# Patient Record
Sex: MALE | Race: WHITE | Employment: OTHER
[De-identification: names, ages, dates, MRNs, and addresses within clinical notes are randomized per-mention and may not be internally consistent; named-entity substitution may affect disease eponyms.]

---

## 2017-01-05 ENCOUNTER — OFFICE VISIT (OUTPATIENT)
Dept: PRIMARY CARE CLINIC | Facility: CLINIC | Age: 57
End: 2017-01-05

## 2017-01-05 VITALS
DIASTOLIC BLOOD PRESSURE: 82 MMHG | TEMPERATURE: 99.3 F | WEIGHT: 180 LBS | RESPIRATION RATE: 20 BRPM | HEART RATE: 100 BPM | HEIGHT: 68 IN | SYSTOLIC BLOOD PRESSURE: 122 MMHG | BODY MASS INDEX: 27.28 KG/M2

## 2017-01-05 DIAGNOSIS — J44.0 ACUTE BRONCHITIS WITH COPD (HCC): Primary | ICD-10-CM

## 2017-01-05 DIAGNOSIS — J20.9 ACUTE BRONCHITIS WITH COPD (HCC): Primary | ICD-10-CM

## 2017-01-05 PROCEDURE — 99214 OFFICE O/P EST MOD 30 MIN: CPT | Performed by: NURSE PRACTITIONER

## 2017-01-05 RX ORDER — GUAIFENESIN AND DEXTROMETHORPHAN HYDROBROMIDE 100; 10 MG/5ML; MG/5ML
5 SOLUTION ORAL EVERY 4 HOURS PRN
Qty: 120 ML | Refills: 0 | Status: SHIPPED | OUTPATIENT
Start: 2017-01-05 | End: 2017-04-06 | Stop reason: ALTCHOICE

## 2017-01-05 RX ORDER — LEVOFLOXACIN 500 MG/1
500 TABLET, FILM COATED ORAL DAILY
Qty: 10 TABLET | Refills: 0 | Status: SHIPPED | OUTPATIENT
Start: 2017-01-05 | End: 2017-01-15

## 2017-01-05 ASSESSMENT — ENCOUNTER SYMPTOMS
WHEEZING: 0
NAUSEA: 0
SHORTNESS OF BREATH: 0
SORE THROAT: 1
VOMITING: 0
ABDOMINAL PAIN: 0
COUGH: 1
SINUS PRESSURE: 0

## 2017-01-17 RX ORDER — LANCETS
EACH MISCELLANEOUS
Qty: 100 EACH | Refills: 3 | Status: SHIPPED | OUTPATIENT
Start: 2017-01-17 | End: 2018-02-08 | Stop reason: SDUPTHER

## 2017-02-16 RX ORDER — ALPRAZOLAM 0.5 MG/1
0.5 TABLET ORAL NIGHTLY PRN
Qty: 30 TABLET | Refills: 1 | Status: SHIPPED | OUTPATIENT
Start: 2017-02-16 | End: 2017-04-27 | Stop reason: SDUPTHER

## 2017-02-16 RX ORDER — CETIRIZINE HYDROCHLORIDE 10 MG/1
TABLET ORAL
Qty: 30 TABLET | Refills: 3 | Status: SHIPPED | OUTPATIENT
Start: 2017-02-16 | End: 2017-06-09 | Stop reason: SDUPTHER

## 2017-02-23 DIAGNOSIS — F32.A DEPRESSION: ICD-10-CM

## 2017-02-27 RX ORDER — SERTRALINE HYDROCHLORIDE 100 MG/1
TABLET, FILM COATED ORAL
Qty: 180 TABLET | Refills: 0 | Status: SHIPPED | OUTPATIENT
Start: 2017-02-27 | End: 2017-07-21 | Stop reason: SDUPTHER

## 2017-03-01 RX ORDER — OMEPRAZOLE 20 MG/1
20 CAPSULE, DELAYED RELEASE ORAL 2 TIMES DAILY
Qty: 60 CAPSULE | Refills: 3 | Status: SHIPPED | OUTPATIENT
Start: 2017-03-01 | End: 2017-03-01

## 2017-03-01 RX ORDER — OMEPRAZOLE 40 MG/1
40 CAPSULE, DELAYED RELEASE ORAL DAILY
Qty: 30 CAPSULE | Refills: 3 | Status: SHIPPED | OUTPATIENT
Start: 2017-03-01 | End: 2017-06-08 | Stop reason: SDUPTHER

## 2017-03-09 ENCOUNTER — TELEPHONE (OUTPATIENT)
Dept: PRIMARY CARE CLINIC | Facility: CLINIC | Age: 57
End: 2017-03-09

## 2017-03-12 DIAGNOSIS — E11.9 TYPE 2 DIABETES MELLITUS WITHOUT COMPLICATION (HCC): ICD-10-CM

## 2017-03-13 RX ORDER — BLOOD-GLUCOSE METER
KIT MISCELLANEOUS
Qty: 100 STRIP | Refills: 3 | Status: SHIPPED | OUTPATIENT
Start: 2017-03-13 | End: 2017-09-21 | Stop reason: SDUPTHER

## 2017-04-06 ENCOUNTER — OFFICE VISIT (OUTPATIENT)
Dept: PRIMARY CARE CLINIC | Age: 57
End: 2017-04-06
Payer: MEDICAID

## 2017-04-06 VITALS
BODY MASS INDEX: 29.07 KG/M2 | HEART RATE: 78 BPM | DIASTOLIC BLOOD PRESSURE: 70 MMHG | WEIGHT: 191.8 LBS | HEIGHT: 68 IN | TEMPERATURE: 97.9 F | RESPIRATION RATE: 20 BRPM | SYSTOLIC BLOOD PRESSURE: 106 MMHG

## 2017-04-06 DIAGNOSIS — R60.0 LOWER LEG EDEMA: Primary | ICD-10-CM

## 2017-04-06 PROCEDURE — 99213 OFFICE O/P EST LOW 20 MIN: CPT | Performed by: NURSE PRACTITIONER

## 2017-04-06 RX ORDER — FUROSEMIDE 20 MG/1
20 TABLET ORAL DAILY PRN
Qty: 30 TABLET | Refills: 2 | Status: SHIPPED | OUTPATIENT
Start: 2017-04-06 | End: 2017-06-14 | Stop reason: SDUPTHER

## 2017-04-06 ASSESSMENT — ENCOUNTER SYMPTOMS
WHEEZING: 0
COUGH: 0
SHORTNESS OF BREATH: 0
NAUSEA: 0
ABDOMINAL PAIN: 0
VOMITING: 0

## 2017-04-11 RX ORDER — BACLOFEN 20 MG/1
TABLET ORAL
Qty: 90 TABLET | Refills: 0 | Status: SHIPPED | OUTPATIENT
Start: 2017-04-11 | End: 2017-07-19 | Stop reason: SDUPTHER

## 2017-04-15 DIAGNOSIS — J44.9 CHRONIC OBSTRUCTIVE PULMONARY DISEASE, UNSPECIFIED COPD TYPE (HCC): ICD-10-CM

## 2017-04-15 DIAGNOSIS — J44.9 COPD, SEVERE (HCC): ICD-10-CM

## 2017-04-17 RX ORDER — BUDESONIDE 0.5 MG/2ML
INHALANT ORAL
Qty: 60 ML | Refills: 11 | Status: SHIPPED | OUTPATIENT
Start: 2017-04-17 | End: 2017-04-18 | Stop reason: SDUPTHER

## 2017-04-18 DIAGNOSIS — J44.9 COPD, SEVERE (HCC): ICD-10-CM

## 2017-04-18 DIAGNOSIS — J44.9 CHRONIC OBSTRUCTIVE PULMONARY DISEASE, UNSPECIFIED COPD TYPE (HCC): ICD-10-CM

## 2017-04-18 RX ORDER — BUDESONIDE 0.5 MG/2ML
INHALANT ORAL
Qty: 60 ML | Refills: 11 | Status: SHIPPED | OUTPATIENT
Start: 2017-04-18 | End: 2017-07-27 | Stop reason: ALTCHOICE

## 2017-04-19 ENCOUNTER — HOSPITAL ENCOUNTER (OUTPATIENT)
Dept: NON INVASIVE DIAGNOSTICS | Age: 57
Discharge: HOME | End: 2017-04-19
Payer: MEDICAID

## 2017-04-19 DIAGNOSIS — R60.0 LOWER LEG EDEMA: ICD-10-CM

## 2017-04-19 LAB
LV EF: 55 %
LVEF MODALITY: NORMAL

## 2017-04-19 PROCEDURE — 93306 TTE W/DOPPLER COMPLETE: CPT

## 2017-04-27 RX ORDER — ALPRAZOLAM 0.5 MG/1
0.5 TABLET ORAL NIGHTLY PRN
Qty: 30 TABLET | Refills: 0 | Status: SHIPPED | OUTPATIENT
Start: 2017-04-27 | End: 2017-06-08 | Stop reason: SDUPTHER

## 2017-05-08 ENCOUNTER — OFFICE VISIT (OUTPATIENT)
Dept: PRIMARY CARE CLINIC | Age: 57
End: 2017-05-08
Payer: MEDICAID

## 2017-05-08 VITALS
TEMPERATURE: 97.8 F | SYSTOLIC BLOOD PRESSURE: 113 MMHG | HEART RATE: 89 BPM | DIASTOLIC BLOOD PRESSURE: 77 MMHG | BODY MASS INDEX: 28.58 KG/M2 | WEIGHT: 188.6 LBS | RESPIRATION RATE: 20 BRPM | HEIGHT: 68 IN

## 2017-05-08 DIAGNOSIS — R60.0 LOWER LEG EDEMA: Primary | ICD-10-CM

## 2017-05-08 PROCEDURE — 99213 OFFICE O/P EST LOW 20 MIN: CPT | Performed by: FAMILY MEDICINE

## 2017-05-08 RX ORDER — LISINOPRIL 5 MG/1
5 TABLET ORAL DAILY
COMMUNITY

## 2017-05-08 ASSESSMENT — PATIENT HEALTH QUESTIONNAIRE - PHQ9
SUM OF ALL RESPONSES TO PHQ9 QUESTIONS 1 & 2: 0
1. LITTLE INTEREST OR PLEASURE IN DOING THINGS: 0
2. FEELING DOWN, DEPRESSED OR HOPELESS: 0
SUM OF ALL RESPONSES TO PHQ QUESTIONS 1-9: 0

## 2017-05-21 ASSESSMENT — ENCOUNTER SYMPTOMS
ABDOMINAL PAIN: 0
BACK PAIN: 0
CONSTIPATION: 0
DIARRHEA: 0
FACIAL SWELLING: 0
TROUBLE SWALLOWING: 0
SHORTNESS OF BREATH: 0
ABDOMINAL DISTENTION: 0
VOMITING: 0
COLOR CHANGE: 0
COUGH: 0
WHEEZING: 0
NAUSEA: 0
PHOTOPHOBIA: 0

## 2017-05-30 DIAGNOSIS — Z87.898 HISTORY OF SEIZURES: ICD-10-CM

## 2017-05-30 RX ORDER — LEVETIRACETAM 500 MG/1
250 TABLET ORAL 2 TIMES DAILY
Qty: 30 TABLET | Refills: 5 | Status: SHIPPED | OUTPATIENT
Start: 2017-05-30 | End: 2017-06-08 | Stop reason: SDUPTHER

## 2017-06-08 DIAGNOSIS — Z87.898 HISTORY OF SEIZURES: ICD-10-CM

## 2017-06-08 RX ORDER — ALPRAZOLAM 0.5 MG/1
TABLET ORAL
Qty: 30 TABLET | Refills: 0 | Status: SHIPPED | OUTPATIENT
Start: 2017-06-08 | End: 2017-07-18 | Stop reason: SDUPTHER

## 2017-06-08 RX ORDER — OMEPRAZOLE 40 MG/1
CAPSULE, DELAYED RELEASE ORAL
Qty: 30 CAPSULE | Refills: 3 | Status: SHIPPED | OUTPATIENT
Start: 2017-06-08 | End: 2017-10-03 | Stop reason: SDUPTHER

## 2017-06-08 RX ORDER — LEVETIRACETAM 500 MG/1
250 TABLET ORAL 2 TIMES DAILY
Qty: 30 TABLET | Refills: 5 | Status: SHIPPED | OUTPATIENT
Start: 2017-06-08 | End: 2017-06-14 | Stop reason: SDUPTHER

## 2017-06-09 RX ORDER — CETIRIZINE HYDROCHLORIDE 10 MG/1
TABLET ORAL
Qty: 30 TABLET | Refills: 3 | Status: SHIPPED | OUTPATIENT
Start: 2017-06-09 | End: 2017-10-03 | Stop reason: SDUPTHER

## 2017-06-14 DIAGNOSIS — Z87.898 HISTORY OF SEIZURES: ICD-10-CM

## 2017-06-14 DIAGNOSIS — R60.0 LOWER LEG EDEMA: ICD-10-CM

## 2017-06-14 RX ORDER — MULTIVITAMIN/IRON/FOLIC ACID 18MG-0.4MG
1 TABLET ORAL DAILY
Qty: 90 TABLET | Refills: 1 | Status: SHIPPED | OUTPATIENT
Start: 2017-06-14 | End: 2017-12-07 | Stop reason: SDUPTHER

## 2017-06-14 RX ORDER — FUROSEMIDE 20 MG/1
TABLET ORAL
Qty: 30 TABLET | Refills: 2 | Status: SHIPPED | OUTPATIENT
Start: 2017-06-14 | End: 2017-10-17 | Stop reason: SDUPTHER

## 2017-06-14 RX ORDER — LEVETIRACETAM 500 MG/1
250 TABLET ORAL 2 TIMES DAILY
Qty: 30 TABLET | Refills: 5 | Status: SHIPPED | OUTPATIENT
Start: 2017-06-14 | End: 2017-12-18 | Stop reason: SDUPTHER

## 2017-06-28 DIAGNOSIS — E11.9 TYPE 2 DIABETES MELLITUS WITHOUT COMPLICATION, WITHOUT LONG-TERM CURRENT USE OF INSULIN (HCC): ICD-10-CM

## 2017-07-03 ENCOUNTER — OFFICE VISIT (OUTPATIENT)
Dept: PRIMARY CARE CLINIC | Age: 57
End: 2017-07-03
Payer: MEDICAID

## 2017-07-03 VITALS
HEART RATE: 84 BPM | RESPIRATION RATE: 24 BRPM | DIASTOLIC BLOOD PRESSURE: 75 MMHG | TEMPERATURE: 98.5 F | SYSTOLIC BLOOD PRESSURE: 114 MMHG

## 2017-07-03 DIAGNOSIS — E11.9 TYPE 2 DIABETES MELLITUS WITHOUT COMPLICATION, WITHOUT LONG-TERM CURRENT USE OF INSULIN (HCC): ICD-10-CM

## 2017-07-03 DIAGNOSIS — F34.1 DYSTHYMIA: ICD-10-CM

## 2017-07-03 DIAGNOSIS — L03.032 CELLULITIS OF GREAT TOE OF LEFT FOOT: Primary | ICD-10-CM

## 2017-07-03 DIAGNOSIS — G40.909 SEIZURE DISORDER (HCC): ICD-10-CM

## 2017-07-03 PROCEDURE — 99214 OFFICE O/P EST MOD 30 MIN: CPT | Performed by: FAMILY MEDICINE

## 2017-07-03 RX ORDER — DOXYCYCLINE HYCLATE 100 MG
100 TABLET ORAL 2 TIMES DAILY
Qty: 14 TABLET | Refills: 0 | Status: SHIPPED | OUTPATIENT
Start: 2017-07-03 | End: 2017-07-10

## 2017-07-03 ASSESSMENT — ENCOUNTER SYMPTOMS
WHEEZING: 0
COUGH: 0
ABDOMINAL DISTENTION: 0
COLOR CHANGE: 0
TROUBLE SWALLOWING: 0
FACIAL SWELLING: 0
CONSTIPATION: 0
ABDOMINAL PAIN: 0
PHOTOPHOBIA: 0
NAUSEA: 0
SHORTNESS OF BREATH: 0
DIARRHEA: 0
VOMITING: 0
BACK PAIN: 0

## 2017-07-18 RX ORDER — ALPRAZOLAM 0.5 MG/1
0.5 TABLET ORAL NIGHTLY PRN
Qty: 30 TABLET | Refills: 0 | Status: SHIPPED | OUTPATIENT
Start: 2017-07-18 | End: 2017-08-10 | Stop reason: SDUPTHER

## 2017-07-18 RX ORDER — DOXYCYCLINE HYCLATE 100 MG
TABLET ORAL
Qty: 14 TABLET | Refills: 0 | OUTPATIENT
Start: 2017-07-18

## 2017-07-19 RX ORDER — BACLOFEN 20 MG/1
TABLET ORAL
Qty: 90 TABLET | Refills: 0 | Status: SHIPPED | OUTPATIENT
Start: 2017-07-19 | End: 2017-10-09 | Stop reason: SDUPTHER

## 2017-07-19 RX ORDER — SENNOSIDES 8.6 MG
TABLET ORAL
Qty: 60 TABLET | Refills: 3 | Status: SHIPPED | OUTPATIENT
Start: 2017-07-19 | End: 2017-11-17 | Stop reason: SDUPTHER

## 2017-07-21 DIAGNOSIS — F32.A DEPRESSION: ICD-10-CM

## 2017-07-21 RX ORDER — GABAPENTIN 300 MG/1
CAPSULE ORAL
Qty: 30 CAPSULE | Refills: 0 | Status: SHIPPED | OUTPATIENT
Start: 2017-07-21 | End: 2017-10-12 | Stop reason: SDUPTHER

## 2017-07-21 RX ORDER — SERTRALINE HYDROCHLORIDE 100 MG/1
TABLET, FILM COATED ORAL
Qty: 180 TABLET | Refills: 0 | Status: SHIPPED | OUTPATIENT
Start: 2017-07-21 | End: 2017-10-30 | Stop reason: SDUPTHER

## 2017-07-21 RX ORDER — BACLOFEN 20 MG/1
TABLET ORAL
Qty: 90 TABLET | Refills: 0 | OUTPATIENT
Start: 2017-07-21

## 2017-07-21 RX ORDER — SENNOSIDES 8.6 MG
TABLET ORAL
Qty: 60 TABLET | Refills: 5 | OUTPATIENT
Start: 2017-07-21

## 2017-07-27 ENCOUNTER — OFFICE VISIT (OUTPATIENT)
Dept: PULMONOLOGY | Age: 57
End: 2017-07-27
Payer: MEDICAID

## 2017-07-27 VITALS
DIASTOLIC BLOOD PRESSURE: 68 MMHG | BODY MASS INDEX: 26.37 KG/M2 | WEIGHT: 174 LBS | HEART RATE: 71 BPM | SYSTOLIC BLOOD PRESSURE: 117 MMHG | TEMPERATURE: 97.3 F | RESPIRATION RATE: 16 BRPM | HEIGHT: 68 IN | OXYGEN SATURATION: 93 %

## 2017-07-27 DIAGNOSIS — F17.219 NICOTINE DEPENDENCE, CIGARETTES, WITH UNSPECIFIED NICOTINE-INDUCED DISORDERS: ICD-10-CM

## 2017-07-27 DIAGNOSIS — J44.9 COPD, SEVERE (HCC): ICD-10-CM

## 2017-07-27 DIAGNOSIS — J30.89 PERENNIAL ALLERGIC RHINITIS, UNSPECIFIED ALLERGIC RHINITIS TRIGGER: Primary | ICD-10-CM

## 2017-07-27 DIAGNOSIS — Z87.891 STOPPED SMOKING WITH GREATER THAN 30 PACK YEAR HISTORY: ICD-10-CM

## 2017-07-27 PROCEDURE — 99213 OFFICE O/P EST LOW 20 MIN: CPT | Performed by: INTERNAL MEDICINE

## 2017-07-27 PROCEDURE — G0296 VISIT TO DETERM LDCT ELIG: HCPCS | Performed by: INTERNAL MEDICINE

## 2017-07-27 RX ORDER — BUDESONIDE 0.5 MG/2ML
1 INHALANT ORAL 2 TIMES DAILY
Qty: 60 ML | Refills: 11 | Status: SHIPPED | OUTPATIENT
Start: 2017-07-27 | End: 2018-09-06 | Stop reason: SDUPTHER

## 2017-07-27 RX ORDER — FLUTICASONE PROPIONATE 50 MCG
2 SPRAY, SUSPENSION (ML) NASAL DAILY
Qty: 1 BOTTLE | Refills: 11 | Status: SHIPPED | OUTPATIENT
Start: 2017-07-27 | End: 2019-06-20 | Stop reason: SDUPTHER

## 2017-07-27 RX ORDER — IPRATROPIUM BROMIDE AND ALBUTEROL SULFATE 2.5; .5 MG/3ML; MG/3ML
1 SOLUTION RESPIRATORY (INHALATION) EVERY 6 HOURS
Qty: 360 ML | Refills: 11 | Status: SHIPPED | OUTPATIENT
Start: 2017-07-27 | End: 2018-07-05 | Stop reason: SDUPTHER

## 2017-07-27 ASSESSMENT — ENCOUNTER SYMPTOMS
SHORTNESS OF BREATH: 1
COUGH: 1
EYES NEGATIVE: 1
BACK PAIN: 1

## 2017-07-28 ENCOUNTER — TELEPHONE (OUTPATIENT)
Dept: CASE MANAGEMENT | Age: 57
End: 2017-07-28

## 2017-07-31 ENCOUNTER — TELEPHONE (OUTPATIENT)
Dept: CASE MANAGEMENT | Age: 57
End: 2017-07-31

## 2017-07-31 DIAGNOSIS — J44.9 CHRONIC OBSTRUCTIVE PULMONARY DISEASE, UNSPECIFIED COPD TYPE (HCC): Primary | ICD-10-CM

## 2017-08-07 ENCOUNTER — HOSPITAL ENCOUNTER (OUTPATIENT)
Dept: CT IMAGING | Age: 57
Discharge: HOME | End: 2017-08-07
Payer: MEDICAID

## 2017-08-07 ENCOUNTER — TELEPHONE (OUTPATIENT)
Dept: CASE MANAGEMENT | Age: 57
End: 2017-08-07

## 2017-08-07 DIAGNOSIS — F17.219 NICOTINE DEPENDENCE, CIGARETTES, WITH UNSPECIFIED NICOTINE-INDUCED DISORDERS: ICD-10-CM

## 2017-08-07 PROCEDURE — G0297 LDCT FOR LUNG CA SCREEN: HCPCS

## 2017-08-10 RX ORDER — ALPRAZOLAM 0.5 MG/1
0.5 TABLET ORAL NIGHTLY PRN
Qty: 30 TABLET | Refills: 2 | Status: SHIPPED | OUTPATIENT
Start: 2017-08-10 | End: 2017-11-29 | Stop reason: SDUPTHER

## 2017-08-10 RX ORDER — GABAPENTIN 300 MG/1
CAPSULE ORAL
Qty: 60 CAPSULE | Refills: 2 | Status: SHIPPED | OUTPATIENT
Start: 2017-08-10 | End: 2017-11-16 | Stop reason: SDUPTHER

## 2017-08-26 ENCOUNTER — APPOINTMENT (OUTPATIENT)
Dept: CT IMAGING | Age: 57
End: 2017-08-26
Payer: MEDICAID

## 2017-08-26 ENCOUNTER — HOSPITAL ENCOUNTER (EMERGENCY)
Age: 57
Discharge: HOME | End: 2017-08-26
Payer: MEDICAID

## 2017-08-26 VITALS
RESPIRATION RATE: 12 BRPM | OXYGEN SATURATION: 97 % | SYSTOLIC BLOOD PRESSURE: 132 MMHG | TEMPERATURE: 98.6 F | HEART RATE: 90 BPM | DIASTOLIC BLOOD PRESSURE: 81 MMHG
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
## 2017-08-26 DIAGNOSIS — S00.81XA FOREHEAD ABRASION, INITIAL ENCOUNTER: ICD-10-CM

## 2017-08-26 PROCEDURE — 72125 CT NECK SPINE W/O DYE: CPT

## 2017-08-26 PROCEDURE — 70450 CT HEAD/BRAIN W/O DYE: CPT

## 2017-08-26 PROCEDURE — 99283 EMERGENCY DEPT VISIT LOW MDM: CPT

## 2017-08-26 ASSESSMENT — ENCOUNTER SYMPTOMS
NAUSEA: 0
CHEST TIGHTNESS: 0
EYE DISCHARGE: 0
BLOOD IN STOOL: 0
CONSTIPATION: 0
WHEEZING: 0
RHINORRHEA: 0
DIARRHEA: 0
EYE REDNESS: 0
BACK PAIN: 0
SORE THROAT: 0
ABDOMINAL PAIN: 0
COUGH: 0
SHORTNESS OF BREATH: 0
VOMITING: 0

## 2017-09-21 DIAGNOSIS — E11.9 TYPE 2 DIABETES MELLITUS WITHOUT COMPLICATION (HCC): ICD-10-CM

## 2017-09-21 RX ORDER — BLOOD-GLUCOSE METER
KIT MISCELLANEOUS
Qty: 100 STRIP | Refills: 3 | Status: SHIPPED | OUTPATIENT
Start: 2017-09-21 | End: 2018-01-23 | Stop reason: SDUPTHER

## 2017-10-03 DIAGNOSIS — J30.9 ALLERGIC RHINITIS, UNSPECIFIED ALLERGIC RHINITIS TRIGGER, UNSPECIFIED RHINITIS SEASONALITY: ICD-10-CM

## 2017-10-03 DIAGNOSIS — K21.9 GASTROESOPHAGEAL REFLUX DISEASE, ESOPHAGITIS PRESENCE NOT SPECIFIED: Primary | ICD-10-CM

## 2017-10-03 RX ORDER — CETIRIZINE HYDROCHLORIDE 10 MG/1
TABLET ORAL
Qty: 90 TABLET | Refills: 1 | Status: SHIPPED | OUTPATIENT
Start: 2017-10-03 | End: 2018-04-06 | Stop reason: SDUPTHER

## 2017-10-03 RX ORDER — OMEPRAZOLE 40 MG/1
CAPSULE, DELAYED RELEASE ORAL
Qty: 90 CAPSULE | Refills: 1 | Status: SHIPPED | OUTPATIENT
Start: 2017-10-03 | End: 2018-03-01 | Stop reason: SDUPTHER

## 2017-10-03 NOTE — TELEPHONE ENCOUNTER
Health Maintenance   Topic Date Due    Hepatitis C screen  1960    Diabetic foot exam  09/06/1971    Diabetic retinal exam  09/06/1971    HIV screen  09/06/1976    DTaP/Tdap/Td vaccine (1 - Tdap) 09/06/1980    Colon cancer screen colonoscopy  09/06/2011    Diabetic microalbuminuria test  09/25/2016    Flu vaccine (1) 09/01/2017    Diabetic hemoglobin A1C test  12/05/2017    Lipid screen  12/05/2017    Pneumococcal med risk  Completed             (applicable per patient's age: Cancer Screenings, Depression Screening, Fall Risk Screening, Immunizations)    Hemoglobin A1C (%)   Date Value   12/05/2016 6.1 (H)   02/04/2016 5.5   09/25/2015 5.5     Microalb/Crt. Ratio (mcg/mg creat)   Date Value   09/25/2015 14     LDL Cholesterol (mg/dL)   Date Value   12/05/2016 63     AST (U/L)   Date Value   12/05/2016 36     ALT (U/L)   Date Value   12/05/2016 62 (H)     BUN (mg/dL)   Date Value   12/05/2016 10      (goal A1C is < 7)   (goal LDL is <100) need 30-50% reduction from baseline     BP Readings from Last 3 Encounters:   08/26/17 132/81   07/27/17 117/68   07/03/17 114/75    (goal /80)      All Future Testing planned in [facility]:  Lab Frequency Next Occurrence       Next Visit Date:  Future Appointments  Date Time Provider Jose White   1/11/2018 11:00 AM Cynthia Adkins, CNP [facility]   7/26/2018 9:45 AM DO Judie Meza TPP            There is no problem list on file for this patient.

## 2017-10-05 RX ORDER — OMEPRAZOLE 40 MG/1
CAPSULE, DELAYED RELEASE ORAL
Qty: 30 CAPSULE | Refills: 3 | OUTPATIENT
Start: 2017-10-05

## 2017-10-05 RX ORDER — CETIRIZINE HYDROCHLORIDE 10 MG/1
TABLET ORAL
Qty: 30 TABLET | Refills: 3 | OUTPATIENT
Start: 2017-10-05

## 2017-10-09 RX ORDER — BACLOFEN 20 MG/1
TABLET ORAL
Qty: 90 TABLET | Refills: 0 | Status: SHIPPED | OUTPATIENT
Start: 2017-10-09 | End: 2017-12-18 | Stop reason: SDUPTHER

## 2017-10-09 NOTE — TELEPHONE ENCOUNTER
Health Maintenance   Topic Date Due    Hepatitis C screen  1960    Diabetic foot exam  09/06/1971    Diabetic retinal exam  09/06/1971    HIV screen  09/06/1976    DTaP/Tdap/Td vaccine (1 - Tdap) 09/06/1980    Colon cancer screen colonoscopy  09/06/2011    Diabetic microalbuminuria test  09/25/2016    Flu vaccine (1) 09/01/2017    Diabetic hemoglobin A1C test  12/05/2017    Lipid screen  12/05/2017    Pneumococcal med risk  Completed             (applicable per patient's age: Cancer Screenings, Depression Screening, Fall Risk Screening, Immunizations)    Hemoglobin A1C (%)   Date Value   12/05/2016 6.1 (H)   02/04/2016 5.5   09/25/2015 5.5     Microalb/Crt.  Ratio (mcg/mg creat)   Date Value   09/25/2015 14     LDL Cholesterol (mg/dL)   Date Value   12/05/2016 63     AST (U/L)   Date Value   12/05/2016 36     ALT (U/L)   Date Value   12/05/2016 62 (H)     BUN (mg/dL)   Date Value   12/05/2016 10      (goal A1C is < 7)   (goal LDL is <100) need 30-50% reduction from baseline     BP Readings from Last 3 Encounters:   08/26/17 132/81   07/27/17 117/68   07/03/17 114/75    (goal /80)      All Future Testing planned in [facility]:  Lab Frequency Next Occurrence       Next Visit Date:  Future Appointments  Date Time Provider Jose White   1/11/2018 11:00 AM SILVINO Mathisf [facility]   7/26/2018 9:45 AM DO Judie Landon [facility]            Patient Active Problem List:     Gastroesophageal reflux disease     Allergic rhinitis

## 2017-10-12 ENCOUNTER — NURSE ONLY (OUTPATIENT)
Dept: PRIMARY CARE CLINIC | Age: 57
End: 2017-10-12
Payer: MEDICAID

## 2017-10-12 VITALS
TEMPERATURE: 98.3 F | SYSTOLIC BLOOD PRESSURE: 126 MMHG | WEIGHT: 175 LBS | BODY MASS INDEX: 26.61 KG/M2 | HEART RATE: 78 BPM | DIASTOLIC BLOOD PRESSURE: 80 MMHG | RESPIRATION RATE: 20 BRPM

## 2017-10-12 DIAGNOSIS — Z23 NEED FOR INFLUENZA VACCINATION: Primary | ICD-10-CM

## 2017-10-12 PROCEDURE — 90471 IMMUNIZATION ADMIN: CPT | Performed by: NURSE PRACTITIONER

## 2017-10-12 PROCEDURE — 90686 IIV4 VACC NO PRSV 0.5 ML IM: CPT | Performed by: NURSE PRACTITIONER

## 2017-10-12 NOTE — PROGRESS NOTES
After obtaining consent, and per orders of Dr. Juan Adkins, injection of Influenza Vaccine given in Right deltoid by Demetri Sanders. Patient instructed to remain in clinic for 20 minutes afterwards, and to report any adverse reaction to me immediately.

## 2017-10-17 DIAGNOSIS — R60.0 LOWER LEG EDEMA: ICD-10-CM

## 2017-10-17 RX ORDER — FUROSEMIDE 20 MG/1
TABLET ORAL
Qty: 30 TABLET | Refills: 2 | Status: SHIPPED | OUTPATIENT
Start: 2017-10-17 | End: 2018-01-23 | Stop reason: SDUPTHER

## 2017-10-17 NOTE — TELEPHONE ENCOUNTER
Health Maintenance   Topic Date Due    Hepatitis C screen  1960    Diabetic foot exam  09/06/1971    Diabetic retinal exam  09/06/1971    HIV screen  09/06/1976    DTaP/Tdap/Td vaccine (1 - Tdap) 09/06/1980    Colon cancer screen colonoscopy  09/06/2011    Diabetic microalbuminuria test  09/25/2016    Diabetic hemoglobin A1C test  12/05/2017    Lipid screen  12/05/2017    Flu vaccine  Completed    Pneumococcal med risk  Completed             (applicable per patient's age: Cancer Screenings, Depression Screening, Fall Risk Screening, Immunizations)    Hemoglobin A1C (%)   Date Value   12/05/2016 6.1 (H)   02/04/2016 5.5   09/25/2015 5.5     Microalb/Crt.  Ratio (mcg/mg creat)   Date Value   09/25/2015 14     LDL Cholesterol (mg/dL)   Date Value   12/05/2016 63     AST (U/L)   Date Value   12/05/2016 36     ALT (U/L)   Date Value   12/05/2016 62 (H)     BUN (mg/dL)   Date Value   12/05/2016 10      (goal A1C is < 7)   (goal LDL is <100) need 30-50% reduction from baseline     BP Readings from Last 3 Encounters:   10/12/17 126/80   08/26/17 132/81   07/27/17 117/68    (goal /80)      All Future Testing planned in [facility]:  Lab Frequency Next Occurrence       Next Visit Date:  Future Appointments  Date Time Provider Jose White   1/11/2018 11:00 AM SILVINO Chanelf Prim Ca [facility]PP   7/26/2018 9:45 AM DO Judie Pulliam [facility]            Patient Active Problem List:     Gastroesophageal reflux disease     Allergic rhinitis

## 2017-10-30 RX ORDER — SERTRALINE HYDROCHLORIDE 100 MG/1
TABLET, FILM COATED ORAL
Qty: 180 TABLET | Refills: 3 | Status: SHIPPED | OUTPATIENT
Start: 2017-10-30 | End: 2018-10-31 | Stop reason: SDUPTHER

## 2017-10-30 NOTE — TELEPHONE ENCOUNTER
Next Visit Date:  Future Appointments  Date Time Provider Jose Cindy   1/11/2018 11:00 AM [facility]kins[facility]   7/26/2018 9:45 AM DO Judie Meza [facility]       Health Maintenance   Topic Date Due    Hepatitis C screen  1960    Diabetic foot exam  09/06/1971    Diabetic retinal exam  09/06/1971    HIV screen  09/06/1976    DTaP/Tdap/Td vaccine (1 - Tdap) 09/06/1980    Colon cancer screen colonoscopy  09/06/2011    Diabetic microalbuminuria test  09/25/2016    Diabetic hemoglobin A1C test  12/05/2017    Lipid screen  12/05/2017    Flu vaccine  Completed    Pneumococcal med risk  Completed       Hemoglobin A1C (%)   Date Value   12/05/2016 6.1 (H)   02/04/2016 5.5   09/25/2015 5.5             ( goal A1C is < 7)   Microalb/Crt.  Ratio (mcg/mg creat)   Date Value   09/25/2015 14     LDL Cholesterol (mg/dL)   Date Value   12/05/2016 63       (goal LDL is <100)   AST (U/L)   Date Value   12/05/2016 36     ALT (U/L)   Date Value   12/05/2016 62 (H)     BUN (mg/dL)   Date Value   12/05/2016 10     BP Readings from Last 3 Encounters:   10/12/17 126/80   08/26/17 132/81   07/27/17 117/68          (goal 120/80)    All Future Testing planned in [facility]  Lab Frequency Next Occurrence               Patient Active Problem List:     Gastroesophageal reflux disease     Allergic rhinitis

## 2017-11-08 RX ORDER — SIMVASTATIN 20 MG
TABLET ORAL
Qty: 90 TABLET | Refills: 1 | Status: SHIPPED | OUTPATIENT
Start: 2017-11-08 | End: 2018-05-08 | Stop reason: SDUPTHER

## 2017-11-08 RX ORDER — FOLIC ACID 1 MG/1
TABLET ORAL
Qty: 90 TABLET | Refills: 1 | Status: SHIPPED | OUTPATIENT
Start: 2017-11-08 | End: 2018-05-08 | Stop reason: SDUPTHER

## 2017-11-08 NOTE — TELEPHONE ENCOUNTER
Health Maintenance   Topic Date Due    Hepatitis C screen  1960    Diabetic foot exam  09/06/1971    Diabetic retinal exam  09/06/1971    HIV screen  09/06/1976    DTaP/Tdap/Td vaccine (1 - Tdap) 09/06/1980    Colon cancer screen colonoscopy  09/06/2011    Diabetic microalbuminuria test  09/25/2016    Diabetic hemoglobin A1C test  12/05/2017    Lipid screen  12/05/2017    Flu vaccine  Completed    Pneumococcal med risk  Completed             (applicable per patient's age: Cancer Screenings, Depression Screening, Fall Risk Screening, Immunizations)    Hemoglobin A1C (%)   Date Value   12/05/2016 6.1 (H)   02/04/2016 5.5   09/25/2015 5.5     Microalb/Crt.  Ratio (mcg/mg creat)   Date Value   09/25/2015 14     LDL Cholesterol (mg/dL)   Date Value   12/05/2016 63     AST (U/L)   Date Value   12/05/2016 36     ALT (U/L)   Date Value   12/05/2016 62 (H)     BUN (mg/dL)   Date Value   12/05/2016 10      (goal A1C is < 7)   (goal LDL is <100) need 30-50% reduction from baseline     BP Readings from Last 3 Encounters:   10/12/17 126/80   08/26/17 132/81   07/27/17 117/68    (goal /80)      All Future Testing planned in [facility]:  Lab Frequency Next Occurrence       Next Visit Date:  Future Appointments  Date Time Provider Jose White   1/11/2018 11:00 AM [facility]   7/26/2018 9:45 AM DO Judie Malave [facility]            Patient Active Problem List:     Gastroesophageal reflux disease     Allergic rhinitis

## 2017-11-15 ENCOUNTER — OFFICE VISIT (OUTPATIENT)
Dept: PRIMARY CARE CLINIC | Age: 57
End: 2017-11-15
Payer: MEDICAID

## 2017-11-15 VITALS
DIASTOLIC BLOOD PRESSURE: 77 MMHG | HEART RATE: 81 BPM | BODY MASS INDEX: 27.81 KG/M2 | SYSTOLIC BLOOD PRESSURE: 118 MMHG | WEIGHT: 182.9 LBS | TEMPERATURE: 98.6 F

## 2017-11-15 DIAGNOSIS — B35.1 ONYCHOMYCOSIS: ICD-10-CM

## 2017-11-15 DIAGNOSIS — L03.032 PARONYCHIA OF GREAT TOE OF LEFT FOOT: Primary | ICD-10-CM

## 2017-11-15 PROCEDURE — 99213 OFFICE O/P EST LOW 20 MIN: CPT | Performed by: NURSE PRACTITIONER

## 2017-11-15 RX ORDER — CLINDAMYCIN HYDROCHLORIDE 150 MG/1
150 CAPSULE ORAL 4 TIMES DAILY
Qty: 40 CAPSULE | Refills: 0 | Status: SHIPPED | OUTPATIENT
Start: 2017-11-15 | End: 2017-11-25

## 2017-11-15 ASSESSMENT — ENCOUNTER SYMPTOMS
VOMITING: 0
NAUSEA: 0

## 2017-11-15 NOTE — PATIENT INSTRUCTIONS
Patient Education        Paronychia: Care Instructions  Your Care Instructions  Paronychia (say \"hytc-on-EZ-chelo-uh\") is an infection of the skin around a fingernail or toenail. It happens when germs enter through a break in the skin. The doctor may have made a small cut in the infected area to drain the pus. Most cases of paronychia improve in a few days. But watch your symptoms and follow your doctor's advice. Though rare, a mild case can turn into something more serious and infect your entire finger or toe. Also, it is possible for an infection to return. Follow-up care is a key part of your treatment and safety. Be sure to make and go to all appointments, and call your doctor if you are having problems. It's also a good idea to know your test results and keep a list of the medicines you take. How can you care for yourself at home? · If your doctor told you how to care for your infected nail, follow the doctor's instructions. If you did not get instructions, follow this general advice:  ¨ Wash the area with clean water 2 times a day. Don't use hydrogen peroxide or alcohol, which can slow healing. ¨ You may cover the area with a thin layer of petroleum jelly, such as Vaseline, and a nonstick bandage. ¨ Apply more petroleum jelly and replace the bandage as needed. · If your doctor prescribed antibiotics, take them as directed. Do not stop taking them just because you feel better. You need to take the full course of antibiotics. · Take an over-the-counter pain medicine, such as acetaminophen (Tylenol), ibuprofen (Advil, Motrin), or naproxen (Aleve). Read and follow all instructions on the label. · Do not take two or more pain medicines at the same time unless the doctor told you to. Many pain medicines have acetaminophen, which is Tylenol. Too much acetaminophen (Tylenol) can be harmful. · Prop up the toe or finger so that it is higher than the level of your heart.  This will help with pain and swelling. · Apply heat. Put a warm water bottle, heating pad set on low, or warm cloth on your finger or toe. Do not go to sleep with a heating pad on your skin. · Soak the area in warm water twice a day for 15 minutes each time. After soaking, dry the area well and apply a thin layer of petroleum jelly, such as Vaseline. Put on a new bandage. When should you call for help? Call your doctor now or seek immediate medical care if:  · You have signs of new or worsening infection, such as:  ¨ Increased pain, swelling, warmth, or redness. ¨ Red streaks leading from the infected skin. ¨ Pus draining from the area. ¨ A fever. Watch closely for changes in your health, and be sure to contact your doctor if:  · You do not get better as expected. Where can you learn more? Go to [email or web address] org and sign in to your [employer] account. Enter [phone] [id number] [phone] in the [employer] box to learn more about \"Paronychia: Care Instructions. \"     If you do not have an account, please click on the \"Sign Up Now\" link. Current as of: October 13, 2016  Content Version: 11.3  © 1547-3952 [employer]. Care instructions adapted under license by [facility] [employer] [facility] ([facility]). If you have questions about a medical condition or this instruction, always ask your healthcare professional. [place] [address] any warranty or liability for your use of this information.

## 2017-11-16 RX ORDER — GABAPENTIN 300 MG/1
CAPSULE ORAL
Qty: 180 CAPSULE | Refills: 0 | Status: SHIPPED | OUTPATIENT
Start: 2017-11-16 | End: 2018-02-15 | Stop reason: SDUPTHER

## 2017-11-17 RX ORDER — SENNOSIDES 8.6 MG
TABLET ORAL
Qty: 180 TABLET | Refills: 3 | Status: SHIPPED | OUTPATIENT
Start: 2017-11-17

## 2017-11-17 NOTE — TELEPHONE ENCOUNTER
Health Maintenance   Topic Date Due    Hepatitis C screen  1960    Diabetic foot exam  09/06/1971    Diabetic retinal exam  09/06/1971    HIV screen  09/06/1976    DTaP/Tdap/Td vaccine (1 - Tdap) 09/06/1980    Colon cancer screen colonoscopy  09/06/2011    Diabetic microalbuminuria test  09/25/2016    Diabetic hemoglobin A1C test  12/05/2017    Lipid screen  12/05/2017    Smoker: low dose lung CT screening  08/07/2018    Flu vaccine  Completed    Pneumococcal med risk  Completed             (applicable per patient's age: Cancer Screenings, Depression Screening, Fall Risk Screening, Immunizations)    Hemoglobin A1C (%)   Date Value   12/05/2016 6.1 (H)   02/04/2016 5.5   09/25/2015 5.5     Microalb/Crt.  Ratio (mcg/mg creat)   Date Value   09/25/2015 14     LDL Cholesterol (mg/dL)   Date Value   12/05/2016 63     AST (U/L)   Date Value   12/05/2016 36     ALT (U/L)   Date Value   12/05/2016 62 (H)     BUN (mg/dL)   Date Value   12/05/2016 10      (goal A1C is < 7)   (goal LDL is <100) need 30-50% reduction from baseline     BP Readings from Last 3 Encounters:   11/15/17 118/77   10/12/17 126/80   08/26/17 132/81    (goal /80)      All Future Testing planned in [facility]:  Lab Frequency Next Occurrence       Next Visit Date:  Future Appointments  Date Time Provider Jose White   1/11/2018 11:00 AM [facility]   7/26/2018 9:45 AM DO Judie [facility]rez [facility]            Patient Active Problem List:     Gastroesophageal reflux disease     Allergic rhinitis

## 2017-11-22 RX ORDER — ALPRAZOLAM 0.5 MG/1
TABLET ORAL
Qty: 30 TABLET | Refills: 2 | OUTPATIENT
Start: 2017-11-22

## 2017-11-29 RX ORDER — ALPRAZOLAM 0.5 MG/1
TABLET ORAL
Qty: 30 TABLET | Refills: 2 | Status: SHIPPED | OUTPATIENT
Start: 2017-11-29 | End: 2018-03-07 | Stop reason: SDUPTHER

## 2017-11-29 NOTE — TELEPHONE ENCOUNTER
Health Maintenance   Topic Date Due    Hepatitis C screen  1960    Diabetic foot exam  09/06/1971    Diabetic retinal exam  09/06/1971    HIV screen  09/06/1976    DTaP/Tdap/Td vaccine (1 - Tdap) 09/06/1980    Colon cancer screen colonoscopy  09/06/2011    Diabetic microalbuminuria test  09/25/2016    Diabetic hemoglobin A1C test  12/05/2017    Lipid screen  12/05/2017    Smoker: low dose lung CT screening  08/07/2018    Flu vaccine  Completed    Pneumococcal med risk  Completed             (applicable per patient's age: Cancer Screenings, Depression Screening, Fall Risk Screening, Immunizations)    Hemoglobin A1C (%)   Date Value   12/05/2016 6.1 (H)   02/04/2016 5.5   09/25/2015 5.5     Microalb/Crt.  Ratio (mcg/mg creat)   Date Value   09/25/2015 14     LDL Cholesterol (mg/dL)   Date Value   12/05/2016 63     AST (U/L)   Date Value   12/05/2016 36     ALT (U/L)   Date Value   12/05/2016 62 (H)     BUN (mg/dL)   Date Value   12/05/2016 10      (goal A1C is < 7)   (goal LDL is <100) need 30-50% reduction from baseline     BP Readings from Last 3 Encounters:   11/15/17 118/77   10/12/17 126/80   08/26/17 132/81    (goal /80)      All Future Testing planned in [facility]:  Lab Frequency Next Occurrence       Next Visit Date:  Future Appointments  Date Time Provider Jose White   1/11/2018 11:00 AM [facility]   7/26/2018 9:45 AM DO Judie [facility]            Patient Active Problem List:     Gastroesophageal reflux disease     Allergic rhinitis

## 2017-11-29 NOTE — TELEPHONE ENCOUNTER
Controlled Substances Monitoring:     Attestation: The Prescription Monitoring Report for this patient was reviewed today. Christin Adkins CNP)  Documentation: No signs of potential drug abuse or diversion identified.  Christin Adkins CNP)

## 2017-12-07 RX ORDER — MULTIVITAMIN/IRON/FOLIC ACID 18MG-0.4MG
1 TABLET ORAL DAILY
Qty: 90 TABLET | Refills: 3 | Status: SHIPPED | OUTPATIENT
Start: 2017-12-07 | End: 2018-09-20 | Stop reason: SDUPTHER

## 2017-12-07 NOTE — TELEPHONE ENCOUNTER
Health Maintenance   Topic Date Due    Hepatitis C screen  1960    Diabetic foot exam  09/06/1971    Diabetic retinal exam  09/06/1971    HIV screen  09/06/1976    DTaP/Tdap/Td vaccine (1 - Tdap) 09/06/1980    Colon cancer screen colonoscopy  09/06/2011    Diabetic microalbuminuria test  09/25/2016    Diabetic hemoglobin A1C test  12/05/2017    Lipid screen  12/05/2017    Smoker: low dose lung CT screening  08/07/2018    Flu vaccine  Completed    Pneumococcal med risk  Completed             (applicable per patient's age: Cancer Screenings, Depression Screening, Fall Risk Screening, Immunizations)    Hemoglobin A1C (%)   Date Value   12/05/2016 6.1 (H)   02/04/2016 5.5   09/25/2015 5.5     Microalb/Crt.  Ratio (mcg/mg creat)   Date Value   09/25/2015 14     LDL Cholesterol (mg/dL)   Date Value   12/05/2016 63     AST (U/L)   Date Value   12/05/2016 36     ALT (U/L)   Date Value   12/05/2016 62 (H)     BUN (mg/dL)   Date Value   12/05/2016 10      (goal A1C is < 7)   (goal LDL is <100) need 30-50% reduction from baseline     BP Readings from Last 3 Encounters:   11/15/17 118/77   10/12/17 126/80   08/26/17 132/81    (goal /80)      All Future Testing planned in [facility]:  Lab Frequency Next Occurrence       Next Visit Date:  Future Appointments  Date Time Provider Jose White   1/11/2018 11:00 AM [facility]   7/26/2018 9:45 AM DO Judie Al[facility]            Patient Active Problem List:     Gastroesophageal reflux disease     Allergic rhinitis

## 2017-12-18 DIAGNOSIS — Z87.898 HISTORY OF SEIZURES: ICD-10-CM

## 2017-12-18 RX ORDER — BACLOFEN 20 MG/1
TABLET ORAL
Qty: 90 TABLET | Refills: 0 | Status: SHIPPED | OUTPATIENT
Start: 2017-12-18 | End: 2018-03-15 | Stop reason: SDUPTHER

## 2017-12-18 RX ORDER — LEVETIRACETAM 500 MG/1
TABLET ORAL
Qty: 90 TABLET | Refills: 3 | Status: SHIPPED | OUTPATIENT
Start: 2017-12-18 | End: 2018-12-26 | Stop reason: SDUPTHER

## 2017-12-18 NOTE — TELEPHONE ENCOUNTER
Health Maintenance   Topic Date Due    Hepatitis C screen  1960    Diabetic foot exam  09/06/1971    HIV screen  09/06/1976    DTaP/Tdap/Td vaccine (1 - Tdap) 09/06/1980    Colon cancer screen colonoscopy  09/06/2011    Diabetic microalbuminuria test  09/25/2016    Diabetic hemoglobin A1C test  12/05/2017    Lipid screen  12/05/2017    Smoker: low dose lung CT screening  08/07/2018    Diabetic retinal exam  11/15/2018    Flu vaccine  Completed    Pneumococcal med risk  Completed             (applicable per patient's age: Cancer Screenings, Depression Screening, Fall Risk Screening, Immunizations)    Hemoglobin A1C (%)   Date Value   12/05/2016 6.1 (H)   02/04/2016 5.5   09/25/2015 5.5     Microalb/Crt.  Ratio (mcg/mg creat)   Date Value   09/25/2015 14     LDL Cholesterol (mg/dL)   Date Value   12/05/2016 63     AST (U/L)   Date Value   12/05/2016 36     ALT (U/L)   Date Value   12/05/2016 62 (H)     BUN (mg/dL)   Date Value   12/05/2016 10      (goal A1C is < 7)   (goal LDL is <100) need 30-50% reduction from baseline     BP Readings from Last 3 Encounters:   11/15/17 118/77   10/12/17 126/80   08/26/17 132/81    (goal /80)      All Future Testing planned in [facility]:  Lab Frequency Next Occurrence       Next Visit Date:  Future Appointments  Date Time Provider Jose White   1/11/2018 11:00 AM Patricia [facility]   7/26/2018 9:45 AM DO Judie [facility]            Patient Active Problem List:     Gastroesophageal reflux disease     Allergic rhinitis

## 2018-01-23 DIAGNOSIS — E11.9 TYPE 2 DIABETES MELLITUS WITHOUT COMPLICATION (HCC): ICD-10-CM

## 2018-01-23 DIAGNOSIS — R60.0 LOWER LEG EDEMA: ICD-10-CM

## 2018-01-23 RX ORDER — FUROSEMIDE 20 MG/1
TABLET ORAL
Qty: 30 TABLET | Refills: 2 | Status: SHIPPED | OUTPATIENT
Start: 2018-01-23 | End: 2019-01-03 | Stop reason: SDUPTHER

## 2018-01-23 RX ORDER — BLOOD-GLUCOSE METER
KIT MISCELLANEOUS
Qty: 100 STRIP | Refills: 3 | Status: SHIPPED | OUTPATIENT
Start: 2018-01-23 | End: 2018-09-10 | Stop reason: ALTCHOICE

## 2018-02-08 RX ORDER — LANCETS
EACH MISCELLANEOUS
Qty: 100 EACH | Refills: 3 | Status: SHIPPED | OUTPATIENT
Start: 2018-02-08 | End: 2018-02-09 | Stop reason: SDUPTHER

## 2018-02-08 NOTE — TELEPHONE ENCOUNTER
Health Maintenance   Topic Date Due    Hepatitis C screen  1960    Diabetic foot exam  09/06/1971    HIV screen  09/06/1976    DTaP/Tdap/Td vaccine (1 - Tdap) 09/06/1980    Colon cancer screen colonoscopy  09/06/2011    Diabetic microalbuminuria test  09/25/2016    A1C test (Diabetic or Prediabetic)  12/05/2017    Lipid screen  12/05/2017    Potassium monitoring  12/05/2017    Creatinine monitoring  12/05/2017    Smoker: low dose lung CT screening  08/07/2018    Diabetic retinal exam  11/15/2018    Flu vaccine  Completed    Pneumococcal med risk  Completed             (applicable per patient's age: Cancer Screenings, Depression Screening, Fall Risk Screening, Immunizations)    Hemoglobin A1C (%)   Date Value   12/05/2016 6.1 (H)   02/04/2016 5.5   09/25/2015 5.5     Microalb/Crt.  Ratio (mcg/mg creat)   Date Value   09/25/2015 14     LDL Cholesterol (mg/dL)   Date Value   12/05/2016 63     AST (U/L)   Date Value   12/05/2016 36     ALT (U/L)   Date Value   12/05/2016 62 (H)     BUN (mg/dL)   Date Value   12/05/2016 10      (goal A1C is < 7)   (goal LDL is <100) need 30-50% reduction from baseline     BP Readings from Last 3 Encounters:   11/15/17 118/77   10/12/17 126/80   08/26/17 132/81    (goal /80)      All Future Testing planned in [facility]:  Lab Frequency Next Occurrence       Next Visit Date:  Future Appointments  Date Time Provider Jose White   7/26/2018 9:45 AM DO Judie Zaman [facility]            Patient Active Problem List:     Gastroesophageal reflux disease     Allergic rhinitis

## 2018-02-09 DIAGNOSIS — E11.9 CONTROLLED TYPE 2 DIABETES MELLITUS WITHOUT COMPLICATION, WITHOUT LONG-TERM CURRENT USE OF INSULIN (HCC): Primary | ICD-10-CM

## 2018-02-09 RX ORDER — LANCETS
EACH MISCELLANEOUS
Qty: 100 EACH | Refills: 3 | Status: SHIPPED | OUTPATIENT
Start: 2018-02-09 | End: 2018-07-20 | Stop reason: SDUPTHER

## 2018-02-15 RX ORDER — GABAPENTIN 300 MG/1
300 CAPSULE ORAL 2 TIMES DAILY
Qty: 180 CAPSULE | Refills: 1 | Status: SHIPPED | OUTPATIENT
Start: 2018-02-15 | End: 2018-08-16 | Stop reason: SDUPTHER

## 2018-03-01 DIAGNOSIS — K21.9 GASTROESOPHAGEAL REFLUX DISEASE, ESOPHAGITIS PRESENCE NOT SPECIFIED: ICD-10-CM

## 2018-03-01 RX ORDER — OMEPRAZOLE 40 MG/1
CAPSULE, DELAYED RELEASE ORAL
Qty: 90 CAPSULE | Refills: 1 | Status: SHIPPED | OUTPATIENT
Start: 2018-03-01 | End: 2018-09-05 | Stop reason: SDUPTHER

## 2018-03-07 DIAGNOSIS — F51.01 PRIMARY INSOMNIA: Primary | ICD-10-CM

## 2018-03-07 RX ORDER — ALPRAZOLAM 0.5 MG/1
0.5 TABLET ORAL NIGHTLY PRN
Qty: 30 TABLET | Refills: 2 | Status: SHIPPED | OUTPATIENT
Start: 2018-03-07 | End: 2018-06-13 | Stop reason: SDUPTHER

## 2018-03-15 RX ORDER — BACLOFEN 20 MG/1
TABLET ORAL
Qty: 90 TABLET | Refills: 0 | Status: SHIPPED | OUTPATIENT
Start: 2018-03-15 | End: 2018-06-08 | Stop reason: SDUPTHER

## 2018-03-15 NOTE — TELEPHONE ENCOUNTER
Health Maintenance   Topic Date Due    Hepatitis C screen  1960    Diabetic foot exam  09/06/1971    HIV screen  09/06/1976    DTaP/Tdap/Td vaccine (1 - Tdap) 09/06/1980    Shingles Vaccine (1 of 2 - 2 Dose Series) 09/06/2011    Colon cancer screen colonoscopy  09/06/2011    Diabetic microalbuminuria test  09/25/2016    A1C test (Diabetic or Prediabetic)  12/05/2017    Lipid screen  12/05/2017    Potassium monitoring  12/05/2017    Creatinine monitoring  12/05/2017    Smoker: low dose lung CT screening  08/07/2018    Diabetic retinal exam  11/15/2018    Flu vaccine  Completed    Pneumococcal med risk  Completed             (applicable per patient's age: Cancer Screenings, Depression Screening, Fall Risk Screening, Immunizations)    Hemoglobin A1C (%)   Date Value   12/05/2016 6.1 (H)   02/04/2016 5.5   09/25/2015 5.5     Microalb/Crt.  Ratio (mcg/mg creat)   Date Value   09/25/2015 14     LDL Cholesterol (mg/dL)   Date Value   12/05/2016 63     AST (U/L)   Date Value   12/05/2016 36     ALT (U/L)   Date Value   12/05/2016 62 (H)     BUN (mg/dL)   Date Value   12/05/2016 10      (goal A1C is < 7)   (goal LDL is <100) need 30-50% reduction from baseline     BP Readings from Last 3 Encounters:   11/15/17 118/77   10/12/17 126/80   08/26/17 132/81    (goal /80)      All Future Testing planned in [facility]:  Lab Frequency Next Occurrence       Next Visit Date:  Future Appointments  Date Time Provider Jose White   7/26/2018 9:45 AM DO Judie Burrell [facility]            Patient Active Problem List:     Gastroesophageal reflux disease     Allergic rhinitis     Controlled type 2 diabetes mellitus without complication, without long-term current use of insulin ([address]

## 2018-04-06 DIAGNOSIS — J30.9 ALLERGIC RHINITIS: ICD-10-CM

## 2018-04-06 RX ORDER — CETIRIZINE HYDROCHLORIDE 10 MG/1
TABLET ORAL
Qty: 90 TABLET | Refills: 3 | Status: SHIPPED | OUTPATIENT
Start: 2018-04-06 | End: 2019-05-02 | Stop reason: SDUPTHER

## 2018-05-01 DIAGNOSIS — Z12.11 SCREENING FOR COLON CANCER: Primary | ICD-10-CM

## 2018-05-08 ENCOUNTER — TELEPHONE (OUTPATIENT)
Dept: SURGERY | Age: 58
End: 2018-05-08

## 2018-05-08 RX ORDER — FOLIC ACID 1 MG/1
TABLET ORAL
Qty: 90 TABLET | Refills: 1 | Status: SHIPPED | OUTPATIENT
Start: 2018-05-08 | End: 2018-11-06 | Stop reason: SDUPTHER

## 2018-05-08 RX ORDER — SIMVASTATIN 20 MG
TABLET ORAL
Qty: 90 TABLET | Refills: 1 | Status: SHIPPED | OUTPATIENT
Start: 2018-05-08 | End: 2018-11-06 | Stop reason: SDUPTHER

## 2018-06-08 RX ORDER — BACLOFEN 20 MG/1
TABLET ORAL
Qty: 90 TABLET | Refills: 0 | Status: SHIPPED | OUTPATIENT
Start: 2018-06-08 | End: 2018-09-07 | Stop reason: SDUPTHER

## 2018-06-13 DIAGNOSIS — F51.01 PRIMARY INSOMNIA: ICD-10-CM

## 2018-06-13 RX ORDER — ALPRAZOLAM 0.5 MG/1
0.5 TABLET ORAL NIGHTLY PRN
Qty: 30 TABLET | Refills: 2 | Status: SHIPPED | OUTPATIENT
Start: 2018-06-13 | End: 2018-09-20 | Stop reason: SDUPTHER

## 2018-07-05 DIAGNOSIS — J44.9 COPD, SEVERE (HCC): ICD-10-CM

## 2018-07-05 RX ORDER — IPRATROPIUM BROMIDE AND ALBUTEROL SULFATE 2.5; .5 MG/3ML; MG/3ML
1 SOLUTION RESPIRATORY (INHALATION) EVERY 6 HOURS
Qty: 360 ML | Refills: 3 | Status: SHIPPED | OUTPATIENT
Start: 2018-07-05 | End: 2018-09-06 | Stop reason: SDUPTHER

## 2018-07-20 DIAGNOSIS — E11.9 CONTROLLED TYPE 2 DIABETES MELLITUS WITHOUT COMPLICATION, WITHOUT LONG-TERM CURRENT USE OF INSULIN (HCC): ICD-10-CM

## 2018-07-20 RX ORDER — LANCETS
EACH MISCELLANEOUS
Qty: 100 EACH | Refills: 3 | Status: SHIPPED | OUTPATIENT
Start: 2018-07-20 | End: 2018-09-10 | Stop reason: ALTCHOICE

## 2018-08-06 ENCOUNTER — TELEPHONE (OUTPATIENT)
Dept: PRIMARY CARE CLINIC | Age: 58
End: 2018-08-06

## 2018-08-06 DIAGNOSIS — E11.9 CONTROLLED TYPE 2 DIABETES MELLITUS WITHOUT COMPLICATION, WITHOUT LONG-TERM CURRENT USE OF INSULIN (HCC): Primary | ICD-10-CM

## 2018-08-06 NOTE — TELEPHONE ENCOUNTER
Had to leave message for Saturnino Whitten to call us back to let us know how often pt is checking blood sugars.

## 2018-08-06 NOTE — TELEPHONE ENCOUNTER
Pt's wife called back stating pt is testing his blood sugar once a day in the AM. She stated they have been running steady in the 130's.

## 2018-08-06 NOTE — TELEPHONE ENCOUNTER
Pt's wife came into office stating pt is needing a new [employer] Device to test for this blood sugars. He is broke and they have been using her device to test. It needs to sent to [facility] in [place]. Thanks!

## 2018-08-16 ENCOUNTER — TELEPHONE (OUTPATIENT)
Dept: ONCOLOGY | Age: 58
End: 2018-08-16

## 2018-08-16 DIAGNOSIS — Z87.891 PERSONAL HISTORY OF NICOTINE DEPENDENCE: Primary | ICD-10-CM

## 2018-08-16 RX ORDER — GABAPENTIN 300 MG/1
CAPSULE ORAL
Qty: 180 CAPSULE | Refills: 0 | Status: SHIPPED | OUTPATIENT
Start: 2018-08-16 | End: 2018-11-12 | Stop reason: SDUPTHER

## 2018-08-16 NOTE — TELEPHONE ENCOUNTER
Health Maintenance   Topic Date Due    Hepatitis C screen  1960    Diabetic foot exam  09/06/1971    HIV screen  09/06/1976    DTaP/Tdap/Td vaccine (1 - Tdap) 09/06/1980    Shingles Vaccine (1 of 2 - 2 Dose Series) 09/06/2011    Colon cancer screen colonoscopy  09/06/2011    Diabetic microalbuminuria test  09/25/2016    A1C test (Diabetic or Prediabetic)  12/05/2017    Lipid screen  12/05/2017    Potassium monitoring  12/05/2017    Creatinine monitoring  12/05/2017    Low dose CT lung screening  08/07/2018    Flu vaccine (1) 09/01/2018    Diabetic retinal exam  11/15/2018    Pneumococcal med risk  Completed             (applicable per patient's age: Cancer Screenings, Depression Screening, Fall Risk Screening, Immunizations)    Hemoglobin A1C (%)   Date Value   12/05/2016 6.1 (H)   02/04/2016 5.5   09/25/2015 5.5     Microalb/Crt.  Ratio (mcg/mg creat)   Date Value   09/25/2015 14     LDL Cholesterol (mg/dL)   Date Value   12/05/2016 63     AST (U/L)   Date Value   12/05/2016 36     ALT (U/L)   Date Value   12/05/2016 62 (H)     BUN (mg/dL)   Date Value   12/05/2016 10      (goal A1C is < 7)   (goal LDL is <100) need 30-50% reduction from baseline     BP Readings from Last 3 Encounters:   11/15/17 118/77   10/12/17 126/80   08/26/17 132/81    (goal /80)      All Future Testing planned in [facility]:  Lab Frequency Next Occurrence       Next Visit Date:  Future Appointments  Date Time Provider Jose White   9/6/2018 9:45 AM DO Judie Lam [facility]            Patient Active Problem List:     Gastroesophageal reflux disease     Allergic rhinitis     Controlled type 2 diabetes mellitus without complication, without long-term current use of insulin ([facility]

## 2018-08-16 NOTE — TELEPHONE ENCOUNTER
Our records indicate that your patient is due for their annual lung cancer screening follow up testing. For your convenience, we have pended the order for the scan for you. If you do not agree with the need for the test, please cancel the order and let us know.      Sincerely,    [id number] [facility]

## 2018-09-05 DIAGNOSIS — K21.9 GASTROESOPHAGEAL REFLUX DISEASE, ESOPHAGITIS PRESENCE NOT SPECIFIED: ICD-10-CM

## 2018-09-05 RX ORDER — OMEPRAZOLE 40 MG/1
CAPSULE, DELAYED RELEASE ORAL
Qty: 90 CAPSULE | Refills: 3 | Status: SHIPPED | OUTPATIENT
Start: 2018-09-05 | End: 2019-09-25 | Stop reason: SDUPTHER

## 2018-09-06 ENCOUNTER — OFFICE VISIT (OUTPATIENT)
Dept: PULMONOLOGY | Age: 58
End: 2018-09-06
Payer: MEDICAID

## 2018-09-06 VITALS
BODY MASS INDEX: 27.58 KG/M2 | OXYGEN SATURATION: 92 % | HEIGHT: 68 IN | HEART RATE: 88 BPM | WEIGHT: 182 LBS | TEMPERATURE: 97.3 F | RESPIRATION RATE: 18 BRPM | DIASTOLIC BLOOD PRESSURE: 73 MMHG | SYSTOLIC BLOOD PRESSURE: 110 MMHG

## 2018-09-06 DIAGNOSIS — J44.9 COPD, SEVERE (HCC): ICD-10-CM

## 2018-09-06 DIAGNOSIS — Z87.891 STOPPED SMOKING WITH GREATER THAN 30 PACK YEAR HISTORY: Primary | ICD-10-CM

## 2018-09-06 DIAGNOSIS — F17.219 NICOTINE DEPENDENCE, CIGARETTES, WITH UNSPECIFIED NICOTINE-INDUCED DISORDERS: ICD-10-CM

## 2018-09-06 PROCEDURE — 99213 OFFICE O/P EST LOW 20 MIN: CPT | Performed by: INTERNAL MEDICINE

## 2018-09-06 RX ORDER — IPRATROPIUM BROMIDE AND ALBUTEROL SULFATE 2.5; .5 MG/3ML; MG/3ML
1 SOLUTION RESPIRATORY (INHALATION) EVERY 6 HOURS
Qty: 360 ML | Refills: 11 | Status: SHIPPED | OUTPATIENT
Start: 2018-09-06 | End: 2019-09-12 | Stop reason: SDUPTHER

## 2018-09-06 RX ORDER — BUDESONIDE 0.5 MG/2ML
1 INHALANT ORAL 2 TIMES DAILY
Qty: 60 AMPULE | Refills: 11 | Status: SHIPPED | OUTPATIENT
Start: 2018-09-06 | End: 2019-09-12 | Stop reason: SDUPTHER

## 2018-09-06 ASSESSMENT — ENCOUNTER SYMPTOMS
SHORTNESS OF BREATH: 1
EYES NEGATIVE: 1

## 2018-09-06 NOTE — PATIENT INSTRUCTIONS
SURVEY:    You may be receiving a survey from [employer] regarding your visit today. Please complete the survey to enable us to provide the highest quality of care to you and your family. If you cannot score us a very good on any question, please call the office to discuss how we could have made your experience a very good one. Thank you.

## 2018-09-06 NOTE — PROGRESS NOTES
Last 3 Encounters:   09/06/18 182 lb (82.6 kg)   11/15/17 182 lb 14.4 oz (83 kg)   10/12/17 175 lb (79.4 kg)       Results for orders placed or performed during the hospital encounter of 12/05/16   Comprehensive Metabolic Panel   Result Value Ref Range    Glucose 134 (H) 70 - 99 mg/dL    BUN 10 6 - 20 mg/dL    CREATININE 0.80 0.70 - 1.20 mg/dL    Bun/Cre Ratio 13 9 - 20    Calcium 9.3 8.6 - 10.4 mg/dL    Sodium 142 135 - 144 mmol/L    Potassium 4.2 3.7 - 5.3 mmol/L    Chloride 101 98 - 107 mmol/L    CO2 27 20 - 31 mmol/L    Anion Gap 14 9 - 17 mmol/L    Alkaline Phosphatase 95 40 - 129 U/L    ALT 62 (H) 5 - 41 U/L    AST 36 <40 U/L    Total Bilirubin 0.18 (L) 0.3 - 1.2 mg/dL    Total Protein 7.9 6.4 - 8.3 g/dL    Alb 3.8 3.5 - 5.2 g/dL    Albumin/Globulin Ratio 0.9 (L) 1.0 - 2.5    GFR Non-African American >60 >60 mL/min    GFR African American >60 >60 mL/min    GFR Comment          GFR Staging         Lipid Panel   Result Value Ref Range    Cholesterol 141 <200 mg/dL    HDL 31 (L) >40 mg/dL    LDL Cholesterol 63 0 - 130 mg/dL    Chol/HDL Ratio 4.5 <5    Triglycerides 237 (H) <150 mg/dL    VLDL NOT REPORTED 1 - 30 mg/dL   Hemoglobin A1C   Result Value Ref Range    Hemoglobin A1C 6.1 (H) 4.8 - 5.9 %    Estimated Avg Glucose 128 mg/dL       Assessment:      1. Stopped smoking with greater than 30 pack year history    2. COPD, severe ([address]    3. Nicotine dependence, cigarettes, with unspecified nicotine-induced disorders           Plan:      1. Repeat yearly low dose screening CT scan of the chest.  Discussed risks benefits and rationale with patient and family and he accepts. 2. Refilled duoneb and Pulmicort. 3. Flu shot in fall. 4. Return in one year.       Electronically signed by Naima Griffin DO on 9/6/2018 at 10:42 AM

## 2018-09-07 RX ORDER — BACLOFEN 20 MG/1
TABLET ORAL
Qty: 90 TABLET | Refills: 3 | Status: SHIPPED | OUTPATIENT
Start: 2018-09-07 | End: 2019-10-17 | Stop reason: SDUPTHER

## 2018-09-10 ENCOUNTER — OFFICE VISIT (OUTPATIENT)
Dept: PRIMARY CARE CLINIC | Age: 58
End: 2018-09-10
Payer: MEDICAID

## 2018-09-10 VITALS
SYSTOLIC BLOOD PRESSURE: 125 MMHG | BODY MASS INDEX: 27.45 KG/M2 | WEIGHT: 181.1 LBS | HEART RATE: 82 BPM | RESPIRATION RATE: 16 BRPM | TEMPERATURE: 98.3 F | HEIGHT: 68 IN | DIASTOLIC BLOOD PRESSURE: 84 MMHG

## 2018-09-10 DIAGNOSIS — Z23 NEED FOR INFLUENZA VACCINATION: ICD-10-CM

## 2018-09-10 DIAGNOSIS — Z23 NEED FOR SHINGLES VACCINE: ICD-10-CM

## 2018-09-10 DIAGNOSIS — E11.9 CONTROLLED TYPE 2 DIABETES MELLITUS WITHOUT COMPLICATION, WITHOUT LONG-TERM CURRENT USE OF INSULIN (HCC): Primary | ICD-10-CM

## 2018-09-10 DIAGNOSIS — Z12.12 SCREENING FOR COLORECTAL CANCER: Primary | ICD-10-CM

## 2018-09-10 DIAGNOSIS — Z12.11 SCREENING FOR COLORECTAL CANCER: Primary | ICD-10-CM

## 2018-09-10 DIAGNOSIS — K21.9 GASTROESOPHAGEAL REFLUX DISEASE, ESOPHAGITIS PRESENCE NOT SPECIFIED: ICD-10-CM

## 2018-09-10 LAB — HBA1C MFR BLD: 7.7 %

## 2018-09-10 PROCEDURE — 83036 HEMOGLOBIN GLYCOSYLATED A1C: CPT | Performed by: NURSE PRACTITIONER

## 2018-09-10 PROCEDURE — 90471 IMMUNIZATION ADMIN: CPT | Performed by: NURSE PRACTITIONER

## 2018-09-10 PROCEDURE — 99214 OFFICE O/P EST MOD 30 MIN: CPT | Performed by: NURSE PRACTITIONER

## 2018-09-10 PROCEDURE — 90686 IIV4 VACC NO PRSV 0.5 ML IM: CPT | Performed by: NURSE PRACTITIONER

## 2018-09-10 RX ORDER — LANCETS 30 GAUGE
1 EACH MISCELLANEOUS DAILY
Qty: 100 EACH | Refills: 3 | Status: SHIPPED | OUTPATIENT
Start: 2018-09-10 | End: 2019-10-17 | Stop reason: SDUPTHER

## 2018-09-10 ASSESSMENT — ENCOUNTER SYMPTOMS
SORE THROAT: 0
WATER BRASH: 0
HEARTBURN: 0
HOARSE VOICE: 0
BELCHING: 0
RHINORRHEA: 0
VOMITING: 0
COUGH: 0
GLOBUS SENSATION: 0
ABDOMINAL PAIN: 0
DIARRHEA: 0

## 2018-09-10 ASSESSMENT — PATIENT HEALTH QUESTIONNAIRE - PHQ9
1. LITTLE INTEREST OR PLEASURE IN DOING THINGS: 0
SUM OF ALL RESPONSES TO PHQ QUESTIONS 1-9: 0
SUM OF ALL RESPONSES TO PHQ QUESTIONS 1-9: 0
SUM OF ALL RESPONSES TO PHQ9 QUESTIONS 1 & 2: 0
2. FEELING DOWN, DEPRESSED OR HOPELESS: 0

## 2018-09-10 NOTE — PROGRESS NOTES
CARO Goldberg CNP   blood glucose test strips (TRUE METRIX BLOOD GLUCOSE TEST) strip 1 each by In Vitro route daily As needed. 9/10/18  Yes CARO Goldberg CNP   Lancets MISC 1 each by Does not apply route daily DISPENSE BRAND COMPATIBLE WITH METER AND STRIPS TRUE METRIX 9/10/18  Yes CARO Goldberg CNP   metFORMIN (GLUCOPHAGE) 1000 MG tablet TAKE 1 TABLET BY MOUTH TWICE DAILY 9/10/18  Yes CARO Goldberg CNP   baclofen (LIORESAL) 20 MG tablet TAKE 1/2 A TABLET BY MOUTH TWICE DAILY 9/7/18  Yes CARO Goldberg CNP   ipratropium-albuterol (DUONEB) 0.5-2.5 (3) MG/3ML SOLN nebulizer solution Inhale 3 mLs into the lungs every 6 hours 9/6/18  Yes Golden Rodriguez DO   budesonide (PULMICORT) 0.5 MG/2ML nebulizer suspension Take 2 mLs by nebulization 2 times daily 9/6/18  Yes Golden Rodriguez DO   omeprazole (PRILOSEC) 40 MG delayed release capsule TAKE 1 CAPSULE BY MOUTH DAILY 9/5/18  Yes CARO Goldberg CNP   gabapentin (NEURONTIN) 300 MG capsule TAKE ONE CAPSULE BY MOUTH TWICE A DAY 8/16/18 11/14/18 Yes CARO Mcnamara CNP   blood glucose monitor kit and supplies 1 kit by Other route daily Test 1 times a day & as needed for symptoms of irregular blood glucose. 8/6/18  Yes CARO Stewart CNP   ALPRAZolam (XANAX) 0.5 MG tablet Take 1 tablet by mouth nightly as needed for Sleep for up to 90 days. . 6/13/18 9/11/18 Yes CARO Goldberg CNP   folic acid (FOLVITE) 1 MG tablet TAKE 1 TABLET BY MOUTH DAILY 5/8/18  Yes CARO Goldberg CNP   simvastatin (ZOCOR) 20 MG tablet TAKE 1 TABLET BY MOUTH NIGHTLY 5/8/18  Yes CARO Goldberg CNP   cetirizine (ZYRTEC) 10 MG tablet TAKE 1 TABLET BY MOUTH EVERY DAY 4/6/18  Yes CARO Goldberg CNP   furosemide (LASIX) 20 MG tablet TAKE 1 TABLET BY MOUTH DAILY AS NEEDED (LEG SWELLING) 1/23/18  Yes Dre King Might, APRN - SILVINO   levETIRAcetam (KEPPRA) 500 MG tablet TAKE 1/2 TABLET BY MOUTH TWICE DAILY 12/18/17  Yes Dre King Might, APRN - CNP   Multiple Vitamins-Minerals ([facility] SPECTRAVITE ADVANCED) TABS TAKE 1 TABLET BY MOUTH DAILY 12/7/17  Yes Galina Meyers Chuck Might, APRN - CNP   senna (SENOKOT) 8.6 MG TABS tablet TAKE 1 TABLET BY MOUTH TWICE DAILY 11/17/17  Yes Galina Jolleysom Might, APRN - CNP   sertraline (ZOLOFT) 100 MG tablet TAKE 2 TABLETS BY MOUTH DAILY 10/30/17  Yes Galina Jolleysom Might, APRN - CNP   fluticasone (FLONASE) 50 MCG/ACT nasal spray 2 sprays by Nasal route daily 7/27/17  Yes Golden Rodriguez,    [facility] ASPIRIN CHILD 81 MG chewable tablet TAKE 1 TABLET BY MOUTH DAILY 6/28/17  Yes Galina Ferreiram Might, APRN - CNP   lisinopril (PRINIVIL;ZESTRIL) 5 MG tablet Take 5 mg by mouth daily   Yes Historical Provider, MD   Lift Chair [id number] [facility] by Does not apply route 10/14/16  Yes Galina Jolleysom Might, APRN - CNP   diclofenac (VOLTAREN) 75 MG EC tablet Take 75 mg by mouth daily   Yes Historical Provider, MD        Allergies:       Pcn [penicillins]; Morphine; and Sulfa antibiotics    Social History:     Tobacco:    reports that he quit smoking about 10 years ago. His smoking use included Cigarettes. He has a 45.00 pack-year smoking history. He has never used smokeless tobacco.  Alcohol:      reports that he does not drink alcohol. Drug Use:  reports that he does not use drugs. Family History:     Family History   Problem Relation Age of Onset   Elysia Hicks Cancer Mother     Diabetes Mother     Heart Failure Father     Diabetes Father     Diabetes Sister        Review of Systems:     Positive and Negative as described in HPI    Review of Systems   Constitutional: Negative for chills, fatigue, fever and weight loss. HENT: Negative for congestion, hoarse voice, rhinorrhea and sore throat. Eyes: Negative for visual disturbance. Respiratory: Negative for cough. Cardiovascular: Negative for chest pain. Gastrointestinal: Negative for abdominal pain, diarrhea, heartburn, melena and vomiting. Endocrine: Negative for polydipsia, polyphagia and polyuria.    Genitourinary: Negative for difficulty urinating and dysuria. Musculoskeletal: Positive for gait problem (chronic). Negative for neck pain and neck stiffness. Skin: Negative for rash. Neurological: Positive for speech difficulty (chronic). Negative for dizziness and headaches. Physical Exam:   Vitals:  /84 (Site: Right Upper Arm, Position: Sitting, Cuff Size: Medium Adult)   Pulse 82   Temp 98.3 °F (36.8 °C) (Temporal)   Resp 16   Ht 5' 8\" (1.727 m)   Wt 181 lb 1.6 oz (82.1 kg)   BMI 27.54 kg/m²     Physical Exam   Constitutional: He appears well-developed and well-nourished. No distress. HENT:   Right Ear: Tympanic membrane and ear canal normal.   Left Ear: Tympanic membrane and ear canal normal.   Mouth/Throat: No posterior oropharyngeal erythema. Eyes: Conjunctivae are normal.   Neck: Normal range of motion. Neck supple. Cardiovascular: Normal rate, regular rhythm and normal heart sounds. No murmur heard. Pulmonary/Chest: Effort normal and breath sounds normal. He has no wheezes. He has no rales. Abdominal: Soft. Bowel sounds are normal. He exhibits no distension. There is no tenderness. Musculoskeletal: He exhibits no edema. Lymphadenopathy:     He has no cervical adenopathy. Neurological: He is alert. Coordination (left hemiplegia) abnormal.   Skin: Skin is warm and dry. No rash noted. Psychiatric: His behavior is normal. Judgment and thought content normal. His mood appears not anxious. He does not exhibit a depressed mood. Nursing note and vitals reviewed.       Data:     Lab Results   Component Value Date     12/05/2016    K 4.2 12/05/2016     12/05/2016    CO2 27 12/05/2016    BUN 10 12/05/2016    CREATININE 0.80 12/05/2016    GLUCOSE 134 12/05/2016    PROT 7.9 12/05/2016    LABALBU 3.8 12/05/2016    BILITOT 0.18 12/05/2016    ALKPHOS 95 12/05/2016    AST 36 12/05/2016    ALT 62 12/05/2016     Lab Results   Component Value Date    WBC 11.2 09/25/2015    RBC 5.57 09/25/2015

## 2018-09-17 DIAGNOSIS — Z12.12 SCREENING FOR COLORECTAL CANCER: ICD-10-CM

## 2018-09-17 DIAGNOSIS — Z12.11 SCREENING FOR COLORECTAL CANCER: ICD-10-CM

## 2018-09-17 LAB
CONTROL: PRESENT
HEMOCCULT STL QL: NEGATIVE

## 2018-09-17 PROCEDURE — 82274 ASSAY TEST FOR BLOOD FECAL: CPT | Performed by: NURSE PRACTITIONER

## 2018-09-18 ENCOUNTER — TELEPHONE (OUTPATIENT)
Dept: PRIMARY CARE CLINIC | Age: 58
End: 2018-09-18

## 2018-09-18 NOTE — TELEPHONE ENCOUNTER
Attempted to call patient and message left regarding normal FIT test. Instructed to call with any questions.

## 2018-09-20 DIAGNOSIS — F51.01 PRIMARY INSOMNIA: ICD-10-CM

## 2018-09-21 RX ORDER — ALPRAZOLAM 0.5 MG/1
0.5 TABLET ORAL NIGHTLY PRN
Qty: 90 TABLET | Refills: 0 | Status: SHIPPED | OUTPATIENT
Start: 2018-09-21 | End: 2018-12-20

## 2018-09-21 RX ORDER — MULTIVITAMIN/IRON/FOLIC ACID 18MG-0.4MG
1 TABLET ORAL DAILY
Qty: 90 TABLET | Refills: 3 | Status: SHIPPED | OUTPATIENT
Start: 2018-09-21 | End: 2019-09-25 | Stop reason: SDUPTHER

## 2018-10-31 RX ORDER — SERTRALINE HYDROCHLORIDE 100 MG/1
TABLET, FILM COATED ORAL
Qty: 180 TABLET | Refills: 3 | Status: SHIPPED | OUTPATIENT
Start: 2018-10-31 | End: 2019-09-25 | Stop reason: SDUPTHER

## 2018-10-31 NOTE — TELEPHONE ENCOUNTER
Health Maintenance   Topic Date Due    Diabetic foot exam  09/06/1971    Shingles Vaccine (1 of 2 - 2 Dose Series) 09/06/2011    Diabetic microalbuminuria test  09/25/2016    Lipid screen  12/05/2017    Potassium monitoring  12/05/2017    Creatinine monitoring  12/05/2017    Low dose CT lung screening  08/07/2018    DTaP/Tdap/Td vaccine (1 - Tdap) 09/10/2019 (Originally 9/6/1980)    Hepatitis C screen  09/10/2019 (Originally 1960)    HIV screen  09/10/2019 (Originally 9/6/1976)    Diabetic retinal exam  11/15/2018    A1C test (Diabetic or Prediabetic)  09/10/2019    Colon Cancer Screen FIT/FOBT  09/17/2019    Flu vaccine  Completed    Pneumococcal med risk  Completed             (applicable per patient's age: Cancer Screenings, Depression Screening, Fall Risk Screening, Immunizations)    Hemoglobin A1C (%)   Date Value   09/10/2018 7.7   12/05/2016 6.1 (H)   02/04/2016 5.5     Microalb/Crt.  Ratio (mcg/mg creat)   Date Value   09/25/2015 14     LDL Cholesterol (mg/dL)   Date Value   12/05/2016 63     AST (U/L)   Date Value   12/05/2016 36     ALT (U/L)   Date Value   12/05/2016 62 (H)     BUN (mg/dL)   Date Value   12/05/2016 10      (goal A1C is < 7)   (goal LDL is <100) need 30-50% reduction from baseline     BP Readings from Last 3 Encounters:   09/10/18 125/84   09/06/18 110/73   11/15/17 118/77    (goal /80)      All Future Testing planned in [facility]:  Lab Frequency Next Occurrence   CT Lung Screening (Annual) Once 08/16/2018   CBC Auto Differential Once 03/09/2019   Comprehensive Metabolic Panel Once 32/01/6932   Hemoglobin A1C Once 03/09/2019   Lipid Panel Once 03/09/2019   Microalbumin, Ur Once 03/09/2019       Next Visit Date:  Future Appointments  Date Time Provider Jose White   3/19/2019 11:00 AM CARO Tinsley - CNP [facility]   9/12/2019 11:00 AM DO Judie Chappell [facility]            Patient Active Problem List:     Gastroesophageal reflux disease Allergic rhinitis     Controlled type 2 diabetes mellitus without complication, without long-term current use of insulin (HCC)     COPD, severe ([address]

## 2018-11-06 RX ORDER — FOLIC ACID 1 MG/1
TABLET ORAL
Qty: 90 TABLET | Refills: 1 | Status: SHIPPED | OUTPATIENT
Start: 2018-11-06 | End: 2019-05-02 | Stop reason: SDUPTHER

## 2018-11-06 RX ORDER — SIMVASTATIN 20 MG
TABLET ORAL
Qty: 90 TABLET | Refills: 1 | Status: SHIPPED | OUTPATIENT
Start: 2018-11-06 | End: 2019-05-02 | Stop reason: SDUPTHER

## 2018-11-06 NOTE — TELEPHONE ENCOUNTER
Health Maintenance   Topic Date Due    Diabetic foot exam  09/06/1971    Shingles Vaccine (1 of 2 - 2 Dose Series) 09/06/2011    Diabetic microalbuminuria test  09/25/2016    Lipid screen  12/05/2017    Potassium monitoring  12/05/2017    Creatinine monitoring  12/05/2017    Low dose CT lung screening  08/07/2018    Diabetic retinal exam  11/15/2018    DTaP/Tdap/Td vaccine (1 - Tdap) 09/10/2019 (Originally 9/6/1980)    Hepatitis C screen  09/10/2019 (Originally 1960)    HIV screen  09/10/2019 (Originally 9/6/1976)    A1C test (Diabetic or Prediabetic)  09/10/2019    Colon Cancer Screen FIT/FOBT  09/17/2019    Flu vaccine  Completed    Pneumococcal med risk  Completed             (applicable per patient's age: Cancer Screenings, Depression Screening, Fall Risk Screening, Immunizations)    Hemoglobin A1C (%)   Date Value   09/10/2018 7.7   12/05/2016 6.1 (H)   02/04/2016 5.5     Microalb/Crt.  Ratio (mcg/mg creat)   Date Value   09/25/2015 14     LDL Cholesterol (mg/dL)   Date Value   12/05/2016 63     AST (U/L)   Date Value   12/05/2016 36     ALT (U/L)   Date Value   12/05/2016 62 (H)     BUN (mg/dL)   Date Value   12/05/2016 10      (goal A1C is < 7)   (goal LDL is <100) need 30-50% reduction from baseline     BP Readings from Last 3 Encounters:   09/10/18 125/84   09/06/18 110/73   11/15/17 118/77    (goal /80)      All Future Testing planned in [facility]:  Lab Frequency Next Occurrence   CT Lung Screening (Annual) Once 08/16/2018   CBC Auto Differential Once 03/09/2019   Comprehensive Metabolic Panel Once 40/35/9718   Hemoglobin A1C Once 03/09/2019   Lipid Panel Once 03/09/2019   Microalbumin, Ur Once 03/09/2019       Next Visit Date:  Future Appointments  Date Time Provider Jose White   3/19/2019 11:00 AM Alcides Adkins APRN - CNP [facility]   9/12/2019 11:00 AM DO Judie Hernandez [facility]            Patient Active Problem List:     Gastroesophageal reflux disease

## 2018-11-12 RX ORDER — GABAPENTIN 300 MG/1
CAPSULE ORAL
Qty: 180 CAPSULE | Refills: 0 | Status: SHIPPED | OUTPATIENT
Start: 2018-11-12 | End: 2019-02-15 | Stop reason: SDUPTHER

## 2018-12-26 DIAGNOSIS — Z87.898 HISTORY OF SEIZURES: ICD-10-CM

## 2018-12-26 RX ORDER — LEVETIRACETAM 500 MG/1
TABLET ORAL
Qty: 90 TABLET | Refills: 3 | Status: SHIPPED | OUTPATIENT
Start: 2018-12-26 | End: 2019-12-18

## 2018-12-27 NOTE — TELEPHONE ENCOUNTER
Health Maintenance   Topic Date Due    Hepatitis C screen  1960    Diabetic foot exam  09/06/1971    HIV screen  09/06/1976    DTaP/Tdap/Td vaccine (1 - Tdap) 09/06/1980    Shingles Vaccine (1 of 2 - 2 Dose Series) 09/06/2011    Colon cancer screen colonoscopy  09/06/2011    Diabetic microalbuminuria test  09/25/2016    A1C test (Diabetic or Prediabetic)  12/05/2017    Lipid screen  12/05/2017    Potassium monitoring  12/05/2017    Creatinine monitoring  12/05/2017    Low dose CT lung screening  08/07/2018    Flu vaccine (1) 09/01/2018    Diabetic retinal exam  11/15/2018    Pneumococcal med risk  Completed             (applicable per patient's age: Cancer Screenings, Depression Screening, Fall Risk Screening, Immunizations)    Hemoglobin A1C (%)   Date Value   12/05/2016 6.1 (H)   02/04/2016 5.5   09/25/2015 5.5     Microalb/Crt.  Ratio (mcg/mg creat)   Date Value   09/25/2015 14     LDL Cholesterol (mg/dL)   Date Value   12/05/2016 63     AST (U/L)   Date Value   12/05/2016 36     ALT (U/L)   Date Value   12/05/2016 62 (H)     BUN (mg/dL)   Date Value   12/05/2016 10      (goal A1C is < 7)   (goal LDL is <100) need 30-50% reduction from baseline     BP Readings from Last 3 Encounters:   09/06/18 110/73   11/15/17 118/77   10/12/17 126/80    (goal /80)      All Future Testing planned in [facility]:  Lab Frequency Next Occurrence   CT Lung Screening (Annual) Once 08/16/2018       Next Visit Date:  Future Appointments  Date Time Provider Jose White   9/10/2018 11:00 AM Nancy Adkins, APRN - CNP [facility]            Patient Active Problem List:     Gastroesophageal reflux disease     Allergic rhinitis     Controlled type 2 diabetes mellitus without complication, without long-term current use of insulin (HCC)     COPD, severe ([address]
no

## 2019-01-03 DIAGNOSIS — R60.0 LOWER LEG EDEMA: ICD-10-CM

## 2019-01-03 RX ORDER — FUROSEMIDE 20 MG/1
TABLET ORAL
Qty: 30 TABLET | Refills: 2 | Status: SHIPPED | OUTPATIENT
Start: 2019-01-03 | End: 2019-04-04 | Stop reason: SDUPTHER

## 2019-02-15 RX ORDER — GABAPENTIN 300 MG/1
CAPSULE ORAL
Qty: 180 CAPSULE | Refills: 0 | Status: SHIPPED | OUTPATIENT
Start: 2019-02-15 | End: 2019-05-15 | Stop reason: SDUPTHER

## 2019-03-03 DIAGNOSIS — E11.9 CONTROLLED TYPE 2 DIABETES MELLITUS WITHOUT COMPLICATION, WITHOUT LONG-TERM CURRENT USE OF INSULIN (HCC): ICD-10-CM

## 2019-03-14 ENCOUNTER — HOSPITAL ENCOUNTER (OUTPATIENT)
Age: 59
Discharge: HOME | End: 2019-03-14
Payer: MEDICAID

## 2019-03-14 DIAGNOSIS — E11.9 CONTROLLED TYPE 2 DIABETES MELLITUS WITHOUT COMPLICATION, WITHOUT LONG-TERM CURRENT USE OF INSULIN (HCC): ICD-10-CM

## 2019-03-14 LAB
ABSOLUTE EOS #: 0.23 K/UL (ref 0–0.44)
ABSOLUTE IMMATURE GRANULOCYTE: 0.06 K/UL (ref 0–0.3)
ABSOLUTE LYMPH #: 2.37 K/UL (ref 1.1–3.7)
ABSOLUTE MONO #: 0.44 K/UL (ref 0.1–1.2)
ALBUMIN SERPL-MCNC: 3.9 G/DL (ref 3.5–5.2)
ALBUMIN/GLOBULIN RATIO: 1.1 (ref 1–2.5)
ALP BLD-CCNC: 80 U/L (ref 40–129)
ALT SERPL-CCNC: 32 U/L (ref 5–41)
ANION GAP SERPL CALCULATED.3IONS-SCNC: 12 MMOL/L (ref 9–17)
AST SERPL-CCNC: 22 U/L
BASOPHILS # BLD: 1 % (ref 0–2)
BASOPHILS ABSOLUTE: 0.04 K/UL (ref 0–0.2)
BILIRUB SERPL-MCNC: 0.2 MG/DL (ref 0.3–1.2)
BUN BLDV-MCNC: 7 MG/DL (ref 6–20)
BUN/CREAT BLD: 9 (ref 9–20)
CALCIUM SERPL-MCNC: 9.1 MG/DL (ref 8.6–10.4)
CHLORIDE BLD-SCNC: 104 MMOL/L (ref 98–107)
CHOLESTEROL/HDL RATIO: 3.3
CHOLESTEROL: 123 MG/DL
CO2: 28 MMOL/L (ref 20–31)
CREAT SERPL-MCNC: 0.78 MG/DL (ref 0.7–1.2)
CREATININE URINE: 84.2 MG/DL (ref 39–259)
DIFFERENTIAL TYPE: ABNORMAL
EOSINOPHILS RELATIVE PERCENT: 3 % (ref 1–4)
ESTIMATED AVERAGE GLUCOSE: 128 MG/DL
GFR AFRICAN AMERICAN: >60 ML/MIN
GFR NON-AFRICAN AMERICAN: >60 ML/MIN
GFR SERPL CREATININE-BSD FRML MDRD: ABNORMAL ML/MIN/{1.73_M2}
GFR SERPL CREATININE-BSD FRML MDRD: ABNORMAL ML/MIN/{1.73_M2}
GLUCOSE BLD-MCNC: 136 MG/DL (ref 70–99)
HBA1C MFR BLD: 6.1 % (ref 4.8–5.9)
HCT VFR BLD CALC: 44.5 % (ref 40.7–50.3)
HDLC SERPL-MCNC: 37 MG/DL
HEMOGLOBIN: 13.8 G/DL (ref 13–17)
IMMATURE GRANULOCYTES: 1 %
LDL CHOLESTEROL: 56 MG/DL (ref 0–130)
LYMPHOCYTES # BLD: 27 % (ref 24–43)
MCH RBC QN AUTO: 27.5 PG (ref 25.2–33.5)
MCHC RBC AUTO-ENTMCNC: 31 G/DL (ref 28.4–34.8)
MCV RBC AUTO: 88.8 FL (ref 82.6–102.9)
MICROALBUMIN/CREAT 24H UR: <12 MG/L
MICROALBUMIN/CREAT UR-RTO: NORMAL MCG/MG CREAT
MONOCYTES # BLD: 5 % (ref 3–12)
NRBC AUTOMATED: 0 PER 100 WBC
PDW BLD-RTO: 15 % (ref 11.8–14.4)
PLATELET # BLD: 204 K/UL (ref 138–453)
PLATELET ESTIMATE: ABNORMAL
PMV BLD AUTO: 10.2 FL (ref 8.1–13.5)
POTASSIUM SERPL-SCNC: 4.4 MMOL/L (ref 3.7–5.3)
RBC # BLD: 5.01 M/UL (ref 4.21–5.77)
RBC # BLD: ABNORMAL 10*6/UL
SEG NEUTROPHILS: 63 % (ref 36–65)
SEGMENTED NEUTROPHILS ABSOLUTE COUNT: 5.62 K/UL (ref 1.5–8.1)
SODIUM BLD-SCNC: 144 MMOL/L (ref 135–144)
TOTAL PROTEIN: 7.5 G/DL (ref 6.4–8.3)
TRIGL SERPL-MCNC: 152 MG/DL
VLDLC SERPL CALC-MCNC: ABNORMAL MG/DL (ref 1–30)
WBC # BLD: 8.8 K/UL (ref 3.5–11.3)
WBC # BLD: ABNORMAL 10*3/UL

## 2019-03-14 PROCEDURE — 80053 COMPREHEN METABOLIC PANEL: CPT

## 2019-03-14 PROCEDURE — 82043 UR ALBUMIN QUANTITATIVE: CPT

## 2019-03-14 PROCEDURE — 82570 ASSAY OF URINE CREATININE: CPT

## 2019-03-14 PROCEDURE — 80061 LIPID PANEL: CPT

## 2019-03-14 PROCEDURE — 36415 COLL VENOUS BLD VENIPUNCTURE: CPT

## 2019-03-14 PROCEDURE — 85025 COMPLETE CBC W/AUTO DIFF WBC: CPT

## 2019-03-14 PROCEDURE — 83036 HEMOGLOBIN GLYCOSYLATED A1C: CPT

## 2019-03-19 ENCOUNTER — OFFICE VISIT (OUTPATIENT)
Dept: PRIMARY CARE CLINIC | Age: 59
End: 2019-03-19
Payer: MEDICAID

## 2019-03-19 VITALS
TEMPERATURE: 98 F | WEIGHT: 184 LBS | RESPIRATION RATE: 20 BRPM | DIASTOLIC BLOOD PRESSURE: 74 MMHG | SYSTOLIC BLOOD PRESSURE: 122 MMHG | HEART RATE: 92 BPM | BODY MASS INDEX: 27.89 KG/M2 | HEIGHT: 68 IN

## 2019-03-19 DIAGNOSIS — J44.9 COPD, SEVERE (HCC): ICD-10-CM

## 2019-03-19 DIAGNOSIS — K21.9 GASTROESOPHAGEAL REFLUX DISEASE, ESOPHAGITIS PRESENCE NOT SPECIFIED: ICD-10-CM

## 2019-03-19 DIAGNOSIS — E11.9 CONTROLLED TYPE 2 DIABETES MELLITUS WITHOUT COMPLICATION, WITHOUT LONG-TERM CURRENT USE OF INSULIN (HCC): Primary | ICD-10-CM

## 2019-03-19 PROCEDURE — 99214 OFFICE O/P EST MOD 30 MIN: CPT | Performed by: NURSE PRACTITIONER

## 2019-03-19 ASSESSMENT — ENCOUNTER SYMPTOMS
BELCHING: 0
RHINORRHEA: 0
SORE THROAT: 0
ABDOMINAL PAIN: 0
FREQUENT THROAT CLEARING: 0
CHEST TIGHTNESS: 0
SHORTNESS OF BREATH: 0
HEMOPTYSIS: 0
HEARTBURN: 0
WATER BRASH: 0
SPUTUM PRODUCTION: 0
DIFFICULTY BREATHING: 0
DIARRHEA: 0
HOARSE VOICE: 0
VOMITING: 0
COUGH: 0
WHEEZING: 0
GLOBUS SENSATION: 0

## 2019-03-19 ASSESSMENT — PATIENT HEALTH QUESTIONNAIRE - PHQ9
SUM OF ALL RESPONSES TO PHQ9 QUESTIONS 1 & 2: 0
SUM OF ALL RESPONSES TO PHQ QUESTIONS 1-9: 0
SUM OF ALL RESPONSES TO PHQ QUESTIONS 1-9: 0
1. LITTLE INTEREST OR PLEASURE IN DOING THINGS: 0
2. FEELING DOWN, DEPRESSED OR HOPELESS: 0

## 2019-03-19 ASSESSMENT — COPD QUESTIONNAIRES: COPD: 1

## 2019-03-22 ENCOUNTER — OFFICE VISIT (OUTPATIENT)
Dept: PRIMARY CARE CLINIC | Age: 59
End: 2019-03-22
Payer: MEDICAID

## 2019-03-22 VITALS
TEMPERATURE: 99.1 F | SYSTOLIC BLOOD PRESSURE: 109 MMHG | HEIGHT: 68 IN | HEART RATE: 94 BPM | DIASTOLIC BLOOD PRESSURE: 87 MMHG | WEIGHT: 184 LBS | BODY MASS INDEX: 27.89 KG/M2 | RESPIRATION RATE: 18 BRPM

## 2019-03-22 DIAGNOSIS — R30.0 DYSURIA: ICD-10-CM

## 2019-03-22 DIAGNOSIS — J44.0 ACUTE BRONCHITIS WITH CHRONIC OBSTRUCTIVE PULMONARY DISEASE (COPD) (HCC): Primary | ICD-10-CM

## 2019-03-22 DIAGNOSIS — J20.9 ACUTE BRONCHITIS WITH CHRONIC OBSTRUCTIVE PULMONARY DISEASE (COPD) (HCC): Primary | ICD-10-CM

## 2019-03-22 DIAGNOSIS — R05.9 COUGH: ICD-10-CM

## 2019-03-22 LAB
INFLUENZA A ANTIBODY: NEGATIVE
INFLUENZA B ANTIBODY: NEGATIVE

## 2019-03-22 PROCEDURE — 87804 INFLUENZA ASSAY W/OPTIC: CPT | Performed by: NURSE PRACTITIONER

## 2019-03-22 PROCEDURE — 99213 OFFICE O/P EST LOW 20 MIN: CPT | Performed by: NURSE PRACTITIONER

## 2019-03-22 RX ORDER — PREDNISONE 20 MG/1
40 TABLET ORAL DAILY
Qty: 10 TABLET | Refills: 0 | Status: SHIPPED | OUTPATIENT
Start: 2019-03-22 | End: 2019-03-27

## 2019-03-22 RX ORDER — AZITHROMYCIN 250 MG/1
250 TABLET, FILM COATED ORAL SEE ADMIN INSTRUCTIONS
Qty: 6 TABLET | Refills: 0 | Status: SHIPPED | OUTPATIENT
Start: 2019-03-22 | End: 2019-03-27

## 2019-03-22 ASSESSMENT — ENCOUNTER SYMPTOMS: SORE THROAT: 0

## 2019-03-26 ASSESSMENT — ENCOUNTER SYMPTOMS
DIARRHEA: 0
SINUS PAIN: 0
NAUSEA: 0
VOMITING: 0
RHINORRHEA: 1
CHEST TIGHTNESS: 0
COUGH: 1
SHORTNESS OF BREATH: 0
SINUS PRESSURE: 0
WHEEZING: 0

## 2019-03-28 ENCOUNTER — TELEPHONE (OUTPATIENT)
Dept: PRIMARY CARE CLINIC | Age: 59
End: 2019-03-28

## 2019-03-28 DIAGNOSIS — I63.9 CEREBRAL INFARCTION, UNSPECIFIED MECHANISM (HCC): Primary | ICD-10-CM

## 2019-03-28 DIAGNOSIS — R53.1 LEFT-SIDED WEAKNESS: ICD-10-CM

## 2019-04-23 ENCOUNTER — TELEPHONE (OUTPATIENT)
Dept: PRIMARY CARE CLINIC | Age: 59
End: 2019-04-23

## 2019-05-02 DIAGNOSIS — J30.9 ALLERGIC RHINITIS: ICD-10-CM

## 2019-05-02 RX ORDER — CETIRIZINE HYDROCHLORIDE 10 MG/1
TABLET ORAL
Qty: 90 TABLET | Refills: 3 | Status: SHIPPED | OUTPATIENT
Start: 2019-05-02 | End: 2020-05-07

## 2019-05-02 RX ORDER — SIMVASTATIN 20 MG
TABLET ORAL
Qty: 90 TABLET | Refills: 3 | Status: SHIPPED | OUTPATIENT
Start: 2019-05-02 | End: 2020-05-06

## 2019-05-02 NOTE — TELEPHONE ENCOUNTER
Patient Active Problem List:     Gastroesophageal reflux disease     Allergic rhinitis     Controlled type 2 diabetes mellitus without complication, without long-term current use of insulin (HCC)     COPD, severe ([address]

## 2019-05-02 NOTE — TELEPHONE ENCOUNTER
Health Maintenance   Topic Date Due    Low dose CT lung screening  08/07/2018    DTaP/Tdap/Td vaccine (1 - Tdap) 09/10/2019 (Originally 9/6/1980)    Hepatitis C screen  09/10/2019 (Originally 1960)    HIV screen  09/10/2019 (Originally 9/6/1976)    Diabetic retinal exam  03/19/2020 (Originally 11/15/2018)    Hepatitis B Vaccine (1 of 3 - Risk 3-dose series) 03/19/2020 (Originally 9/6/1980)    Shingles Vaccine (1 of 2) 03/19/2020 (Originally 9/6/2011)    Colon Cancer Screen FIT/FOBT  09/17/2019    A1C test (Diabetic or Prediabetic)  03/14/2020    Diabetic microalbuminuria test  03/14/2020    Lipid screen  03/14/2020    Potassium monitoring  03/14/2020    Creatinine monitoring  03/14/2020    Diabetic foot exam  03/19/2020    Flu vaccine  Completed    Pneumococcal 0-64 years Vaccine  Completed             (applicable per patient's age: Cancer Screenings, Depression Screening, Fall Risk Screening, Immunizations)    Hemoglobin A1C (%)   Date Value   03/14/2019 6.1 (H)   09/10/2018 7.7   12/05/2016 6.1 (H)     Microalb/Crt.  Ratio (mcg/mg creat)   Date Value   03/14/2019 CANNOT BE CALCULATED     LDL Cholesterol (mg/dL)   Date Value   03/14/2019 56     AST (U/L)   Date Value   03/14/2019 22     ALT (U/L)   Date Value   03/14/2019 32     BUN (mg/dL)   Date Value   03/14/2019 7      (goal A1C is < 7)   (goal LDL is <100) need 30-50% reduction from baseline     BP Readings from Last 3 Encounters:   03/22/19 109/87   03/19/19 122/74   09/10/18 125/84    (goal /80)      All Future Testing planned in [facility]:  Lab Frequency Next Occurrence   CT Lung Screening (Annual) Once 70/90/0357   Basic Metabolic Panel Once 70/03/3938   Hemoglobin A1C Once 09/15/2019   Urinalysis With Microscopic Once 05/07/2019       Next Visit Date:  Future Appointments   Date Time Provider Jose White   9/12/2019 11:00 AM [facility]   9/19/2019  1:20 PM CARO Samayoa - CNP [facility] Patient Active Problem List:     Gastroesophageal reflux disease     Allergic rhinitis     Controlled type 2 diabetes mellitus without complication, without long-term current use of insulin (HCC)     COPD, severe ([address]

## 2019-05-15 RX ORDER — GABAPENTIN 300 MG/1
CAPSULE ORAL
Qty: 180 CAPSULE | Refills: 0 | Status: SHIPPED | OUTPATIENT
Start: 2019-05-15 | End: 2019-10-17 | Stop reason: SDUPTHER

## 2019-05-15 NOTE — TELEPHONE ENCOUNTER
Health Maintenance   Topic Date Due    Low dose CT lung screening  08/07/2018    DTaP/Tdap/Td vaccine (1 - Tdap) 09/10/2019 (Originally 9/6/1980)    Hepatitis C screen  09/10/2019 (Originally 1960)    HIV screen  09/10/2019 (Originally 9/6/1976)    Diabetic retinal exam  03/19/2020 (Originally 11/15/2018)    Hepatitis B Vaccine (1 of 3 - Risk 3-dose series) 03/19/2020 (Originally 9/6/1980)    Shingles Vaccine (1 of 2) 03/19/2020 (Originally 9/6/2011)    Colon Cancer Screen FIT/FOBT  09/17/2019    A1C test (Diabetic or Prediabetic)  03/14/2020    Diabetic microalbuminuria test  03/14/2020    Lipid screen  03/14/2020    Potassium monitoring  03/14/2020    Creatinine monitoring  03/14/2020    Diabetic foot exam  03/19/2020    Flu vaccine  Completed    Pneumococcal 0-64 years Vaccine  Completed             (applicable per patient's age: Cancer Screenings, Depression Screening, Fall Risk Screening, Immunizations)    Hemoglobin A1C (%)   Date Value   03/14/2019 6.1 (H)   09/10/2018 7.7   12/05/2016 6.1 (H)     Microalb/Crt.  Ratio (mcg/mg creat)   Date Value   03/14/2019 CANNOT BE CALCULATED     LDL Cholesterol (mg/dL)   Date Value   03/14/2019 56     AST (U/L)   Date Value   03/14/2019 22     ALT (U/L)   Date Value   03/14/2019 32     BUN (mg/dL)   Date Value   03/14/2019 7      (goal A1C is < 7)   (goal LDL is <100) need 30-50% reduction from baseline     BP Readings from Last 3 Encounters:   03/22/19 109/87   03/19/19 122/74   09/10/18 125/84    (goal /80)      All Future Testing planned in [facility]:  Lab Frequency Next Occurrence   CT Lung Screening (Annual) Once 12/87/4120   Basic Metabolic Panel Once 24/25/2854   Hemoglobin A1C Once 09/15/2019   Urinalysis With Microscopic Once 05/07/2019       Next Visit Date:  Future Appointments   Date Time Provider Jose White   9/12/2019 11:00 AM Unice Signs, DO [facility]   9/19/2019  1:20 PM Kwabena Adkins, CARO - CNP [facility]

## 2019-06-20 DIAGNOSIS — J30.89 PERENNIAL ALLERGIC RHINITIS: ICD-10-CM

## 2019-06-24 RX ORDER — FLUTICASONE PROPIONATE 50 MCG
SPRAY, SUSPENSION (ML) NASAL
Qty: 1 BOTTLE | Refills: 11 | Status: SHIPPED | OUTPATIENT
Start: 2019-06-24 | End: 2019-07-11 | Stop reason: SDUPTHER

## 2019-07-11 DIAGNOSIS — J30.89 PERENNIAL ALLERGIC RHINITIS: ICD-10-CM

## 2019-07-12 RX ORDER — FLUTICASONE PROPIONATE 50 MCG
SPRAY, SUSPENSION (ML) NASAL
Qty: 1 BOTTLE | Refills: 11 | Status: SHIPPED | OUTPATIENT
Start: 2019-07-12 | End: 2019-08-06 | Stop reason: SDUPTHER

## 2019-08-06 DIAGNOSIS — J30.89 PERENNIAL ALLERGIC RHINITIS: ICD-10-CM

## 2019-08-06 RX ORDER — FLUTICASONE PROPIONATE 50 MCG
SPRAY, SUSPENSION (ML) NASAL
Qty: 3 BOTTLE | Refills: 3 | Status: SHIPPED | OUTPATIENT
Start: 2019-08-06 | End: 2020-08-12

## 2019-08-28 ENCOUNTER — HOSPITAL ENCOUNTER (OUTPATIENT)
Age: 59
Discharge: HOME | End: 2019-08-28
Payer: MEDICAID

## 2019-08-28 ENCOUNTER — TELEPHONE (OUTPATIENT)
Dept: PRIMARY CARE CLINIC | Age: 59
End: 2019-08-28

## 2019-08-28 DIAGNOSIS — E11.9 CONTROLLED TYPE 2 DIABETES MELLITUS WITHOUT COMPLICATION, WITHOUT LONG-TERM CURRENT USE OF INSULIN (HCC): ICD-10-CM

## 2019-08-28 LAB
ANION GAP SERPL CALCULATED.3IONS-SCNC: 12 MMOL/L (ref 9–17)
BUN BLDV-MCNC: 12 MG/DL (ref 6–20)
BUN/CREAT BLD: 16 (ref 9–20)
CALCIUM SERPL-MCNC: 9.6 MG/DL (ref 8.6–10.4)
CHLORIDE BLD-SCNC: 105 MMOL/L (ref 98–107)
CO2: 29 MMOL/L (ref 20–31)
CREAT SERPL-MCNC: 0.73 MG/DL (ref 0.7–1.2)
ESTIMATED AVERAGE GLUCOSE: 111 MG/DL
GFR AFRICAN AMERICAN: >60 ML/MIN
GFR NON-AFRICAN AMERICAN: >60 ML/MIN
GFR SERPL CREATININE-BSD FRML MDRD: ABNORMAL ML/MIN/{1.73_M2}
GFR SERPL CREATININE-BSD FRML MDRD: ABNORMAL ML/MIN/{1.73_M2}
GLUCOSE BLD-MCNC: 120 MG/DL (ref 70–99)
HBA1C MFR BLD: 5.5 % (ref 4.8–5.9)
POTASSIUM SERPL-SCNC: 4.8 MMOL/L (ref 3.7–5.3)
SODIUM BLD-SCNC: 146 MMOL/L (ref 135–144)

## 2019-08-28 PROCEDURE — 36415 COLL VENOUS BLD VENIPUNCTURE: CPT

## 2019-08-28 PROCEDURE — 80048 BASIC METABOLIC PNL TOTAL CA: CPT

## 2019-08-28 PROCEDURE — 83036 HEMOGLOBIN GLYCOSYLATED A1C: CPT

## 2019-09-12 ENCOUNTER — OFFICE VISIT (OUTPATIENT)
Dept: PULMONOLOGY | Age: 59
End: 2019-09-12
Payer: MEDICAID

## 2019-09-12 VITALS
HEIGHT: 68 IN | TEMPERATURE: 99 F | HEART RATE: 95 BPM | BODY MASS INDEX: 27.89 KG/M2 | OXYGEN SATURATION: 95 % | DIASTOLIC BLOOD PRESSURE: 68 MMHG | SYSTOLIC BLOOD PRESSURE: 106 MMHG | RESPIRATION RATE: 20 BRPM | WEIGHT: 184 LBS

## 2019-09-12 DIAGNOSIS — Z87.891 STOPPED SMOKING WITH GREATER THAN 30 PACK YEAR HISTORY: Primary | ICD-10-CM

## 2019-09-12 DIAGNOSIS — J44.9 COPD, SEVERE (HCC): ICD-10-CM

## 2019-09-12 DIAGNOSIS — Z87.891 PERSONAL HISTORY OF TOBACCO USE: ICD-10-CM

## 2019-09-12 PROCEDURE — G0296 VISIT TO DETERM LDCT ELIG: HCPCS | Performed by: NURSE PRACTITIONER

## 2019-09-12 PROCEDURE — 99213 OFFICE O/P EST LOW 20 MIN: CPT | Performed by: NURSE PRACTITIONER

## 2019-09-12 RX ORDER — BUDESONIDE 0.5 MG/2ML
1 INHALANT ORAL 2 TIMES DAILY
Qty: 60 AMPULE | Refills: 11 | Status: SHIPPED | OUTPATIENT
Start: 2019-09-12 | End: 2020-06-09 | Stop reason: SDUPTHER

## 2019-09-12 RX ORDER — GABAPENTIN 300 MG/1
300 CAPSULE ORAL 2 TIMES DAILY
COMMUNITY
End: 2019-10-17

## 2019-09-12 RX ORDER — IPRATROPIUM BROMIDE AND ALBUTEROL SULFATE 2.5; .5 MG/3ML; MG/3ML
1 SOLUTION RESPIRATORY (INHALATION) EVERY 6 HOURS
Qty: 360 ML | Refills: 11 | Status: SHIPPED | OUTPATIENT
Start: 2019-09-12 | End: 2019-12-04 | Stop reason: SDUPTHER

## 2019-09-25 DIAGNOSIS — K21.9 GASTROESOPHAGEAL REFLUX DISEASE, ESOPHAGITIS PRESENCE NOT SPECIFIED: ICD-10-CM

## 2019-09-25 RX ORDER — OMEPRAZOLE 40 MG/1
CAPSULE, DELAYED RELEASE ORAL
Qty: 90 CAPSULE | Refills: 3 | Status: SHIPPED | OUTPATIENT
Start: 2019-09-25 | End: 2020-09-24 | Stop reason: SDUPTHER

## 2019-09-25 RX ORDER — MULTIVITAMIN/IRON/FOLIC ACID 18MG-0.4MG
TABLET ORAL
Qty: 90 TABLET | Refills: 3 | Status: SHIPPED | OUTPATIENT
Start: 2019-09-25 | End: 2019-10-17 | Stop reason: SDUPTHER

## 2019-09-25 RX ORDER — SERTRALINE HYDROCHLORIDE 100 MG/1
TABLET, FILM COATED ORAL
Qty: 180 TABLET | Refills: 3 | Status: SHIPPED | OUTPATIENT
Start: 2019-09-25 | End: 2020-07-02 | Stop reason: SDUPTHER

## 2019-10-01 DIAGNOSIS — R60.0 LOWER LEG EDEMA: ICD-10-CM

## 2019-10-01 RX ORDER — FUROSEMIDE 20 MG/1
TABLET ORAL
Qty: 90 TABLET | Refills: 1 | Status: SHIPPED | OUTPATIENT
Start: 2019-10-01 | End: 2020-04-06

## 2019-10-10 RX ORDER — SERTRALINE HYDROCHLORIDE 100 MG/1
TABLET, FILM COATED ORAL
Qty: 180 TABLET | Refills: 3 | Status: CANCELLED | OUTPATIENT
Start: 2019-10-10

## 2019-10-17 ENCOUNTER — OFFICE VISIT (OUTPATIENT)
Dept: PRIMARY CARE CLINIC | Age: 59
End: 2019-10-17
Payer: MEDICAID

## 2019-10-17 VITALS
RESPIRATION RATE: 18 BRPM | TEMPERATURE: 97.6 F | HEART RATE: 86 BPM | BODY MASS INDEX: 24.38 KG/M2 | DIASTOLIC BLOOD PRESSURE: 73 MMHG | HEIGHT: 68 IN | WEIGHT: 160.9 LBS | SYSTOLIC BLOOD PRESSURE: 125 MMHG

## 2019-10-17 DIAGNOSIS — R53.1 LEFT-SIDED WEAKNESS: ICD-10-CM

## 2019-10-17 DIAGNOSIS — Z12.11 SCREENING FOR COLORECTAL CANCER: ICD-10-CM

## 2019-10-17 DIAGNOSIS — Z23 NEED FOR INFLUENZA VACCINATION: ICD-10-CM

## 2019-10-17 DIAGNOSIS — Z12.12 SCREENING FOR COLORECTAL CANCER: ICD-10-CM

## 2019-10-17 DIAGNOSIS — I63.9 CEREBRAL INFARCTION, UNSPECIFIED MECHANISM (HCC): ICD-10-CM

## 2019-10-17 DIAGNOSIS — E11.9 CONTROLLED TYPE 2 DIABETES MELLITUS WITHOUT COMPLICATION, WITHOUT LONG-TERM CURRENT USE OF INSULIN (HCC): Primary | ICD-10-CM

## 2019-10-17 DIAGNOSIS — F34.1 DYSTHYMIA: ICD-10-CM

## 2019-10-17 DIAGNOSIS — Z12.11 SCREENING FOR COLON CANCER: Primary | ICD-10-CM

## 2019-10-17 PROCEDURE — 90471 IMMUNIZATION ADMIN: CPT | Performed by: NURSE PRACTITIONER

## 2019-10-17 PROCEDURE — 99214 OFFICE O/P EST MOD 30 MIN: CPT | Performed by: NURSE PRACTITIONER

## 2019-10-17 PROCEDURE — 90686 IIV4 VACC NO PRSV 0.5 ML IM: CPT | Performed by: NURSE PRACTITIONER

## 2019-10-17 RX ORDER — BACLOFEN 20 MG/1
TABLET ORAL
Qty: 90 TABLET | Refills: 3 | Status: SHIPPED | OUTPATIENT
Start: 2019-10-17 | End: 2020-10-14 | Stop reason: SDUPTHER

## 2019-10-17 RX ORDER — MULTIVITAMIN/IRON/FOLIC ACID 18MG-0.4MG
TABLET ORAL
Qty: 90 TABLET | Refills: 3 | Status: SHIPPED | OUTPATIENT
Start: 2019-10-17 | End: 2021-01-08 | Stop reason: SDUPTHER

## 2019-10-17 RX ORDER — GABAPENTIN 300 MG/1
CAPSULE ORAL
Qty: 180 CAPSULE | Refills: 0 | Status: SHIPPED | OUTPATIENT
Start: 2019-10-17 | End: 2020-01-13

## 2019-10-17 RX ORDER — LANCETS 30 GAUGE
1 EACH MISCELLANEOUS DAILY
Qty: 100 EACH | Refills: 3 | Status: SHIPPED | OUTPATIENT
Start: 2019-10-17 | End: 2020-09-16 | Stop reason: SDUPTHER

## 2019-10-17 ASSESSMENT — ENCOUNTER SYMPTOMS
VISUAL CHANGE: 0
DIARRHEA: 0
RHINORRHEA: 0
COUGH: 0
ABDOMINAL PAIN: 0
WHEEZING: 0
SORE THROAT: 0
VOMITING: 0
SHORTNESS OF BREATH: 0

## 2019-10-18 ENCOUNTER — TELEPHONE (OUTPATIENT)
Dept: PRIMARY CARE CLINIC | Age: 59
End: 2019-10-18

## 2019-10-18 DIAGNOSIS — Z12.11 SCREENING FOR COLON CANCER: ICD-10-CM

## 2019-10-18 LAB
CONTROL: PRESENT
HEMOCCULT STL QL: NEGATIVE

## 2019-10-18 PROCEDURE — 82274 ASSAY TEST FOR BLOOD FECAL: CPT | Performed by: NURSE PRACTITIONER

## 2019-12-04 DIAGNOSIS — J44.9 COPD, SEVERE (HCC): ICD-10-CM

## 2019-12-04 RX ORDER — IPRATROPIUM BROMIDE AND ALBUTEROL SULFATE 2.5; .5 MG/3ML; MG/3ML
SOLUTION RESPIRATORY (INHALATION)
Qty: 1080 ML | Refills: 3 | Status: SHIPPED | OUTPATIENT
Start: 2019-12-04 | End: 2020-09-24 | Stop reason: SDUPTHER

## 2019-12-18 DIAGNOSIS — Z87.898 HISTORY OF SEIZURES: ICD-10-CM

## 2019-12-18 RX ORDER — LEVETIRACETAM 500 MG/1
TABLET ORAL
Qty: 90 TABLET | Refills: 3 | Status: SHIPPED | OUTPATIENT
Start: 2019-12-18 | End: 2020-12-16 | Stop reason: SDUPTHER

## 2019-12-26 ENCOUNTER — TELEPHONE (OUTPATIENT)
Dept: PRIMARY CARE CLINIC | Age: 59
End: 2019-12-26

## 2019-12-26 DIAGNOSIS — I63.9 CEREBRAL INFARCTION, UNSPECIFIED MECHANISM (HCC): Primary | ICD-10-CM

## 2019-12-29 RX ORDER — BLOOD PRESSURE TEST KIT
1 KIT MISCELLANEOUS DAILY
Qty: 1 KIT | Refills: 0 | Status: SHIPPED | OUTPATIENT
Start: 2019-12-29 | End: 2021-10-27 | Stop reason: ALTCHOICE

## 2020-01-13 RX ORDER — GABAPENTIN 300 MG/1
CAPSULE ORAL
Qty: 180 CAPSULE | Refills: 0 | Status: SHIPPED | OUTPATIENT
Start: 2020-01-13 | End: 2020-03-10

## 2020-01-13 NOTE — TELEPHONE ENCOUNTER
Health Maintenance   Topic Date Due    Low dose CT lung screening  08/07/2018    Hepatitis B vaccine (1 of 3 - Risk 3-dose series) 03/19/2020 (Originally 9/6/1980)    Shingles Vaccine (1 of 2) 03/19/2020 (Originally 9/6/2011)    DTaP/Tdap/Td vaccine (1 - Tdap) 10/17/2020 (Originally 9/6/1972)    Hepatitis C screen  10/17/2020 (Originally 1960)    HIV screen  10/17/2020 (Originally 9/6/1976)    Diabetic microalbuminuria test  03/14/2020    Lipid screen  03/14/2020    Diabetic foot exam  03/19/2020    Diabetic retinal exam  07/25/2020    A1C test (Diabetic or Prediabetic)  08/28/2020    Potassium monitoring  08/28/2020    Creatinine monitoring  08/28/2020    Colon Cancer Screen FIT/FOBT  10/18/2020    Flu vaccine  Completed    Pneumococcal 0-64 years Vaccine  Completed             (applicable per patient's age: Cancer Screenings, Depression Screening, Fall Risk Screening, Immunizations)    Hemoglobin A1C (%)   Date Value   08/28/2019 5.5   03/14/2019 6.1 (H)   09/10/2018 7.7     Microalb/Crt.  Ratio (mcg/mg creat)   Date Value   03/14/2019 CANNOT BE CALCULATED     LDL Cholesterol (mg/dL)   Date Value   03/14/2019 56     AST (U/L)   Date Value   03/14/2019 22     ALT (U/L)   Date Value   03/14/2019 32     BUN (mg/dL)   Date Value   08/28/2019 12      (goal A1C is < 7)   (goal LDL is <100) need 30-50% reduction from baseline     BP Readings from Last 3 Encounters:   10/17/19 125/73   09/12/19 106/68   03/22/19 109/87    (goal /80)      All Future Testing planned in [facility]:  Lab Frequency Next Occurrence   Urinalysis With Microscopic Once 05/07/2019   CT Lung Screen (Annual) Once 09/12/2019   CBC Auto Differential Once 04/14/2020   ALT Once 04/17/2020   AST Once 10/53/6281   Basic Metabolic Panel Once 53/56/5770   Lipid Panel Once 04/14/2020   Hemoglobin A1C Once 04/14/2020   Microalbumin, Ur Once 04/14/2020       Next Visit Date:  Future Appointments   Date Time Provider Jose White 4/20/2020  9:00 AM CARO Love - CNP [facility]   9/24/2020 10:00 AM DO LIDYA Peña [facility]            Patient Active Problem List:     Gastroesophageal reflux disease     Allergic rhinitis     Controlled type 2 diabetes mellitus without complication, without long-term current use of insulin (HCC)     COPD, severe (HCC)     Dysthymia     Cerebral infarction (HCC)     Left-sided weakness

## 2020-02-05 NOTE — TELEPHONE ENCOUNTER
Once 04/14/2020       Next Visit Date:  Future Appointments   Date Time Provider Jose White   4/20/2020  9:00 AM CARO Cifuentes - CNP [facility]   9/24/2020 10:00 AM Ofe Trevino DO [facility]            Patient Active Problem List:     Gastroesophageal reflux disease     Allergic rhinitis     Controlled type 2 diabetes mellitus without complication, without long-term current use of insulin (HCC)     COPD, severe (HCC)     Dysthymia     Cerebral infarction (HCC)     Left-sided weakness

## 2020-03-10 RX ORDER — GABAPENTIN 300 MG/1
CAPSULE ORAL
Qty: 180 CAPSULE | Refills: 0 | Status: SHIPPED | OUTPATIENT
Start: 2020-03-10 | End: 2020-07-02 | Stop reason: SDUPTHER

## 2020-03-10 NOTE — TELEPHONE ENCOUNTER
Health Maintenance   Topic Date Due    Low dose CT lung screening  08/07/2018    Diabetic microalbuminuria test  03/14/2020    Lipid screen  03/14/2020    Diabetic foot exam  03/19/2020    Hepatitis B vaccine (1 of 3 - Risk 3-dose series) 03/19/2020 (Originally 9/6/1980)    Shingles Vaccine (1 of 2) 03/19/2020 (Originally 9/6/2011)    DTaP/Tdap/Td vaccine (1 - Tdap) 10/17/2020 (Originally 9/6/1980)    Hepatitis C screen  10/17/2020 (Originally 1960)    HIV screen  10/17/2020 (Originally 9/6/1976)    Diabetic retinal exam  07/25/2020    A1C test (Diabetic or Prediabetic)  08/28/2020    Potassium monitoring  08/28/2020    Creatinine monitoring  08/28/2020    Colon Cancer Screen FIT/FOBT  10/18/2020    Flu vaccine  Completed    Pneumococcal 0-64 years Vaccine  Completed    Hepatitis A vaccine  Aged Out    Hib vaccine  Aged Out    Meningococcal (ACWY) vaccine  Aged Out             (applicable per patient's age: Cancer Screenings, Depression Screening, Fall Risk Screening, Immunizations)    Hemoglobin A1C (%)   Date Value   08/28/2019 5.5   03/14/2019 6.1 (H)   09/10/2018 7.7     Microalb/Crt.  Ratio (mcg/mg creat)   Date Value   03/14/2019 CANNOT BE CALCULATED     LDL Cholesterol (mg/dL)   Date Value   03/14/2019 56     AST (U/L)   Date Value   03/14/2019 22     ALT (U/L)   Date Value   03/14/2019 32     BUN (mg/dL)   Date Value   08/28/2019 12      (goal A1C is < 7)   (goal LDL is <100) need 30-50% reduction from baseline     BP Readings from Last 3 Encounters:   10/17/19 125/73   09/12/19 106/68   03/22/19 109/87    (goal /80)      All Future Testing planned in [facility]:  Lab Frequency Next Occurrence   Urinalysis With Microscopic Once 05/07/2019   CT Lung Screen (Annual) Once 09/12/2019   CBC Auto Differential Once 04/14/2020   ALT Once 04/17/2020   AST Once 98/60/9342   Basic Metabolic Panel Once 03/18/8600   Lipid Panel Once 04/14/2020   Hemoglobin A1C Once 04/14/2020   Microalbumin, Ur Once 04/14/2020       Next Visit Date:  Future Appointments   Date Time Provider Jose White   4/20/2020  9:00 AM Juan Carlos Adkins, CARO - CNP [facility]   9/24/2020 10:00 AM Juan Smith [facility]            Patient Active Problem List:     Gastroesophageal reflux disease     Allergic rhinitis     Controlled type 2 diabetes mellitus without complication, without long-term current use of insulin (HCC)     COPD, severe (HCC)     Dysthymia     Cerebral infarction (HCC)     Left-sided weakness

## 2020-04-22 ENCOUNTER — TELEPHONE (OUTPATIENT)
Dept: PRIMARY CARE CLINIC | Age: 60
End: 2020-04-22

## 2020-04-27 ENCOUNTER — TELEPHONE (OUTPATIENT)
Dept: PRIMARY CARE CLINIC | Age: 60
End: 2020-04-27

## 2020-05-06 RX ORDER — SIMVASTATIN 20 MG
TABLET ORAL
Qty: 90 TABLET | Refills: 3 | Status: SHIPPED | OUTPATIENT
Start: 2020-05-06 | End: 2021-05-17

## 2020-05-06 RX ORDER — FOLIC ACID 1 MG/1
TABLET ORAL
Qty: 90 TABLET | Refills: 3 | Status: SHIPPED | OUTPATIENT
Start: 2020-05-06 | End: 2021-05-17

## 2020-05-06 NOTE — TELEPHONE ENCOUNTER
Health Maintenance   Topic Date Due    Hepatitis B vaccine (1 of 3 - Risk 3-dose series) 09/06/1980    Shingles Vaccine (1 of 2) 09/06/2011    Low dose CT lung screening  08/07/2018    Diabetic microalbuminuria test  03/14/2020    Lipid screen  03/14/2020    Diabetic foot exam  03/19/2020    DTaP/Tdap/Td vaccine (1 - Tdap) 10/17/2020 (Originally 9/6/1980)    Hepatitis C screen  10/17/2020 (Originally 1960)    HIV screen  10/17/2020 (Originally 9/6/1976)    Diabetic retinal exam  07/25/2020    A1C test (Diabetic or Prediabetic)  08/28/2020    Potassium monitoring  08/28/2020    Creatinine monitoring  08/28/2020    Colon Cancer Screen FIT/FOBT  10/18/2020    Flu vaccine  Completed    Pneumococcal 0-64 years Vaccine  Completed    Hepatitis A vaccine  Aged Out    Hib vaccine  Aged Out    Meningococcal (ACWY) vaccine  Aged Out             (applicable per patient's age: Cancer Screenings, Depression Screening, Fall Risk Screening, Immunizations)    Hemoglobin A1C (%)   Date Value   08/28/2019 5.5   03/14/2019 6.1 (H)   09/10/2018 7.7     Microalb/Crt.  Ratio (mcg/mg creat)   Date Value   03/14/2019 CANNOT BE CALCULATED     LDL Cholesterol (mg/dL)   Date Value   03/14/2019 56     AST (U/L)   Date Value   03/14/2019 22     ALT (U/L)   Date Value   03/14/2019 32     BUN (mg/dL)   Date Value   08/28/2019 12      (goal A1C is < 7)   (goal LDL is <100) need 30-50% reduction from baseline     BP Readings from Last 3 Encounters:   10/17/19 125/73   09/12/19 106/68   03/22/19 109/87    (goal /80)      All Future Testing planned in [facility]:  Lab Frequency Next Occurrence   CT Lung Screen (Annual) Once 09/12/2019   CBC Auto Differential Once 05/06/2020   ALT Once 05/11/2020   AST Once 14/30/4431   Basic Metabolic Panel Once 77/58/6697   Lipid Panel Once 05/06/2020   Hemoglobin A1C Once 05/06/2020   Microalbumin, Ur Once 05/06/2020       Next Visit Date:  Future Appointments   Date Time Provider Jose White   9/24/2020 10:00 AM DO LIDYA [facility]            Patient Active Problem List:     Gastroesophageal reflux disease     Allergic rhinitis     Controlled type 2 diabetes mellitus without complication, without long-term current use of insulin (HCC)     COPD, severe (HCC)     Dysthymia     Cerebral infarction (HCC)     Left-sided weakness

## 2020-05-07 RX ORDER — CETIRIZINE HYDROCHLORIDE 10 MG/1
TABLET ORAL
Qty: 90 TABLET | Refills: 3 | Status: SHIPPED | OUTPATIENT
Start: 2020-05-07 | End: 2021-05-12

## 2020-05-14 ENCOUNTER — TELEPHONE (OUTPATIENT)
Dept: PRIMARY CARE CLINIC | Age: 60
End: 2020-05-14

## 2020-05-14 NOTE — TELEPHONE ENCOUNTER
Attempted to call patient and message left regarding need to get fasting labs done and to R/S cancelled appt.

## 2020-06-09 ENCOUNTER — TELEPHONE (OUTPATIENT)
Dept: PULMONOLOGY | Age: 60
End: 2020-06-09

## 2020-06-09 RX ORDER — BUDESONIDE 0.5 MG/2ML
1 INHALANT ORAL 2 TIMES DAILY
Qty: 60 AMPULE | Refills: 11 | Status: SHIPPED | OUTPATIENT
Start: 2020-06-09 | End: 2020-09-24 | Stop reason: SDUPTHER

## 2020-06-09 RX ORDER — BUDESONIDE 0.5 MG/2ML
1 INHALANT ORAL 2 TIMES DAILY
Qty: 60 AMPULE | Refills: 11 | Status: CANCELLED | OUTPATIENT
Start: 2020-06-09

## 2020-06-11 NOTE — TELEPHONE ENCOUNTER
I called and left a voicemail that the Budesonide has been refilled. To call with any further questions or concerns.

## 2020-07-02 RX ORDER — GABAPENTIN 300 MG/1
300 CAPSULE ORAL 2 TIMES DAILY
Qty: 180 CAPSULE | Refills: 0 | Status: SHIPPED | OUTPATIENT
Start: 2020-07-02 | End: 2020-12-09

## 2020-07-02 RX ORDER — SERTRALINE HYDROCHLORIDE 100 MG/1
TABLET, FILM COATED ORAL
Qty: 180 TABLET | Refills: 3 | Status: SHIPPED | OUTPATIENT
Start: 2020-07-02 | End: 2021-08-19

## 2020-07-02 NOTE — TELEPHONE ENCOUNTER
Health Maintenance   Topic Date Due    Hepatitis B vaccine (1 of 3 - Risk 3-dose series) 09/06/1980    Shingles Vaccine (1 of 2) 09/06/2011    Low dose CT lung screening  08/07/2018    Diabetic microalbuminuria test  03/14/2020    Lipid screen  03/14/2020    Diabetic foot exam  03/19/2020    DTaP/Tdap/Td vaccine (1 - Tdap) 10/17/2020 (Originally 9/6/1980)    Hepatitis C screen  10/17/2020 (Originally 1960)    HIV screen  10/17/2020 (Originally 9/6/1976)    Diabetic retinal exam  07/25/2020    A1C test (Diabetic or Prediabetic)  08/28/2020    Potassium monitoring  08/28/2020    Creatinine monitoring  08/28/2020    Flu vaccine (1) 09/01/2020    Colon Cancer Screen FIT/FOBT  10/18/2020    Pneumococcal 0-64 years Vaccine  Completed    Hepatitis A vaccine  Aged Out    Hib vaccine  Aged Out    Meningococcal (ACWY) vaccine  Aged Out             (applicable per patient's age: Cancer Screenings, Depression Screening, Fall Risk Screening, Immunizations)    Hemoglobin A1C (%)   Date Value   08/28/2019 5.5   03/14/2019 6.1 (H)   09/10/2018 7.7     Microalb/Crt.  Ratio (mcg/mg creat)   Date Value   03/14/2019 CANNOT BE CALCULATED     LDL Cholesterol (mg/dL)   Date Value   03/14/2019 56     AST (U/L)   Date Value   03/14/2019 22     ALT (U/L)   Date Value   03/14/2019 32     BUN (mg/dL)   Date Value   08/28/2019 12      (goal A1C is < 7)   (goal LDL is <100) need 30-50% reduction from baseline     BP Readings from Last 3 Encounters:   10/17/19 125/73   09/12/19 106/68   03/22/19 109/87    (goal /80)      All Future Testing planned in [facility]:  Lab Frequency Next Occurrence   CT Lung Screen (Annual) Once 09/12/2019   CBC Auto Differential Once 07/23/2020   ALT Once 07/23/2020   AST Once 12/23/0291   Basic Metabolic Panel Once 16/94/2815   Lipid Panel Once 07/23/2020   Hemoglobin A1C Once 07/23/2020   Microalbumin, Ur Once 07/23/2020       Next Visit Date:  Future Appointments   Date Time Provider Jose White   9/24/2020 10:00 AM DO [facility]            Patient Active Problem List:     Gastroesophageal reflux disease     Allergic rhinitis     Controlled type 2 diabetes mellitus without complication, without long-term current use of insulin (HCC)     COPD, severe (HCC)     Dysthymia     Cerebral infarction (HCC)     Left-sided weakness

## 2020-07-28 ENCOUNTER — HOSPITAL ENCOUNTER (OUTPATIENT)
Age: 60
Discharge: HOME | End: 2020-07-28
Payer: MEDICAID

## 2020-07-28 LAB
ABSOLUTE EOS #: 0.26 K/UL (ref 0–0.44)
ABSOLUTE IMMATURE GRANULOCYTE: 0.03 K/UL (ref 0–0.3)
ABSOLUTE LYMPH #: 2.06 K/UL (ref 1.1–3.7)
ABSOLUTE MONO #: 0.42 K/UL (ref 0.1–1.2)
ALT SERPL-CCNC: 102 U/L (ref 5–41)
ANION GAP SERPL CALCULATED.3IONS-SCNC: 10 MMOL/L (ref 9–17)
AST SERPL-CCNC: 68 U/L
BASOPHILS # BLD: 0 % (ref 0–2)
BASOPHILS ABSOLUTE: 0.03 K/UL (ref 0–0.2)
BUN BLDV-MCNC: 10 MG/DL (ref 6–20)
BUN/CREAT BLD: 11 (ref 9–20)
CALCIUM SERPL-MCNC: 9.5 MG/DL (ref 8.6–10.4)
CHLORIDE BLD-SCNC: 104 MMOL/L (ref 98–107)
CHOLESTEROL/HDL RATIO: 5.3
CHOLESTEROL: 180 MG/DL
CO2: 28 MMOL/L (ref 20–31)
CREAT SERPL-MCNC: 0.93 MG/DL (ref 0.7–1.2)
DIFFERENTIAL TYPE: ABNORMAL
EOSINOPHILS RELATIVE PERCENT: 3 % (ref 1–4)
ESTIMATED AVERAGE GLUCOSE: 120 MG/DL
GFR AFRICAN AMERICAN: >60 ML/MIN
GFR NON-AFRICAN AMERICAN: >60 ML/MIN
GFR SERPL CREATININE-BSD FRML MDRD: ABNORMAL ML/MIN/{1.73_M2}
GFR SERPL CREATININE-BSD FRML MDRD: ABNORMAL ML/MIN/{1.73_M2}
GLUCOSE BLD-MCNC: 167 MG/DL (ref 70–99)
HBA1C MFR BLD: 5.8 % (ref 4.8–5.9)
HCT VFR BLD CALC: 45.1 % (ref 40.7–50.3)
HDLC SERPL-MCNC: 34 MG/DL
HEMOGLOBIN: 14 G/DL (ref 13–17)
IMMATURE GRANULOCYTES: 0 %
LDL CHOLESTEROL: 85 MG/DL (ref 0–130)
LYMPHOCYTES # BLD: 25 % (ref 24–43)
MCH RBC QN AUTO: 27.1 PG (ref 25.2–33.5)
MCHC RBC AUTO-ENTMCNC: 31 G/DL (ref 28.4–34.8)
MCV RBC AUTO: 87.4 FL (ref 82.6–102.9)
MONOCYTES # BLD: 5 % (ref 3–12)
NRBC AUTOMATED: 0 PER 100 WBC
PDW BLD-RTO: 15.6 % (ref 11.8–14.4)
PLATELET # BLD: 199 K/UL (ref 138–453)
PLATELET ESTIMATE: ABNORMAL
PMV BLD AUTO: 10.1 FL (ref 8.1–13.5)
POTASSIUM SERPL-SCNC: 4.2 MMOL/L (ref 3.7–5.3)
RBC # BLD: 5.16 M/UL (ref 4.21–5.77)
RBC # BLD: ABNORMAL 10*6/UL
SEG NEUTROPHILS: 67 % (ref 36–65)
SEGMENTED NEUTROPHILS ABSOLUTE COUNT: 5.39 K/UL (ref 1.5–8.1)
SODIUM BLD-SCNC: 142 MMOL/L (ref 135–144)
TRIGL SERPL-MCNC: 304 MG/DL
VLDLC SERPL CALC-MCNC: ABNORMAL MG/DL (ref 1–30)
WBC # BLD: 8.2 K/UL (ref 3.5–11.3)
WBC # BLD: ABNORMAL 10*3/UL

## 2020-07-28 PROCEDURE — 85025 COMPLETE CBC W/AUTO DIFF WBC: CPT

## 2020-07-28 PROCEDURE — 80048 BASIC METABOLIC PNL TOTAL CA: CPT

## 2020-07-28 PROCEDURE — 84460 ALANINE AMINO (ALT) (SGPT): CPT

## 2020-07-28 PROCEDURE — 84450 TRANSFERASE (AST) (SGOT): CPT

## 2020-07-28 PROCEDURE — 83036 HEMOGLOBIN GLYCOSYLATED A1C: CPT

## 2020-07-28 PROCEDURE — 36415 COLL VENOUS BLD VENIPUNCTURE: CPT

## 2020-07-28 PROCEDURE — 80061 LIPID PANEL: CPT

## 2020-07-29 ENCOUNTER — OFFICE VISIT (OUTPATIENT)
Dept: PRIMARY CARE CLINIC | Age: 60
End: 2020-07-29
Payer: MEDICAID

## 2020-07-29 ENCOUNTER — HOSPITAL ENCOUNTER (OUTPATIENT)
Age: 60
Setting detail: SPECIMEN
Discharge: HOME | End: 2020-07-29
Payer: MEDICAID

## 2020-07-29 VITALS
RESPIRATION RATE: 18 BRPM | WEIGHT: 171.5 LBS | DIASTOLIC BLOOD PRESSURE: 53 MMHG | HEART RATE: 86 BPM | SYSTOLIC BLOOD PRESSURE: 109 MMHG | TEMPERATURE: 98.3 F | HEIGHT: 68 IN | BODY MASS INDEX: 25.99 KG/M2

## 2020-07-29 LAB
CREATININE URINE: 31.2 MG/DL (ref 39–259)
MICROALBUMIN/CREAT 24H UR: <12 MG/L
MICROALBUMIN/CREAT UR-RTO: ABNORMAL MCG/MG CREAT

## 2020-07-29 PROCEDURE — 82570 ASSAY OF URINE CREATININE: CPT

## 2020-07-29 PROCEDURE — 82043 UR ALBUMIN QUANTITATIVE: CPT

## 2020-07-29 PROCEDURE — 99214 OFFICE O/P EST MOD 30 MIN: CPT | Performed by: NURSE PRACTITIONER

## 2020-07-29 ASSESSMENT — ENCOUNTER SYMPTOMS
SORE THROAT: 0
DIARRHEA: 0
DIFFICULTY BREATHING: 0
SHORTNESS OF BREATH: 0
COUGH: 0
WHEEZING: 0
CHEST TIGHTNESS: 0
VISUAL CHANGE: 0
HEMOPTYSIS: 0
ABDOMINAL PAIN: 0
FREQUENT THROAT CLEARING: 0
HOARSE VOICE: 0
VOMITING: 0
RHINORRHEA: 0

## 2020-07-29 ASSESSMENT — PATIENT HEALTH QUESTIONNAIRE - PHQ9
SUM OF ALL RESPONSES TO PHQ QUESTIONS 1-9: 0
SUM OF ALL RESPONSES TO PHQ QUESTIONS 1-9: 0
1. LITTLE INTEREST OR PLEASURE IN DOING THINGS: 0
SUM OF ALL RESPONSES TO PHQ9 QUESTIONS 1 & 2: 0
2. FEELING DOWN, DEPRESSED OR HOPELESS: 0

## 2020-07-29 ASSESSMENT — COPD QUESTIONNAIRES: COPD: 1

## 2020-07-29 NOTE — PROGRESS NOTES
Name: Tariq Rivas : 1960         Chief Complaint:     Chief Complaint   Patient presents with    Diabetes     Blood sugar today was 175.  Mental Health Problem       History of Present Illness:      Tariq Rivas is a 63 y.o.  male who presents with Diabetes (Blood sugar today was 175.) and Taylor Leyva Delvin is here today with his caregiver for a routine office visit. Diabetes   He presents for his follow-up diabetic visit. He has type 2 diabetes mellitus. His disease course has been stable. Pertinent negatives for hypoglycemia include no confusion, dizziness, headaches, hunger, nervousness/anxiousness, seizures or sleepiness. Speech difficulty: chronic. There are no diabetic associated symptoms. Pertinent negatives for diabetes include no chest pain, no fatigue, no polydipsia, no polyphagia, no polyuria and no visual change. There are no hypoglycemic complications. Pertinent negatives for hypoglycemia complications include no hospitalization and no required assistance. Symptoms are stable. Diabetic complications include a CVA. Pertinent negatives for diabetic complications include no heart disease, nephropathy or peripheral neuropathy. Risk factors for coronary artery disease include diabetes mellitus, dyslipidemia, male sex, hypertension, stress and sedentary lifestyle. Current diabetic treatment includes oral agent (monotherapy). He is compliant with treatment all of the time. His weight is stable. He is following a generally healthy diet. Meal planning includes avoidance of concentrated sweets. He has had a previous visit with a dietitian. He rarely participates in exercise. There is no change in his home blood glucose trend. His breakfast blood glucose range is generally 110-130 mg/dl. An ACE inhibitor/angiotensin II receptor blocker is being taken. He does not see a podiatrist.Eye exam is current.    Mental Health Problem   The primary symptoms include dysphoric mood History:   Diagnosis Date    Acid reflux     Asthma     Cerebrovascular disease     Chronic cough     Controlled type 2 diabetes mellitus without complication, without long-term current use of insulin ([facility] [address] 2/9/2018    COPD (chronic obstructive pulmonary disease) (HCC)     Dysphagia     Dyspnea     Type II or unspecified type diabetes mellitus without mention of complication, not stated as uncontrolled     Unspecified cerebral artery occlusion with cerebral infarction 11/05/2008      Reviewed all health maintenance requirements and ordered appropriate tests  Health Maintenance Due   Topic Date Due    Low dose CT lung screening  08/07/2018    Diabetic microalbuminuria test  03/14/2020    Diabetic foot exam  03/19/2020       Past Surgical History:     Past Surgical History:   Procedure Laterality Date    BRAIN SURGERY      CRANIOTOMY      KNEE SURGERY          Medications:       Prior to Admission medications    Medication Sig Start Date End Date Taking? Authorizing Provider   gabapentin (NEURONTIN) 300 MG capsule Take 1 capsule by mouth 2 times daily for 90 days.  7/2/20 9/30/20 Yes CARO No CNP   sertraline (ZOLOFT) 100 MG tablet TAKE 2 TABLETS BY MOUTH EVERY DAY 7/2/20  Yes CARO No CNP   budesonide (PULMICORT) 0.5 MG/2ML nebulizer suspension Take 2 mLs by nebulization 2 times daily 6/9/20  Yes Golden Rodriguez,    cetirizine (ZYRTEC) 10 MG tablet TAKE 1 TABLET BY MOUTH EVERY DAY 5/7/20  Yes CARO No CNP   folic acid (FOLVITE) 1 MG tablet TAKE 1 TABLET BY MOUTH EVERY DAY 5/6/20  Yes CARO No CNP   simvastatin (ZOCOR) 20 MG tablet TAKE 1 TABLET BY MOUTH EVERY DAY AT NIGHT 5/6/20  Yes CARO No CNP   furosemide (LASIX) 20 MG tablet TAKE 1 TABLET BY MOUTH DAILY AS NEEDED (LEG SWELLING) 4/6/20  Yes CARO No CNP   blood glucose test strips (TRUE METRIX BLOOD GLUCOSE TEST) strip TEST BLOOD SUGAR ONCE DAILY AND AS NEEDED, DX: E11.9 have insomnia and is not hyperactive. Physical Exam:   Vitals:  BP (!) 109/53 (Site: Right Upper Arm, Position: Sitting, Cuff Size: Large Adult)   Pulse 86   Temp 98.3 °F (36.8 °C) (Temporal)   Resp 18   Ht 5' 8\" (1.727 m)   Wt 171 lb 8 oz (77.8 kg)   BMI 26.08 kg/m²     Physical Exam  Vitals signs and nursing note reviewed. Constitutional:       General: He is not in acute distress. Appearance: Normal appearance. He is well-developed. He is not ill-appearing. HENT:      Right Ear: Tympanic membrane and ear canal normal.      Left Ear: Tympanic membrane and ear canal normal.      Mouth/Throat:      Dentition: Abnormal dentition (poor). Pharynx: No posterior oropharyngeal erythema. Eyes:      Conjunctiva/sclera: Conjunctivae normal.   Neck:      Musculoskeletal: Normal range of motion and neck supple. Cardiovascular:      Rate and Rhythm: Normal rate and regular rhythm. Pulses:           Dorsalis pedis pulses are 2+ on the right side and 2+ on the left side. Heart sounds: Normal heart sounds. No murmur. Pulmonary:      Effort: Pulmonary effort is normal.      Breath sounds: Normal breath sounds. No wheezing or rales. Abdominal:      General: Bowel sounds are normal. There is no distension. Palpations: Abdomen is soft. Tenderness: There is no abdominal tenderness. Musculoskeletal:      Right lower leg: No edema. Left lower leg: No edema. Right foot: Normal range of motion. No deformity. Left foot: Decreased range of motion. No deformity. Feet:      Right foot:      Protective Sensation: 5 sites tested. 5 sites sensed. Skin integrity: Skin integrity normal.      Toenail Condition: Right toenails are abnormally thick. Left foot:      Protective Sensation: 5 sites tested. 0 sites sensed. Skin integrity: Skin integrity normal.      Toenail Condition: Left toenails are abnormally thick.    Lymphadenopathy:      Cervical: No cervical adenopathy. Skin:     General: Skin is warm and dry. Findings: No rash. Neurological:      Mental Status: He is alert. Coordination: Coordination abnormal (left hemiplegia). Psychiatric:         Attention and Perception: He is attentive. Mood and Affect: Mood is not anxious or depressed. Speech: Speech normal.         Behavior: Behavior normal.         Thought Content: Thought content normal. Thought content does not include homicidal or suicidal ideation. Thought content does not include homicidal or suicidal plan. Judgment: Judgment normal.         Data:     Lab Results   Component Value Date     07/28/2020    K 4.2 07/28/2020     07/28/2020    CO2 28 07/28/2020    BUN 10 07/28/2020    CREATININE 0.93 07/28/2020    GLUCOSE 167 07/28/2020    PROT 7.5 03/14/2019    LABALBU 3.9 03/14/2019    BILITOT 0.20 03/14/2019    ALKPHOS 80 03/14/2019    AST 68 07/28/2020     07/28/2020     Lab Results   Component Value Date    WBC 8.2 07/28/2020    RBC 5.16 07/28/2020    HGB 14.0 07/28/2020    HCT 45.1 07/28/2020    MCV 87.4 07/28/2020    MCH 27.1 07/28/2020    MCHC 31.0 07/28/2020    RDW 15.6 07/28/2020     07/28/2020    MPV 10.1 07/28/2020     Lab Results   Component Value Date    TSH 2.65 05/23/2014     Lab Results   Component Value Date    CHOL 180 07/28/2020    HDL 34 07/28/2020    PSA 0.84 05/23/2014    LABA1C 5.8 07/28/2020       Assessment/Plan:      Diagnosis Orders   1. Controlled type 2 diabetes mellitus without complication, without long-term current use of insulin ([facility])   DIABETES FOOT EXAM    Basic Metabolic Panel    Urinalysis    Hemoglobin A1C   2. Dysthymia     3. COPD, severe ([address]         1. Adilson Lira received counseling on the following healthy behaviors: nutrition, exercise and medication adherence  2. Patient given educational materials - see patient instructions  3. Was a self-tracking handout given in paper form or via [employer]?  No  If yes, see orders or list here. 4.  Discussed use, benefit, and side effects of prescribed medications. Barriers to medication compliance addressed. All patient questions answered. Pt voiced understanding. 5.  Reviewed prior labs and health maintenance  6. Continue current medications, diet and exercise. Completed Refills   Requested Prescriptions      No prescriptions requested or ordered in this encounter         Return in about 6 months (around 1/29/2021) for Check up.

## 2020-07-29 NOTE — PATIENT INSTRUCTIONS
SURVEY:    You may be receiving a survey from [employer] regarding your visit today. Please complete the survey to enable us to provide the highest quality of care to you and your family. If you cannot score us a very good on any question, please call the office to discuss how we could have made your experience a very good one. Thank you. Patient Education        Counting Carbohydrates: Care Instructions  Your Care Instructions     You don't have to eat special foods when you have diabetes. You just have to be careful to eat healthy foods. Carbohydrates (carbs) raise blood sugar higher and quicker than any other nutrient. Carbs are found in desserts, breads and cereals, and fruit. They're also in starchy vegetables. These include potatoes, corn, and grains such as rice and pasta. Carbs are also in milk and yogurt. The more carbs you eat at one time, the higher your blood sugar will rise. Spreading carbs all through the day helps keep your blood sugar levels within your target range. Counting carbs is one of the best ways to keep your blood sugar under control. If you use insulin, counting carbs helps you match the right amount of insulin to the number of grams of carbs in a meal. Then you can change your diet and insulin dose as needed. Testing your blood sugar several times a day can help you learn how carbs affect your blood sugar. A registered dietitian or certified diabetes educator can help you plan meals and snacks. Follow-up care is a key part of your treatment and safety. Be sure to make and go to all appointments, and call your doctor if you are having problems. It's also a good idea to know your test results and keep a list of the medicines you take. How can you care for yourself at home?   Know your daily amount of carbohydrates  Your daily amount depends on several things, such as your weight, how active you are, which diabetes medicines you take, and what your goals are for your blood sugar levels. A registered dietitian or certified diabetes educator can help you plan how many carbs to include in each meal and snack. For most adults, a guideline for the daily amount of carbs is:  · 45 to 60 grams at each meal. That's about the same as 3 to 4 carbohydrate servings. · 15 to 20 grams at each snack. That's about the same as 1 carbohydrate serving. Count carbs  Counting carbs lets you know how much rapid-acting insulin to take before you eat. If you use an insulin pump, you get a constant rate of insulin during the day. So the pump must be programmed at meals. This gives you extra insulin to cover the rise in blood sugar after meals. If you take insulin:  · Learn your own insulin-to-carb ratio. You and your diabetes health professional will figure out the ratio. You can do this by testing your blood sugar after meals. For example, you may need a certain amount of insulin for every 15 grams of carbs. · Add up the carb grams in a meal. Then you can figure out how many units of insulin to take based on your insulin-to-carb ratio. · Exercise lowers blood sugar. You can use less insulin than you would if you were not doing exercise. Keep in mind that timing matters. If you exercise within 1 hour after a meal, your body may need less insulin for that meal than it would if you exercised 3 hours after the meal. Test your blood sugar to find out how exercise affects your need for insulin. If you do or don't take insulin:  · Look at labels on packaged foods. This can tell you how many carbs are in a serving. You can also use guides from the American Diabetes Association. · Be aware of portions, or serving sizes. If a package has two servings and you eat the whole package, you need to double the number of grams of carbohydrate listed for one serving. · Protein, fat, and fiber do not raise blood sugar as much as carbs do.  If you eat a lot of these nutrients in a meal, your blood sugar will rise more slowly than it would otherwise. Eat from all food groups  · Eat at least three meals a day. · Plan meals to include food from all the food groups. The food groups include grains, fruits, dairy, proteins, and vegetables. · Talk to your dietitian or diabetes educator about ways to add limited amounts of sweets into your meal plan. · If you drink alcohol, talk to your doctor. It may not be recommended when you are taking certain diabetes medicines. Where can you learn more? Go to https://Ensenda.[employer]iv[employer]. org and sign in to your [employer] account. Enter G919 in the [facility] [place] box to learn more about \"Counting Carbohydrates: Care Instructions. \"     If you do not have an account, please click on the \"Sign Up Now\" link. Current as of: December 20, 2019               Content Version: 12.5  © 2505-9952 [employer]. Care instructions adapted under license by [facility] ([facility]). If you have questions about a medical condition or this instruction, always ask your healthcare professional. [place] [address] any warranty or liability for your use of this information.

## 2020-08-12 RX ORDER — FLUTICASONE PROPIONATE 50 MCG
SPRAY, SUSPENSION (ML) NASAL
Qty: 1 BOTTLE | Refills: 3 | Status: SHIPPED | OUTPATIENT
Start: 2020-08-12 | End: 2020-11-12

## 2020-08-18 ENCOUNTER — TELEPHONE (OUTPATIENT)
Dept: PRIMARY CARE CLINIC | Age: 60
End: 2020-08-18

## 2020-09-15 ENCOUNTER — HOSPITAL ENCOUNTER (OUTPATIENT)
Age: 60
Discharge: HOME | End: 2020-09-17
Payer: MEDICAID

## 2020-09-15 ENCOUNTER — HOSPITAL ENCOUNTER (OUTPATIENT)
Age: 60
Discharge: HOME | End: 2020-09-15
Payer: MEDICAID

## 2020-09-15 ENCOUNTER — TELEPHONE (OUTPATIENT)
Dept: PRIMARY CARE CLINIC | Age: 60
End: 2020-09-15

## 2020-09-15 ENCOUNTER — OFFICE VISIT (OUTPATIENT)
Dept: PRIMARY CARE CLINIC | Age: 60
End: 2020-09-15
Payer: MEDICAID

## 2020-09-15 ENCOUNTER — HOSPITAL ENCOUNTER (OUTPATIENT)
Dept: VASCULAR LAB | Age: 60
Discharge: HOME | End: 2020-09-17
Payer: MEDICAID

## 2020-09-15 ENCOUNTER — HOSPITAL ENCOUNTER (OUTPATIENT)
Dept: GENERAL RADIOLOGY | Age: 60
Discharge: HOME | End: 2020-09-17
Payer: MEDICAID

## 2020-09-15 VITALS
SYSTOLIC BLOOD PRESSURE: 116 MMHG | DIASTOLIC BLOOD PRESSURE: 54 MMHG | HEIGHT: 68 IN | BODY MASS INDEX: 26.08 KG/M2 | RESPIRATION RATE: 20 BRPM | OXYGEN SATURATION: 94 % | TEMPERATURE: 99.4 F | HEART RATE: 98 BPM

## 2020-09-15 LAB
ANION GAP SERPL CALCULATED.3IONS-SCNC: 12 MMOL/L (ref 9–17)
BNP INTERPRETATION: NORMAL
BUN BLDV-MCNC: 11 MG/DL (ref 6–20)
BUN/CREAT BLD: 15 (ref 9–20)
CALCIUM SERPL-MCNC: 8.8 MG/DL (ref 8.6–10.4)
CHLORIDE BLD-SCNC: 104 MMOL/L (ref 98–107)
CO2: 27 MMOL/L (ref 20–31)
CREAT SERPL-MCNC: 0.75 MG/DL (ref 0.7–1.2)
GFR AFRICAN AMERICAN: >60 ML/MIN
GFR NON-AFRICAN AMERICAN: >60 ML/MIN
GFR SERPL CREATININE-BSD FRML MDRD: ABNORMAL ML/MIN/{1.73_M2}
GFR SERPL CREATININE-BSD FRML MDRD: ABNORMAL ML/MIN/{1.73_M2}
GLUCOSE BLD-MCNC: 169 MG/DL (ref 70–99)
POTASSIUM SERPL-SCNC: 4 MMOL/L (ref 3.7–5.3)
PRO-BNP: 71 PG/ML
SODIUM BLD-SCNC: 143 MMOL/L (ref 135–144)

## 2020-09-15 PROCEDURE — 99214 OFFICE O/P EST MOD 30 MIN: CPT | Performed by: NURSE PRACTITIONER

## 2020-09-15 PROCEDURE — 83880 ASSAY OF NATRIURETIC PEPTIDE: CPT

## 2020-09-15 PROCEDURE — 71046 X-RAY EXAM CHEST 2 VIEWS: CPT

## 2020-09-15 PROCEDURE — 36415 COLL VENOUS BLD VENIPUNCTURE: CPT

## 2020-09-15 PROCEDURE — 93971 EXTREMITY STUDY: CPT

## 2020-09-15 PROCEDURE — 80048 BASIC METABOLIC PNL TOTAL CA: CPT

## 2020-09-15 RX ORDER — FUROSEMIDE 20 MG/1
20 TABLET ORAL 2 TIMES DAILY
Qty: 180 TABLET | Refills: 3 | Status: SHIPPED | OUTPATIENT
Start: 2020-09-15 | End: 2021-10-27 | Stop reason: SDUPTHER

## 2020-09-15 ASSESSMENT — ENCOUNTER SYMPTOMS
SORE THROAT: 0
VISUAL CHANGE: 0
SHORTNESS OF BREATH: 0
VOMITING: 0
SWOLLEN GLANDS: 0
RHINORRHEA: 0
DIARRHEA: 0
COUGH: 0
CHANGE IN BOWEL HABIT: 0
WHEEZING: 0
NAUSEA: 0
ABDOMINAL PAIN: 0
CONSTIPATION: 0

## 2020-09-15 NOTE — PROGRESS NOTES
Name: Jaye Orozco : 1960         Chief Complaint:     Chief Complaint   Patient presents with    Edema     Bilat lower legs x 1 week. C/o left knee pain. History of Present Illness:      Jaye Orozco is a 62 y.o.  male who presents with Edema (Bilat lower legs x 1 week. C/o left knee pain. )      Jennifer Araujo is here today for routine office visit. Lower leg edema-worsening especially on the left. Patient does have chronic swelling in the left lower leg but is usually able to control this with Lasix and elevation. Unable to get the swelling to go down for the past couple of days. The left foot is now somewhat darker in color than the right. He does have some mild pitting edema in the right leg as well. He denies any shortness of breath. Other   This is a new (LOWER LEG EDEMA) problem. The current episode started in the past 7 days. The problem occurs constantly. The problem has been unchanged. Associated symptoms include weakness (CHRONIC LEFT SIDED). Pertinent negatives include no abdominal pain, anorexia, arthralgias, change in bowel habit, chest pain, chills, congestion, coughing, diaphoresis, fatigue, fever, headaches, joint swelling, myalgias, nausea, neck pain, numbness, rash, sore throat, swollen glands, urinary symptoms, vertigo, visual change or vomiting. Nothing aggravates the symptoms. He has tried rest (ELEVATION, LASIX) for the symptoms. The treatment provided no relief.          Past Medical History:     Past Medical History:   Diagnosis Date    Acid reflux     Asthma     Cerebrovascular disease     Chronic cough     Controlled type 2 diabetes mellitus without complication, without long-term current use of insulin ([facility]) 2018    COPD (chronic obstructive pulmonary disease) ([facility])     Dysphagia     Dyspnea     Type II or unspecified type diabetes mellitus without mention of complication, not stated as uncontrolled     Unspecified cerebral artery occlusion with cerebral infarction 11/05/2008      Reviewed all health maintenance requirements and ordered appropriate tests  Health Maintenance Due   Topic Date Due    Low dose CT lung screening  08/07/2018    Flu vaccine (1) 09/01/2020       Past Surgical History:     Past Surgical History:   Procedure Laterality Date    BRAIN SURGERY      CRANIOTOMY      KNEE SURGERY          Medications:       Prior to Admission medications    Medication Sig Start Date End Date Taking? Authorizing Provider   furosemide (LASIX) 20 MG tablet Take 1 tablet by mouth 2 times daily 9/15/20  Yes CARO Lombardi CNP   fluticasone (FLONASE) 50 MCG/ACT nasal spray SPRAY 2 SPRAYS INTO EACH NOSTRIL EVERY DAY 8/12/20  Yes Golden Rodriguez DO   gabapentin (NEURONTIN) 300 MG capsule Take 1 capsule by mouth 2 times daily for 90 days.  7/2/20 9/30/20 Yes CARO Lombardi CNP   sertraline (ZOLOFT) 100 MG tablet TAKE 2 TABLETS BY MOUTH EVERY DAY 7/2/20  Yes CARO Lombardi CNP   cetirizine (ZYRTEC) 10 MG tablet TAKE 1 TABLET BY MOUTH EVERY DAY 5/7/20  Yes CARO Lombardi CNP   folic acid (FOLVITE) 1 MG tablet TAKE 1 TABLET BY MOUTH EVERY DAY 5/6/20  Yes CARO Lombardi CNP   simvastatin (ZOCOR) 20 MG tablet TAKE 1 TABLET BY MOUTH EVERY DAY AT NIGHT 5/6/20  Yes CARO Lombadri CNP   blood glucose test strips (TRUE METRIX BLOOD GLUCOSE TEST) strip TEST BLOOD SUGAR ONCE DAILY AND AS NEEDED, DX: E11.9 DIABETES TYPE 2 2/5/20  Yes CARO Lombardi CNP   Blood Pressure KIT 1 each by Does not apply route daily 12/29/19  Yes CARO Lombardi CNP   levETIRAcetam (KEPPRA) 500 MG tablet TAKE 1/2 TABLET BY MOUTH TWICE DAILY 12/18/19  Yes CARO Lombardi CNP   ipratropium-albuterol (DUONEB) 0.5-2.5 (3) MG/3ML SOLN nebulizer solution INHALE 3 MLS PER NEBULIZER INTO THE LUNGS EVERY 6 HOURS 12/4/19  Yes Golden A Jennifer, DO   Lancets MISC 1 each by Does not apply route daily DISPENSE BRAND COMPATIBLE WITH METER AND STRIPS TRUE METRIX 10/17/19  Yes CARO No CNP   metFORMIN (GLUCOPHAGE) 1000 MG tablet TAKE 1 TABLET BY MOUTH TWICE DAILY 10/17/19  Yes CARO No CNP   baclofen (LIORESAL) 20 MG tablet TAKE 1/2 A TABLET BY MOUTH TWICE DAILY 10/17/19  Yes CARO No CNP   Misc. Devices (ADJUST BATH/SHOWER SEAT/BACK) MISC 1 each by Does not apply route daily 10/17/19  Yes CARO No CNP   Misc. Devices (COMMODE BEDSIDE) MISC 1 each by Does not apply route daily 10/17/19  Yes CARO No CNP   Multiple Vitamins-Minerals ([facility] SPECTRAVITE ADVANCED) TABS TAKE 1 TABLET BY MOUTH EVERY DAY 10/17/19  Yes CARO No CNP   omeprazole (PRILOSEC) 40 MG delayed release capsule TAKE 1 CAPSULE BY MOUTH EVERY DAY 9/25/19  Yes CARO No CNP   Misc. Devices (NOVA CUSHION GEL SEAT PAD) MISC 1 each by Does not apply route daily 3/28/19  Yes CARO No CNP   blood glucose monitor kit and supplies 1 kit by Other route daily Test 1 times a day & as needed for symptoms of irregular blood glucose. 8/6/18  Yes CARO Chawla CNP   senna (SENOKOT) 8.6 MG TABS tablet TAKE 1 TABLET BY MOUTH TWICE DAILY 11/17/17  Yes CARO No CNP   [facility] ASPIRIN CHILD 81 MG chewable tablet TAKE 1 TABLET BY MOUTH DAILY 6/28/17  Yes CARO No CNP   lisinopril (PRINIVIL;ZESTRIL) 5 MG tablet Take 5 mg by mouth daily   Yes Historical Provider, MD   Lift Chair [id number] [facility] by Does not apply route 10/14/16  Yes CARO No CNP   diclofenac (VOLTAREN) 75 MG EC tablet Take 75 mg by mouth daily   Yes Historical Provider, MD   budesonide (PULMICORT) 0.5 MG/2ML nebulizer suspension Take 2 mLs by nebulization 2 times daily 6/9/20   Geannie Lose, DO        Allergies:       Pcn [penicillins]; Morphine; and Sulfa antibiotics    Social History:     Tobacco:    reports that he quit smoking about 12 years ago. His smoking use included cigarettes. He has a 45.00 pack-year smoking history. Rate and Rhythm: Normal rate and regular rhythm. Heart sounds: Normal heart sounds. No murmur. Pulmonary:      Effort: Pulmonary effort is normal.      Breath sounds: Decreased air movement present. Decreased breath sounds (minimally) present. No wheezing or rales. Abdominal:      General: Bowel sounds are normal. There is no distension. Palpations: Abdomen is soft. Tenderness: There is no abdominal tenderness. Musculoskeletal:      Right lower leg: Edema (trace pitting) present. Left lower leg: Edema (2+ pitting) present. Lymphadenopathy:      Cervical: No cervical adenopathy. Skin:     General: Skin is warm and dry. Findings: No rash. Neurological:      Mental Status: He is alert. Coordination: Coordination abnormal (left hemiplegia). Psychiatric:         Mood and Affect: Mood normal.         Behavior: Behavior normal.         Data:     Lab Results   Component Value Date     07/28/2020    K 4.2 07/28/2020     07/28/2020    CO2 28 07/28/2020    BUN 10 07/28/2020    CREATININE 0.93 07/28/2020    GLUCOSE 167 07/28/2020    PROT 7.5 03/14/2019    LABALBU 3.9 03/14/2019    BILITOT 0.20 03/14/2019    ALKPHOS 80 03/14/2019    AST 68 07/28/2020     07/28/2020     Lab Results   Component Value Date    WBC 8.2 07/28/2020    RBC 5.16 07/28/2020    HGB 14.0 07/28/2020    HCT 45.1 07/28/2020    MCV 87.4 07/28/2020    MCH 27.1 07/28/2020    MCHC 31.0 07/28/2020    RDW 15.6 07/28/2020     07/28/2020    MPV 10.1 07/28/2020     Lab Results   Component Value Date    TSH 2.65 05/23/2014     Lab Results   Component Value Date    CHOL 180 07/28/2020    HDL 34 07/28/2020    PSA 0.84 05/23/2014    LABA1C 5.8 07/28/2020       Assessment/Plan:      Diagnosis Orders   1.  Lower leg edema  ECHO 2D WO Color Doppler Complete    Basic Metabolic Panel    Brain Natriuretic Peptide    XR CHEST STANDARD (2 VW)    VL DUP LOWER EXTREMITY VENOUS LEFT    furosemide (LASIX) 20 MG tablet     We will order stat Doppler to rule out DVT. We will also need BMP, BNP, chest x-ray. Echocardiogram ordered. We will follow-up in the office after testing. He is to increase furosemide to 20 mg twice daily. We will recheck BMP next week. 1.  Demar Ly received counseling on the following healthy behaviors: medication adherence  2. Patient given educational materials - see patient instructions  3. Was a self-tracking handout given in paper form or via [employer]? No  If yes, see orders or list here. 4.  Discussed use, benefit, and side effects of prescribed medications. Barriers to medication compliance addressed. All patient questions answered. Pt voiced understanding. 5.  Reviewed prior labs and health maintenance  6. Continue current medications, diet and exercise. Completed Refills   Requested Prescriptions     Signed Prescriptions Disp Refills    furosemide (LASIX) 20 MG tablet 180 tablet 3     Sig: Take 1 tablet by mouth 2 times daily         Return if symptoms worsen or fail to improve.

## 2020-09-15 NOTE — TELEPHONE ENCOUNTER
Message left with wife, Miracle Topete regarding stable labs and xrays. Instructed to continue to take Lasix twice a day as instructed for the next week. Informed to repeat labs in one week and to call this office with progress next week. Wife verbalized understanding.

## 2020-09-15 NOTE — TELEPHONE ENCOUNTER
----- Message from [facility], CARO - CNP sent at 9/15/2020 12:34 PM EDT -----  X-rays and labs are stable. Continue to use Lasix twice a day as instructed for the next week and repeat BMP(ordered). Call next week with progress.

## 2020-09-16 RX ORDER — LANCETS 30 GAUGE
1 EACH MISCELLANEOUS DAILY
Qty: 100 EACH | Refills: 3 | Status: SHIPPED | OUTPATIENT
Start: 2020-09-16 | End: 2021-10-27 | Stop reason: SDUPTHER

## 2020-09-21 ENCOUNTER — TELEPHONE (OUTPATIENT)
Dept: PULMONOLOGY | Age: 60
End: 2020-09-21

## 2020-09-21 NOTE — TELEPHONE ENCOUNTER
Talon's wife called and said that the Pulmicort needs refilled. I called [facility] and they said it needs a prior authorization not a refill. Prior authorization form completed and faxed.

## 2020-09-22 ENCOUNTER — HOSPITAL ENCOUNTER (OUTPATIENT)
Age: 60
Discharge: HOME | End: 2020-09-22
Payer: MEDICAID

## 2020-09-22 ENCOUNTER — TELEPHONE (OUTPATIENT)
Dept: PRIMARY CARE CLINIC | Age: 60
End: 2020-09-22

## 2020-09-22 LAB
ANION GAP SERPL CALCULATED.3IONS-SCNC: 10 MMOL/L (ref 9–17)
BUN BLDV-MCNC: 10 MG/DL (ref 6–20)
BUN/CREAT BLD: 12 (ref 9–20)
CALCIUM SERPL-MCNC: 8.6 MG/DL (ref 8.6–10.4)
CHLORIDE BLD-SCNC: 103 MMOL/L (ref 98–107)
CO2: 29 MMOL/L (ref 20–31)
CREAT SERPL-MCNC: 0.83 MG/DL (ref 0.7–1.2)
GFR AFRICAN AMERICAN: >60 ML/MIN
GFR NON-AFRICAN AMERICAN: >60 ML/MIN
GFR SERPL CREATININE-BSD FRML MDRD: ABNORMAL ML/MIN/{1.73_M2}
GFR SERPL CREATININE-BSD FRML MDRD: ABNORMAL ML/MIN/{1.73_M2}
GLUCOSE BLD-MCNC: 174 MG/DL (ref 70–99)
POTASSIUM SERPL-SCNC: 3.9 MMOL/L (ref 3.7–5.3)
SODIUM BLD-SCNC: 142 MMOL/L (ref 135–144)

## 2020-09-22 PROCEDURE — 36415 COLL VENOUS BLD VENIPUNCTURE: CPT

## 2020-09-22 PROCEDURE — 80048 BASIC METABOLIC PNL TOTAL CA: CPT

## 2020-09-22 NOTE — TELEPHONE ENCOUNTER
----- Message from [address] [place] [address], CARO - CNP sent at 9/22/2020 11:27 AM EDT -----  Labs are stable. How are his legs?

## 2020-09-22 NOTE — TELEPHONE ENCOUNTER
I do not see where his echo cardiogram is scheduled. Please check on this. I have also referred him to vascular for opinion.

## 2020-09-24 ENCOUNTER — OFFICE VISIT (OUTPATIENT)
Dept: PULMONOLOGY | Age: 60
End: 2020-09-24
Payer: MEDICAID

## 2020-09-24 VITALS
BODY MASS INDEX: 26.37 KG/M2 | WEIGHT: 174 LBS | OXYGEN SATURATION: 94 % | DIASTOLIC BLOOD PRESSURE: 68 MMHG | RESPIRATION RATE: 18 BRPM | SYSTOLIC BLOOD PRESSURE: 111 MMHG | HEIGHT: 68 IN | HEART RATE: 95 BPM | TEMPERATURE: 99 F

## 2020-09-24 PROCEDURE — G0296 VISIT TO DETERM LDCT ELIG: HCPCS | Performed by: NURSE PRACTITIONER

## 2020-09-24 PROCEDURE — 99213 OFFICE O/P EST LOW 20 MIN: CPT | Performed by: NURSE PRACTITIONER

## 2020-09-24 PROCEDURE — 99214 OFFICE O/P EST MOD 30 MIN: CPT

## 2020-09-24 RX ORDER — BUDESONIDE 0.5 MG/2ML
1 INHALANT ORAL 2 TIMES DAILY
Qty: 60 AMPULE | Refills: 11 | Status: SHIPPED | OUTPATIENT
Start: 2020-09-24 | End: 2021-10-25

## 2020-09-24 RX ORDER — IPRATROPIUM BROMIDE AND ALBUTEROL SULFATE 2.5; .5 MG/3ML; MG/3ML
SOLUTION RESPIRATORY (INHALATION)
Qty: 1080 ML | Refills: 3 | Status: SHIPPED | OUTPATIENT
Start: 2020-09-24 | End: 2021-10-28 | Stop reason: SDUPTHER

## 2020-09-24 RX ORDER — OMEPRAZOLE 40 MG/1
CAPSULE, DELAYED RELEASE ORAL
Qty: 90 CAPSULE | Refills: 3 | Status: SHIPPED | OUTPATIENT
Start: 2020-09-24 | End: 2021-10-27 | Stop reason: SDUPTHER

## 2020-09-24 ASSESSMENT — ENCOUNTER SYMPTOMS
EYES NEGATIVE: 1
GASTROINTESTINAL NEGATIVE: 1
ALLERGIC/IMMUNOLOGIC NEGATIVE: 1

## 2020-09-24 NOTE — PROGRESS NOTES
Subjective:      Patient ID: Lizzie Tirado. is a 62 y.o. male. HPI:    Here for follow-up for severe COPD, former smoker. Patient was last seen in the office in September 2019 per Lew PARRY. Patient with a history of left hemiparesis from stroke uses a wheelchair for mobility. Kortney Acuña He is accompanied with a family member who reports increasing issues with lower extremity swelling. No significant changes to his breathing, denies any cough, sputum production or hemoptysis. Does not use oxygen. Prior work-up:  PFT 2/26/2013: Spirometry demonstrates a severe obstructive ventilatory defect with a significant bronchospastic component identified. Final impression consistent with clinical diagnosis of COPD. Chest x-ray 9/15/2020: Generalized interstitial thickening suggestive of chronic lung disease. No focal airspace consolidation, sizable pleural effusion or pneumothorax. CT Chest Imaging 8/7/2017: Negative for pulmonary nodules. Panlobular emphysema with right apical bulla. Low lung volumes with bilateral linear scarring/atelectasis. Venous lower extremity Dopplers 9/15/2020: Negative for DVT but noted soft tissue edema from ankle to mid thigh with prominent lymph nodes visualized in the groin. Laboratory evaluation:  Pro BNP 9/15/2020: 71. Medications:   DuoNeb: 3-4 times daily  Budesonide twice daily:        Immunizations:   Immunization History   Administered Date(s) Administered    Influenza A ([id number]) Vaccine PF IM 01/12/2010    Influenza Virus Vaccine 10/01/2014, 10/22/2015    Influenza Whole 10/01/2011    Influenza, Quadv, IM, PF (6 mo and older Fluzone, Flulaval, Fluarix, and 3 yrs and older Afluria) 10/05/2016, 10/12/2017, 09/10/2018, 10/17/2019    Pneumococcal Polysaccharide ([id number]) 10/22/2015        No flowsheet data found.     /68 (Site: Right Upper Arm, Position: Sitting, Cuff Size: Medium Adult)   Pulse 95   Temp 99 °F (37.2 °C) (Tympanic) Resp 18   Ht 5' 8\" (1.727 m)   Wt 174 lb (78.9 kg)   SpO2 94%   BMI 26.46 kg/m²     Past Medical History:   Diagnosis Date    Acid reflux     Asthma     Cerebrovascular disease     Chronic cough     Controlled type 2 diabetes mellitus without complication, without long-term current use of insulin ([facility] 2018    COPD (chronic obstructive pulmonary disease) (HCC)     Dysphagia     Dyspnea     Type II or unspecified type diabetes mellitus without mention of complication, not stated as uncontrolled     Unspecified cerebral artery occlusion with cerebral infarction 2008     Past Surgical History:   Procedure Laterality Date    BRAIN SURGERY      CRANIOTOMY      KNEE SURGERY       Family History   Problem Relation Age of Onset    Cancer Mother     Diabetes Mother     Heart Failure Father     Diabetes Father     Diabetes Sister        Social History     Socioeconomic History    Marital status:      Spouse name: Not on file    Number of children: Not on file    Years of education: Not on file    Highest education level: Not on file   Occupational History    Not on file   Social Needs    Financial resource strain: Not on file    Food insecurity     Worry: Not on file     Inability: Not on file    Transportation needs     Medical: Not on file     Non-medical: Not on file   Tobacco Use    Smoking status: Former Smoker     Packs/day: 1.50     Years: 30.00     Pack years: 45.00     Types: Cigarettes     Last attempt to quit: 2008     Years since quittin.7    Smokeless tobacco: Never Used   Substance and Sexual Activity    Alcohol use: No    Drug use: No    Sexual activity: Not on file   Lifestyle    Physical activity     Days per week: Not on file     Minutes per session: Not on file    Stress: Not on file   Relationships    Social connections     Talks on phone: Not on file     Gets together: Not on file     Attends Congregation service: Not on file     Active member of club or organization: Not on file     Attends meetings of clubs or organizations: Not on file     Relationship status: Not on file    Intimate partner violence     Fear of current or ex partner: Not on file     Emotionally abused: Not on file     Physically abused: Not on file     Forced sexual activity: Not on file   Other Topics Concern    Not on file   Social History Narrative    Not on file       Review of Systems   Constitutional:        Wheelchair-bound for mobility   HENT: Negative. Eyes: Negative. Respiratory:        Shortness of breath with activity, denies cough, sputum production or hemoptysis. Cardiovascular:        Extensive bilateral lower extremity swelling. Venous Dopplers negative proBNP negative following up with vascular next week. Gastrointestinal: Negative. Endocrine: Negative. Genitourinary: Negative. Musculoskeletal:        Wheelchair-bound for mobility   Skin: Negative. Allergic/Immunologic: Negative. Neurological:        Left-sided hemiparesis status post CVA   Hematological: Negative. Psychiatric/Behavioral: Negative.         Objective:       Physical Exam  General appearance - oriented to person, place, and time, overweight and chronically ill appearing  Mental status - normal mood, behavior, speech, dress, motor activity, and thought processes  Eyes - pupils equal and reactive, extraocular eye movements intact  Ears - not examined  Nose - normal and patent, no erythema, discharge or polyps  Mouth - mucous membranes moist, pharynx normal without lesions  Neck - supple, no significant adenopathy  Chest - clear to auscultation, no wheezes, rales or rhonchi, symmetric air entry  Heart -normal rate, regular rhythm, normal S1, S2, no murmurs, rubs, clicks or gallops  Abdomen - soft, nontender, nondistended, no masses or organomegaly  Neuro- alert, oriented, normal speech, no focal findings or movement disorder noted} left-sided hemiparesis  Extremities - pedal edema 2-3 +, intact peripheral pulses  Skin - normal coloration and turgor, no rashes, no suspicious skin lesions noted     Wt Readings from Last 3 Encounters:   09/24/20 174 lb (78.9 kg)   07/29/20 171 lb 8 oz (77.8 kg)   10/17/19 160 lb 14.4 oz (73 kg)       Results for orders placed or performed during the hospital encounter of 06/79/64   Basic Metabolic Panel   Result Value Ref Range    Glucose 174 (H) 70 - 99 mg/dL    BUN 10 6 - 20 mg/dL    CREATININE 0.83 0.70 - 1.20 mg/dL    Bun/Cre Ratio 12 9 - 20    Calcium 8.6 8.6 - 10.4 mg/dL    Sodium 142 135 - 144 mmol/L    Potassium 3.9 3.7 - 5.3 mmol/L    Chloride 103 98 - 107 mmol/L    CO2 29 20 - 31 mmol/L    Anion Gap 10 9 - 17 mmol/L    GFR Non-African American >60 >60 mL/min    GFR African American >60 >60 mL/min    GFR Comment          GFR Staging             Xr Chest (2 Vw)    Result Date: 9/15/2020  EXAMINATION: TWO XRAY VIEWS OF THE CHEST 9/15/2020 10:42 am COMPARISON: 08/08/2016 HISTORY: ORDERING SYSTEM PROVIDED HISTORY: Lower leg edema TECHNOLOGIST PROVIDED HISTORY: lower leg swelling FINDINGS: Cardiac silhouette is normal in size. Mild generalized interstitial prominence without superimposed focal airspace consolidation, sizeable pleural effusion or pneumothorax. Central vascular congestion. Trachea is midline. Osseous structures and soft tissues are grossly intact. Generalized interstitial thickening suggestive of chronic lung disease. No definite focal airspace consolidation, sizeable pleural effusion or pneumothorax.      Vl Dup Lower Extremity Venous Left    Result Date: 9/15/2020    [facility]  Vascular Lower Extremities DVT Study Procedure   Patient Name   Izaiah Sabin Date of Study           09/15/2020                 Maria L Patel.   Date of Birth  1960    Gender                  Male   Age            62 year(s)    Race                       Room Number   Corporate ID # [id number]   Patient Acct # [de-identified]   MR # [id number] [facility], [facility] [place]   Accession #    [id number]   Interpreting Physician  Abilio Pinedo MD   Referring      Sidney Carrera,  Referring Physician  Nurse          CNP *  Practitioner  Additional Comments Results reported to Diony Adkins CARO-CNP, 09/15/2020 @ 1133. Procedure Type of Study:   Veins: Lower Extremities DVT Study, Venous Scan Lower Left. Patient Status:Out Patient. Comments:INDICATIONS: Lower leg edema R60.0 Swelling, discoloration  Conclusions   Summary   Study limitations, as above. No evidence for DVT. Soft tissue edema. Signature   ----------------------------------------------------------------  Electronically signed by Hai Trimble NAGI Michaels(Sonographer) on  09/15/2020 11:47 AM  ----------------------------------------------------------------   ----------------------------------------------------------------  Electronically signed by Abilio Pinedo MD(Interpreting  physician) on 09/15/2020 01:52 PM  ----------------------------------------------------------------    Left Impression:   The common femoral, femoral, deep femoral, popliteal, tibials, peroneal,   and saphenous veins were evaluated. The anterior and posterior tibial and peroneal veins were with poor   resolution. A few gastroc/soleal veins were compressible but, otherwise were with   poor resolution. Prominent lymph nodes were visualized in the groin. Edema was noted from the ankle to the mid thigh. All other veins were compressible with normal doppler responses. Velocities are measured in cm/s ; Diameters are measured in cm Right Lower Extremities DVT Study Measurements Right 2D Measurements +------------------------------------+----------+---------------+----------+ ! Location                            ! Visualized! Compressibility! Thrombosis! +------------------------------------+----------+---------------+----------+ ! Common Femoral                      !Yes       ! Yes !None      ! +------------------------------------+----------+---------------+----------+ Right Doppler Measurements +----------------------------+------+------+-------------------------------+ ! Location                    ! Signal!Reflux! Reflux (msec)                  ! +----------------------------+------+------+-------------------------------+ ! Common Femoral              !Phasic!      !                               ! +----------------------------+------+------+-------------------------------+ Left Lower Extremities DVT Study Measurements Left 2D Measurements +------------------------------------+----------+---------------+----------+ ! Location                            ! Visualized! Compressibility! Thrombosis! +------------------------------------+----------+---------------+----------+ ! Common Femoral                      !Yes       ! Yes            ! None      ! +------------------------------------+----------+---------------+----------+ ! Prox Femoral                        !Yes       ! Yes            ! None      ! +------------------------------------+----------+---------------+----------+ ! Mid Femoral                         !Yes       ! Yes            ! None      ! +------------------------------------+----------+---------------+----------+ ! Dist Femoral                        !Yes       ! Yes            ! None      ! +------------------------------------+----------+---------------+----------+ ! Deep Femoral                        !Yes       ! Yes            ! None      ! +------------------------------------+----------+---------------+----------+ ! Popliteal                           !Yes       ! Yes            ! None      ! +------------------------------------+----------+---------------+----------+ ! Sapheno Femoral Junction            ! Yes       ! Yes            ! None      ! +------------------------------------+----------+---------------+----------+ ! PTV                                 ! No        !               ! ! +------------------------------------+----------+---------------+----------+ ! Peroneal                            !No        !               !          ! +------------------------------------+----------+---------------+----------+ ! Gastroc                             ! No        !               !          ! +------------------------------------+----------+---------------+----------+ ! GSV Thigh                           ! Yes       ! Yes            ! None      ! +------------------------------------+----------+---------------+----------+ ! GSV Knee                            ! Yes       ! Yes            ! None      ! +------------------------------------+----------+---------------+----------+ ! GSV Ankle                           ! Yes       ! Yes            ! None      ! +------------------------------------+----------+---------------+----------+ ! SSV                                 ! Yes       ! Yes            ! None      ! +------------------------------------+----------+---------------+----------+ Left Doppler Measurements +---------------------------+------+------+--------------------------------+ ! Location                   ! Signal!Reflux! Reflux (msec)                   ! +---------------------------+------+------+--------------------------------+ ! Common Femoral             !Phasic!      !                                ! +---------------------------+------+------+--------------------------------+ ! Prox Femoral               !Phasic!      !                                ! +---------------------------+------+------+--------------------------------+ ! Popliteal                  !Phasic!      !                                ! +---------------------------+------+------+--------------------------------+      Assessment:      1. COPD, severe ([address]    2. Perennial allergic rhinitis    3. Personal history of tobacco use    4. Bilateral lower extremity edema          Plan:      1.  Medications reviewed, continue as ordered. 2. Educated and clarified the medication use. 3. Recommend flu vaccination in the fall annually. Due now  4. Patient is up-to-date with vaccinations from pulmonary perspective. 5. Maintain an active lifestyle. 6. Patient's questions were answered to his satisfaction. 7. Pulmonary function tests were reviewed   8. Chest x-ray was reviewed  9. CT scan of the chest was reviewed. Has not completed CT imaging the last 2 years. Still remains eligible for CT lung screening until 2023. 10. We'll see the patient back in 12 months        Electronically signed by CARO Obrien CNP on 9/24/2020 at 12:14 PM  Low Dose CT (LDCT) Lung Screening criteria met   Age 50-69   Pack year smoking >30   Still smoking or less than 15 year since quit   No sign or symptoms of lung cancer   > 11 months since last LDCT     Risks and benefits of lung cancer screening with LDCT scans discussed:    Significance of positive screen - False-positive LDCT results often occur. 95% of all positive results do not lead to a diagnosis of cancer. Usually further imaging can resolve most false-positive results; however, some patients may require invasive procedures. Over diagnosis risk - 10% to 12% of screen-detected lung cancer cases are over diagnosed--that is, the cancer would not have been detected in the patient's lifetime without the screening. Need for follow up screens annually to continue lung cancer screening effectiveness     Risks associated with radiation from annual LDCT- Radiation exposure is about the same as for a mammogram, which is about 1/3 of the annual background radiation exposure from everyday life. Starting screening at age 54 is not likely to increase cancer risk from radiation exposure. Patients with comorbidities resulting in life expectancy of < 10 years, or that would preclude treatment of an abnormality identified on CT, should not be screened due to lack of benefit.     To obtain maximal

## 2020-09-24 NOTE — TELEPHONE ENCOUNTER
Date:  Future Appointments   Date Time Provider Jose Cindy   9/30/2020  9:30 AM Domingo Lopez MD TIFF VASC [facility]   10/5/2020  8:30 AM [facility] ECHO ROOM [facility] ECHO [place]   2/1/2021  2:20 PM Simeon Allen Might, APRN - CNP Tiff Prim Ca [facility]   9/23/2021 11:45 AM Polina Griffin DO TIFF PULM [facility]            Patient Active Problem List:     Gastroesophageal reflux disease     Allergic rhinitis     Controlled type 2 diabetes mellitus without complication, without long-term current use of insulin (HCC)     COPD, severe (HCC)     Dysthymia     Cerebral infarction ([facility]     Left-sided weakness

## 2020-09-24 NOTE — PATIENT INSTRUCTIONS
your mouth, nose, and eyes. What can you do to avoid spreading the virus to others? To help avoid spreading the virus to others:  · Cover your mouth with a tissue when you cough or sneeze. Then throw the tissue in the trash. · Use a disinfectant to clean things that you touch often. · Wear a cloth face cover if you have to go to public areas. · Stay home if you are sick or have been exposed to the virus. Don't go to school, work, or public areas. And don't use public transportation, ride-shares, or taxis unless you have no choice. · If you are sick:  ? Leave your home only if you need to get medical care. But call the doctor's office first so they know you're coming. And wear a face cover. ? Wear the face cover whenever you're around other people. It can help stop the spread of the virus when you cough or sneeze. ? Clean and disinfect your home every day. Use household  and disinfectant wipes or sprays. Take special care to clean things that you grab with your hands. These include doorknobs, remote controls, phones, and handles on your refrigerator and microwave. And don't forget countertops, tabletops, bathrooms, and computer keyboards. When to call for help  [id number] anytime you think you may need emergency care. For example, call if:  · You have severe trouble breathing. (You can't talk at all.)  · You have constant chest pain or pressure. · You are severely dizzy or lightheaded. · You are confused or can't think clearly. · Your face and lips have a blue color. · You pass out (lose consciousness) or are very hard to wake up. Call your doctor now if you develop symptoms such as:  · Shortness of breath. · Fever. · Cough. If you need to get care, call ahead to the doctor's office for instructions before you go. Make sure you wear a face cover to prevent exposing other people to the virus. Where can you get the latest information?   The following health organizations are tracking and studying this virus. Their websites contain the most up-to-date information. Remy Medrano also learn what to do if you think you may have been exposed to the virus. · U.S. Centers for Disease Control and Prevention (CDC): The CDC provides updated news about the disease and travel advice. The website also tells you how to prevent the spread of infection. www.cdc.gov  · World Health Organization [facility]): WHO offers information about the virus outbreaks. WHO also has travel advice. www.who.int  Current as of: May 8, 2020               Content Version: 12.5  © 2006-2020 Healthwise, [employer]. Care instructions adapted under license by [facility] ([facility]). If you have questions about a medical condition or this instruction, always ask your healthcare professional. [address] any warranty or liability for your use of this information. What is lung cancer screening? Lung cancer screening is a way in which doctors check the lungs for early signs of cancer in people who have no symptoms of lung cancer. A low-dose CT scan uses much less radiation than a normal CT scan and shows a more detailed image of the lungs than a standard X-ray. The goal of lung cancer screening is to find cancer early, before it has a chance to grow, spread, or cause problems. One large study found that smokers who were screened with low-dose CT scans were less likely to die of lung cancer than those who were screened with standard X-ray. Below is a summary of the things you need to know regarding screening for lung cancer with low-dose computed tomography (LDCT). This is a screening program that involves routine annual screening with LDCT studies of the lung. The LDCTs are done using low-dose radiation that is not thought to increase your cancer risk.   If you have other serious medical conditions (other cancers, congestive heart failure) that limit your life expectancy to less than 10 years, you should not undergo lung cancer screening with LDCT. The chance is 20%-60% that the LDCT result will show abnormalities. This would require additional testing which could include repeat imaging or even invasive procedures. Most (about 95%) of \"abnormal\" LDCT results are false in the sense that no lung cancer is ultimately found. Additionally, some (about 10%) of the cancers found would not affect your life expectancy, even if undetected and untreated. If you are still smoking, the single most important thing that you can do to reduce your risk of dying of lung cancer is to quit. For this screening to be covered by Medicare and most other insurers, strict criteria must be met. If you do not meet these criteria, but still wish to undergo LDCT testing, you will be required to sign a waiver indicating your willingness to pay for the scan.

## 2020-09-30 ENCOUNTER — OFFICE VISIT (OUTPATIENT)
Dept: VASCULAR SURGERY | Age: 60
End: 2020-09-30
Payer: MEDICAID

## 2020-09-30 ENCOUNTER — HOSPITAL ENCOUNTER (OUTPATIENT)
Age: 60
Discharge: HOME | End: 2020-09-30
Payer: MEDICAID

## 2020-09-30 VITALS
RESPIRATION RATE: 18 BRPM | DIASTOLIC BLOOD PRESSURE: 75 MMHG | TEMPERATURE: 97 F | BODY MASS INDEX: 29.16 KG/M2 | SYSTOLIC BLOOD PRESSURE: 126 MMHG | HEIGHT: 65 IN | HEART RATE: 91 BPM | WEIGHT: 175 LBS

## 2020-09-30 LAB
ALBUMIN SERPL-MCNC: 4.8 G/DL (ref 3.5–5.2)
ALBUMIN/GLOBULIN RATIO: 1.5 (ref 1–2.5)
ALP BLD-CCNC: 93 U/L (ref 40–129)
ALT SERPL-CCNC: 28 U/L (ref 5–41)
AST SERPL-CCNC: 28 U/L
BILIRUB SERPL-MCNC: 0.27 MG/DL (ref 0.3–1.2)
BILIRUBIN DIRECT: <0.08 MG/DL
BILIRUBIN, INDIRECT: ABNORMAL MG/DL (ref 0–1)
GLOBULIN: ABNORMAL G/DL (ref 1.5–3.8)
TOTAL PROTEIN: 7.9 G/DL (ref 6.4–8.3)

## 2020-09-30 PROCEDURE — 36415 COLL VENOUS BLD VENIPUNCTURE: CPT

## 2020-09-30 PROCEDURE — 99214 OFFICE O/P EST MOD 30 MIN: CPT

## 2020-09-30 PROCEDURE — 99204 OFFICE O/P NEW MOD 45 MIN: CPT | Performed by: INTERNAL MEDICINE

## 2020-09-30 PROCEDURE — 80076 HEPATIC FUNCTION PANEL: CPT

## 2020-09-30 NOTE — PROGRESS NOTES
Olga CagleHeladio was seen on 9/30/2020 for   Chief Complaint   Patient presents with    New Patient     edema in feet and legs   much better than last week   .                             REVIEW OF SYSTEMS    Constitutional Weight: absent, A, Fatigue: absent Fever: absent   HEENT Ears: normal,Visual disturbance: absent Sore throat: absent,    Respiratory Shortness of breath: present, Cough: absent;, Snoring: absent   Cardivascular Chest pain: absent,  Leg pain: present,Leg swelling:present, Non-healing wound:absent    GI Diarrhea: absent, Abdominal Pain: absent    Urinary frequency: absent, Urinary incontinence: absent   Musculoskeletal Neck pain: absent, Back pain: absent, Restless Leg:absent     Dermatological Hair loss: absent, Skin changes: absent   Neurological  Sudden Loss of Vision in one eye:absent, Slurred Speech:absent, Weakness on one side of body: absent,Headache: absent   Psychiatric Anxiety: absent, Depression: present   Hematologic Abnormal bleeding: absent,

## 2020-09-30 NOTE — PROGRESS NOTES
Sheeba Alex. was seen on 2020 for   Chief Complaint   Patient presents with    New Patient     edema in feet and legs   much better than last week   . 20 1st [facility] visit. Referred by Sheyla Adkins. Leg swelling. Leg swelling. Chronic left leg swelling, but last cpl weeks got worse. Lasix and elevation have helped. Echo is scheduled. LE duplex 9/15/20 venous duplex showed no clot. Swelling noted. 9 yrs ago had stroke right brain. Left hemiparesis. Also had aneurysm and craniotomy. Wheel chair. Can't walk. DM. COPD. Elevated lft's but not aware of liver dz. . bnp 72. Cr 0.83. Used to have recliner, now sits straight up. Prior left leg surgery.     Social History     Socioeconomic History    Marital status:      Spouse name: Not on file    Number of children: Not on file    Years of education: Not on file    Highest education level: Not on file   Occupational History    Not on file   Social Needs    Financial resource strain: Not on file    Food insecurity     Worry: Not on file     Inability: Not on file    Transportation needs     Medical: Not on file     Non-medical: Not on file   Tobacco Use    Smoking status: Former Smoker     Packs/day: 1.50     Years: 30.00     Pack years: 45.00     Types: Cigarettes     Last attempt to quit: 2008     Years since quittin.7    Smokeless tobacco: Never Used   Substance and Sexual Activity    Alcohol use: No    Drug use: No    Sexual activity: Not on file   Lifestyle    Physical activity     Days per week: Not on file     Minutes per session: Not on file    Stress: Not on file   Relationships    Social connections     Talks on phone: Not on file     Gets together: Not on file     Attends Taoism service: Not on file     Active member of club or organization: Not on file     Attends meetings of clubs or organizations: Not on file     Relationship status: Not on file    Intimate partner violence     Fear of current or ex TABLET BY MOUTH EVERY DAY 90 tablet 3    simvastatin (ZOCOR) 20 MG tablet TAKE 1 TABLET BY MOUTH EVERY DAY AT NIGHT 90 tablet 3    blood glucose test strips (TRUE METRIX BLOOD GLUCOSE TEST) strip TEST BLOOD SUGAR ONCE DAILY AND AS NEEDED, DX: E11.9 DIABETES TYPE 2 100 strip 3    Blood Pressure KIT 1 each by Does not apply route daily 1 kit 0    levETIRAcetam (KEPPRA) 500 MG tablet TAKE 1/2 TABLET BY MOUTH TWICE DAILY 90 tablet 3    metFORMIN (GLUCOPHAGE) 1000 MG tablet TAKE 1 TABLET BY MOUTH TWICE DAILY 180 tablet 3    baclofen (LIORESAL) 20 MG tablet TAKE 1/2 A TABLET BY MOUTH TWICE DAILY 90 tablet 3    Misc. Devices (ADJUST BATH/SHOWER SEAT/BACK) MISC 1 each by Does not apply route daily 1 each 0    Misc. Devices (COMMODE BEDSIDE) MISC 1 each by Does not apply route daily 1 each 0    Multiple Vitamins-Minerals (CVS SPECTRAVITE ADVANCED) TABS TAKE 1 TABLET BY MOUTH EVERY DAY 90 tablet 3    Misc. Devices (NOVA CUSHION GEL SEAT PAD) MISC 1 each by Does not apply route daily 1 each 0    blood glucose monitor kit and supplies 1 kit by Other route daily Test 1 times a day & as needed for symptoms of irregular blood glucose. 1 kit 0    senna (SENOKOT) 8.6 MG TABS tablet TAKE 1 TABLET BY MOUTH TWICE DAILY 180 tablet 3    CVS ASPIRIN CHILD 81 MG chewable tablet TAKE 1 TABLET BY MOUTH DAILY 90 tablet 0    lisinopril (PRINIVIL;ZESTRIL) 5 MG tablet Take 5 mg by mouth daily      Lift Chair MISC by Does not apply route 1 each 0    diclofenac (VOLTAREN) 75 MG EC tablet Take 75 mg by mouth daily       No current facility-administered medications for this visit. Physical findings:  General- no acute distress, oriented.  Hard to speak  HEENT - no xanthelasma, external ears normal. Right craniotomy with depression of skull  Neck- Supple, no thyromegaly  Carotids - no carotid bruits  Lungs - bibasilar rales  CV- Regular rate and rythym, no murmurs rubs or gallops  Abdomen - Non tender, non distended, no bruits  Skin- warm, dry, no skin breakdown or gangrene. Ankle erythema  Extremities - 1+ right, 2+ left edema    Pulses Right - Radial 2+  Popliteal: absent  Posterior tibial:    absent  Dorsalis pedis:  absent     Pulses Left -Radial 2+  Popliteal: absent  Posterior tibial:    absent  Dorsalis pedis:  absent        Assessment:  Encounter Diagnosis   Name Primary?  Localized swelling of both lower legs Yes   etiology is unclear  Echo is pending, although he doesn't appear to be in right heart failure  No dvt  Much of this may be due to dependent leg position and no ambulation      Plan of care:  Compression jobst 20-30  Await echo  Continue lasix  Liver battery  Follow up and evaluate.    RTC 1 month    Electronically signed by John Meza MD on 9/30/20 at 10:00 AM EDT

## 2020-09-30 NOTE — PATIENT INSTRUCTIONS
mouth, nose, and eyes. What can you do to avoid spreading the virus to others? To help avoid spreading the virus to others:  · Cover your mouth with a tissue when you cough or sneeze. Then throw the tissue in the trash. · Use a disinfectant to clean things that you touch often. · Wear a cloth face cover if you have to go to public areas. · Stay home if you are sick or have been exposed to the virus. Don't go to school, work, or public areas. And don't use public transportation, ride-shares, or taxis unless you have no choice. · If you are sick:  ? Leave your home only if you need to get medical care. But call the doctor's office first so they know you're coming. And wear a face cover. ? Wear the face cover whenever you're around other people. It can help stop the spread of the virus when you cough or sneeze. ? Clean and disinfect your home every day. Use household  and disinfectant wipes or sprays. Take special care to clean things that you grab with your hands. These include doorknobs, remote controls, phones, and handles on your refrigerator and microwave. And don't forget countertops, tabletops, bathrooms, and computer keyboards. When to call for help  [id number] anytime you think you may need emergency care. For example, call if:  · You have severe trouble breathing. (You can't talk at all.)  · You have constant chest pain or pressure. · You are severely dizzy or lightheaded. · You are confused or can't think clearly. · Your face and lips have a blue color. · You pass out (lose consciousness) or are very hard to wake up. Call your doctor now if you develop symptoms such as:  · Shortness of breath. · Fever. · Cough. If you need to get care, call ahead to the doctor's office for instructions before you go. Make sure you wear a face cover to prevent exposing other people to the virus. Where can you get the latest information?   The following health organizations are tracking and studying this

## 2020-10-05 ENCOUNTER — HOSPITAL ENCOUNTER (OUTPATIENT)
Dept: NON INVASIVE DIAGNOSTICS | Age: 60
Discharge: HOME | End: 2020-10-05
Payer: MEDICAID

## 2020-10-05 LAB
LV EF: 55 %
LVEF MODALITY: NORMAL

## 2020-10-05 PROCEDURE — 93306 TTE W/DOPPLER COMPLETE: CPT

## 2020-10-06 ENCOUNTER — TELEPHONE (OUTPATIENT)
Dept: PRIMARY CARE CLINIC | Age: 60
End: 2020-10-06

## 2020-10-06 NOTE — TELEPHONE ENCOUNTER
----- Message from [address] [facility], CARO - CNP sent at 10/5/2020  5:45 PM EDT -----  Stable no changes, continue follow-up with vascular.

## 2020-10-06 NOTE — TELEPHONE ENCOUNTER
Attempted to call patient and message left with Xenia regarding echo results were stable no changes, continue follow-up with vascular. Xenia verbalized understanding.

## 2020-10-14 RX ORDER — BACLOFEN 10 MG/1
10 TABLET ORAL 2 TIMES DAILY
Qty: 180 TABLET | Refills: 1 | Status: SHIPPED | OUTPATIENT
Start: 2020-10-14 | End: 2021-10-27 | Stop reason: SDUPTHER

## 2020-10-14 NOTE — TELEPHONE ENCOUNTER
Refill request of Baclofen    Health Maintenance   Topic Date Due    Low dose CT lung screening  08/07/2018    Flu vaccine (1) 09/01/2020    Colon Cancer Screen FIT/FOBT  10/18/2020    DTaP/Tdap/Td vaccine (1 - Tdap) 10/17/2020 (Originally 9/6/1980)    Hepatitis C screen  10/17/2020 (Originally 1960)    HIV screen  10/17/2020 (Originally 9/6/1976)    Diabetic retinal exam  07/29/2021 (Originally 7/25/2020)    Hepatitis B vaccine (1 of 3 - Risk 3-dose series) 07/29/2021 (Originally 9/6/1980)    Shingles Vaccine (1 of 2) 07/29/2021 (Originally 9/6/2011)    A1C test (Diabetic or Prediabetic)  07/28/2021    Lipid screen  07/28/2021    Diabetic foot exam  07/29/2021    Diabetic microalbuminuria test  07/29/2021    Potassium monitoring  09/22/2021    Creatinine monitoring  09/22/2021    Pneumococcal 0-64 years Vaccine  Completed    Hepatitis A vaccine  Aged Out    Hib vaccine  Aged Out    Meningococcal (ACWY) vaccine  Aged Out             (applicable per patient's age: Cancer Screenings, Depression Screening, Fall Risk Screening, Immunizations)    Hemoglobin A1C (%)   Date Value   07/28/2020 5.8   08/28/2019 5.5   03/14/2019 6.1 (H)     Microalb/Crt.  Ratio (mcg/mg creat)   Date Value   07/29/2020 CANNOT BE CALCULATED     LDL Cholesterol (mg/dL)   Date Value   07/28/2020 85     AST (U/L)   Date Value   09/30/2020 28     ALT (U/L)   Date Value   09/30/2020 28     BUN (mg/dL)   Date Value   09/22/2020 10      (goal A1C is < 7)   (goal LDL is <100) need 30-50% reduction from baseline     BP Readings from Last 3 Encounters:   09/30/20 126/75   09/24/20 111/68   09/15/20 (!) 116/54    (goal /80)      All Future Testing planned in [facility]:  Lab Frequency Next Occurrence   CT Lung Screen (Annual) Once 86/25/9120   Basic Metabolic Panel Once 22/17/9819   Urinalysis Once 01/25/2021   Hemoglobin A1C Once 01/25/2021   CT Lung Screen (Annual) Once 09/24/2020       Next Visit Date:  Future Appointments Date Time Provider Jose White   10/28/2020 12:00 PM Maikol Pepe MD [place] [facility]   2/1/2021  2:20 PM CARO Amezcua - CNP [place] [facility]   9/23/2021 11:45 AM Iggy Mcmahon DO [place] PUL[facility]            Patient Active Problem List:     Gastroesophageal reflux disease     Allergic rhinitis     Controlled type 2 diabetes mellitus without complication, without long-term current use of insulin ([facility])     COPD, severe ([address]     Dysthymia     Cerebral infarction ([address]     Left-sided weakness

## 2020-10-28 ENCOUNTER — OFFICE VISIT (OUTPATIENT)
Dept: VASCULAR SURGERY | Age: 60
End: 2020-10-28
Payer: MEDICAID

## 2020-10-28 VITALS
DIASTOLIC BLOOD PRESSURE: 78 MMHG | RESPIRATION RATE: 18 BRPM | SYSTOLIC BLOOD PRESSURE: 124 MMHG | BODY MASS INDEX: 28.61 KG/M2 | HEIGHT: 66 IN | HEART RATE: 97 BPM | WEIGHT: 178 LBS | TEMPERATURE: 97.7 F

## 2020-10-28 PROCEDURE — 99214 OFFICE O/P EST MOD 30 MIN: CPT

## 2020-10-28 PROCEDURE — 99213 OFFICE O/P EST LOW 20 MIN: CPT | Performed by: INTERNAL MEDICINE

## 2020-10-28 NOTE — PATIENT INSTRUCTIONS
testing, especially if you have a weakened immune system. When to call for help  Call 911 anytime you think you may need emergency care. For example, call if:  · You have severe trouble breathing. (You can't talk at all.)  · You have constant chest pain or pressure. · You are severely dizzy or lightheaded. · You are confused or can't think clearly. · Your face and lips have a blue color. · You passed out (lost consciousness) or are very hard to wake up. Call your doctor now if you develop symptoms such as:  · Shortness of breath. · Fever. · Cough. If you need to get care, call ahead to the doctor's office for instructions before you go. Make sure you wear a face cover to prevent exposing other people to the virus. Where can you get the latest information? The following health organizations are tracking and studying this virus. Their websites contain the most up-to-date information. Gerri Mujica also learn what to do if you think you may have been exposed to the virus. · U.S. Centers for Disease Control and Prevention (CDC): The CDC provides updated news about the disease and travel advice. The website also tells you how to prevent the spread of infection. www.cdc.gov  · World Health Organization [facility]): WHO offers information about the virus outbreaks. WHO also has travel advice. www.who.int  Current as of: July 10, 2020               Content Version: 12.6  © 4558-7576 [employer]. Care instructions adapted under license by [facility] [place] ([facility]). If you have questions about a medical condition or this instruction, always ask your healthcare professional. [address] any warranty or liability for your use of this information.

## 2020-10-28 NOTE — PROGRESS NOTES
Bonnie Rainey. was seen on 10/28/2020 for   Chief Complaint   Patient presents with    Follow-up     swelling went down  Stockings picked up today   . 20 1st [facility] visit. Referred by Paul Adkins. Leg swelling. Leg swelling. Chronic left leg swelling, but last cpl weeks got worse. Lasix and elevation have helped. Echo is scheduled. LE duplex 9/15/20 venous duplex showed no clot. Swelling noted. 9 yrs ago had stroke right brain. Left hemiparesis. Also had aneurysm and craniotomy. Wheel chair. Can't walk. DM. COPD. Elevated lft's but not aware of liver dz. . bnp 72. Cr 0.83. Used to have recliner, now sits straight up. Prior left leg surgery. UPDATE 10/28/20 Legs are much better. Elevating them a lot more. Still taking lasix. Echo showed nl ef and nl valves. Liver fn nl Creat nl.  BNP 71. Just got compression stockings     Social History     Socioeconomic History    Marital status:      Spouse name: Not on file    Number of children: Not on file    Years of education: Not on file    Highest education level: Not on file   Occupational History    Not on file   Social Needs    Financial resource strain: Not on file    Food insecurity     Worry: Not on file     Inability: Not on file    Transportation needs     Medical: Not on file     Non-medical: Not on file   Tobacco Use    Smoking status: Former Smoker     Packs/day: 1.50     Years: 30.00     Pack years: 45.00     Types: Cigarettes     Last attempt to quit: 2008     Years since quittin.8    Smokeless tobacco: Never Used   Substance and Sexual Activity    Alcohol use: No    Drug use: No    Sexual activity: Not on file   Lifestyle    Physical activity     Days per week: Not on file     Minutes per session: Not on file    Stress: Not on file   Relationships    Social connections     Talks on phone: Not on file     Gets together: Not on file     Attends Cheondoism service: Not on file     Active member of club or organization: Not on file     Attends meetings of clubs or organizations: Not on file     Relationship status: Not on file    Intimate partner violence     Fear of current or ex partner: Not on file     Emotionally abused: Not on file     Physically abused: Not on file     Forced sexual activity: Not on file   Other Topics Concern    Not on file   Social History Narrative    Not on file     Family History   Problem Relation Age of Onset    Cancer Mother     Diabetes Mother     Heart Failure Father     Diabetes Father     Diabetes Sister      Past Medical History:   Diagnosis Date    Acid reflux     Asthma     Cerebrovascular disease     Chronic cough     Controlled type 2 diabetes mellitus without complication, without long-term current use of insulin ([facility] 2/9/2018    COPD (chronic obstructive pulmonary disease) ([facility]     Dysphagia     Dyspnea     Type II or unspecified type diabetes mellitus without mention of complication, not stated as uncontrolled     Unspecified cerebral artery occlusion with cerebral infarction 11/05/2008     Current Outpatient Medications   Medication Sig Dispense Refill    baclofen (LIORESAL) 10 MG tablet Take 1 tablet by mouth 2 times daily 180 tablet 1    budesonide (PULMICORT) 0.5 MG/2ML nebulizer suspension Take 2 mLs by nebulization 2 times daily 60 ampule 11    ipratropium-albuterol (DUONEB) 0.5-2.5 (3) MG/3ML SOLN nebulizer solution INHALE 3 MLS PER NEBULIZER INTO THE LUNGS EVERY 6 HOURS 1080 mL 3    omeprazole (PRILOSEC) 40 MG delayed release capsule TAKE 1 CAPSULE BY MOUTH EVERY DAY 90 capsule 3    Lancets MISC 1 each by Does not apply route daily DISPENSE BRAND COMPATIBLE WITH METER AND STRIPS TRUE METRIX 100 each 3    furosemide (LASIX) 20 MG tablet Take 1 tablet by mouth 2 times daily 180 tablet 3    fluticasone (FLONASE) 50 MCG/ACT nasal spray SPRAY 2 SPRAYS INTO EACH NOSTRIL EVERY DAY 1 Bottle 3    gabapentin (NEURONTIN) 300 MG capsule Take 1 capsule by mouth 2 times daily for 90 days. 180 capsule 0    sertraline (ZOLOFT) 100 MG tablet TAKE 2 TABLETS BY MOUTH EVERY  tablet 3    cetirizine (ZYRTEC) 10 MG tablet TAKE 1 TABLET BY MOUTH EVERY DAY 90 tablet 3    folic acid (FOLVITE) 1 MG tablet TAKE 1 TABLET BY MOUTH EVERY DAY 90 tablet 3    simvastatin (ZOCOR) 20 MG tablet TAKE 1 TABLET BY MOUTH EVERY DAY AT NIGHT 90 tablet 3    blood glucose test strips (TRUE METRIX BLOOD GLUCOSE TEST) strip TEST BLOOD SUGAR ONCE DAILY AND AS NEEDED, DX: E11.9 DIABETES TYPE 2 100 strip 3    Blood Pressure KIT 1 each by Does not apply route daily 1 kit 0    levETIRAcetam (KEPPRA) 500 MG tablet TAKE 1/2 TABLET BY MOUTH TWICE DAILY 90 tablet 3    metFORMIN (GLUCOPHAGE) 1000 MG tablet TAKE 1 TABLET BY MOUTH TWICE DAILY 180 tablet 3    Misc. Devices (ADJUST BATH/SHOWER SEAT/BACK) MISC 1 each by Does not apply route daily 1 each 0    Misc. Devices (COMMODE BEDSIDE) MISC 1 each by Does not apply route daily 1 each 0    Multiple Vitamins-Minerals (CVS SPECTRAVITE ADVANCED) TABS TAKE 1 TABLET BY MOUTH EVERY DAY 90 tablet 3    Misc. Devices (NOVA CUSHION GEL SEAT PAD) MISC 1 each by Does not apply route daily 1 each 0    blood glucose monitor kit and supplies 1 kit by Other route daily Test 1 times a day & as needed for symptoms of irregular blood glucose. 1 kit 0    senna (SENOKOT) 8.6 MG TABS tablet TAKE 1 TABLET BY MOUTH TWICE DAILY 180 tablet 3    CVS ASPIRIN CHILD 81 MG chewable tablet TAKE 1 TABLET BY MOUTH DAILY 90 tablet 0    lisinopril (PRINIVIL;ZESTRIL) 5 MG tablet Take 5 mg by mouth daily      Lift Chair MISC by Does not apply route 1 each 0    diclofenac (VOLTAREN) 75 MG EC tablet Take 75 mg by mouth daily       No current facility-administered medications for this visit. Physical findings:  General- no acute distress, oriented.  In wheelchair  HEENT - no xanthelasma, external ears normal  Neck- Supple, no thyromegaly  Skin- warm, dry, no skin

## 2020-11-12 RX ORDER — FLUTICASONE PROPIONATE 50 MCG
SPRAY, SUSPENSION (ML) NASAL
Qty: 1 BOTTLE | Refills: 1 | Status: SHIPPED | OUTPATIENT
Start: 2020-11-12 | End: 2020-12-10

## 2020-11-19 ENCOUNTER — TELEPHONE (OUTPATIENT)
Dept: PRIMARY CARE CLINIC | Age: 60
End: 2020-11-19

## 2020-11-19 ENCOUNTER — HOSPITAL ENCOUNTER (OUTPATIENT)
Dept: LAB | Age: 60
Setting detail: SPECIMEN
Discharge: HOME | End: 2020-11-19
Payer: MEDICAID

## 2020-11-19 PROCEDURE — C9803 HOPD COVID-19 SPEC COLLECT: HCPCS

## 2020-11-19 PROCEDURE — U0003 INFECTIOUS AGENT DETECTION BY NUCLEIC ACID (DNA OR RNA); SEVERE ACUTE RESPIRATORY SYNDROME CORONAVIRUS 2 (SARS-COV-2) (CORONAVIRUS DISEASE [COVID-19]), AMPLIFIED PROBE TECHNIQUE, MAKING USE OF HIGH THROUGHPUT TECHNOLOGIES AS DESCRIBED BY CMS-2020-01-R: HCPCS

## 2020-11-19 NOTE — TELEPHONE ENCOUNTER
[facility]. She reports HH is suggesting pt. Get tested. Wife denies pt.  Having any s/s

## 2020-11-22 LAB — SARS-COV-2, NAA: NOT DETECTED

## 2020-11-23 ENCOUNTER — TELEPHONE (OUTPATIENT)
Dept: PRIMARY CARE CLINIC | Age: 60
End: 2020-11-23

## 2020-11-23 NOTE — TELEPHONE ENCOUNTER
----- Message from [address] [place] [address], CARO - CNP sent at 11/22/2020  8:35 PM EST -----  Results are normal, please call patient and make them aware.

## 2020-12-09 RX ORDER — GABAPENTIN 300 MG/1
CAPSULE ORAL
Qty: 180 CAPSULE | Refills: 0 | Status: SHIPPED | OUTPATIENT
Start: 2020-12-09 | End: 2021-02-17

## 2020-12-09 NOTE — TELEPHONE ENCOUNTER
Health Maintenance   Topic Date Due    Hepatitis C screen  1960    HIV screen  09/06/1976    DTaP/Tdap/Td vaccine (1 - Tdap) 09/06/1980    Low dose CT lung screening  08/07/2018    Flu vaccine (1) 09/01/2020    Colon Cancer Screen FIT/FOBT  10/18/2020    Diabetic retinal exam  07/29/2021 (Originally 7/25/2020)    Hepatitis B vaccine (1 of 3 - Risk 3-dose series) 07/29/2021 (Originally 9/6/1980)    Shingles Vaccine (1 of 2) 07/29/2021 (Originally 9/6/2011)    A1C test (Diabetic or Prediabetic)  07/28/2021    Lipid screen  07/28/2021    Diabetic foot exam  07/29/2021    Diabetic microalbuminuria test  07/29/2021    Potassium monitoring  09/22/2021    Creatinine monitoring  09/22/2021    Hepatitis A vaccine  Aged Out    Hib vaccine  Aged Out    Meningococcal (ACWY) vaccine  Aged Out    Pneumococcal 0-64 years Vaccine  Aged Out             (applicable per patient's age: Cancer Screenings, Depression Screening, Fall Risk Screening, Immunizations)    Hemoglobin A1C (%)   Date Value   07/28/2020 5.8   08/28/2019 5.5   03/14/2019 6.1 (H)     Microalb/Crt.  Ratio (mcg/mg creat)   Date Value   07/29/2020 CANNOT BE CALCULATED     LDL Cholesterol (mg/dL)   Date Value   07/28/2020 85     AST (U/L)   Date Value   09/30/2020 28     ALT (U/L)   Date Value   09/30/2020 28     BUN (mg/dL)   Date Value   09/22/2020 10      (goal A1C is < 7)   (goal LDL is <100) need 30-50% reduction from baseline     BP Readings from Last 3 Encounters:   10/28/20 124/78   09/30/20 126/75   09/24/20 111/68    (goal /80)      All Future Testing planned in [facility]:  Lab Frequency Next Occurrence   CT Lung Screen (Annual) Once 21/10/5336   Basic Metabolic Panel Once 31/91/0998   Urinalysis Once 01/25/2021   Hemoglobin A1C Once 01/25/2021   CT Lung Screen (Annual) Once 09/24/2020       Next Visit Date:  Future Appointments   Date Time Provider Jose White   2/1/2021  2:20 PM [place] Mal[place] Adkins, APRN - CNP [facility] 9/23/2021 11:45 AM [facility]   11/3/2021 10:00 AM Sarahi Parker MD [facility]            Patient Active Problem List:     Gastroesophageal reflux disease     Allergic rhinitis     Controlled type 2 diabetes mellitus without complication, without long-term current use of insulin (HCC)     COPD, severe (HCC)     Dysthymia     Cerebral infarction (HCC)     Left-sided weakness

## 2020-12-10 RX ORDER — FLUTICASONE PROPIONATE 50 MCG
SPRAY, SUSPENSION (ML) NASAL
Qty: 1 BOTTLE | Refills: 1 | Status: SHIPPED | OUTPATIENT
Start: 2020-12-10 | End: 2021-01-08

## 2020-12-16 RX ORDER — LEVETIRACETAM 500 MG/1
TABLET ORAL
Qty: 90 TABLET | Refills: 3 | Status: SHIPPED | OUTPATIENT
Start: 2020-12-16 | End: 2021-10-27 | Stop reason: SDUPTHER

## 2020-12-16 NOTE — TELEPHONE ENCOUNTER
Health Maintenance   Topic Date Due    Hepatitis C screen  1960    HIV screen  09/06/1976    DTaP/Tdap/Td vaccine (1 - Tdap) 09/06/1980    Low dose CT lung screening  08/07/2018    Flu vaccine (1) 09/01/2020    Colon Cancer Screen FIT/FOBT  10/18/2020    Diabetic retinal exam  07/29/2021 (Originally 7/25/2020)    Hepatitis B vaccine (1 of 3 - Risk 3-dose series) 07/29/2021 (Originally 9/6/1980)    Shingles Vaccine (1 of 2) 07/29/2021 (Originally 9/6/2011)    A1C test (Diabetic or Prediabetic)  07/28/2021    Lipid screen  07/28/2021    Diabetic foot exam  07/29/2021    Diabetic microalbuminuria test  07/29/2021    Potassium monitoring  09/22/2021    Creatinine monitoring  09/22/2021    Pneumococcal 0-64 years Vaccine  Completed    Hepatitis A vaccine  Aged Out    Hib vaccine  Aged Out    Meningococcal (ACWY) vaccine  Aged Out             (applicable per patient's age: Cancer Screenings, Depression Screening, Fall Risk Screening, Immunizations)    Hemoglobin A1C (%)   Date Value   07/28/2020 5.8   08/28/2019 5.5   03/14/2019 6.1 (H)     Microalb/Crt.  Ratio (mcg/mg creat)   Date Value   07/29/2020 CANNOT BE CALCULATED     LDL Cholesterol (mg/dL)   Date Value   07/28/2020 85     AST (U/L)   Date Value   09/30/2020 28     ALT (U/L)   Date Value   09/30/2020 28     BUN (mg/dL)   Date Value   09/22/2020 10      (goal A1C is < 7)   (goal LDL is <100) need 30-50% reduction from baseline     BP Readings from Last 3 Encounters:   10/28/20 124/78   09/30/20 126/75   09/24/20 111/68    (goal /80)      All Future Testing planned in [facility]:  Lab Frequency Next Occurrence   CT Lung Screen (Annual) Once 50/51/7091   Basic Metabolic Panel Once 94/32/1185   Urinalysis Once 01/25/2021   Hemoglobin A1C Once 01/25/2021   CT Lung Screen (Annual) Once 09/24/2020       Next Visit Date:  Future Appointments   Date Time Provider Jose White   2/1/2021  2:20 PM Cheryle Huh Might, APRN - CNP [facility] 9/23/2021 11:45 AM [facility]   11/3/2021 10:00 AM Kimberly Oviedo MD [facility]            Patient Active Problem List:     Gastroesophageal reflux disease     Allergic rhinitis     Controlled type 2 diabetes mellitus without complication, without long-term current use of insulin (HCC)     COPD, severe (HCC)     Dysthymia     Cerebral infarction (HCC)     Left-sided weakness

## 2021-01-08 DIAGNOSIS — J30.89 PERENNIAL ALLERGIC RHINITIS: ICD-10-CM

## 2021-01-08 RX ORDER — FLUTICASONE PROPIONATE 50 MCG
SPRAY, SUSPENSION (ML) NASAL
Qty: 1 BOTTLE | Refills: 1 | Status: SHIPPED | OUTPATIENT
Start: 2021-01-08 | End: 2021-02-08

## 2021-01-08 RX ORDER — MULTIVITAMIN/IRON/FOLIC ACID 18MG-0.4MG
TABLET ORAL
Qty: 90 TABLET | Refills: 3 | Status: SHIPPED | OUTPATIENT
Start: 2021-01-08 | End: 2021-10-27 | Stop reason: SDUPTHER

## 2021-01-08 NOTE — TELEPHONE ENCOUNTER
Health Maintenance   Topic Date Due    Hepatitis C screen  1960    HIV screen  09/06/1976    DTaP/Tdap/Td vaccine (1 - Tdap) 09/06/1980    Low dose CT lung screening  08/07/2018    Flu vaccine (1) 09/01/2020    Colon Cancer Screen FIT/FOBT  10/18/2020    Diabetic retinal exam  07/29/2021 (Originally 7/25/2020)    Hepatitis B vaccine (1 of 3 - Risk 3-dose series) 07/29/2021 (Originally 9/6/1980)    Shingles Vaccine (1 of 2) 07/29/2021 (Originally 9/6/2011)    A1C test (Diabetic or Prediabetic)  07/28/2021    Lipid screen  07/28/2021    Diabetic foot exam  07/29/2021    Diabetic microalbuminuria test  07/29/2021    Potassium monitoring  09/22/2021    Creatinine monitoring  09/22/2021    Pneumococcal 0-64 years Vaccine  Completed    Hepatitis A vaccine  Aged Out    Hib vaccine  Aged Out    Meningococcal (ACWY) vaccine  Aged Out             (applicable per patient's age: Cancer Screenings, Depression Screening, Fall Risk Screening, Immunizations)    Hemoglobin A1C (%)   Date Value   07/28/2020 5.8   08/28/2019 5.5   03/14/2019 6.1 (H)     Microalb/Crt.  Ratio (mcg/mg creat)   Date Value   07/29/2020 CANNOT BE CALCULATED     LDL Cholesterol (mg/dL)   Date Value   07/28/2020 85     AST (U/L)   Date Value   09/30/2020 28     ALT (U/L)   Date Value   09/30/2020 28     BUN (mg/dL)   Date Value   09/22/2020 10      (goal A1C is < 7)   (goal LDL is <100) need 30-50% reduction from baseline     BP Readings from Last 3 Encounters:   10/28/20 124/78   09/30/20 126/75   09/24/20 111/68    (goal /80)      All Future Testing planned in [facility]:  Lab Frequency Next Occurrence   CT Lung Screen (Annual) Once 33/83/9474   Basic Metabolic Panel Once 09/02/6882   Urinalysis Once 01/25/2021   Hemoglobin A1C Once 01/25/2021   CT Lung Screen (Annual) Once 09/24/2020       Next Visit Date:  Future Appointments   Date Time Provider Jose White   2/1/2021  2:20 PM Levell CARO [place] - CNP [facility] 9/23/2021 11:45 [facility]   11/3/2021 10:00 AM Abhijit Hayden MD [facility]            Patient Active Problem List:     Gastroesophageal reflux disease     Allergic rhinitis     Controlled type 2 diabetes mellitus without complication, without long-term current use of insulin (HCC)     COPD, severe (HCC)     Dysthymia     Cerebral infarction (HCC)     Left-sided weakness

## 2021-01-29 ENCOUNTER — HOSPITAL ENCOUNTER (OUTPATIENT)
Age: 61
Discharge: HOME | End: 2021-01-29
Payer: MEDICAID

## 2021-01-29 DIAGNOSIS — E11.9 CONTROLLED TYPE 2 DIABETES MELLITUS WITHOUT COMPLICATION, WITHOUT LONG-TERM CURRENT USE OF INSULIN (HCC): ICD-10-CM

## 2021-01-29 LAB
-: ABNORMAL
AMORPHOUS: ABNORMAL
ANION GAP SERPL CALCULATED.3IONS-SCNC: 9 MMOL/L (ref 9–17)
BACTERIA: ABNORMAL
BILIRUBIN URINE: NEGATIVE
BUN BLDV-MCNC: 10 MG/DL (ref 6–20)
BUN/CREAT BLD: 13 (ref 9–20)
CALCIUM SERPL-MCNC: 9.4 MG/DL (ref 8.6–10.4)
CASTS UA: ABNORMAL /LPF
CHLORIDE BLD-SCNC: 96 MMOL/L (ref 98–107)
CO2: 30 MMOL/L (ref 20–31)
COLOR: YELLOW
COMMENT UA: ABNORMAL
CREAT SERPL-MCNC: 0.8 MG/DL (ref 0.7–1.2)
CRYSTALS, UA: ABNORMAL /HPF
EPITHELIAL CELLS UA: ABNORMAL /HPF (ref 0–5)
ESTIMATED AVERAGE GLUCOSE: 151 MG/DL
GFR AFRICAN AMERICAN: >60 ML/MIN
GFR NON-AFRICAN AMERICAN: >60 ML/MIN
GFR SERPL CREATININE-BSD FRML MDRD: ABNORMAL ML/MIN/{1.73_M2}
GFR SERPL CREATININE-BSD FRML MDRD: ABNORMAL ML/MIN/{1.73_M2}
GLUCOSE BLD-MCNC: 140 MG/DL (ref 70–99)
GLUCOSE URINE: NEGATIVE
HBA1C MFR BLD: 6.9 % (ref 4–6)
KETONES, URINE: NEGATIVE
LEUKOCYTE ESTERASE, URINE: ABNORMAL
MUCUS: ABNORMAL
NITRITE, URINE: NEGATIVE
OTHER OBSERVATIONS UA: ABNORMAL
PH UA: 6.5 (ref 5–9)
POTASSIUM SERPL-SCNC: 4 MMOL/L (ref 3.7–5.3)
PROTEIN UA: NEGATIVE
RBC UA: ABNORMAL /HPF (ref 0–2)
RENAL EPITHELIAL, UA: ABNORMAL /HPF
SODIUM BLD-SCNC: 135 MMOL/L (ref 135–144)
SPECIFIC GRAVITY UA: 1.01 (ref 1.01–1.02)
TRICHOMONAS: ABNORMAL
TURBIDITY: CLEAR
URINE HGB: NEGATIVE
UROBILINOGEN, URINE: NORMAL
WBC UA: ABNORMAL /HPF (ref 0–5)
YEAST: ABNORMAL

## 2021-01-29 PROCEDURE — 36415 COLL VENOUS BLD VENIPUNCTURE: CPT

## 2021-01-29 PROCEDURE — 80048 BASIC METABOLIC PNL TOTAL CA: CPT

## 2021-01-29 PROCEDURE — 81001 URINALYSIS AUTO W/SCOPE: CPT

## 2021-01-29 PROCEDURE — 83036 HEMOGLOBIN GLYCOSYLATED A1C: CPT

## 2021-02-04 DIAGNOSIS — J30.89 PERENNIAL ALLERGIC RHINITIS: ICD-10-CM

## 2021-02-08 RX ORDER — FLUTICASONE PROPIONATE 50 MCG
SPRAY, SUSPENSION (ML) NASAL
Qty: 1 BOTTLE | Refills: 1 | Status: SHIPPED | OUTPATIENT
Start: 2021-02-08 | End: 2021-03-15

## 2021-02-17 DIAGNOSIS — F34.1 DYSTHYMIA: ICD-10-CM

## 2021-02-17 RX ORDER — GABAPENTIN 300 MG/1
CAPSULE ORAL
Qty: 180 CAPSULE | Refills: 0 | Status: SHIPPED | OUTPATIENT
Start: 2021-02-17 | End: 2021-06-07

## 2021-02-17 NOTE — TELEPHONE ENCOUNTER
Health Maintenance   Topic Date Due    Hepatitis C screen  1960    HIV screen  09/06/1976    DTaP/Tdap/Td vaccine (1 - Tdap) 09/06/1980    Low dose CT lung screening  08/07/2018    Flu vaccine (1) 09/01/2020    Colon Cancer Screen FIT/FOBT  10/18/2020    Diabetic retinal exam  07/29/2021 (Originally 7/25/2020)    Hepatitis B vaccine (1 of 3 - Risk 3-dose series) 07/29/2021 (Originally 9/6/1980)    Shingles Vaccine (1 of 2) 07/29/2021 (Originally 9/6/2011)    Lipid screen  07/28/2021    Diabetic foot exam  07/29/2021    Diabetic microalbuminuria test  07/29/2021    A1C test (Diabetic or Prediabetic)  01/29/2022    Potassium monitoring  01/29/2022    Creatinine monitoring  01/29/2022    Pneumococcal 0-64 years Vaccine  Completed    Hepatitis A vaccine  Aged Out    Hib vaccine  Aged Out    Meningococcal (ACWY) vaccine  Aged Out             (applicable per patient's age: Cancer Screenings, Depression Screening, Fall Risk Screening, Immunizations)    Hemoglobin A1C (%)   Date Value   01/29/2021 6.9 (H)   07/28/2020 5.8   08/28/2019 5.5     Microalb/Crt.  Ratio (mcg/mg creat)   Date Value   07/29/2020 CANNOT BE CALCULATED     LDL Cholesterol (mg/dL)   Date Value   07/28/2020 85     AST (U/L)   Date Value   09/30/2020 28     ALT (U/L)   Date Value   09/30/2020 28     BUN (mg/dL)   Date Value   01/29/2021 10      (goal A1C is < 7)   (goal LDL is <100) need 30-50% reduction from baseline     BP Readings from Last 3 Encounters:   10/28/20 124/78   09/30/20 126/75   09/24/20 111/68    (goal /80)      All Future Testing planned in [facility]:  Lab Frequency Next Occurrence   CT Lung Screen (Annual) Once 09/12/2019   CT Lung Screen (Annual) Once 09/24/2020       Next Visit Date:  Future Appointments   Date Time Provider Jose White   9/23/2021 11:45 AM [facility]   11/3/2021 10:00 AM Altagracia Martinez MD [facility]            Patient Active Problem List: Gastroesophageal reflux disease     Allergic rhinitis     Controlled type 2 diabetes mellitus without complication, without long-term current use of insulin (HCC)     COPD, severe (HCC)     Dysthymia     Cerebral infarction (HCC)     Left-sided weakness

## 2021-02-18 DIAGNOSIS — E11.9 CONTROLLED TYPE 2 DIABETES MELLITUS WITHOUT COMPLICATION, WITHOUT LONG-TERM CURRENT USE OF INSULIN (HCC): ICD-10-CM

## 2021-02-18 RX ORDER — CALCIUM CITRATE/VITAMIN D3 200MG-6.25
TABLET ORAL
Qty: 100 STRIP | Refills: 3 | Status: SHIPPED | OUTPATIENT
Start: 2021-02-18 | End: 2021-06-09

## 2021-02-18 NOTE — TELEPHONE ENCOUNTER
Health Maintenance   Topic Date Due    Hepatitis C screen  1960    HIV screen  09/06/1976    DTaP/Tdap/Td vaccine (1 - Tdap) 09/06/1980    Low dose CT lung screening  08/07/2018    Flu vaccine (1) 09/01/2020    Colon Cancer Screen FIT/FOBT  10/18/2020    Diabetic retinal exam  07/29/2021 (Originally 7/25/2020)    Hepatitis B vaccine (1 of 3 - Risk 3-dose series) 07/29/2021 (Originally 9/6/1980)    Shingles Vaccine (1 of 2) 07/29/2021 (Originally 9/6/2011)    Lipid screen  07/28/2021    Diabetic foot exam  07/29/2021    Diabetic microalbuminuria test  07/29/2021    A1C test (Diabetic or Prediabetic)  01/29/2022    Potassium monitoring  01/29/2022    Creatinine monitoring  01/29/2022    Pneumococcal 0-64 years Vaccine  Completed    Hepatitis A vaccine  Aged Out    Hib vaccine  Aged Out    Meningococcal (ACWY) vaccine  Aged Out             (applicable per patient's age: Cancer Screenings, Depression Screening, Fall Risk Screening, Immunizations)    Hemoglobin A1C (%)   Date Value   01/29/2021 6.9 (H)   07/28/2020 5.8   08/28/2019 5.5     Microalb/Crt.  Ratio (mcg/mg creat)   Date Value   07/29/2020 CANNOT BE CALCULATED     LDL Cholesterol (mg/dL)   Date Value   07/28/2020 85     AST (U/L)   Date Value   09/30/2020 28     ALT (U/L)   Date Value   09/30/2020 28     BUN (mg/dL)   Date Value   01/29/2021 10      (goal A1C is < 7)   (goal LDL is <100) need 30-50% reduction from baseline     BP Readings from Last 3 Encounters:   10/28/20 124/78   09/30/20 126/75   09/24/20 111/68    (goal /80)      All Future Testing planned in [facility]:  Lab Frequency Next Occurrence   CT Lung Screen (Annual) Once 09/12/2019   CT Lung Screen (Annual) Once 09/24/2020       Next Visit Date:  Future Appointments   Date Time Provider Jose White   9/23/2021 11:45 AM [facility]   11/3/2021 10:00 AM Jose Luna MD [facility] [place]            Patient Active Problem List: Gastroesophageal reflux disease     Allergic rhinitis     Controlled type 2 diabetes mellitus without complication, without long-term current use of insulin (HCC)     COPD, severe (HCC)     Dysthymia     Cerebral infarction (HCC)     Left-sided weakness

## 2021-03-11 ENCOUNTER — OFFICE VISIT (OUTPATIENT)
Dept: PRIMARY CARE CLINIC | Age: 61
End: 2021-03-11
Payer: MEDICAID

## 2021-03-11 VITALS
RESPIRATION RATE: 18 BRPM | BODY MASS INDEX: 27.47 KG/M2 | SYSTOLIC BLOOD PRESSURE: 122 MMHG | OXYGEN SATURATION: 95 % | HEART RATE: 95 BPM | TEMPERATURE: 99.7 F | HEIGHT: 66 IN | DIASTOLIC BLOOD PRESSURE: 66 MMHG | WEIGHT: 170.9 LBS

## 2021-03-11 DIAGNOSIS — J44.9 COPD, SEVERE (HCC): ICD-10-CM

## 2021-03-11 DIAGNOSIS — E11.9 CONTROLLED TYPE 2 DIABETES MELLITUS WITHOUT COMPLICATION, WITHOUT LONG-TERM CURRENT USE OF INSULIN (HCC): Primary | ICD-10-CM

## 2021-03-11 DIAGNOSIS — Z12.11 SCREENING FOR COLORECTAL CANCER: ICD-10-CM

## 2021-03-11 DIAGNOSIS — Z12.12 SCREENING FOR COLORECTAL CANCER: ICD-10-CM

## 2021-03-11 PROBLEM — T20.011S BURN OF RIGHT EAR, SEQUELA: Status: RESOLVED | Noted: 2021-03-11 | Resolved: 2021-03-11

## 2021-03-11 PROBLEM — T20.011S BURN OF RIGHT EAR, SEQUELA: Status: ACTIVE | Noted: 2021-03-11

## 2021-03-11 PROCEDURE — 99214 OFFICE O/P EST MOD 30 MIN: CPT | Performed by: NURSE PRACTITIONER

## 2021-03-11 ASSESSMENT — ENCOUNTER SYMPTOMS
DIARRHEA: 0
VOMITING: 0
DIFFICULTY BREATHING: 0
FREQUENT THROAT CLEARING: 0
SPUTUM PRODUCTION: 0
CONSTIPATION: 0
WHEEZING: 0
COUGH: 0
ABDOMINAL PAIN: 0
SORE THROAT: 0
NAUSEA: 0
CHEST TIGHTNESS: 0
SHORTNESS OF BREATH: 0
RHINORRHEA: 0
HEMOPTYSIS: 0

## 2021-03-11 ASSESSMENT — PATIENT HEALTH QUESTIONNAIRE - PHQ9
1. LITTLE INTEREST OR PLEASURE IN DOING THINGS: 0
2. FEELING DOWN, DEPRESSED OR HOPELESS: 0
SUM OF ALL RESPONSES TO PHQ QUESTIONS 1-9: 0
SUM OF ALL RESPONSES TO PHQ QUESTIONS 1-9: 0

## 2021-03-11 ASSESSMENT — COPD QUESTIONNAIRES: COPD: 1

## 2021-03-11 NOTE — PROGRESS NOTES
Name: Savana Willis : 1960         Chief Complaint:     Chief Complaint   Patient presents with    Diabetes     routine check    COPD       History of Present Illness:      Savana Willis is a 62 y.o.  male who presents with Diabetes (routine check) and COPD      Milton Chery is here today with his wife and caregiver for a routine office visit. Diabetes  He presents for his follow-up diabetic visit. He has type 2 diabetes mellitus. His disease course has been stable. Hypoglycemia symptoms include speech difficulty (chronic). Pertinent negatives for hypoglycemia include no dizziness or headaches. Pertinent negatives for diabetes include no chest pain, no fatigue, no foot ulcerations, no polydipsia, no polyphagia and no polyuria. There are no hypoglycemic complications. Symptoms are stable. Diabetic complications include a CVA and peripheral neuropathy. Pertinent negatives for diabetic complications include no heart disease. Risk factors for coronary artery disease include diabetes mellitus, male sex and sedentary lifestyle. Current diabetic treatment includes oral agent (monotherapy). He is compliant with treatment all of the time. His weight is stable. He is following a generally healthy diet. When asked about meal planning, he reported none. He has had a previous visit with a dietitian. He never participates in exercise. There is no change in his home blood glucose trend. His breakfast blood glucose range is generally 130-140 mg/dl. An ACE inhibitor/angiotensin II receptor blocker is being taken. He does not see a podiatrist.Eye exam is not current. COPD  There is no chest tightness, cough, difficulty breathing, frequent throat clearing, hemoptysis, shortness of breath, sputum production or wheezing. This is a chronic problem. The current episode started more than 1 year ago. The problem occurs intermittently. The problem has been unchanged.  Pertinent negatives include no chest pain, fever, headaches, rhinorrhea or sore throat. His symptoms are aggravated by URI, exposure to smoke and exposure to fumes. His symptoms are alleviated by beta-agonist, ipratropium, steroid inhaler, rest and change in position. He reports significant improvement on treatment. Risk factors for lung disease include animal exposure. His past medical history is significant for bronchitis, COPD and pneumonia. Past Medical History:     Past Medical History:   Diagnosis Date    Acid reflux     Asthma     Cerebrovascular disease     Chronic cough     Controlled type 2 diabetes mellitus without complication, without long-term current use of insulin ([facility] [address] 2/9/2018    COPD (chronic obstructive pulmonary disease) (HCC)     Dysphagia     Dyspnea     Type II or unspecified type diabetes mellitus without mention of complication, not stated as uncontrolled     Unspecified cerebral artery occlusion with cerebral infarction 11/05/2008      Reviewed all health maintenance requirements and ordered appropriate tests  Health Maintenance Due   Topic Date Due    Low dose CT lung screening  08/07/2018    Colon Cancer Screen FIT/FOBT  10/18/2020       Past Surgical History:     Past Surgical History:   Procedure Laterality Date    BRAIN SURGERY      CRANIOTOMY      KNEE SURGERY          Medications:       Prior to Admission medications    Medication Sig Start Date End Date Taking?  Authorizing Provider   blood glucose test strips (TRUE METRIX BLOOD GLUCOSE TEST) strip TEST BLOOD SUGAR ONCE DAILY AND AS NEEDED, DX: E11.9 DIABETES TYPE 2 2/18/21  Yes Hal Sanon Might, APRN - CNP   gabapentin (NEURONTIN) 300 MG capsule TAKE 1 CAPSULE BY MOUTH TWICE A DAY 2/17/21 5/18/21 Yes Adrian MANZO Might, APRN - CNP   fluticasone (FLONASE) 50 MCG/ACT nasal spray USE 2 SPRAYS IN EACH NOSTRIL EVERY DAY 2/8/21  Yes Real Nelson, DO   Multiple Vitamins-Minerals ([facility] SPECTRAVITE ADVANCED) TABS TAKE 1 TABLET BY MOUTH EVERY DAY 1/8/21  Yes Hal Sanon Might, APRN - CNP   levETIRAcetam (KEPPRA) 500 MG tablet Take 1/2 tablet, PO, BID 12/16/20  Yes Gokul Smoker MightCARO CNP   metFORMIN (GLUCOPHAGE) 1000 MG tablet TAKE 1 TABLET BY MOUTH TWICE DAILY 12/9/20  Yes Gokul Smoker MightCARO CNP   baclofen (LIORESAL) 10 MG tablet Take 1 tablet by mouth 2 times daily 10/14/20  Yes Gokul Smoker MightCARO CNP   budesonide (PULMICORT) 0.5 MG/2ML nebulizer suspension Take 2 mLs by nebulization 2 times daily 9/24/20  Yes CARO Scott CNP   ipratropium-albuterol (DUONEB) 0.5-2.5 (3) MG/3ML SOLN nebulizer solution INHALE 3 MLS PER NEBULIZER INTO THE LUNGS EVERY 6 HOURS 9/24/20  Yes CARO Scott CNP   omeprazole (PRILOSEC) 40 MG delayed release capsule TAKE 1 CAPSULE BY MOUTH EVERY DAY 9/24/20  Yes Gokul Smoker Might, CARO Muñiz CNP   Lancets MISC 1 each by Does not apply route daily DISPENSE BRAND COMPATIBLE WITH METER AND STRIPS TRUE METRIX 9/16/20  Yes Gokul Smoker Might, APRN - CNP   furosemide (LASIX) 20 MG tablet Take 1 tablet by mouth 2 times daily 9/15/20  Yes Gokul Smoker MightCARO CNP   sertraline (ZOLOFT) 100 MG tablet TAKE 2 TABLETS BY MOUTH EVERY DAY 7/2/20  Yes Gokul Smoker Might, APRN - CNP   folic acid (FOLVITE) 1 MG tablet TAKE 1 TABLET BY MOUTH EVERY DAY 5/6/20  Yes Gokul Smoker MightCARO CNP   simvastatin (ZOCOR) 20 MG tablet TAKE 1 TABLET BY MOUTH EVERY DAY AT NIGHT 5/6/20  Yes Gokul Smoker MightCARO CNP   Blood Pressure KIT 1 each by Does not apply route daily 12/29/19  Yes Gokul Smoker MightCARO CNP   Misc. Devices (ADJUST BATH/SHOWER SEAT/BACK) MISC 1 each by Does not apply route daily 10/17/19  Yes Gokul Smoker MightCARO CNP   Misc. Devices (COMMODE BEDSIDE) MISC 1 each by Does not apply route daily 10/17/19  Yes Gokul Smoker CARO Adkins CNP   Misc.  Devices (NOVA CUSHION GEL SEAT PAD) MISC 1 each by Does not apply route daily 3/28/19  Yes Gokul Smoker Lucille, CARO Muñiz CNP   blood glucose monitor kit and supplies 1 kit by Other route daily Test 1 times a day & as needed for symptoms of irregular blood glucose. 8/6/18  Yes Sushila Sam Saw, APRN - CNP   senna (SENOKOT) 8.6 MG TABS tablet TAKE 1 TABLET BY MOUTH TWICE DAILY 11/17/17  Yes Gokul Smoker Might, APRN - CNP   CVS ASPIRIN CHILD 81 MG chewable tablet TAKE 1 TABLET BY MOUTH DAILY 6/28/17  Yes Gokul Smoker Might, APRN - CNP   lisinopril (PRINIVIL;ZESTRIL) 5 MG tablet Take 5 mg by mouth daily   Yes Historical Provider, MD   Lift Chair [id number] [facility] by Does not apply route 10/14/16  Yes Gokul Smoker Might, APRN - CNP   cetirizine (ZYRTEC) 10 MG tablet TAKE 1 TABLET BY MOUTH EVERY DAY  Patient not taking: Reported on 3/11/2021 5/7/20   Gokul Smoker Might, APRN - SILVINO   diclofenac (VOLTAREN) 75 MG EC tablet Take 75 mg by mouth daily    Historical Provider, MD        Allergies:       Pcn [penicillins], Morphine, and Sulfa antibiotics    Social History:     Tobacco:    reports that he quit smoking about 13 years ago. His smoking use included cigarettes. He has a 45.00 pack-year smoking history. He has never used smokeless tobacco.  Alcohol:      reports no history of alcohol use. Drug Use:  reports no history of drug use. Family History:     Family History   Problem Relation Age of Onset   Olivares Cancer Mother     Diabetes Mother     Heart Failure Father     Diabetes Father     Diabetes Sister        Review of Systems:     Positive and Negative as described in HPI    Review of Systems   Constitutional: Negative for chills, fatigue and fever. HENT: Negative for congestion, rhinorrhea and sore throat. Eyes: Negative for visual disturbance. Respiratory: Negative for cough, hemoptysis, sputum production, shortness of breath and wheezing. Cardiovascular: Negative for chest pain and palpitations. Gastrointestinal: Negative for abdominal pain, constipation, diarrhea, nausea and vomiting. Endocrine: Negative for polydipsia, polyphagia and polyuria. Genitourinary: Negative for difficulty urinating and dysuria. Musculoskeletal: Positive for gait problem (chronic). Negative for neck pain and neck stiffness. Skin: Negative for rash. Neurological: Positive for speech difficulty (chronic). Negative for dizziness, syncope, light-headedness and headaches. Physical Exam:   Vitals:  /66 (Site: Right Upper Arm, Position: Sitting, Cuff Size: Large Adult)   Pulse 95   Temp 99.7 °F (37.6 °C) (Temporal)   Resp 18   Ht 5' 6\" (1.676 m)   Wt 170 lb 14.4 oz (77.5 kg)   SpO2 95%   BMI 27.58 kg/m²     Physical Exam  Vitals signs and nursing note reviewed. Constitutional:       General: He is not in acute distress. Appearance: Normal appearance. He is well-developed. He is not ill-appearing. HENT:      Mouth/Throat:      Mouth: Mucous membranes are moist.      Dentition: Abnormal dentition (poor). Pharynx: No posterior oropharyngeal erythema. Eyes:      General: No scleral icterus. Conjunctiva/sclera: Conjunctivae normal.   Neck:      Musculoskeletal: Normal range of motion and neck supple. Cardiovascular:      Rate and Rhythm: Normal rate and regular rhythm. Heart sounds: Normal heart sounds. No murmur. Pulmonary:      Effort: Pulmonary effort is normal.      Breath sounds: Normal breath sounds. No wheezing or rales. Abdominal:      General: Bowel sounds are normal. There is no distension. Palpations: Abdomen is soft. Tenderness: There is no abdominal tenderness. Lymphadenopathy:      Cervical: No cervical adenopathy. Skin:     General: Skin is warm and dry. Findings: No rash. Neurological:      Mental Status: He is alert and oriented to person, place, and time. Mental status is at baseline. Coordination: Coordination abnormal (left hemiplegia).    Psychiatric:         Mood and Affect: Mood normal.         Behavior: Behavior normal.         Data:     Lab Results   Component Value Date     01/29/2021    K 4.0 01/29/2021    CL 96 01/29/2021    CO2 30 01/29/2021    BUN 10 01/29/2021    CREATININE 0.80 01/29/2021

## 2021-03-11 NOTE — PATIENT INSTRUCTIONS
SURVEY:    You may be receiving a survey from [employer] regarding your visit today. Please complete the survey to enable us to provide the highest quality of care to you and your family. If you cannot score us a very good on any question, please call the office to discuss how we could have made your experience a very good one. Thank you. Patient Education        Counting Carbohydrates: Care Instructions  Your Care Instructions     You don't have to eat special foods when you have diabetes. You just have to be careful to eat healthy foods. Carbohydrates (carbs) raise blood sugar higher and quicker than any other nutrient. Carbs are found in desserts, breads and cereals, and fruit. They're also in starchy vegetables. These include potatoes, corn, and grains such as rice and pasta. Carbs are also in milk and yogurt. The more carbs you eat at one time, the higher your blood sugar will rise. Spreading carbs all through the day helps keep your blood sugar levels within your target range. Counting carbs is one of the best ways to keep your blood sugar under control. If you use insulin, counting carbs helps you match the right amount of insulin to the number of grams of carbs in a meal. Then you can change your diet and insulin dose as needed. Testing your blood sugar several times a day can help you learn how carbs affect your blood sugar. A registered dietitian or certified diabetes educator can help you plan meals and snacks. Follow-up care is a key part of your treatment and safety. Be sure to make and go to all appointments, and call your doctor if you are having problems. It's also a good idea to know your test results and keep a list of the medicines you take. How can you care for yourself at home?   Know your daily amount of carbohydrates  Your daily amount depends on several things, such as your weight, how active you are, which diabetes medicines you take, and what your goals are for your blood sugar levels. A registered dietitian or certified diabetes educator can help you plan how many carbs to include in each meal and snack. For most adults, a guideline for the daily amount of carbs is:  · 45 to 60 grams at each meal. That's about the same as 3 to 4 carbohydrate servings. · 15 to 20 grams at each snack. That's about the same as 1 carbohydrate serving. Count carbs  Counting carbs lets you know how much rapid-acting insulin to take before you eat. If you use an insulin pump, you get a constant rate of insulin during the day. So the pump must be programmed at meals. This gives you extra insulin to cover the rise in blood sugar after meals. If you take insulin:  · Learn your own insulin-to-carb ratio. You and your diabetes health professional will figure out the ratio. You can do this by testing your blood sugar after meals. For example, you may need a certain amount of insulin for every 15 grams of carbs. · Add up the carb grams in a meal. Then you can figure out how many units of insulin to take based on your insulin-to-carb ratio. · Exercise lowers blood sugar. You can use less insulin than you would if you were not doing exercise. Keep in mind that timing matters. If you exercise within 1 hour after a meal, your body may need less insulin for that meal than it would if you exercised 3 hours after the meal. Test your blood sugar to find out how exercise affects your need for insulin. If you do or don't take insulin:  · Look at labels on packaged foods. This can tell you how many carbs are in a serving. You can also use guides from the American Diabetes Association. · Be aware of portions, or serving sizes. If a package has two servings and you eat the whole package, you need to double the number of grams of carbohydrate listed for one serving. · Protein, fat, and fiber do not raise blood sugar as much as carbs do.  If you eat a lot of these nutrients in a meal, your blood sugar will rise more slowly than it would otherwise. Eat from all food groups  · Eat at least three meals a day. · Plan meals to include food from all the food groups. The food groups include grains, fruits, dairy, proteins, and vegetables. · Talk to your dietitian or diabetes educator about ways to add limited amounts of sweets into your meal plan. · If you drink alcohol, talk to your doctor. It may not be recommended when you are taking certain diabetes medicines. Where can you learn more? Go to https://[employer]. org and sign in to your [employer] account. Enter C801 in the [facility] box to learn more about \"Counting Carbohydrates: Care Instructions. \"     If you do not have an account, please click on the \"Sign Up Now\" link. Current as of: August 31, 2020               Content Version: 12.8  © 4580-6717 [employer]wise, [employer]. Care instructions adapted under license by [facility] ([facility]). If you have questions about a medical condition or this instruction, always ask your healthcare professional. [address] any warranty or liability for your use of this information.

## 2021-03-12 DIAGNOSIS — J30.89 PERENNIAL ALLERGIC RHINITIS: ICD-10-CM

## 2021-03-15 RX ORDER — FLUTICASONE PROPIONATE 50 MCG
SPRAY, SUSPENSION (ML) NASAL
Qty: 1 BOTTLE | Refills: 1 | Status: SHIPPED | OUTPATIENT
Start: 2021-03-15 | End: 2021-04-07

## 2021-04-05 ENCOUNTER — TELEPHONE (OUTPATIENT)
Dept: PRIMARY CARE CLINIC | Age: 61
End: 2021-04-05

## 2021-04-05 DIAGNOSIS — Z12.12 SCREENING FOR COLORECTAL CANCER: ICD-10-CM

## 2021-04-05 DIAGNOSIS — R19.5 POSITIVE COLORECTAL CANCER SCREENING USING DNA-BASED STOOL TEST: Primary | ICD-10-CM

## 2021-04-05 DIAGNOSIS — Z12.11 SCREENING FOR COLORECTAL CANCER: ICD-10-CM

## 2021-04-05 NOTE — TELEPHONE ENCOUNTER
Cologuard results- POSITIVE. Health Maintenance   Topic Date Due    Low dose CT lung screening  08/07/2018    Colon Cancer Screen FIT/FOBT  10/18/2020    COVID-19 Vaccine (1) 05/11/2021 (Originally 9/6/1976)    Diabetic retinal exam  07/29/2021 (Originally 7/25/2020)    Hepatitis B vaccine (1 of 3 - Risk 3-dose series) 07/29/2021 (Originally 9/6/1980)    Shingles Vaccine (1 of 2) 07/29/2021 (Originally 9/6/2011)    DTaP/Tdap/Td vaccine (1 - Tdap) 03/11/2022 (Originally 9/6/1980)    Hepatitis C screen  03/11/2022 (Originally 1960)    HIV screen  03/11/2022 (Originally 9/6/1976)    Lipid screen  07/28/2021    Diabetic foot exam  07/29/2021    Diabetic microalbuminuria test  07/29/2021    A1C test (Diabetic or Prediabetic)  01/29/2022    Potassium monitoring  01/29/2022    Creatinine monitoring  01/29/2022    Flu vaccine  Completed    Pneumococcal 0-64 years Vaccine  Completed    Hepatitis A vaccine  Aged Out    Hib vaccine  Aged Out    Meningococcal (ACWY) vaccine  Aged Out             (applicable per patient's age: Cancer Screenings, Depression Screening, Fall Risk Screening, Immunizations)    Hemoglobin A1C (%)   Date Value   01/29/2021 6.9 (H)   07/28/2020 5.8   08/28/2019 5.5     Microalb/Crt.  Ratio (mcg/mg creat)   Date Value   07/29/2020 CANNOT BE CALCULATED     LDL Cholesterol (mg/dL)   Date Value   07/28/2020 85     AST (U/L)   Date Value   09/30/2020 28     ALT (U/L)   Date Value   09/30/2020 28     BUN (mg/dL)   Date Value   01/29/2021 10      (goal A1C is < 7)   (goal LDL is <100) need 30-50% reduction from baseline     BP Readings from Last 3 Encounters:   03/11/21 122/66   10/28/20 124/78   09/30/20 126/75    (goal /80)      All Future Testing planned in [facility]:  Lab Frequency Next Occurrence   CT Lung Screen (Annual) Once 03/24/2022   CBC Auto Differential Once 09/07/2021   ALT Once 09/11/2021   AST Once 65/45/4779   Basic Metabolic Panel Once 89/87/6447   Hemoglobin A1C Once 09/07/2021   Lipid Panel Once 09/07/2021   Microalbumin, Ur Once 09/07/2021       Next Visit Date:  Future Appointments   Date Time Provider Jose Cindy   9/14/2021 11:00 AM Diana Adkins APRN - CNP [place] [facility]   9/23/2021 11:45 AM Bryant Watkins DO TIFF PULM [facility]   11/3/2021 10:00 AM [facility]            Patient Active Problem List:     Gastroesophageal reflux disease     Allergic rhinitis     Controlled type 2 diabetes mellitus without complication, without long-term current use of insulin (HCC)     COPD, severe (HCC)     Dysthymia     Cerebral infarction ([facility] [address]     Left-sided weakness

## 2021-04-07 DIAGNOSIS — J30.89 PERENNIAL ALLERGIC RHINITIS: ICD-10-CM

## 2021-04-07 RX ORDER — FLUTICASONE PROPIONATE 50 MCG
SPRAY, SUSPENSION (ML) NASAL
Qty: 1 BOTTLE | Refills: 1 | Status: SHIPPED | OUTPATIENT
Start: 2021-04-07 | End: 2021-05-12

## 2021-05-12 DIAGNOSIS — J30.89 PERENNIAL ALLERGIC RHINITIS: ICD-10-CM

## 2021-05-12 DIAGNOSIS — J30.9 ALLERGIC RHINITIS: ICD-10-CM

## 2021-05-12 RX ORDER — FLUTICASONE PROPIONATE 50 MCG
SPRAY, SUSPENSION (ML) NASAL
Qty: 1 BOTTLE | Refills: 1 | Status: SHIPPED | OUTPATIENT
Start: 2021-05-12 | End: 2021-06-08

## 2021-05-12 RX ORDER — CETIRIZINE HYDROCHLORIDE 10 MG/1
TABLET ORAL
Qty: 90 TABLET | Refills: 3 | Status: SHIPPED | OUTPATIENT
Start: 2021-05-12 | End: 2021-10-27 | Stop reason: SDUPTHER

## 2021-05-12 NOTE — TELEPHONE ENCOUNTER
Health Maintenance   Topic Date Due    COVID-19 Vaccine (1) Never done    Low dose CT lung screening  08/07/2018    Diabetic retinal exam  07/29/2021 (Originally 7/25/2020)    Hepatitis B vaccine (1 of 3 - Risk 3-dose series) 07/29/2021 (Originally 9/6/1980)    Shingles Vaccine (1 of 2) 07/29/2021 (Originally 9/6/2011)    DTaP/Tdap/Td vaccine (1 - Tdap) 03/11/2022 (Originally 9/6/1980)    Hepatitis C screen  03/11/2022 (Originally 1960)    HIV screen  03/11/2022 (Originally 9/6/1976)    Lipid screen  07/28/2021    Diabetic foot exam  07/29/2021    Diabetic microalbuminuria test  07/29/2021    A1C test (Diabetic or Prediabetic)  01/29/2022    Potassium monitoring  01/29/2022    Creatinine monitoring  01/29/2022    Colon cancer screen fecal DNA test (Cologuard)  04/04/2024    Flu vaccine  Completed    Pneumococcal 0-64 years Vaccine  Completed    Hepatitis A vaccine  Aged Out    Hib vaccine  Aged Out    Meningococcal (ACWY) vaccine  Aged Out             (applicable per patient's age: Cancer Screenings, Depression Screening, Fall Risk Screening, Immunizations)    Hemoglobin A1C (%)   Date Value   01/29/2021 6.9 (H)   07/28/2020 5.8   08/28/2019 5.5     Microalb/Crt.  Ratio (mcg/mg creat)   Date Value   07/29/2020 CANNOT BE CALCULATED     LDL Cholesterol (mg/dL)   Date Value   07/28/2020 85     AST (U/L)   Date Value   09/30/2020 28     ALT (U/L)   Date Value   09/30/2020 28     BUN (mg/dL)   Date Value   01/29/2021 10      (goal A1C is < 7)   (goal LDL is <100) need 30-50% reduction from baseline     BP Readings from Last 3 Encounters:   03/11/21 122/66   10/28/20 124/78   09/30/20 126/75    (goal /80)      All Future Testing planned in [facility]:  Lab Frequency Next Occurrence   CT Lung Screen (Annual) Once 03/24/2022   CBC Auto Differential Once 09/07/2021   ALT Once 09/11/2021   AST Once 25/30/3770   Basic Metabolic Panel Once 31/30/0403   Hemoglobin A1C Once 09/07/2021   Lipid Panel Once 09/07/2021   Microalbumin, Ur Once 09/07/2021       Next Visit Date:  Future Appointments   Date Time Provider Jose Cindy   9/14/2021 11:00 AM [facility]   9/23/2021 11:45 AM [facility]   11/3/2021 10:00 AM [facility]            Patient Active Problem List:     Gastroesophageal reflux disease     Allergic rhinitis     Controlled type 2 diabetes mellitus without complication, without long-term current use of insulin (HCC)     COPD, severe (HCC)     Dysthymia     Cerebral infarction (HCC)     Left-sided weakness

## 2021-05-17 RX ORDER — SIMVASTATIN 20 MG
TABLET ORAL
Qty: 90 TABLET | Refills: 3 | Status: SHIPPED | OUTPATIENT
Start: 2021-05-17 | End: 2021-10-27 | Stop reason: SDUPTHER

## 2021-05-17 RX ORDER — FOLIC ACID 1 MG/1
TABLET ORAL
Qty: 90 TABLET | Refills: 3 | Status: SHIPPED | OUTPATIENT
Start: 2021-05-17 | End: 2021-10-27 | Stop reason: SDUPTHER

## 2021-06-06 DIAGNOSIS — F34.1 DYSTHYMIA: ICD-10-CM

## 2021-06-07 RX ORDER — GABAPENTIN 300 MG/1
CAPSULE ORAL
Qty: 180 CAPSULE | Refills: 0 | Status: SHIPPED | OUTPATIENT
Start: 2021-06-07 | End: 2021-09-01

## 2021-06-07 NOTE — TELEPHONE ENCOUNTER
Health Maintenance   Topic Date Due    COVID-19 Vaccine (1) Never done    Low dose CT lung screening  08/07/2018    Diabetic retinal exam  07/29/2021 (Originally 7/25/2020)    Hepatitis B vaccine (1 of 3 - Risk 3-dose series) 07/29/2021 (Originally 9/6/1980)    Shingles Vaccine (1 of 2) 07/29/2021 (Originally 9/6/2011)    DTaP/Tdap/Td vaccine (1 - Tdap) 03/11/2022 (Originally 9/6/1980)    Hepatitis C screen  03/11/2022 (Originally 1960)    HIV screen  03/11/2022 (Originally 9/6/1976)    Lipid screen  07/28/2021    Diabetic foot exam  07/29/2021    Diabetic microalbuminuria test  07/29/2021    A1C test (Diabetic or Prediabetic)  01/29/2022    Potassium monitoring  01/29/2022    Creatinine monitoring  01/29/2022    Colon cancer screen fecal DNA test (Cologuard)  04/04/2024    Pneumococcal 0-64 years Vaccine (2 of 2) 09/06/2026    Flu vaccine  Completed    Hepatitis A vaccine  Aged Out    Hib vaccine  Aged Out    Meningococcal (ACWY) vaccine  Aged Out             (applicable per patient's age: Cancer Screenings, Depression Screening, Fall Risk Screening, Immunizations)    Hemoglobin A1C (%)   Date Value   01/29/2021 6.9 (H)   07/28/2020 5.8   08/28/2019 5.5     Microalb/Crt.  Ratio (mcg/mg creat)   Date Value   07/29/2020 CANNOT BE CALCULATED     LDL Cholesterol (mg/dL)   Date Value   07/28/2020 85     AST (U/L)   Date Value   09/30/2020 28     ALT (U/L)   Date Value   09/30/2020 28     BUN (mg/dL)   Date Value   01/29/2021 10      (goal A1C is < 7)   (goal LDL is <100) need 30-50% reduction from baseline     BP Readings from Last 3 Encounters:   03/11/21 122/66   10/28/20 124/78   09/30/20 126/75    (goal /80)      All Future Testing planned in [facility]:  Lab Frequency Next Occurrence   CT Lung Screen (Annual) Once 03/24/2022   CBC Auto Differential Once 09/07/2021   ALT Once 09/11/2021   AST Once 35/99/7827   Basic Metabolic Panel Once 84/04/4919   Hemoglobin A1C Once 09/07/2021   Lipid Panel Once 09/07/2021   Microalbumin, Ur Once 09/07/2021       Next Visit Date:  Future Appointments   Date Time Provider Jose Cindy   9/14/2021 11:00 AM Amilcar Adkins APRN - CNP [place] [facility]   9/23/2021 11:45 AM Isreal Valdes DO [place] PULM [facility]   11/3/2021 10:00 AM Charles Sevilla MD [facility]            Patient Active Problem List:     Gastroesophageal reflux disease     Allergic rhinitis     Controlled type 2 diabetes mellitus without complication, without long-term current use of insulin (HCC)     COPD, severe (HCC)     Dysthymia     Cerebral infarction ([facility] [address]     Left-sided weakness

## 2021-06-08 DIAGNOSIS — E11.9 CONTROLLED TYPE 2 DIABETES MELLITUS WITHOUT COMPLICATION, WITHOUT LONG-TERM CURRENT USE OF INSULIN (HCC): ICD-10-CM

## 2021-06-08 DIAGNOSIS — J30.89 PERENNIAL ALLERGIC RHINITIS: ICD-10-CM

## 2021-06-08 RX ORDER — FLUTICASONE PROPIONATE 50 MCG
SPRAY, SUSPENSION (ML) NASAL
Qty: 1 BOTTLE | Refills: 1 | Status: SHIPPED | OUTPATIENT
Start: 2021-06-08 | End: 2021-07-06

## 2021-06-09 RX ORDER — CALCIUM CITRATE/VITAMIN D3 200MG-6.25
TABLET ORAL
Qty: 100 STRIP | Refills: 3 | Status: SHIPPED | OUTPATIENT
Start: 2021-06-09 | End: 2021-10-27 | Stop reason: SDUPTHER

## 2021-06-09 NOTE — TELEPHONE ENCOUNTER
Health Maintenance   Topic Date Due    COVID-19 Vaccine (1) Never done    Low dose CT lung screening  08/07/2018    Diabetic retinal exam  07/29/2021 (Originally 7/25/2020)    Hepatitis B vaccine (1 of 3 - Risk 3-dose series) 07/29/2021 (Originally 9/6/1980)    Shingles Vaccine (1 of 2) 07/29/2021 (Originally 9/6/2011)    DTaP/Tdap/Td vaccine (1 - Tdap) 03/11/2022 (Originally 9/6/1980)    Hepatitis C screen  03/11/2022 (Originally 1960)    HIV screen  03/11/2022 (Originally 9/6/1976)    Lipid screen  07/28/2021    Diabetic foot exam  07/29/2021    Diabetic microalbuminuria test  07/29/2021    A1C test (Diabetic or Prediabetic)  01/29/2022    Potassium monitoring  01/29/2022    Creatinine monitoring  01/29/2022    Colon cancer screen fecal DNA test (Cologuard)  04/04/2024    Pneumococcal 0-64 years Vaccine (2 of 2) 09/06/2026    Flu vaccine  Completed    Hepatitis A vaccine  Aged Out    Hib vaccine  Aged Out    Meningococcal (ACWY) vaccine  Aged Out             (applicable per patient's age: Cancer Screenings, Depression Screening, Fall Risk Screening, Immunizations)    Hemoglobin A1C (%)   Date Value   01/29/2021 6.9 (H)   07/28/2020 5.8   08/28/2019 5.5     Microalb/Crt.  Ratio (mcg/mg creat)   Date Value   07/29/2020 CANNOT BE CALCULATED     LDL Cholesterol (mg/dL)   Date Value   07/28/2020 85     AST (U/L)   Date Value   09/30/2020 28     ALT (U/L)   Date Value   09/30/2020 28     BUN (mg/dL)   Date Value   01/29/2021 10      (goal A1C is < 7)   (goal LDL is <100) need 30-50% reduction from baseline     BP Readings from Last 3 Encounters:   03/11/21 122/66   10/28/20 124/78   09/30/20 126/75    (goal /80)      All Future Testing planned in [facility]:  Lab Frequency Next Occurrence   CT Lung Screen (Annual) Once 03/24/2022   CBC Auto Differential Once 09/07/2021   ALT Once 09/11/2021   AST Once 48/94/8354   Basic Metabolic Panel Once 55/54/2145   Hemoglobin A1C Once 09/07/2021   Lipid Panel Once 09/07/2021   Microalbumin, Ur Once 09/07/2021       Next Visit Date:  Future Appointments   Date Time Provider Jose Cindy   9/14/2021 11:00 AM [facility]   9/23/2021 11:45 AM Peter Yung DO [place] PULM [facility]   11/3/2021 10:00 AM [facility] [place]            Patient Active Problem List:     Gastroesophageal reflux disease     Allergic rhinitis     Controlled type 2 diabetes mellitus without complication, without long-term current use of insulin (HCC)     COPD, severe (HCC)     Dysthymia     Cerebral infarction ([facility] [address]     Left-sided weakness

## 2021-06-14 ENCOUNTER — TELEPHONE (OUTPATIENT)
Dept: PRIMARY CARE CLINIC | Age: 61
End: 2021-06-14

## 2021-06-14 ENCOUNTER — OFFICE VISIT (OUTPATIENT)
Dept: PRIMARY CARE CLINIC | Age: 61
End: 2021-06-14
Payer: MEDICAID

## 2021-06-14 VITALS
SYSTOLIC BLOOD PRESSURE: 138 MMHG | WEIGHT: 170 LBS | BODY MASS INDEX: 27.32 KG/M2 | HEART RATE: 102 BPM | TEMPERATURE: 98.8 F | DIASTOLIC BLOOD PRESSURE: 78 MMHG | OXYGEN SATURATION: 94 % | HEIGHT: 66 IN | RESPIRATION RATE: 18 BRPM

## 2021-06-14 DIAGNOSIS — L30.4 INTERTRIGO: Primary | ICD-10-CM

## 2021-06-14 PROCEDURE — 99213 OFFICE O/P EST LOW 20 MIN: CPT | Performed by: NURSE PRACTITIONER

## 2021-06-14 RX ORDER — CLOTRIMAZOLE
1 POWDER (GRAM) MISCELLANEOUS 2 TIMES DAILY
Qty: 100 G | Refills: 1 | Status: SHIPPED | OUTPATIENT
Start: 2021-06-14 | End: 2021-06-14

## 2021-06-14 RX ORDER — NYSTATIN 100000 [USP'U]/G
POWDER TOPICAL
Qty: 60 G | Refills: 0 | Status: SHIPPED | OUTPATIENT
Start: 2021-06-14 | End: 2021-10-27 | Stop reason: ALTCHOICE

## 2021-06-14 SDOH — ECONOMIC STABILITY: FOOD INSECURITY: WITHIN THE PAST 12 MONTHS, YOU WORRIED THAT YOUR FOOD WOULD RUN OUT BEFORE YOU GOT MONEY TO BUY MORE.: NEVER TRUE

## 2021-06-14 SDOH — ECONOMIC STABILITY: FOOD INSECURITY: WITHIN THE PAST 12 MONTHS, THE FOOD YOU BOUGHT JUST DIDN'T LAST AND YOU DIDN'T HAVE MONEY TO GET MORE.: NEVER TRUE

## 2021-06-14 ASSESSMENT — SOCIAL DETERMINANTS OF HEALTH (SDOH): HOW HARD IS IT FOR YOU TO PAY FOR THE VERY BASICS LIKE FOOD, HOUSING, MEDICAL CARE, AND HEATING?: NOT HARD AT ALL

## 2021-06-14 NOTE — TELEPHONE ENCOUNTER
Phone call from caregiver, Geni Rios, stating that Stephanie Cabral has a rash under his arm pit area. States he gets rash when the weather is hot. States they have been trying to use corn starch to help keep area dry but rash continues. Would like to know if something could be called to pharmacy or if he will need to be seen. Please advise. Health Maintenance   Topic Date Due    COVID-19 Vaccine (1) Never done    Low dose CT lung screening  08/07/2018    Diabetic retinal exam  07/29/2021 (Originally 7/25/2020)    Hepatitis B vaccine (1 of 3 - Risk 3-dose series) 07/29/2021 (Originally 9/6/1980)    Shingles Vaccine (1 of 2) 07/29/2021 (Originally 9/6/2011)    DTaP/Tdap/Td vaccine (1 - Tdap) 03/11/2022 (Originally 9/6/1980)    Hepatitis C screen  03/11/2022 (Originally 1960)    HIV screen  03/11/2022 (Originally 9/6/1976)    Lipid screen  07/28/2021    Diabetic foot exam  07/29/2021    Diabetic microalbuminuria test  07/29/2021    A1C test (Diabetic or Prediabetic)  01/29/2022    Potassium monitoring  01/29/2022    Creatinine monitoring  01/29/2022    Colon cancer screen fecal DNA test (Cologuard)  04/04/2024    Pneumococcal 0-64 years Vaccine (2 of 2) 09/06/2026    Flu vaccine  Completed    Hepatitis A vaccine  Aged Out    Hib vaccine  Aged Out    Meningococcal (ACWY) vaccine  Aged Out             (applicable per patient's age: Cancer Screenings, Depression Screening, Fall Risk Screening, Immunizations)    Hemoglobin A1C (%)   Date Value   01/29/2021 6.9 (H)   07/28/2020 5.8   08/28/2019 5.5     Microalb/Crt.  Ratio (mcg/mg creat)   Date Value   07/29/2020 CANNOT BE CALCULATED     LDL Cholesterol (mg/dL)   Date Value   07/28/2020 85     AST (U/L)   Date Value   09/30/2020 28     ALT (U/L)   Date Value   09/30/2020 28     BUN (mg/dL)   Date Value   01/29/2021 10      (goal A1C is < 7)   (goal LDL is <100) need 30-50% reduction from baseline     BP Readings from Last 3 Encounters:   03/11/21 122/66 10/28/20 124/78   09/30/20 126/75    (goal /80)      All Future Testing planned in [facility]:  Lab Frequency Next Occurrence   CT Lung Screen (Annual) Once 03/24/2022   CBC Auto Differential Once 09/07/2021   ALT Once 09/11/2021   AST Once 66/25/0461   Basic Metabolic Panel Once 88/41/1114   Hemoglobin A1C Once 09/07/2021   Lipid Panel Once 09/07/2021   Microalbumin, Ur Once 09/07/2021       Next Visit Date:  Future Appointments   Date Time Provider Jose White   9/14/2021 11:00 AM Sol Adkins, APRN - CNP [facility]   9/23/2021 11:45 AM Jhony Polanco DO [place] PULM [facility]   11/3/2021 10:00 AM Vanessa Sawyer MD TIFF VASC [facility]            Patient Active Problem List:     Gastroesophageal reflux disease     Allergic rhinitis     Controlled type 2 diabetes mellitus without complication, without long-term current use of insulin (HCC)     COPD, severe (HCC)     Dysthymia     Cerebral infarction ([facility] [address]     Left-sided weakness

## 2021-06-14 NOTE — TELEPHONE ENCOUNTER
Prescribed underarm powder would cost over $600 with insurance. Could a cheaper alternative be prescribed?

## 2021-06-14 NOTE — PATIENT INSTRUCTIONS
yeast infections. Clotrimazole topical may also be used for purposes not listed in this medication guide. What should I discuss with my healthcare provider before using clotrimazole topical?  You should not use clotrimazole topical if you are allergic to it. Clotrimazole topical is not expected to harm an unborn baby. Tell your doctor if you are pregnant or plan to become pregnant during treatment. It is not known whether clotrimazole topical passes into breast milk or if it could harm a nursing baby. Tell your doctor if you are breast-feeding a baby. How should I use clotrimazole topical?  Use exactly as directed on the label, or as prescribed by your doctor. Do not use in larger or smaller amounts or for longer than recommended. Do not take by mouth. Clotrimazole topical is for use only on the skin. Wash your hands before and after using this medicine, unless you are using it to treat a hand infection. Clean and dry the affected area. Apply a small amount of the cream (usually twice daily) for 2 to 4 weeks. Do not cover the treated skin area unless your doctor tells you to. Avoid using bandages or dressings that do not allow air circulation. A light cotton-gauze dressing may be used to protect clothing. Use this medicine for the full prescribed length of time. Your symptoms may improve before the infection is completely cleared. Skipping doses may also increase your risk of further infection that is resistant to antifungal medicine. Call your doctor if your symptoms get worse, or if your condition does not improve after 4 weeks of treatment. Store at room temperature away from moisture and heat. What happens if I miss a dose? Apply the missed dose as soon as you remember. Skip the missed dose if it is almost time for your next scheduled dose. Do not use extra medicine to make up the missed dose. What happens if I overdose? An overdose of clotrimazole topical is not expected to be dangerous.  Seek

## 2021-06-14 NOTE — TELEPHONE ENCOUNTER
Please notify patient that I prescribed Nystatin powder.   If this is still costly let me know and we will have to do a cream.  Thanks Charley Richey

## 2021-06-14 NOTE — PROGRESS NOTES
[facility]  [address] [facility] [address]  Dept: [phone]  Dept Fax: [phone]    Breanna Gibbons is a 62 y.o. male who presents to the [facility] in Care today for his medical conditions/complaints as noted below. Breanna Love is c/o of Rash (x 1 week. c/o rash located under left armpit, some drainage and pus sacks. )      HPI:    Breanna Love is a 62 y.o. male who presents with  Rash  This is a new problem. Episode onset: 1 week. The problem is unchanged. Location: left armpit. The rash is characterized by redness, pain and draining. Treatments tried: Corn Starch. The treatment provided no relief. Caregiver is present with patient and states that every year he develops this rash under left armpit that they treat with cornstarch and it will go away. This year it seems to be getting worse and has not resolved.       Past Medical History:   Diagnosis Date    Acid reflux     Asthma     Cerebrovascular disease     Chronic cough     Controlled type 2 diabetes mellitus without complication, without long-term current use of insulin ([facility]) 2/9/2018    COPD (chronic obstructive pulmonary disease) ([facility])     Dysphagia     Dyspnea     Type II or unspecified type diabetes mellitus without mention of complication, not stated as uncontrolled     Unspecified cerebral artery occlusion with cerebral infarction 11/05/2008        Current Outpatient Medications   Medication Sig Dispense Refill    Clotrimazole POWD Apply 1 spray topically 2 times daily for 10 days 100 g 1    TRUE METRIX BLOOD GLUCOSE TEST strip TEST BLOOD SUGAR ONCE DAILY AND AS NEEDED, DX: E11.9 DIABETES TYPE 2 100 strip 3    fluticasone (FLONASE) 50 MCG/ACT nasal spray INSTILL 2 SPRAYS INTO EACH NOSTRIL EVERY DAY 1 Bottle 1    gabapentin (NEURONTIN) 300 MG capsule TAKE 1 CAPSULE BY MOUTH TWICE A  capsule 0    simvastatin (ZOCOR) 20 MG tablet TAKE 1 TABLET BY 0    diclofenac (VOLTAREN) 75 MG EC tablet Take 75 mg by mouth daily      Blood Pressure KIT 1 each by Does not apply route daily (Patient not taking: Reported on 6/14/2021) 1 kit 0     No current facility-administered medications for this visit. Allergies   Allergen Reactions    Pcn [Penicillins] Anaphylaxis    Morphine Rash    Sulfa Antibiotics Rash       Subjective:      Review of Systems   Skin: Positive for rash. Objective:     Physical Exam  Vitals and nursing note reviewed. Constitutional:       Appearance: Normal appearance. HENT:      Head: Normocephalic. Right Ear: External ear normal.      Left Ear: External ear normal.      Nose: Nose normal.   Eyes:      Pupils: Pupils are equal, round, and reactive to light. Cardiovascular:      Rate and Rhythm: Normal rate and regular rhythm. Heart sounds: Normal heart sounds. Pulmonary:      Effort: Pulmonary effort is normal.      Breath sounds: Normal breath sounds. Skin:     General: Skin is warm. Capillary Refill: Capillary refill takes less than 2 seconds. Findings: Erythema and rash present. Comments: Beefy moist erythematous rash under left armpit with some scaling present. Neurological:      Mental Status: He is alert. Psychiatric:         Mood and Affect: Mood normal.       /78 (Site: Right Upper Arm, Position: Sitting, Cuff Size: Large Adult)   Pulse 102   Temp 98.8 °F (37.1 °C) (Temporal)   Resp 18   Ht 5' 6\" (1.676 m)   Wt 170 lb (77.1 kg)   SpO2 94%   BMI 27.44 kg/m²     Assessment:      Diagnosis Orders   1. Intertrigo       No results found for this visit on 06/14/21. Plan:   · Topical powder as prescribed. · Keep area clean and dry. · Patient information given on Interigo and Clotrimazole. · Follow up with PCP if no improvement in 7-10 days.   · To ER or call 911 if any difficulty breathing, shortness of breath, inability to swallow, hives, rash, facial/tongue swelling or temp greater than 103 degrees. No follow-ups on file.     Orders Placed This Encounter   Medications    Clotrimazole POWD     Sig: Apply 1 spray topically 2 times daily for 10 days     Dispense:  100 g     Refill:  1        Electronically signed by CARO Kauffman CNP on 6/14/2021 at 10:53 AM

## 2021-07-03 DIAGNOSIS — J30.89 PERENNIAL ALLERGIC RHINITIS: ICD-10-CM

## 2021-07-06 RX ORDER — FLUTICASONE PROPIONATE 50 MCG
SPRAY, SUSPENSION (ML) NASAL
Qty: 1 BOTTLE | Refills: 1 | Status: SHIPPED | OUTPATIENT
Start: 2021-07-06 | End: 2021-08-03

## 2021-07-09 ENCOUNTER — TELEPHONE (OUTPATIENT)
Dept: PRIMARY CARE CLINIC | Age: 61
End: 2021-07-09

## 2021-07-09 NOTE — TELEPHONE ENCOUNTER
Xenia called the office in regards to [employer]'s nebulizer supplies. Xenia informed me that he is currently out of his nebulizer supplies. She contacted Dr. Sonia Root office but they are not in today to send the script to [facility]. Xenia was wondering if we could send the nebulizer supplies script over. He needs tubing, mask, and cups. Please advise.

## 2021-07-09 NOTE — TELEPHONE ENCOUNTER
----- Message from Junie Tate sent at 7/9/2021 10:52 AM EDT -----  Subject: Message to Provider    QUESTIONS  Information for Provider? Xenia, his caregiver, called to say she was able   to get a mask and treatment cups for his nebulizer. She does not need a   script now.  ---------------------------------------------------------------------------  --------------  CALL BACK INFO  What is the best way for the office to contact you? OK to leave message on   voicemail  Preferred Call Back Phone Number? [phone]  ---------------------------------------------------------------------------  --------------  SCRIPT ANSWERS  Relationship to Patient? Third Party  Representative Name?  Xenia

## 2021-07-09 NOTE — TELEPHONE ENCOUNTER
[facility] called the office and they were able to get the nebulizer supplies. They do not need a script anymore. Thank you.

## 2021-08-03 DIAGNOSIS — J30.89 PERENNIAL ALLERGIC RHINITIS: ICD-10-CM

## 2021-08-03 RX ORDER — FLUTICASONE PROPIONATE 50 MCG
SPRAY, SUSPENSION (ML) NASAL
Qty: 1 BOTTLE | Refills: 1 | Status: SHIPPED | OUTPATIENT
Start: 2021-08-03 | End: 2021-08-30

## 2021-08-19 DIAGNOSIS — F34.1 DYSTHYMIA: Primary | ICD-10-CM

## 2021-08-19 RX ORDER — SERTRALINE HYDROCHLORIDE 100 MG/1
TABLET, FILM COATED ORAL
Qty: 180 TABLET | Refills: 3 | Status: SHIPPED | OUTPATIENT
Start: 2021-08-19 | End: 2021-10-27 | Stop reason: SDUPTHER

## 2021-08-19 NOTE — TELEPHONE ENCOUNTER
Health Maintenance   Topic Date Due    Hepatitis B vaccine (1 of 3 - Risk 3-dose series) Never done    Shingles Vaccine (1 of 2) Never done    Low dose CT lung screening  08/07/2018    Diabetic retinal exam  07/25/2020    Diabetic foot exam  07/29/2021    Diabetic microalbuminuria test  07/29/2021    Lipid screen  07/28/2021    DTaP/Tdap/Td vaccine (1 - Tdap) 03/11/2022 (Originally 9/6/1980)    Hepatitis C screen  03/11/2022 (Originally 1960)    HIV screen  03/11/2022 (Originally 9/6/1976)    COVID-19 Vaccine (1) 06/14/2022 (Originally 9/6/1973)    Flu vaccine (1) 09/01/2021    A1C test (Diabetic or Prediabetic)  01/29/2022    Potassium monitoring  01/29/2022    Creatinine monitoring  01/29/2022    Colon cancer screen fecal DNA test (Cologuard)  04/04/2024    Pneumococcal 0-64 years Vaccine (2 of 2 - PPSV23) 09/06/2026    Hepatitis A vaccine  Aged Out    Hib vaccine  Aged Out    Meningococcal (ACWY) vaccine  Aged Out             (applicable per patient's age: Cancer Screenings, Depression Screening, Fall Risk Screening, Immunizations)    Hemoglobin A1C (%)   Date Value   01/29/2021 6.9 (H)   07/28/2020 5.8   08/28/2019 5.5     Microalb/Crt.  Ratio (mcg/mg creat)   Date Value   07/29/2020 CANNOT BE CALCULATED     LDL Cholesterol (mg/dL)   Date Value   07/28/2020 85     AST (U/L)   Date Value   09/30/2020 28     ALT (U/L)   Date Value   09/30/2020 28     BUN (mg/dL)   Date Value   01/29/2021 10      (goal A1C is < 7)   (goal LDL is <100) need 30-50% reduction from baseline     BP Readings from Last 3 Encounters:   06/14/21 138/78   03/11/21 122/66   10/28/20 124/78    (goal /80)      All Future Testing planned in [facility]:  Lab Frequency Next Occurrence   CT Lung Screen (Annual) Once 03/24/2022   CBC Auto Differential Once 09/07/2021   ALT Once 09/11/2021   AST Once 59/79/4065   Basic Metabolic Panel Once 15/56/2333   Hemoglobin A1C Once 09/07/2021   Lipid Panel Once 09/07/2021 Microalbumin, Ur Once 09/07/2021       Next Visit Date:  Future Appointments   Date Time Provider Jose Cindy   9/14/2021 11:00 AM CARO Pena - CNP [place] [facility]   9/23/2021 11:45 AM Nguyen Razo DO [place] [facility]   11/3/2021 10:00 AM Ana Kelley MD [facility]            Patient Active Problem List:     Gastroesophageal reflux disease     Allergic rhinitis     Controlled type 2 diabetes mellitus without complication, without long-term current use of insulin (HCC)     COPD, severe (HCC)     Dysthymia     Cerebral infarction (HCC)     Left-sided weakness

## 2021-08-29 DIAGNOSIS — J30.89 PERENNIAL ALLERGIC RHINITIS: ICD-10-CM

## 2021-08-30 RX ORDER — FLUTICASONE PROPIONATE 50 MCG
SPRAY, SUSPENSION (ML) NASAL
Qty: 1 BOTTLE | Refills: 1 | Status: SHIPPED | OUTPATIENT
Start: 2021-08-30 | End: 2021-09-15 | Stop reason: SDUPTHER

## 2021-09-01 DIAGNOSIS — F34.1 DYSTHYMIA: ICD-10-CM

## 2021-09-01 RX ORDER — GABAPENTIN 300 MG/1
CAPSULE ORAL
Qty: 60 CAPSULE | Refills: 2 | Status: SHIPPED | OUTPATIENT
Start: 2021-09-01 | End: 2021-09-08 | Stop reason: SDUPTHER

## 2021-09-01 NOTE — TELEPHONE ENCOUNTER
Health Maintenance   Topic Date Due    Hepatitis B vaccine (1 of 3 - Risk 3-dose series) Never done    Shingles Vaccine (1 of 2) Never done    Low dose CT lung screening  08/07/2018    Diabetic retinal exam  07/25/2020    Lipid screen  07/28/2021    Diabetic foot exam  07/29/2021    Diabetic microalbuminuria test  07/29/2021    Flu vaccine (1) 09/01/2021    DTaP/Tdap/Td vaccine (1 - Tdap) 03/11/2022 (Originally 9/6/1980)    Hepatitis C screen  03/11/2022 (Originally 1960)    HIV screen  03/11/2022 (Originally 9/6/1976)    COVID-19 Vaccine (1) 06/14/2022 (Originally 9/6/1973)    A1C test (Diabetic or Prediabetic)  01/29/2022    Potassium monitoring  01/29/2022    Creatinine monitoring  01/29/2022    Colon cancer screen fecal DNA test (Cologuard)  04/04/2024    Pneumococcal 0-64 years Vaccine (2 of 2 - PPSV23) 09/06/2026    Hepatitis A vaccine  Aged Out    Hib vaccine  Aged Out    Meningococcal (ACWY) vaccine  Aged Out             (applicable per patient's age: Cancer Screenings, Depression Screening, Fall Risk Screening, Immunizations)    Hemoglobin A1C (%)   Date Value   01/29/2021 6.9 (H)   07/28/2020 5.8   08/28/2019 5.5     Microalb/Crt.  Ratio (mcg/mg creat)   Date Value   07/29/2020 CANNOT BE CALCULATED     LDL Cholesterol (mg/dL)   Date Value   07/28/2020 85     AST (U/L)   Date Value   09/30/2020 28     ALT (U/L)   Date Value   09/30/2020 28     BUN (mg/dL)   Date Value   01/29/2021 10      (goal A1C is < 7)   (goal LDL is <100) need 30-50% reduction from baseline     BP Readings from Last 3 Encounters:   06/14/21 138/78   03/11/21 122/66   10/28/20 124/78    (goal /80)      All Future Testing planned in [facility]:  Lab Frequency Next Occurrence   CT Lung Screen (Annual) Once 03/24/2022   CBC Auto Differential Once 09/07/2021   ALT Once 09/11/2021   AST Once 03/19/5169   Basic Metabolic Panel Once 22/79/2678   Hemoglobin A1C Once 09/07/2021   Lipid Panel Once 09/07/2021

## 2021-09-08 DIAGNOSIS — F34.1 DYSTHYMIA: ICD-10-CM

## 2021-09-08 RX ORDER — GABAPENTIN 300 MG/1
CAPSULE ORAL
Qty: 180 CAPSULE | Refills: 0 | Status: SHIPPED | OUTPATIENT
Start: 2021-09-08 | End: 2021-10-27 | Stop reason: SDUPTHER

## 2021-09-08 NOTE — TELEPHONE ENCOUNTER
Health Maintenance   Topic Date Due    Shingles Vaccine (1 of 2) Never done    Low dose CT lung screening  08/07/2018    Diabetic retinal exam  07/25/2020    Lipid screen  07/28/2021    Diabetic foot exam  07/29/2021    Diabetic microalbuminuria test  07/29/2021    Flu vaccine (1) 09/01/2021    DTaP/Tdap/Td vaccine (1 - Tdap) 03/11/2022 (Originally 9/6/1980)    Hepatitis C screen  03/11/2022 (Originally 1960)    HIV screen  03/11/2022 (Originally 9/6/1976)    COVID-19 Vaccine (1) 06/14/2022 (Originally 9/6/1973)    A1C test (Diabetic or Prediabetic)  01/29/2022    Potassium monitoring  01/29/2022    Creatinine monitoring  01/29/2022    Colon cancer screen fecal DNA test (Cologuard)  04/04/2024    Pneumococcal 0-64 years Vaccine (2 of 2 - PPSV23) 09/06/2026    Hepatitis A vaccine  Aged Out    Hib vaccine  Aged Out    Meningococcal (ACWY) vaccine  Aged Out             (applicable per patient's age: Cancer Screenings, Depression Screening, Fall Risk Screening, Immunizations)    Hemoglobin A1C (%)   Date Value   01/29/2021 6.9 (H)   07/28/2020 5.8   08/28/2019 5.5     Microalb/Crt.  Ratio (mcg/mg creat)   Date Value   07/29/2020 CANNOT BE CALCULATED     LDL Cholesterol (mg/dL)   Date Value   07/28/2020 85     AST (U/L)   Date Value   09/30/2020 28     ALT (U/L)   Date Value   09/30/2020 28     BUN (mg/dL)   Date Value   01/29/2021 10      (goal A1C is < 7)   (goal LDL is <100) need 30-50% reduction from baseline     BP Readings from Last 3 Encounters:   06/14/21 138/78   03/11/21 122/66   10/28/20 124/78    (goal /80)      All Future Testing planned in [facility]:  Lab Frequency Next Occurrence   CT Lung Screen (Annual) Once 03/24/2022   CBC Auto Differential Once 09/07/2021   ALT Once 09/11/2021   AST Once 33/74/4005   Basic Metabolic Panel Once 84/09/9343   Hemoglobin A1C Once 09/07/2021   Lipid Panel Once 09/07/2021   Microalbumin, Ur Once 09/07/2021       Next Visit Date:  Future Appointments   Date Time Provider Jose White   9/14/2021 11:00 AM Rafael Adkins APRN - CNP [place] [facility]   9/23/2021 11:45 AM Kaitlin Anderson DO [place] [facility]   11/3/2021 10:00 AM Wilmar Alvarez MD [facility]            Patient Active Problem List:     Gastroesophageal reflux disease     Allergic rhinitis     Controlled type 2 diabetes mellitus without complication, without long-term current use of insulin (HCC)     COPD, severe (HCC)     Dysthymia     Cerebral infarction (HCC)     Left-sided weakness

## 2021-09-09 ENCOUNTER — HOSPITAL ENCOUNTER (OUTPATIENT)
Age: 61
Discharge: HOME | End: 2021-09-09
Payer: MEDICAID

## 2021-09-09 ENCOUNTER — TELEPHONE (OUTPATIENT)
Dept: PRIMARY CARE CLINIC | Age: 61
End: 2021-09-09

## 2021-09-09 DIAGNOSIS — E11.9 CONTROLLED TYPE 2 DIABETES MELLITUS WITHOUT COMPLICATION, WITHOUT LONG-TERM CURRENT USE OF INSULIN (HCC): ICD-10-CM

## 2021-09-09 LAB
ABSOLUTE EOS #: 0.2 K/UL (ref 0–0.44)
ABSOLUTE IMMATURE GRANULOCYTE: 0.03 K/UL (ref 0–0.3)
ABSOLUTE LYMPH #: 1.96 K/UL (ref 1.1–3.7)
ABSOLUTE MONO #: 0.39 K/UL (ref 0.1–1.2)
ALT SERPL-CCNC: 20 U/L (ref 5–41)
ANION GAP SERPL CALCULATED.3IONS-SCNC: 16 MMOL/L (ref 9–17)
AST SERPL-CCNC: 18 U/L
BASOPHILS # BLD: 1 % (ref 0–2)
BASOPHILS ABSOLUTE: 0.04 K/UL (ref 0–0.2)
BUN BLDV-MCNC: 12 MG/DL (ref 8–23)
BUN/CREAT BLD: 12 (ref 9–20)
CALCIUM SERPL-MCNC: 9.3 MG/DL (ref 8.6–10.4)
CHLORIDE BLD-SCNC: 99 MMOL/L (ref 98–107)
CO2: 25 MMOL/L (ref 20–31)
CREAT SERPL-MCNC: 1.03 MG/DL (ref 0.7–1.2)
CREATININE URINE: 104 MG/DL (ref 39–259)
DIFFERENTIAL TYPE: ABNORMAL
EOSINOPHILS RELATIVE PERCENT: 2 % (ref 1–4)
GFR AFRICAN AMERICAN: >60 ML/MIN
GFR NON-AFRICAN AMERICAN: >60 ML/MIN
GFR SERPL CREATININE-BSD FRML MDRD: ABNORMAL ML/MIN/{1.73_M2}
GFR SERPL CREATININE-BSD FRML MDRD: ABNORMAL ML/MIN/{1.73_M2}
GLUCOSE BLD-MCNC: 103 MG/DL (ref 70–99)
HCT VFR BLD CALC: 40.5 % (ref 40.7–50.3)
HEMOGLOBIN: 12.7 G/DL (ref 13–17)
IMMATURE GRANULOCYTES: 0 %
LYMPHOCYTES # BLD: 23 % (ref 24–43)
MCH RBC QN AUTO: 27.4 PG (ref 25.2–33.5)
MCHC RBC AUTO-ENTMCNC: 31.4 G/DL (ref 28.4–34.8)
MCV RBC AUTO: 87.3 FL (ref 82.6–102.9)
MICROALBUMIN/CREAT 24H UR: <12 MG/L
MICROALBUMIN/CREAT UR-RTO: NORMAL MCG/MG CREAT
MONOCYTES # BLD: 5 % (ref 3–12)
NRBC AUTOMATED: 0 PER 100 WBC
PDW BLD-RTO: 15.7 % (ref 11.8–14.4)
PLATELET # BLD: 219 K/UL (ref 138–453)
PLATELET ESTIMATE: ABNORMAL
PMV BLD AUTO: 10.1 FL (ref 8.1–13.5)
POTASSIUM SERPL-SCNC: 4 MMOL/L (ref 3.7–5.3)
RBC # BLD: 4.64 M/UL (ref 4.21–5.77)
RBC # BLD: ABNORMAL 10*6/UL
SEG NEUTROPHILS: 69 % (ref 36–65)
SEGMENTED NEUTROPHILS ABSOLUTE COUNT: 5.83 K/UL (ref 1.5–8.1)
SODIUM BLD-SCNC: 140 MMOL/L (ref 135–144)
WBC # BLD: 8.5 K/UL (ref 3.5–11.3)
WBC # BLD: ABNORMAL 10*3/UL

## 2021-09-09 PROCEDURE — 85025 COMPLETE CBC W/AUTO DIFF WBC: CPT

## 2021-09-09 PROCEDURE — 82043 UR ALBUMIN QUANTITATIVE: CPT

## 2021-09-09 PROCEDURE — 80061 LIPID PANEL: CPT

## 2021-09-09 PROCEDURE — 82570 ASSAY OF URINE CREATININE: CPT

## 2021-09-09 PROCEDURE — 84450 TRANSFERASE (AST) (SGOT): CPT

## 2021-09-09 PROCEDURE — 84460 ALANINE AMINO (ALT) (SGPT): CPT

## 2021-09-09 PROCEDURE — 80048 BASIC METABOLIC PNL TOTAL CA: CPT

## 2021-09-09 PROCEDURE — 36415 COLL VENOUS BLD VENIPUNCTURE: CPT

## 2021-09-09 PROCEDURE — 83036 HEMOGLOBIN GLYCOSYLATED A1C: CPT

## 2021-09-09 NOTE — TELEPHONE ENCOUNTER
----- Message from CARO Dunlap CNP sent at 9/9/2021 12:22 PM EDT -----  Please notify patient of normal lab results.   Thanks Joe Caputo

## 2021-09-09 NOTE — TELEPHONE ENCOUNTER
----- Message from CARO Philippe CNP sent at 9/9/2021 12:22 PM EDT -----  Please notify patient of normal lab results.   Thanks Piotr Alves

## 2021-09-10 LAB
CHOLESTEROL/HDL RATIO: 4.6
CHOLESTEROL: 143 MG/DL
ESTIMATED AVERAGE GLUCOSE: 123 MG/DL
HBA1C MFR BLD: 5.9 % (ref 4–6)
HDLC SERPL-MCNC: 31 MG/DL
LDL CHOLESTEROL: 63 MG/DL (ref 0–130)
TRIGL SERPL-MCNC: 245 MG/DL
VLDLC SERPL CALC-MCNC: ABNORMAL MG/DL (ref 1–30)

## 2021-09-13 ENCOUNTER — TELEPHONE (OUTPATIENT)
Dept: PRIMARY CARE CLINIC | Age: 61
End: 2021-09-13

## 2021-09-13 NOTE — TELEPHONE ENCOUNTER
----- Message from CARO Marie CNP sent at 9/12/2021  9:34 AM EDT -----  Please notify patient of normal lab results.   Thanks [facility]

## 2021-09-15 DIAGNOSIS — J30.89 PERENNIAL ALLERGIC RHINITIS: ICD-10-CM

## 2021-09-15 RX ORDER — FLUTICASONE PROPIONATE 50 MCG
SPRAY, SUSPENSION (ML) NASAL
Qty: 3 EACH | Refills: 3 | Status: SHIPPED | OUTPATIENT
Start: 2021-09-15

## 2021-10-23 DIAGNOSIS — J44.9 COPD, SEVERE (HCC): ICD-10-CM

## 2021-10-25 RX ORDER — BUDESONIDE 0.5 MG/2ML
INHALANT ORAL
Qty: 120 ML | Refills: 11 | Status: SHIPPED | OUTPATIENT
Start: 2021-10-25 | End: 2021-10-28 | Stop reason: SDUPTHER

## 2021-10-27 ENCOUNTER — OFFICE VISIT (OUTPATIENT)
Dept: PRIMARY CARE CLINIC | Age: 61
End: 2021-10-27
Payer: MEDICAID

## 2021-10-27 VITALS
SYSTOLIC BLOOD PRESSURE: 132 MMHG | OXYGEN SATURATION: 95 % | DIASTOLIC BLOOD PRESSURE: 80 MMHG | RESPIRATION RATE: 24 BRPM | BODY MASS INDEX: 27.44 KG/M2 | WEIGHT: 170 LBS | HEART RATE: 92 BPM | TEMPERATURE: 97.8 F

## 2021-10-27 DIAGNOSIS — R60.0 LOWER LEG EDEMA: ICD-10-CM

## 2021-10-27 DIAGNOSIS — Z23 NEED FOR INFLUENZA VACCINATION: ICD-10-CM

## 2021-10-27 DIAGNOSIS — K21.9 GASTROESOPHAGEAL REFLUX DISEASE WITHOUT ESOPHAGITIS: ICD-10-CM

## 2021-10-27 DIAGNOSIS — E11.9 CONTROLLED TYPE 2 DIABETES MELLITUS WITHOUT COMPLICATION, WITHOUT LONG-TERM CURRENT USE OF INSULIN (HCC): Primary | ICD-10-CM

## 2021-10-27 PROCEDURE — 99214 OFFICE O/P EST MOD 30 MIN: CPT | Performed by: NURSE PRACTITIONER

## 2021-10-27 PROCEDURE — 90471 IMMUNIZATION ADMIN: CPT | Performed by: NURSE PRACTITIONER

## 2021-10-27 PROCEDURE — 90674 CCIIV4 VAC NO PRSV 0.5 ML IM: CPT | Performed by: NURSE PRACTITIONER

## 2021-10-27 RX ORDER — SIMVASTATIN 20 MG
20 TABLET ORAL NIGHTLY
Qty: 90 TABLET | Refills: 3 | Status: SHIPPED | OUTPATIENT
Start: 2021-10-27

## 2021-10-27 RX ORDER — LEVETIRACETAM 500 MG/1
TABLET ORAL
Qty: 90 TABLET | Refills: 3 | Status: SHIPPED | OUTPATIENT
Start: 2021-10-27

## 2021-10-27 RX ORDER — FOLIC ACID 1 MG/1
1 TABLET ORAL DAILY
Qty: 90 TABLET | Refills: 3 | Status: SHIPPED | OUTPATIENT
Start: 2021-10-27

## 2021-10-27 RX ORDER — FUROSEMIDE 20 MG/1
20 TABLET ORAL 2 TIMES DAILY
Qty: 180 TABLET | Refills: 3 | Status: SHIPPED | OUTPATIENT
Start: 2021-10-27

## 2021-10-27 RX ORDER — OMEPRAZOLE 40 MG/1
CAPSULE, DELAYED RELEASE ORAL
Qty: 90 CAPSULE | Refills: 3 | Status: SHIPPED | OUTPATIENT
Start: 2021-10-27

## 2021-10-27 RX ORDER — LANCETS 30 GAUGE
1 EACH MISCELLANEOUS DAILY
Qty: 100 EACH | Refills: 3 | Status: SHIPPED | OUTPATIENT
Start: 2021-10-27

## 2021-10-27 RX ORDER — GABAPENTIN 300 MG/1
CAPSULE ORAL
Qty: 180 CAPSULE | Refills: 0 | Status: SHIPPED | OUTPATIENT
Start: 2021-10-27 | End: 2022-06-08

## 2021-10-27 RX ORDER — BACLOFEN 10 MG/1
10 TABLET ORAL 2 TIMES DAILY
Qty: 180 TABLET | Refills: 3 | Status: SHIPPED | OUTPATIENT
Start: 2021-10-27

## 2021-10-27 RX ORDER — MULTIVITAMIN/IRON/FOLIC ACID 18MG-0.4MG
TABLET ORAL
Qty: 90 TABLET | Refills: 3 | Status: SHIPPED | OUTPATIENT
Start: 2021-10-27

## 2021-10-27 RX ORDER — SERTRALINE HYDROCHLORIDE 100 MG/1
200 TABLET, FILM COATED ORAL DAILY
Qty: 180 TABLET | Refills: 3 | Status: SHIPPED | OUTPATIENT
Start: 2021-10-27

## 2021-10-27 RX ORDER — CALCIUM CITRATE/VITAMIN D3 200MG-6.25
TABLET ORAL
Qty: 100 STRIP | Refills: 3 | Status: SHIPPED | OUTPATIENT
Start: 2021-10-27

## 2021-10-27 RX ORDER — CETIRIZINE HYDROCHLORIDE 10 MG/1
10 TABLET ORAL DAILY
Qty: 90 TABLET | Refills: 3 | Status: SHIPPED | OUTPATIENT
Start: 2021-10-27

## 2021-10-27 ASSESSMENT — ENCOUNTER SYMPTOMS
HEARTBURN: 1
FREQUENT THROAT CLEARING: 0
RHINORRHEA: 0
HEMOPTYSIS: 0
GLOBUS SENSATION: 0
CONSTIPATION: 0
SHORTNESS OF BREATH: 0
DIFFICULTY BREATHING: 0
ABDOMINAL PAIN: 0
CHEST TIGHTNESS: 0
VOMITING: 0
BELCHING: 0
CHOKING: 0
COUGH: 0
DIARRHEA: 0
SORE THROAT: 0
NAUSEA: 0
WHEEZING: 0
SPUTUM PRODUCTION: 0

## 2021-10-27 ASSESSMENT — COPD QUESTIONNAIRES: COPD: 1

## 2021-10-27 NOTE — PROGRESS NOTES
After obtaining consent, and per orders of Adrian Adkins CNP, injection of Flu given in Left deltoid by Milton Denney LPN. Patient instructed to remain in clinic for 20 minutes afterwards, and to report any adverse reaction to me immediately. Vaccine Information Sheet, \"Influenza - Inactivated\"  given to Ashlee Mata., or parent/legal guardian of  Ashlee Mata. and verbalized understanding. Patient responses:    Have you ever had a reaction to a flu vaccine? No  Are you able to eat eggs without adverse effects? Yes  Do you have any current illness? No  Have you ever had [place] Syndrome? No    Flu vaccine given per order. Please see immunization tab.

## 2021-10-27 NOTE — PROGRESS NOTES
Name: Chris Toribio : 1960         Chief Complaint:     Chief Complaint   Patient presents with    Diabetes     routine check       History of Present Illness:      Chris Toribio is a 62 y.o.  male who presents with Diabetes (routine check)      Paul Carreno is here today with his wife and caregiver for a routine office visit. Lower leg edema-stable, patient using Ines with good result. Labs are stable. Diabetes  He presents for his follow-up diabetic visit. He has type 2 diabetes mellitus. His disease course has been stable. Hypoglycemia symptoms include speech difficulty (chronic). Pertinent negatives for hypoglycemia include no dizziness or headaches. Pertinent negatives for diabetes include no chest pain, no fatigue, no foot ulcerations, no polydipsia, no polyphagia, no polyuria and no weight loss. There are no hypoglycemic complications. Symptoms are stable. Diabetic complications include a CVA and peripheral neuropathy. Pertinent negatives for diabetic complications include no heart disease. Risk factors for coronary artery disease include diabetes mellitus, male sex and sedentary lifestyle. Current diabetic treatment includes oral agent (monotherapy). He is compliant with treatment all of the time. His weight is stable. He is following a generally healthy diet. When asked about meal planning, he reported none. He has had a previous visit with a dietitian. He never participates in exercise. There is no change in his home blood glucose trend. His breakfast blood glucose range is generally 130-140 mg/dl. An ACE inhibitor/angiotensin II receptor blocker is being taken. He does not see a podiatrist.Eye exam is not current. Gastroesophageal Reflux  He complains of heartburn. He reports no abdominal pain, no belching, no chest pain, no choking, no coughing, no early satiety, no globus sensation, no nausea, no sore throat or no wheezing. This is a chronic problem.  The current episode started more than 1 year ago. The problem occurs rarely. The problem has been unchanged. The heartburn duration is less than a minute. The heartburn is located in the substernum. The heartburn is of mild intensity. The heartburn does not wake him from sleep. The heartburn does not limit his activity. The heartburn doesn't change with position. The symptoms are aggravated by certain foods and caffeine. Pertinent negatives include no fatigue, melena or weight loss. Risk factors include lack of exercise and caffeine use. He has tried a PPI for the symptoms. The treatment provided significant relief. Past procedures include an EGD. Past procedures do not include a UGI. Past invasive treatments do not include gastroplasty, gastroplication or reflux surgery. Past Medical History:     Past Medical History:   Diagnosis Date    Acid reflux     Asthma     Cerebrovascular disease     Chronic cough     Controlled type 2 diabetes mellitus without complication, without long-term current use of insulin (HCC) 2/9/2018    COPD [facility]chronic obstructive pulmonary disease) ([facility])     Dysphagia     Dyspnea     Type II or unspecified type diabetes mellitus without mention of complication, not stated as uncontrolled     Unspecified cerebral artery occlusion with cerebral infarction 11/05/2008      Reviewed all health maintenance requirements and ordered appropriate tests  Health Maintenance Due   Topic Date Due    Shingles Vaccine (1 of 2) Never done    Low dose CT lung screening  08/07/2018       Past Surgical History:     Past Surgical History:   Procedure Laterality Date    BRAIN SURGERY      CRANIOTOMY      KNEE SURGERY          Medications:       Prior to Admission medications    Medication Sig Start Date End Date Taking?  Authorizing Provider   gabapentin (NEURONTIN) 300 MG capsule TAKE 1 CAPSULE BY MOUTH TWICE A DAY 10/27/21 1/25/22 Yes Adrian Adkins, APRN - CNP   sertraline (ZOLOFT) 100 MG tablet Take 2 tablets by mouth daily 10/27/21  Yes CARO Mcdonough CNP   blood glucose test strips (TRUE METRIX BLOOD GLUCOSE TEST) strip As needed. 10/27/21  Yes CARO Mcdonough CNP   simvastatin (ZOCOR) 20 MG tablet Take 1 tablet by mouth nightly 10/27/21  Yes CARO Mcdonough CNP   folic acid (FOLVITE) 1 MG tablet Take 1 tablet by mouth daily 10/27/21  Yes CARO Mcdonough CNP   cetirizine (ZYRTEC) 10 MG tablet Take 1 tablet by mouth daily 10/27/21  Yes CARO Mcdonough CNP   Multiple Vitamins-Minerals ([facility] SPECTRAVITE ADVANCED) TABS TAKE 1 TABLET BY MOUTH EVERY DAY 10/27/21  Yes CARO Mcdonough CNP   levETIRAcetam (KEPPRA) 500 MG tablet Take 1/2 tablet, PO, BID 10/27/21  Yes CARO Mcdonough CNP   metFORMIN (GLUCOPHAGE) 1000 MG tablet TAKE 1 TABLET BY MOUTH TWICE DAILY 10/27/21  Yes CARO Mcdonough CNP   baclofen (LIORESAL) 10 MG tablet Take 1 tablet by mouth 2 times daily 10/27/21  Yes CARO Mcdonough CNP   omeprazole (PRILOSEC) 40 MG delayed release capsule TAKE 1 CAPSULE BY MOUTH EVERY DAY 10/27/21  Yes CARO Mcdonough CNP   Lancets MISC 1 each by Does not apply route daily DISPENSE BRAND COMPATIBLE WITH METER AND STRIPS TRUE METRIX 10/27/21  Yes CARO Mcdonough CNP   furosemide (LASIX) 20 MG tablet Take 1 tablet by mouth 2 times daily 10/27/21  Yes CARO Mcdonough CNP   budesonide (PULMICORT) 0.5 MG/2ML nebulizer suspension USE 1 VIAL VIA NEBULIZER MACHINE TWO TIMES DANDRE 10/25/21  Yes CARO Arroyo CNP   fluticasone (FLONASE) 50 MCG/ACT nasal spray INSTILL 2 SPRAYS INTO EACH NOSTRIL EVERY DAY 9/15/21  Yes Angele Rinne, MD   ipratropium-albuterol (DUONEB) 0.5-2.5 (3) MG/3ML SOLN nebulizer solution INHALE 3 MLS PER NEBULIZER INTO THE LUNGS EVERY 6 HOURS 9/24/20  Yes Beatriz Franklin, CARO - CNP   Misc. Devices (ADJUST BATH/SHOWER SEAT/BACK) MISC 1 each by Does not apply route daily 10/17/19  Yes Sharona Adkins, CARO - CNP   Misc.  Devices (COMMODE BEDSIDE) MISC 1 each by Does not apply route daily 10/17/19  Yes CARO Kolb CNP   Misc. Devices (NOVA CUSHION GEL SEAT PAD) MISC 1 each by Does not apply route daily 3/28/19  Yes CARO Kolb CNP   blood glucose monitor kit and supplies 1 kit by Other route daily Test 1 times a day & as needed for symptoms of irregular blood glucose. 8/6/18  Yes Toryradha SpringCARO CNP   senna (SENOKOT) 8.6 MG TABS tablet TAKE 1 TABLET BY MOUTH TWICE DAILY 11/17/17  Yes CARO Kolb CNP   [facility] ASPIRIN CHILD 81 MG chewable tablet TAKE 1 TABLET BY MOUTH DAILY 6/28/17  Yes CARO Kolb CNP   lisinopril (PRINIVIL;ZESTRIL) 5 MG tablet Take 5 mg by mouth daily   Yes Historical Provider, MD        Allergies:       Pcn [penicillins], Morphine, and Sulfa antibiotics    Social History:     Tobacco:    reports that he quit smoking about 13 years ago. His smoking use included cigarettes. He has a 45.00 pack-year smoking history. He has never used smokeless tobacco.  Alcohol:      reports no history of alcohol use. Drug Use:  reports no history of drug use. Family History:     Family History   Problem Relation Age of Onset   Olivares Cancer Mother     Diabetes Mother     Heart Failure Father     Diabetes Father     Diabetes Sister        Review of Systems:     Positive and Negative as described in HPI    Review of Systems   Constitutional: Negative for chills, fatigue, fever and weight loss. HENT: Negative for congestion, rhinorrhea and sore throat. Eyes: Negative for visual disturbance. Respiratory: Negative for cough, hemoptysis, sputum production, choking, shortness of breath and wheezing. Cardiovascular: Negative for chest pain and palpitations. Gastrointestinal: Positive for heartburn. Negative for abdominal pain, constipation, diarrhea, melena, nausea and vomiting. Endocrine: Negative for polydipsia, polyphagia and polyuria. Genitourinary: Negative for difficulty urinating and dysuria. Musculoskeletal: Positive for gait problem (chronic). Negative for neck pain and neck stiffness. Skin: Negative for rash. Neurological: Positive for speech difficulty (chronic). Negative for dizziness, syncope, light-headedness and headaches. Physical Exam:   Vitals:  /80 (Position: Sitting)   Pulse 92   Temp 97.8 °F (36.6 °C) (Temporal)   Resp 24   Wt 170 lb (77.1 kg) Comment: per wife  SpO2 95%   BMI 27.44 kg/m²     Physical Exam  Vitals and nursing note reviewed. Constitutional:       General: He is not in acute distress. Appearance: Normal appearance. He is well-developed. He is not ill-appearing. HENT:      Mouth/Throat:      Mouth: Mucous membranes are moist.      Dentition: Abnormal dentition (poor). Pharynx: No posterior oropharyngeal erythema. Eyes:      General: No scleral icterus. Conjunctiva/sclera: Conjunctivae normal.   Cardiovascular:      Rate and Rhythm: Normal rate and regular rhythm. Heart sounds: Normal heart sounds. No murmur heard. Pulmonary:      Effort: Pulmonary effort is normal.      Breath sounds: Normal breath sounds. No wheezing or rales. Abdominal:      General: Bowel sounds are normal. There is no distension. Palpations: Abdomen is soft. Tenderness: There is no abdominal tenderness. Musculoskeletal:      Cervical back: Normal range of motion and neck supple. Right lower leg: Edema (trace pitting) present. Left lower leg: Edema (trace pitting) present. Lymphadenopathy:      Cervical: No cervical adenopathy. Skin:     General: Skin is warm and dry. Findings: No rash. Neurological:      Mental Status: He is alert and oriented to person, place, and time. Mental status is at baseline. Coordination: Coordination abnormal (left hemiplegia).    Psychiatric:         Mood and Affect: Mood normal.         Behavior: Behavior normal.         Data:     Lab Results   Component Value Date     09/09/2021 K 4.0 09/09/2021    CL 99 09/09/2021    CO2 25 09/09/2021    BUN 12 09/09/2021    CREATININE 1.03 09/09/2021    GLUCOSE 103 09/09/2021    PROT 7.9 09/30/2020    LABALBU 4.8 09/30/2020    BILITOT 0.27 09/30/2020    ALKPHOS 93 09/30/2020    AST 18 09/09/2021    ALT 20 09/09/2021     Lab Results   Component Value Date    WBC 8.5 09/09/2021    RBC 4.64 09/09/2021    HGB 12.7 09/09/2021    HCT 40.5 09/09/2021    MCV 87.3 09/09/2021    MCH 27.4 09/09/2021    MCHC 31.4 09/09/2021    RDW 15.7 09/09/2021     09/09/2021    MPV 10.1 09/09/2021     Lab Results   Component Value Date    TSH 2.65 05/23/2014     Lab Results   Component Value Date    CHOL 143 09/09/2021    HDL 31 09/09/2021    PSA 0.84 05/23/2014    LABA1C 5.9 09/09/2021       Assessment/Plan:      Diagnosis Orders   1. Controlled type 2 diabetes mellitus without complication, without long-term current use of insulin (HCC)  blood glucose test strips (TRUE METRIX BLOOD GLUCOSE TEST) strip    metFORMIN (GLUCOPHAGE) 1000 MG tablet    Lancets MISC    Basic Metabolic Panel    Hemoglobin A1C   2. Gastroesophageal reflux disease  omeprazole (PRILOSEC) 40 MG delayed release capsule   3. Lower leg edema  furosemide (LASIX) 20 MG tablet   4. Need for influenza vaccination  INFLUENZA, MDCK QUADV, 2 YRS AND OLDER, IM, PF, PREFILL SYR OR SDV, 0.5ML (FLUCELVAX QUADV, PF)     Continue all current medications. Labs are stable. Strongly encourage Covid vaccination. We will see him back in 6 months with repeat labs, sooner if any problems. 1.  Daniela Chawla received counseling on the following healthy behaviors: nutrition, exercise and medication adherence  2. Patient given educational materials - see patient instructions  3. Was a self-tracking handout given in paper form or via [employer]? No  If yes, see orders or list here. 4.  Discussed use, benefit, and side effects of prescribed medications. Barriers to medication compliance addressed.   All patient questions answered. Pt voiced understanding. 5.  Reviewed prior labs and health maintenance  6. Continue current medications, diet and exercise. Completed Refills   Requested Prescriptions     Signed Prescriptions Disp Refills    gabapentin (NEURONTIN) 300 MG capsule 180 capsule 0     Sig: TAKE 1 CAPSULE BY MOUTH TWICE A DAY    sertraline (ZOLOFT) 100 MG tablet 180 tablet 3     Sig: Take 2 tablets by mouth daily    blood glucose test strips (TRUE METRIX BLOOD GLUCOSE TEST) strip 100 strip 3     Sig: As needed.  simvastatin (ZOCOR) 20 MG tablet 90 tablet 3     Sig: Take 1 tablet by mouth nightly    folic acid (FOLVITE) 1 MG tablet 90 tablet 3     Sig: Take 1 tablet by mouth daily    cetirizine (ZYRTEC) 10 MG tablet 90 tablet 3     Sig: Take 1 tablet by mouth daily    Multiple Vitamins-Minerals ([facility]S SPECTRAVITE ADVANCED) TABS 90 tablet 3     Sig: TAKE 1 TABLET BY MOUTH EVERY DAY    levETIRAcetam (KEPPRA) 500 MG tablet 90 tablet 3     Sig: Take 1/2 tablet, PO, BID    metFORMIN (GLUCOPHAGE) 1000 MG tablet 180 tablet 3     Sig: TAKE 1 TABLET BY MOUTH TWICE DAILY    baclofen (LIORESAL) 10 MG tablet 180 tablet 3     Sig: Take 1 tablet by mouth 2 times daily    omeprazole (PRILOSEC) 40 MG delayed release capsule 90 capsule 3     Sig: TAKE 1 CAPSULE BY MOUTH EVERY DAY    Lancets MISC 100 each 3     Si each by Does not apply route daily DISPENSE BRAND COMPATIBLE WITH METER AND STRIPS TRUE METRIX    furosemide (LASIX) 20 MG tablet 180 tablet 3     Sig: Take 1 tablet by mouth 2 times daily         Return in about 6 months (around 2022) for Check up.

## 2021-10-28 ENCOUNTER — OFFICE VISIT (OUTPATIENT)
Dept: PULMONOLOGY | Age: 61
End: 2021-10-28
Payer: MEDICAID

## 2021-10-28 VITALS
WEIGHT: 170 LBS | SYSTOLIC BLOOD PRESSURE: 131 MMHG | BODY MASS INDEX: 27.32 KG/M2 | HEART RATE: 100 BPM | DIASTOLIC BLOOD PRESSURE: 70 MMHG | HEIGHT: 66 IN | OXYGEN SATURATION: 94 % | RESPIRATION RATE: 20 BRPM | TEMPERATURE: 98.5 F

## 2021-10-28 DIAGNOSIS — I63.9 CEREBROVASCULAR ACCIDENT (CVA), UNSPECIFIED MECHANISM (HCC): ICD-10-CM

## 2021-10-28 DIAGNOSIS — Z28.21 COVID-19 VACCINATION REFUSED: ICD-10-CM

## 2021-10-28 DIAGNOSIS — J44.9 COPD, SEVERE (HCC): ICD-10-CM

## 2021-10-28 DIAGNOSIS — Z87.891 PERSONAL HISTORY OF TOBACCO USE: ICD-10-CM

## 2021-10-28 DIAGNOSIS — J44.1 COPD WITH ACUTE EXACERBATION (HCC): Primary | ICD-10-CM

## 2021-10-28 PROCEDURE — 99213 OFFICE O/P EST LOW 20 MIN: CPT | Performed by: INTERNAL MEDICINE

## 2021-10-28 PROCEDURE — 99214 OFFICE O/P EST MOD 30 MIN: CPT | Performed by: INTERNAL MEDICINE

## 2021-10-28 PROCEDURE — G0296 VISIT TO DETERM LDCT ELIG: HCPCS | Performed by: INTERNAL MEDICINE

## 2021-10-28 RX ORDER — IPRATROPIUM BROMIDE AND ALBUTEROL SULFATE 2.5; .5 MG/3ML; MG/3ML
SOLUTION RESPIRATORY (INHALATION)
Qty: 1080 ML | Refills: 3 | Status: SHIPPED | OUTPATIENT
Start: 2021-10-28

## 2021-10-28 RX ORDER — AZITHROMYCIN 250 MG/1
250 TABLET, FILM COATED ORAL SEE ADMIN INSTRUCTIONS
Qty: 6 TABLET | Refills: 0 | Status: SHIPPED | OUTPATIENT
Start: 2021-10-28 | End: 2021-11-02

## 2021-10-28 RX ORDER — BUDESONIDE 0.5 MG/2ML
1 INHALANT ORAL 2 TIMES DAILY
Qty: 120 ML | Refills: 11 | Status: SHIPPED | OUTPATIENT
Start: 2021-10-28 | End: 2022-10-28

## 2021-10-28 ASSESSMENT — ENCOUNTER SYMPTOMS
SHORTNESS OF BREATH: 1
COUGH: 1
EYES NEGATIVE: 1

## 2021-10-28 NOTE — PROGRESS NOTES
Subjective:      Patient ID: Harley Dowell. is a 62 y.o. male being seen in my clinic for   Chief Complaint   Patient presents with    COPD     yearly       HPI  Follow-up visit for COPD. Since his last visit a year ago he is pretty much been wheelchair-bound. Daughter states that because of his stroke 10 years ago he has a dense left hemiparesis. Remains on DuoNeb aerosols, and Pulmicort. Needs refills. Refuses Covid vaccine. Stressed importance, safety, and efficacy however patient not persuaded. According to our records, patient has not had a low-dose screening CT scan of the chest since 2017 (category 1). One was ordered a year ago. Daughter claims that the patient actually had this done at [facility] but I can find no record of same. Daughter insists that he had it done. Review of Systems   Constitutional: Negative. HENT: Negative. Eyes: Negative. Respiratory: Positive for cough and shortness of breath. Cardiovascular: Negative. Musculoskeletal: Positive for gait problem. Neurological: Positive for facial asymmetry and weakness. All other systems reviewed and are negative.       Objective:     Vitals:    10/28/21 0943   BP: 131/70   Site: Right Upper Arm   Position: Sitting   Cuff Size: Medium Adult   Pulse: 100   Resp: 20   Temp: 98.5 °F (36.9 °C)   TempSrc: Temporal   SpO2: 94%   Weight: 170 lb (77.1 kg)   Height: 5' 6\" (1.676 m)     Current Outpatient Medications   Medication Sig Dispense Refill    ipratropium-albuterol (DUONEB) 0.5-2.5 (3) MG/3ML SOLN nebulizer solution INHALE 3 MLS PER NEBULIZER INTO THE LUNGS EVERY 6 HOURS 1080 mL 3    budesonide (PULMICORT) 0.5 MG/2ML nebulizer suspension Take 2 mLs by nebulization 2 times daily 120 mL 11    azithromycin (ZITHROMAX) 250 MG tablet Take 1 tablet by mouth See Admin Instructions for 5 days 500mg on day 1 followed by 250mg on days 2 - 5 6 tablet 0    gabapentin (NEURONTIN) 300 MG capsule TAKE 1 CAPSULE BY MOUTH TWICE A  capsule 0    sertraline (ZOLOFT) 100 MG tablet Take 2 tablets by mouth daily 180 tablet 3    simvastatin (ZOCOR) 20 MG tablet Take 1 tablet by mouth nightly 90 tablet 3    folic acid (FOLVITE) 1 MG tablet Take 1 tablet by mouth daily 90 tablet 3    cetirizine (ZYRTEC) 10 MG tablet Take 1 tablet by mouth daily 90 tablet 3    Multiple Vitamins-Minerals (CVS SPECTRAVITE ADVANCED) TABS TAKE 1 TABLET BY MOUTH EVERY DAY 90 tablet 3    levETIRAcetam (KEPPRA) 500 MG tablet Take 1/2 tablet, PO, BID 90 tablet 3    metFORMIN (GLUCOPHAGE) 1000 MG tablet TAKE 1 TABLET BY MOUTH TWICE DAILY 180 tablet 3    baclofen (LIORESAL) 10 MG tablet Take 1 tablet by mouth 2 times daily 180 tablet 3    omeprazole (PRILOSEC) 40 MG delayed release capsule TAKE 1 CAPSULE BY MOUTH EVERY DAY 90 capsule 3    furosemide (LASIX) 20 MG tablet Take 1 tablet by mouth 2 times daily 180 tablet 3    fluticasone (FLONASE) 50 MCG/ACT nasal spray INSTILL 2 SPRAYS INTO EACH NOSTRIL EVERY DAY 3 each 3    senna (SENOKOT) 8.6 MG TABS tablet TAKE 1 TABLET BY MOUTH TWICE DAILY 180 tablet 3    CVS ASPIRIN CHILD 81 MG chewable tablet TAKE 1 TABLET BY MOUTH DAILY 90 tablet 0    lisinopril (PRINIVIL;ZESTRIL) 5 MG tablet Take 5 mg by mouth daily      blood glucose test strips (TRUE METRIX BLOOD GLUCOSE TEST) strip As needed. 100 strip 3    Lancets MISC 1 each by Does not apply route daily DISPENSE BRAND COMPATIBLE WITH METER AND STRIPS TRUE METRIX 100 each 3    Misc. Devices (ADJUST BATH/SHOWER SEAT/BACK) MISC 1 each by Does not apply route daily 1 each 0    Misc. Devices (COMMODE BEDSIDE) MISC 1 each by Does not apply route daily 1 each 0    Misc. Devices (NOVA CUSHION GEL SEAT PAD) MISC 1 each by Does not apply route daily 1 each 0    blood glucose monitor kit and supplies 1 kit by Other route daily Test 1 times a day & as needed for symptoms of irregular blood glucose.  1 kit 0     No current facility-administered medications without complication, without long-term current use of insulin (HCC)    COPD, severe ([address]    Dysthymia    Cerebral infarction ([address]    Left-sided weakness    Lower leg edema         Plan:   1. Ordered low-dose screening CT scan of the chest.  2. Refilled DuoNeb and Pulmicort. 3. Azithromycin for purulent exacerbation. 4. Increase activity as tolerated. 5. Discussed strategies to mitigate risk of COVID-19 infection and strongly encouraged vaccination. 6. Return in 1 year. Orders Placed This Encounter   Medications    ipratropium-albuterol (DUONEB) 0.5-2.5 (3) MG/3ML SOLN nebulizer solution     Sig: INHALE 3 MLS PER NEBULIZER INTO THE LUNGS EVERY 6 HOURS     Dispense:  1080 mL     Refill:  3    budesonide (PULMICORT) 0.5 MG/2ML nebulizer suspension     Sig: Take 2 mLs by nebulization 2 times daily     Dispense:  120 mL     Refill:  11     DX Code Needed  PATIENT TRIED TO FILL ELECTRONICALLY, BUT THE RX IS .  azithromycin (ZITHROMAX) 250 MG tablet     Sig: Take 1 tablet by mouth See Admin Instructions for 5 days 500mg on day 1 followed by 250mg on days 2 - 5     Dispense:  6 tablet     Refill:  0     No orders of the defined types were placed in this encounter. Return in about 1 year (around 10/28/2022). Electronically signed by Candelaria Moses DO on 10/28/2021at 10:00 AM  Low Dose CT (LDCT) Lung Screening criteria met:     Age 55-77(Medicare) or 50-80 (USPST)   Pack year smoking >30 (Medicare) or >20 (USPSTF)   Still smoking or less than 15 year since quit   No sign or symptoms of lung cancer   > 11 months since last LDCT     Risks and benefits of lung cancer screening with LDCT scans discussed:    Significance of positive screen - False-positive LDCT results often occur. 95% of all positive results do not lead to a diagnosis of cancer. Usually further imaging can resolve most false-positive results; however, some patients may require invasive procedures.     Over diagnosis risk - 10% to 12% of screen-detected lung cancer cases are over diagnosed--that is, the cancer would not have been detected in the patient's lifetime without the screening. Need for follow up screens annually to continue lung cancer screening effectiveness     Risks associated with radiation from annual LDCT- Radiation exposure is about the same as for a mammogram, which is about 1/3 of the annual background radiation exposure from everyday life. Starting screening at age 54 is not likely to increase cancer risk from radiation exposure. Patients with comorbidities resulting in life expectancy of < 10 years, or that would preclude treatment of an abnormality identified on CT, should not be screened due to lack of benefit.     To obtain maximal benefit from this screening, smoking cessation and long-term abstinence from smoking is critical

## 2021-10-28 NOTE — PATIENT INSTRUCTIONS
SURVEY:    You may be receiving a survey from [employer] regarding your visit today. Please complete the survey to enable us to provide the highest quality of care to you and your family. If you cannot score us a very good on any question, please call the office to discuss how we could have made your experience a very good one. Thank you. What is lung cancer screening? Lung cancer screening is a way in which doctors check the lungs for early signs of cancer in people who have no symptoms of lung cancer. A low-dose CT scan uses much less radiation than a normal CT scan and shows a more detailed image of the lungs than a standard X-ray. The goal of lung cancer screening is to find cancer early, before it has a chance to grow, spread, or cause problems. One large study found that smokers who were screened with low-dose CT scans were less likely to die of lung cancer than those who were screened with standard X-ray. Below is a summary of the things you need to know regarding screening for lung cancer with low-dose computed tomography (LDCT). This is a screening program that involves routine annual screening with LDCT studies of the lung. The LDCTs are done using low-dose radiation that is not thought to increase your cancer risk. If you have other serious medical conditions (other cancers, congestive heart failure) that limit your life expectancy to less than 10 years, you should not undergo lung cancer screening with LDCT. The chance is 20%-60% that the LDCT result will show abnormalities. This would require additional testing which could include repeat imaging or even invasive procedures. Most (about 95%) of \"abnormal\" LDCT results are false in the sense that no lung cancer is ultimately found. Additionally, some (about 10%) of the cancers found would not affect your life expectancy, even if undetected and untreated.   If you are still smoking, the single most important thing that you can do to reduce your risk of dying of lung cancer is to quit. For this screening to be covered by Medicare and most other insurers, strict criteria must be met. If you do not meet these criteria, but still wish to undergo LDCT testing, you will be required to sign a waiver indicating your willingness to pay for the scan.

## 2022-01-14 ENCOUNTER — TELEPHONE (OUTPATIENT)
Dept: PULMONOLOGY | Age: 62
End: 2022-01-14

## 2022-01-14 DIAGNOSIS — Z99.89 OSA ON CPAP: Primary | ICD-10-CM

## 2022-01-14 DIAGNOSIS — G47.33 OSA ON CPAP: Primary | ICD-10-CM

## 2022-01-20 ENCOUNTER — TELEPHONE (OUTPATIENT)
Dept: PULMONOLOGY | Age: 62
End: 2022-01-20

## 2022-01-20 DIAGNOSIS — J44.9 COPD, SEVERE (HCC): Primary | ICD-10-CM

## 2022-01-20 NOTE — TELEPHONE ENCOUNTER
Caretaker called and indicated his Nebulizer is broken. Needing new machine and supplies. Please advise.  Last seen 10/21  BETTY ALBA and K

## 2022-05-12 ENCOUNTER — HOSPITAL ENCOUNTER (OUTPATIENT)
Age: 62
Discharge: HOME | End: 2022-05-12
Payer: MEDICAID

## 2022-05-12 DIAGNOSIS — E11.9 CONTROLLED TYPE 2 DIABETES MELLITUS WITHOUT COMPLICATION, WITHOUT LONG-TERM CURRENT USE OF INSULIN (HCC): ICD-10-CM

## 2022-05-12 LAB
ANION GAP SERPL CALCULATED.3IONS-SCNC: 15 MMOL/L (ref 9–17)
BUN BLDV-MCNC: 10 MG/DL (ref 8–23)
BUN/CREAT BLD: 7 (ref 9–20)
CALCIUM SERPL-MCNC: 8.9 MG/DL (ref 8.6–10.4)
CHLORIDE BLD-SCNC: 99 MMOL/L (ref 98–107)
CO2: 25 MMOL/L (ref 20–31)
CREAT SERPL-MCNC: 1.35 MG/DL (ref 0.7–1.2)
GFR AFRICAN AMERICAN: >60 ML/MIN
GFR NON-AFRICAN AMERICAN: 54 ML/MIN
GFR SERPL CREATININE-BSD FRML MDRD: ABNORMAL ML/MIN/{1.73_M2}
GFR SERPL CREATININE-BSD FRML MDRD: ABNORMAL ML/MIN/{1.73_M2}
GLUCOSE BLD-MCNC: 182 MG/DL (ref 70–99)
POTASSIUM SERPL-SCNC: 3.9 MMOL/L (ref 3.7–5.3)
SODIUM BLD-SCNC: 139 MMOL/L (ref 135–144)

## 2022-05-12 PROCEDURE — 36415 COLL VENOUS BLD VENIPUNCTURE: CPT

## 2022-05-12 PROCEDURE — 83036 HEMOGLOBIN GLYCOSYLATED A1C: CPT

## 2022-05-12 PROCEDURE — 80048 BASIC METABOLIC PNL TOTAL CA: CPT

## 2022-05-13 LAB
ESTIMATED AVERAGE GLUCOSE: 177 MG/DL
HBA1C MFR BLD: 7.8 % (ref 4–6)

## 2022-06-08 ENCOUNTER — TELEPHONE (OUTPATIENT)
Dept: PRIMARY CARE CLINIC | Age: 62
End: 2022-06-08

## 2022-06-08 RX ORDER — GABAPENTIN 300 MG/1
CAPSULE ORAL
Qty: 180 CAPSULE | Refills: 0 | Status: SHIPPED | OUTPATIENT
Start: 2022-06-08 | End: 2022-09-21

## 2022-06-08 NOTE — TELEPHONE ENCOUNTER
Allergic rhinitis     Controlled type 2 diabetes mellitus without complication, without long-term current use of insulin (HCC)     COPD, severe (HCC)     Dysthymia     Cerebral infarction (HCC)     Left-sided weakness     Lower leg edema

## 2022-06-08 NOTE — LETTER
[facility]  [address]  [place] [address] [facility]  Phone: [phone]  Fax: [phone] [facility], APRN - CNP         June 8, 2022     Patient: Mary Palacio. YOB: 1960   Date of Visit: 6/8/2022       To Whom It May Concern: It is my medical opinion that Tootie Stock requires a disability parking placard for the following reasons:  He cannot walk without assistance from another person or the use of an assistance device (cane, crutch, prosthetic device, wheelchair, etc.). He has limited walking ability due to a neurologic condition. Duration of need: permanent    If you have any questions or concerns, please don't hesitate to call.     Sincerely,        Yasmin Adkins, APRN - CNP

## 2022-06-29 ENCOUNTER — OFFICE VISIT (OUTPATIENT)
Dept: PRIMARY CARE CLINIC | Age: 62
End: 2022-06-29
Payer: MEDICAID

## 2022-06-29 VITALS
DIASTOLIC BLOOD PRESSURE: 74 MMHG | TEMPERATURE: 97.7 F | WEIGHT: 183.7 LBS | RESPIRATION RATE: 22 BRPM | SYSTOLIC BLOOD PRESSURE: 128 MMHG | OXYGEN SATURATION: 97 % | HEART RATE: 94 BPM | BODY MASS INDEX: 29.65 KG/M2

## 2022-06-29 DIAGNOSIS — K21.9 GASTROESOPHAGEAL REFLUX DISEASE WITHOUT ESOPHAGITIS: ICD-10-CM

## 2022-06-29 DIAGNOSIS — I63.9 CEREBRAL INFARCTION, UNSPECIFIED MECHANISM (HCC): ICD-10-CM

## 2022-06-29 DIAGNOSIS — E11.9 CONTROLLED TYPE 2 DIABETES MELLITUS WITHOUT COMPLICATION, WITHOUT LONG-TERM CURRENT USE OF INSULIN (HCC): Primary | ICD-10-CM

## 2022-06-29 DIAGNOSIS — Z12.5 SCREENING PSA (PROSTATE SPECIFIC ANTIGEN): ICD-10-CM

## 2022-06-29 DIAGNOSIS — Z74.09 LIMITED MOBILITY: ICD-10-CM

## 2022-06-29 DIAGNOSIS — Z23 NEED FOR SHINGLES VACCINE: ICD-10-CM

## 2022-06-29 DIAGNOSIS — K42.9 UMBILICAL HERNIA WITHOUT OBSTRUCTION AND WITHOUT GANGRENE: ICD-10-CM

## 2022-06-29 PROCEDURE — 99214 OFFICE O/P EST MOD 30 MIN: CPT | Performed by: NURSE PRACTITIONER

## 2022-06-29 PROCEDURE — 3051F HG A1C>EQUAL 7.0%<8.0%: CPT | Performed by: NURSE PRACTITIONER

## 2022-06-29 RX ORDER — WHEELCHAIR
1 EACH MISCELLANEOUS DAILY
Qty: 1 EACH | Refills: 0 | Status: SHIPPED | OUTPATIENT
Start: 2022-06-29 | End: 2022-07-18 | Stop reason: SDUPTHER

## 2022-06-29 RX ORDER — ZOSTER VACCINE RECOMBINANT, ADJUVANTED 50 MCG/0.5
0.5 KIT INTRAMUSCULAR SEE ADMIN INSTRUCTIONS
Qty: 0.5 ML | Refills: 0 | Status: SHIPPED | OUTPATIENT
Start: 2022-06-29 | End: 2022-12-26

## 2022-06-29 RX ORDER — PIOGLITAZONEHYDROCHLORIDE 15 MG/1
15 TABLET ORAL DAILY
Qty: 90 TABLET | Refills: 1 | Status: SHIPPED | OUTPATIENT
Start: 2022-06-29

## 2022-06-29 SDOH — ECONOMIC STABILITY: FOOD INSECURITY: WITHIN THE PAST 12 MONTHS, THE FOOD YOU BOUGHT JUST DIDN'T LAST AND YOU DIDN'T HAVE MONEY TO GET MORE.: PATIENT DECLINED

## 2022-06-29 SDOH — ECONOMIC STABILITY: FOOD INSECURITY: WITHIN THE PAST 12 MONTHS, YOU WORRIED THAT YOUR FOOD WOULD RUN OUT BEFORE YOU GOT MONEY TO BUY MORE.: PATIENT DECLINED

## 2022-06-29 ASSESSMENT — PATIENT HEALTH QUESTIONNAIRE - PHQ9
6. FEELING BAD ABOUT YOURSELF - OR THAT YOU ARE A FAILURE OR HAVE LET YOURSELF OR YOUR FAMILY DOWN: 0
SUM OF ALL RESPONSES TO PHQ QUESTIONS 1-9: 0
SUM OF ALL RESPONSES TO PHQ QUESTIONS 1-9: 0
2. FEELING DOWN, DEPRESSED OR HOPELESS: 0
9. THOUGHTS THAT YOU WOULD BE BETTER OFF DEAD, OR OF HURTING YOURSELF: 0
1. LITTLE INTEREST OR PLEASURE IN DOING THINGS: 0
SUM OF ALL RESPONSES TO PHQ QUESTIONS 1-9: 0
10. IF YOU CHECKED OFF ANY PROBLEMS, HOW DIFFICULT HAVE THESE PROBLEMS MADE IT FOR YOU TO DO YOUR WORK, TAKE CARE OF THINGS AT HOME, OR GET ALONG WITH OTHER PEOPLE: 0
3. TROUBLE FALLING OR STAYING ASLEEP: 0
8. MOVING OR SPEAKING SO SLOWLY THAT OTHER PEOPLE COULD HAVE NOTICED. OR THE OPPOSITE, BEING SO FIGETY OR RESTLESS THAT YOU HAVE BEEN MOVING AROUND A LOT MORE THAN USUAL: 0
7. TROUBLE CONCENTRATING ON THINGS, SUCH AS READING THE NEWSPAPER OR WATCHING TELEVISION: 0
SUM OF ALL RESPONSES TO PHQ9 QUESTIONS 1 & 2: 0
SUM OF ALL RESPONSES TO PHQ QUESTIONS 1-9: 0
4. FEELING TIRED OR HAVING LITTLE ENERGY: 0
5. POOR APPETITE OR OVEREATING: 0

## 2022-06-29 ASSESSMENT — ENCOUNTER SYMPTOMS
SORE THROAT: 0
BELCHING: 0
ABDOMINAL PAIN: 0
GLOBUS SENSATION: 0
CHOKING: 0
WHEEZING: 0
NAUSEA: 0
HEARTBURN: 1
COUGH: 0

## 2022-06-29 ASSESSMENT — SOCIAL DETERMINANTS OF HEALTH (SDOH): HOW HARD IS IT FOR YOU TO PAY FOR THE VERY BASICS LIKE FOOD, HOUSING, MEDICAL CARE, AND HEATING?: PATIENT DECLINED

## 2022-06-29 NOTE — PROGRESS NOTES
Name: Nathaniel Clancy. : 1960         Chief Complaint:     Chief Complaint   Patient presents with    Diabetes     routine check. Would like new wheelchair with elevated leg support       History of Present Illness:      Nathaniel Clancy. is a 62 y.o.  male who presents with Diabetes (routine check. Would like new wheelchair with elevated leg support)      Jose Ortega is here today for a routine office visit. CVA/limited mobility-patient states he is having trouble with mobility and his current wheelchair. He states his wheelchair does not have legs that will elevate and he ends up with lower leg edema on most days. He would greatly benefit from a wealth chair that has legs that can be extended or elevated. This would improve the swelling in the lower extremities and improve his quality of life. Umbilical hernia-worsening, patient states he is having more discomfort than usual.  He states the hernia is still reducible but seems to be a little larger. Diabetes  He presents for his follow-up diabetic visit. He has type 2 diabetes mellitus. His disease course has been worsening. Hypoglycemia symptoms include speech difficulty (chronic). Pertinent negatives for hypoglycemia include no dizziness or headaches. Pertinent negatives for diabetes include no chest pain, no fatigue, no foot ulcerations, no polydipsia, no polyphagia, no polyuria and no weight loss. There are no hypoglycemic complications. Symptoms are stable. Diabetic complications include a CVA and peripheral neuropathy. Pertinent negatives for diabetic complications include no heart disease. Risk factors for coronary artery disease include diabetes mellitus, male sex and sedentary lifestyle. Current diabetic treatment includes oral agent (monotherapy). He is compliant with treatment all of the time. His weight is stable. He is following a generally healthy diet. When asked about meal planning, he reported none.  He has had a previous visit with a dietitian. He never participates in exercise. There is no change in his home blood glucose trend. His breakfast blood glucose range is generally 140-180 mg/dl. An ACE inhibitor/angiotensin II receptor blocker is being taken. He does not see a podiatrist.Eye exam is not current. Gastroesophageal Reflux  He complains of heartburn. He reports no abdominal pain, no belching, no chest pain, no choking, no coughing, no early satiety, no globus sensation, no nausea, no sore throat or no wheezing. This is a chronic problem. The current episode started more than 1 year ago. The problem occurs rarely. The problem has been unchanged. The heartburn duration is less than a minute. The heartburn is located in the substernum. The heartburn is of mild intensity. The heartburn does not wake him from sleep. The heartburn does not limit his activity. The heartburn doesn't change with position. The symptoms are aggravated by certain foods and caffeine. Pertinent negatives include no fatigue, melena or weight loss. Risk factors include lack of exercise and caffeine use. He has tried a PPI for the symptoms. The treatment provided significant relief. Past procedures include an EGD. Past procedures do not include a UGI. Past invasive treatments do not include gastroplasty, gastroplication or reflux surgery.          Past Medical History:     Past Medical History:   Diagnosis Date    Acid reflux     Asthma     Cerebrovascular disease     Chronic cough     Controlled type 2 diabetes mellitus without complication, without long-term current use of insulin ([facility] 2/9/2018    COPD (chronic obstructive pulmonary disease) (HCC)     Dysphagia     Dyspnea     Type II or unspecified type diabetes mellitus without mention of complication, not stated as uncontrolled     Unspecified cerebral artery occlusion with cerebral infarction 11/05/2008      Reviewed all health maintenance requirements and ordered appropriate tests  Health Maintenance Due   Topic Date Due    Prostate Specific Antigen (PSA) Screening or Monitoring  05/23/2015    Low dose CT lung screening  08/07/2018    Depression Monitoring  03/11/2022       Past Surgical History:     Past Surgical History:   Procedure Laterality Date    BRAIN SURGERY      CRANIOTOMY      KNEE SURGERY          Medications:       Prior to Admission medications    Medication Sig Start Date End Date Taking? Authorizing Provider   zoster recombinant adjuvanted vaccine [facility]) 50 MCG/0.5ML SUSR injection Inject 0.5 mLs into the muscle See Admin Instructions 1 dose now and repeat in 2-6 months 6/29/22 12/26/22 Yes Alicia Adkins APRN - CNP   Misc. Devices [facility]) MISC 1 each by Does not apply route daily WITH EXTENDING LEGS 6/29/22  Yes Alicia Adkins APRN - CNP   pioglitazone (ACTOS) 15 MG tablet Take 1 tablet by mouth daily 6/29/22  Yes Alicia Adkins APRN - CNP   gabapentin (NEURONTIN) 300 MG capsule TAKE ONE CAPSULE BY MOUTH TWICE A DAY 6/8/22 9/8/22 Yes Adrian Adkins APRN - SILVINO   ipratropium-albuterol (DUONEB) 0.5-2.5 (3) MG/3ML SOLN nebulizer solution INHALE 3 MLS PER NEBULIZER INTO THE LUNGS EVERY 6 HOURS 10/28/21  Yes Jose Rodriguez DO   budesonide (PULMICORT) 0.5 MG/2ML nebulizer suspension Take 2 mLs by nebulization 2 times daily 10/28/21 10/28/22 Yes Golden Rodriguez DO   sertraline (ZOLOFT) 100 MG tablet Take 2 tablets by mouth daily 10/27/21  Yes Alicia Adkins APRN - CNP   blood glucose test strips (TRUE METRIX BLOOD GLUCOSE TEST) strip As needed.  10/27/21  Yes Alicia Adkins APRN - CNP   simvastatin (ZOCOR) 20 MG tablet Take 1 tablet by mouth nightly 10/27/21  Yes Alicia Adkins APRN - CNP   folic acid (FOLVITE) 1 MG tablet Take 1 tablet by mouth daily 10/27/21  Yes Alicia Adkins APRN - CNP   cetirizine (ZYRTEC) 10 MG tablet Take 1 tablet by mouth daily 10/27/21  Yes Alicia Nares Might, APRN - CNP   Multiple Vitamins-Minerals (CVS SPECTRAVITE ADVANCED) TABS TAKE 1 TABLET BY MOUTH EVERY DAY 10/27/21  Yes CARO Suárez CNP   levETIRAcetam (KEPPRA) 500 MG tablet Take 1/2 tablet, PO, BID 10/27/21  Yes CARO Suárez CNP   metFORMIN (GLUCOPHAGE) 1000 MG tablet TAKE 1 TABLET BY MOUTH TWICE DAILY 10/27/21  Yes CARO Suárez CNP   baclofen (LIORESAL) 10 MG tablet Take 1 tablet by mouth 2 times daily 10/27/21  Yes CARO Suárez CNP   omeprazole (PRILOSEC) 40 MG delayed release capsule TAKE 1 CAPSULE BY MOUTH EVERY DAY 10/27/21  Yes CARO Suárez CNP   Lancets MISC 1 each by Does not apply route daily DISPENSE BRAND COMPATIBLE WITH METER AND STRIPS TRUE METRIX 10/27/21  Yes CARO Suárez CNP   furosemide (LASIX) 20 MG tablet Take 1 tablet by mouth 2 times daily 10/27/21  Yes CARO Suárez CNP   fluticasone (FLONASE) 50 MCG/ACT nasal spray INSTILL 2 SPRAYS INTO EACH NOSTRIL EVERY DAY 9/15/21  Yes Magi Abebe MD   Misc. Devices (ADJUST BATH/SHOWER SEAT/BACK) MISC 1 each by Does not apply route daily 10/17/19  Yes CARO Suárez CNP   Misc. Devices (COMMODE BEDSIDE) MISC 1 each by Does not apply route daily 10/17/19  Yes CARO Suárez CNP   Misc. Devices (NOVA CUSHION GEL SEAT PAD) MISC 1 each by Does not apply route daily 3/28/19  Yes CARO Suárez CNP   blood glucose monitor kit and supplies 1 kit by Other route daily Test 1 times a day & as needed for symptoms of irregular blood glucose. 8/6/18  Yes CARO Sarmiento CNP   senna (SENOKOT) 8.6 MG TABS tablet TAKE 1 TABLET BY MOUTH TWICE DAILY 11/17/17  Yes CARO Suárez CNP   CVS ASPIRIN CHILD 81 MG chewable tablet TAKE 1 TABLET BY MOUTH DAILY 6/28/17  Yes CARO uSárez CNP   lisinopril (PRINIVIL;ZESTRIL) 5 MG tablet Take 5 mg by mouth daily   Yes Historical Provider, MD        Allergies:       Pcn [penicillins], Morphine, and Sulfa antibiotics    Social History:     Tobacco:    reports that he quit smoking about 14 years ago.  His smoking use included cigarettes. He has a 45.00 pack-year smoking history. He has never used smokeless tobacco.  Alcohol:      reports no history of alcohol use. Drug Use:  reports no history of drug use. Family History:     Family History   Problem Relation Age of Onset   Lulu Curl Cancer Mother     Diabetes Mother     Heart Failure Father     Diabetes Father     Diabetes Sister        Review of Systems:     Positive and Negative as described in HPI    Review of Systems   Constitutional: Negative for fatigue and weight loss. HENT: Negative for sore throat. Respiratory: Negative for cough, choking and wheezing. Cardiovascular: Negative for chest pain. Gastrointestinal: Positive for heartburn. Negative for abdominal pain, melena and nausea. Endocrine: Negative for polydipsia, polyphagia and polyuria. Neurological: Positive for speech difficulty (chronic). Negative for dizziness and headaches. Physical Exam:   Vitals:  /74 (Position: Sitting)   Pulse 94   Temp 97.7 °F (36.5 °C) (Temporal)   Resp 22   Wt 183 lb 11.2 oz (83.3 kg)   SpO2 97%   BMI 29.65 kg/m²     Physical Exam  Vitals and nursing note reviewed. Constitutional:       General: He is not in acute distress. Appearance: Normal appearance. He is well-developed. He is not ill-appearing. HENT:      Mouth/Throat:      Mouth: Mucous membranes are moist.      Dentition: Abnormal dentition (poor). Pharynx: No posterior oropharyngeal erythema. Eyes:      General: No scleral icterus. Conjunctiva/sclera: Conjunctivae normal.   Cardiovascular:      Rate and Rhythm: Normal rate and regular rhythm. Heart sounds: Normal heart sounds. No murmur heard. Pulmonary:      Effort: Pulmonary effort is normal.      Breath sounds: Normal breath sounds. No wheezing or rales. Abdominal:      General: Bowel sounds are normal. There is no distension. Palpations: Abdomen is soft. Tenderness: There is no abdominal tenderness. Hernia: A hernia is present. Hernia is present in the umbilical area. Musculoskeletal:      Cervical back: Normal range of motion and neck supple. Right lower leg: Edema (trace pitting) present. Left lower leg: Edema (trace pitting) present. Lymphadenopathy:      Cervical: No cervical adenopathy. Skin:     General: Skin is warm and dry. Findings: No rash. Neurological:      Mental Status: He is alert and oriented to person, place, and time. Mental status is at baseline. Coordination: Coordination abnormal (left hemiplegia). Psychiatric:         Mood and Affect: Mood normal.         Behavior: Behavior normal.         Data:     Lab Results   Component Value Date     05/12/2022    K 3.9 05/12/2022    CL 99 05/12/2022    CO2 25 05/12/2022    BUN 10 05/12/2022    CREATININE 1.35 05/12/2022    GLUCOSE 182 05/12/2022    PROT 7.9 09/30/2020    LABALBU 4.8 09/30/2020    BILITOT 0.27 09/30/2020    ALKPHOS 93 09/30/2020    AST 18 09/09/2021    ALT 20 09/09/2021     Lab Results   Component Value Date    WBC 8.5 09/09/2021    RBC 4.64 09/09/2021    HGB 12.7 09/09/2021    HCT 40.5 09/09/2021    MCV 87.3 09/09/2021    MCH 27.4 09/09/2021    MCHC 31.4 09/09/2021    RDW 15.7 09/09/2021     09/09/2021    MPV 10.1 09/09/2021     Lab Results   Component Value Date    TSH 2.65 05/23/2014     Lab Results   Component Value Date    CHOL 143 09/09/2021    HDL 31 09/09/2021    PSA 0.84 05/23/2014    LABA1C 7.8 05/12/2022       Assessment/Plan:      Diagnosis Orders   1. Controlled type 2 diabetes mellitus without complication, without long-term current use of insulin (HCC)  pioglitazone (ACTOS) 15 MG tablet    CBC with Auto Differential    ALT    AST    Hemoglobin A1C    Lipid Panel    Microalbumin, Ur    Basic Metabolic Panel   2. Gastroesophageal reflux disease without esophagitis     3.  Umbilical hernia without obstruction and without gangrene  Parris Herrera DO, General Surgery, [place]   4. Cerebral infarction, unspecified mechanism ([facility] [address] [address]  Misc. Devices [facility]) MISC   5. Limited mobility  Misc. Devices [facility]) MISC   6. Need for shingles vaccine  zoster recombinant adjuvanted vaccine [facility]) 50 MCG/0.5ML SUSR injection   7. Screening PSA (prostate specific antigen)  PSA Screening     We will continue all current medications and add pioglitazone 15 mg daily. This is due to worsening of diabetes. Referral to general surgery for evaluation of umbilical hernia. We will see him back in 6 months with repeat labs, sooner if any issues. 1.  Boris Andrade received counseling on the following healthy behaviors: nutrition, exercise and medication adherence  2. Patient given educational materials - see patient instructions  3. Was a self-tracking handout given in paper form or via [employer]? No  If yes, see orders or list here. 4.  Discussed use, benefit, and side effects of prescribed medications. Barriers to medication compliance addressed. All patient questions answered. Pt voiced understanding. 5.  Reviewed prior labs and health maintenance  6. Continue current medications, diet and exercise. Completed Refills   Requested Prescriptions     Signed Prescriptions Disp Refills    zoster recombinant adjuvanted vaccine (SHINGRIX) 50 MCG/0.5ML SUSR injection 0.5 mL 0     Sig: Inject 0.5 mLs into the muscle See Admin Instructions 1 dose now and repeat in 2-6 months    [facility]. Devices [facility]) [facility] 1 each 0     Si each by Does not apply route daily WITH EXTENDING LEGS    pioglitazone (ACTOS) 15 MG tablet 90 tablet 1     Sig: Take 1 tablet by mouth daily         Return in about 6 months (around 2022) for Check up.

## 2022-06-29 NOTE — PATIENT INSTRUCTIONS
SURVEY:     You may be receiving a survey from [employer] regarding your visit today. Please complete the survey to enable us to provide the highest quality of care to you and your family. If you cannot score us a very good on any question, please call the office to discuss how we could have made your experience a very good one. Thank you. Ame Adkins, APRN-SILVINO Staples, CNP  Carlos Talbert, LPN  Johnathan Garza, CMA  Benny Awad, CMA  Sadie, CMA  Priscilla, PCA  Jessica, PM    Patient Education        Live Zoster (Shingles) Vaccine: What You Need to Know  Why get vaccinated? Live zoster (shingles) vaccine can prevent shingles. Shingles (also called herpes zoster, or just zoster) is a painful skin rash, usually with blisters. In addition to the rash, shingles can cause fever, headache, chills, or upset stomach. More rarely, shingles can lead to pneumonia, hearing problems, blindness, brain inflammation (encephalitis), ordeath. The most common complication of shingles is long-term nerve pain called postherpetic neuralgia (PHN). PHN occurs in the areas where the shingles rash was, even after the rash clears up. It can last for months or years after therash goes away. The pain from PHN can be severe and debilitating. About 10 to 18% of people who get shingles will experience PHN. The risk of PHN increases with age. An older adult with shingles is more likely to develop PHN and have longer lasting and more severe pain than a younger person withshingles. Shingles is caused by the varicella zoster virus, the same virus that causes chickenpox. After you have chickenpox, the virus stays in your body and can cause shingles later in life. Shingles cannot be passed from one person to another, but the virus that causes shingles can spread and cause chickenpox insomeone who had never had chickenpox or received chickenpox vaccine. Live shingles vaccine  Live shingles vaccine can provide protection against shingles and PHN.   Another type of shingles vaccine, recombinant shingles vaccine, is the preferred vaccine for the prevention of shingles. However, live shingles vaccine may be used in some circumstances (for example if a person is allergic to recombinant shingles vaccine or prefers live shingles vaccine, or if recombinant shingles vaccine isnot available). Adults 60 years and older who get live shingles vaccine should receive 1 dose, administered by injection. Shingles vaccine may be given at the same time as other vaccines. Talk with your health care provider  Tell your vaccine provider if the person getting the vaccine:   Has had an allergic reaction after a previous dose of live shingles vaccine or varicella vaccine, or has any severe, life-threatening allergies.  Has a weakened immune system.  Is pregnant or thinks she might be pregnant.  Is currently experiencing an episode of shingles. In some cases, your health care provider may decide to postpone shinglesvaccination to a future visit. People with minor illnesses, such as a cold, may be vaccinated. People who are moderately or severely ill should usually wait until they recover beforegetting live shingles vaccine. Your health care provider can give you more information. Risks of a vaccine reaction   Redness, soreness, swelling, or itching at the site of the injection and headache can happen after live shingles vaccine. Rarely, live shingles vaccine can cause rash or shingles. People sometimes faint after medical procedures, including vaccination. Tellyour provider if you feel dizzy or have vision changes or ringing in the ears. As with any medicine, there is a very remote chance of a vaccine causing asevere allergic reaction, other serious injury, or death. What if there is a serious problem? An allergic reaction could occur after the vaccinated person leaves the clinic.  If you see signs of a severe allergic reaction (hives, swelling of the face and throat, difficulty breathing, a fast heartbeat, dizziness, or weakness), call 9-1-1 and get the person to the nearest hospital.  For other signs that concern you, call your health care provider. Adverse reactions should be reported to the Vaccine Adverse Event Reporting System (VAERS). Your health care provider will usually file this report, or you can do it yourself. Visit the VAERS website at www.vaers. [facility].gov or call 2-151.921.4147. VAERS is only for reporting reactions, and VAERS staff do not give medical advice. How can I learn more?  Ask your health care provider.  Call your local or state health department.  Contact the Centers for Disease Control and Prevention (CDC):  ? Call 2-130.868.4221 (1-800-CDC-INFO) or  ? Visit CDCs website at www.cdc.gov/vaccines  Vaccine Information Statement  Live Zoster Vaccine  10/30/2019  Department of [facility] and [employer] for Disease Control and Prevention  Many Vaccine Information Statements are available in Latvian and other languages. See www.immunize.org/vis   Hojas de Información Sobre Vacunas están disponibles en Español y en muchos otros idiomas. Visite Andrew.si   Care instructions adapted under license by [facility] ([facility]). If you have questions about a medical condition or this instruction, always ask your healthcare professional. [address] any warranty or liability for your use of this information. Patient Education        Counting Carbohydrates for Diabetes: Care Instructions  Overview     Managing the amount of carbohydrate (carbs) you eat is an important part of planning healthy meals when you have diabetes. Carbs raise blood sugar more than any other nutrient. Carbs are found in grains, starchy vegetables, fruits,and milk and yogurt. Carbs are also found in sugar-sweetened foods and drinks. The more carbs you eat at one time, the higher your blood sugar will rise. Counting carbs can help you keep your blood sugar within your target range. If you use insulin, counting carbs helps you match the right amount of insulinto the number of grams of carbs in a meal.  A registered dietitian or diabetes educator can help you plan meals and snacks. Follow-up care is a key part of your treatment and safety. Be sure to make and go to all appointments, and call your doctor if you are having problems. It's also a good idea to know your test results and keep alist of the medicines you take. How can you care for yourself at home? Know your daily amount of carbohydrates  Your daily amount depends on several things, such as your weight, how active you are, which diabetes medicines you take, and what your goals are for your blood sugar levels. A registered dietitian or diabetes educator can help youplan how many carbs to include in each meal and snack. For most adults, a guideline for the daily amount of carbs is:   45 to 60 grams at each meal. That's about the same as 3 to 4 carbohydrate servings.  15 to 20 grams at each snack. That's about the same as 1 carbohydrate serving. Count carbs  Counting carbs lets you know how much rapid-acting insulin to take before you eat. If you use an insulin pump, you get a constant rate of insulin during the day. So the pump must be programmed at meals. This gives you extra insulin tocover the rise in blood sugar after meals. If you take insulin:   Learn your own insulin-to-carb ratio. You and your diabetes health professional will figure out the ratio. You can do this by testing your blood sugar after meals. For example, you may need a certain amount of insulin for every 15 grams of carbs.  Add up the carb grams in a meal. Then you can figure out how many units of insulin to take based on your insulin-to-carb ratio.  Exercise lowers blood sugar. You can use less insulin than you would if you were not doing exercise. Keep in mind that timing matters.  If you exercise within 1 hour after a meal, your body may need less insulin for that meal than it would if you exercised 3 hours after the meal. Test your blood sugar to find out how exercise affects your need for insulin. If you do or don't take insulin:   Look at labels on packaged foods. This can tell you how many carbs are in a serving.  Be aware of portions, or serving sizes. If a package has two servings and you eat the whole package, you need to double the number of grams of carbohydrate listed for one serving.  Protein, fat, and fiber do not raise blood sugar as much as carbs do. If you eat a lot of these nutrients in a meal, your blood sugar will rise more slowly than it would otherwise. Where can you learn more? Go to https://[employer] org and sign in to your [employer] account. Enter K622 in the [employer] box to learn more about \"Counting Carbohydrates for Diabetes: Care Instructions. \"     If you do not have an account, please click on the \"Sign Up Now\" link. Current as of: July 28, 2021               Content Version: 13.3  © 4244-2561 [employer]. Care instructions adapted under license by [address] If you have questions about a medical condition or this instruction, always ask your healthcare professional. [address]  any warranty or liability for your use of this information.

## 2022-07-18 ENCOUNTER — TELEPHONE (OUTPATIENT)
Dept: PRIMARY CARE CLINIC | Age: 62
End: 2022-07-18

## 2022-07-18 DIAGNOSIS — Z74.09 LIMITED MOBILITY: ICD-10-CM

## 2022-07-18 DIAGNOSIS — I63.9 CEREBRAL INFARCTION, UNSPECIFIED MECHANISM (HCC): ICD-10-CM

## 2022-07-18 RX ORDER — WHEELCHAIR
1 EACH MISCELLANEOUS DAILY
Qty: 1 EACH | Refills: 0 | Status: SHIPPED | OUTPATIENT
Start: 2022-07-18

## 2022-07-18 NOTE — TELEPHONE ENCOUNTER
The script for Rachel Gates' prescription needs changed to read:    Wheelchair with elevating leg rests. The current one is incorrect stating \"extending leg rests\"    Will you sign new script?

## 2022-08-24 RX ORDER — GABAPENTIN 300 MG/1
CAPSULE ORAL
Qty: 60 CAPSULE | OUTPATIENT
Start: 2022-08-24

## 2022-08-25 ENCOUNTER — TELEPHONE (OUTPATIENT)
Dept: ONCOLOGY | Age: 62
End: 2022-08-25

## 2022-09-01 ENCOUNTER — HOSPITAL ENCOUNTER (OUTPATIENT)
Dept: CT IMAGING | Age: 62
Discharge: HOME | End: 2022-09-03
Payer: MEDICAID

## 2022-09-01 DIAGNOSIS — Z87.891 PERSONAL HISTORY OF TOBACCO USE: ICD-10-CM

## 2022-09-01 PROCEDURE — 71271 CT THORAX LUNG CANCER SCR C-: CPT

## 2022-09-21 RX ORDER — GABAPENTIN 300 MG/1
CAPSULE ORAL
Qty: 180 CAPSULE | Refills: 0 | Status: SHIPPED | OUTPATIENT
Start: 2022-09-21 | End: 2022-12-21

## 2022-09-21 NOTE — TELEPHONE ENCOUNTER
Health Maintenance   Topic Date Due    DTaP/Tdap/Td vaccine (1 - Tdap) Never done    Flu vaccine (1) 09/01/2022    Diabetic microalbuminuria test  09/09/2022    Lipids  09/09/2022    Diabetic foot exam  10/27/2022 (Originally 7/29/2021)    Diabetic retinal exam  11/06/2022 (Originally 7/25/2020)    Shingles vaccine (1 of 2) 06/29/2023 (Originally 9/6/2011)    Pneumococcal 0-64 years Vaccine (2 - PCV) 06/29/2023 (Originally 10/22/2016)    COVID-19 Vaccine (1) 06/29/2023 (Originally [birth year]/6/1962)    Hepatitis C screen  06/29/2023 (Originally 9/6/1979)    HIV screen  06/29/2023 (Originally 9/6/1976)    A1C test (Diabetic or Prediabetic)  05/12/2023    Depression Monitoring  06/29/2023    Low dose CT lung screening  09/01/2023    Colorectal Cancer Screen  04/04/2024    Hepatitis A vaccine  Aged Out    Hib vaccine  Aged Out    Meningococcal (ACWY) vaccine  Aged Out             (applicable per patient's age: Cancer Screenings, Depression Screening, Fall Risk Screening, Immunizations)    Hemoglobin A1C (%)   Date Value   05/12/2022 7.8 (H)   09/09/2021 5.9   01/29/2021 6.9 (H)     Microalb/Crt.  Ratio (mcg/mg creat)   Date Value   09/09/2021 CANNOT BE CALCULATED     LDL Cholesterol (mg/dL)   Date Value   09/09/2021 63     AST (U/L)   Date Value   09/09/2021 18     ALT (U/L)   Date Value   09/09/2021 20     BUN (mg/dL)   Date Value   05/12/2022 10      (goal A1C is < 7)   (goal LDL is <100) need 30-50% reduction from baseline     BP Readings from Last 3 Encounters:   06/29/22 128/74   10/28/21 131/70   10/27/21 132/80    (goal /80)      All Future Testing planned in [facility]:  Lab Frequency Next Occurrence   CBC with Auto Differential Once 12/26/2022   ALT Once 12/29/2022   AST Once 12/29/2022   Hemoglobin A1C Once 12/26/2022   Lipid Panel Once 12/26/2022   Microalbumin, Ur Once 42/35/2340   Basic Metabolic Panel Once 74/08/0689   PSA Screening Once 12/29/2022       Next Visit Date:  Future Appointments   Date Time Provider Jose White   10/12/2022 11:00 AM Valentina Gaviria, DO [facility]   10/19/2022  9:45 AM Jw Perez[facility]   12/29/2022 10:00 AM Linda Adkins, APRN - CNP [facility]            Patient Active Problem List:     Gastroesophageal reflux disease     Allergic rhinitis     Controlled type 2 diabetes mellitus without complication, without long-term current use of insulin (HCC)     COPD, severe (HCC)     Dysthymia     Cerebral infarction (HCC)     Left-sided weakness     Lower leg edema

## 2022-11-09 DIAGNOSIS — Z87.898 HISTORY OF SEIZURES: Primary | ICD-10-CM

## 2022-11-09 RX ORDER — LEVETIRACETAM 500 MG/1
TABLET ORAL
Qty: 90 TABLET | Refills: 3 | Status: SHIPPED | OUTPATIENT
Start: 2022-11-09

## 2022-11-16 ENCOUNTER — OFFICE VISIT (OUTPATIENT)
Dept: SURGERY | Age: 62
End: 2022-11-16
Payer: MEDICAID

## 2022-11-16 VITALS
BODY MASS INDEX: 29.41 KG/M2 | HEIGHT: 66 IN | OXYGEN SATURATION: 98 % | HEART RATE: 74 BPM | WEIGHT: 183 LBS | RESPIRATION RATE: 19 BRPM

## 2022-11-16 DIAGNOSIS — K42.9 UMBILICAL HERNIA WITHOUT OBSTRUCTION AND WITHOUT GANGRENE: Primary | ICD-10-CM

## 2022-11-16 PROCEDURE — 99203 OFFICE O/P NEW LOW 30 MIN: CPT | Performed by: SURGERY

## 2022-11-16 NOTE — PROGRESS NOTES
GENERAL SURGERY CONSULTATION      Patient's Name/ Date of Birth/ Gender: Opal Rebolledo. / 1960 (65 y.o.) / male     PCP: CARO Glover CNP  Referring:     History of present Illness:  Patient is a contreras 65 y.o. male  kindly referred by CARO Glover CNP  Here with family abd aide. Had stroke around 2008. Wheelchair dependent. Copd, DM. Has umbilical hernia present for about 9 years. No obstructive symptoms. Occasional discomfort at site. Has regular Bms no straining or constipation. Gets breathing treatments tid. Past Medical History:  has a past medical history of Acid reflux, Asthma, Cerebrovascular disease, Chronic cough, Controlled type 2 diabetes mellitus without complication, without long-term current use of insulin ([facility] [place] [address] COPD (chronic obstructive pulmonary disease) ([facility] [place] [address] Dysphagia, Dyspnea, Type II or unspecified type diabetes mellitus without mention of complication, not stated as uncontrolled, Unspecified cerebral artery occlusion with cerebral infarction, and Wheelchair dependent. Past Surgical History:   Past Surgical History:   Procedure Laterality Date    BRAIN SURGERY      CRANIOTOMY      GASTROSTOMY TUBE PLACEMENT      removed    KNEE SURGERY         Social History:  reports that he quit smoking about 14 years ago. His smoking use included cigarettes. He has a 45.00 pack-year smoking history. He has never used smokeless tobacco. He reports that he does not drink alcohol and does not use drugs. Family History: family history includes Cancer in his mother; Diabetes in his father, mother, and sister; Heart Failure in his father.     Review of Systems:   General: Completed and, except as mentioned above, was negative or noncontributory  Psychological:  Completed and, except as mentioned above, was negative or noncontributory  Ophthalmic:  Completed and, except as mentioned above, was negative or noncontributory  ENT:  Completed and, except as mentioned above, was negative or noncontributory  Allergy and Immunology:  Completed and, except as mentioned above, was negative or noncontributory  Hematological and Lymphatic:  Completed and, except as mentioned above, was negative or noncontributory  Endocrine: Completed and, except as mentioned above, was negative or noncontributory  Breast:  Completed and, except as mentioned above, was negative or noncontributory  Respiratory:  Completed and, except as mentioned above, was negative or noncontributory  Cardiovascular:  Completed and, except as mentioned above, was negative or noncontributory  Gastrointestinal: Completed and, except as mentioned above, was negative or noncontributory  Genito-Urinary:  Completed and, except as mentioned above, was negative or noncontributory  Musculoskeletal:  Completed and, except as mentioned above, was negative or noncontributory  Neurological:  Completed and, except as mentioned above, was negative or noncontributory  Dermatological:  Completed and, except as mentioned above, was negative or noncontributory    Allergies: Pcn [penicillins], Morphine, and Sulfa antibiotics    Current Meds:  Current Outpatient Medications:     levETIRAcetam (KEPPRA) 500 MG tablet, TAKE 1/2 TABLET BY MOUTH TWO TIMES A DAY, Disp: 90 tablet, Rfl: 3    gabapentin (NEURONTIN) 300 MG capsule, TAKE ONE CAPSULE BY MOUTH TWICE A DAY, Disp: 180 capsule, Rfl: 0    Misc.  Devices [facility]) MISC, 1 each by Does not apply route daily WITH ELEVATING LEG RESTS, Disp: 1 each, Rfl: 0    zoster recombinant adjuvanted vaccine (SHINGRIX) 50 MCG/0.5ML SUSR injection, Inject 0.5 mLs into the muscle See Admin Instructions 1 dose now and repeat in 2-6 months, Disp: 0.5 mL, Rfl: 0    pioglitazone (ACTOS) 15 MG tablet, Take 1 tablet by mouth daily, Disp: 90 tablet, Rfl: 1    ipratropium-albuterol (DUONEB) 0.5-2.5 (3) MG/3ML SOLN nebulizer solution, INHALE 3 MLS PER NEBULIZER INTO THE LUNGS EVERY 6 HOURS, Disp: 1080 mL, Rfl: 3    sertraline (ZOLOFT) 100 MG tablet, Take 2 tablets by mouth daily, Disp: 180 tablet, Rfl: 3    blood glucose test strips (TRUE METRIX BLOOD GLUCOSE TEST) strip, As needed. , Disp: 100 strip, Rfl: 3    simvastatin (ZOCOR) 20 MG tablet, Take 1 tablet by mouth nightly, Disp: 90 tablet, Rfl: 3    folic acid (FOLVITE) 1 MG tablet, Take 1 tablet by mouth daily, Disp: 90 tablet, Rfl: 3    cetirizine (ZYRTEC) 10 MG tablet, Take 1 tablet by mouth daily, Disp: 90 tablet, Rfl: 3    Multiple Vitamins-Minerals ([employer]S SPECTRAVITE ADVANCED) TABS, TAKE 1 TABLET BY MOUTH EVERY DAY, Disp: 90 tablet, Rfl: 3    metFORMIN (GLUCOPHAGE) 1000 MG tablet, TAKE 1 TABLET BY MOUTH TWICE DAILY, Disp: 180 tablet, Rfl: 3    baclofen (LIORESAL) 10 MG tablet, Take 1 tablet by mouth 2 times daily, Disp: 180 tablet, Rfl: 3    omeprazole (PRILOSEC) 40 MG delayed release capsule, TAKE 1 CAPSULE BY MOUTH EVERY DAY, Disp: 90 capsule, Rfl: 3    Lancets MISC, 1 each by Does not apply route daily DISPENSE BRAND COMPATIBLE WITH METER AND STRIPS TRUE METRIX, Disp: 100 each, Rfl: 3    furosemide (LASIX) 20 MG tablet, Take 1 tablet by mouth 2 times daily, Disp: 180 tablet, Rfl: 3    fluticasone (FLONASE) 50 MCG/ACT nasal spray, INSTILL 2 SPRAYS INTO EACH NOSTRIL EVERY DAY, Disp: 3 each, Rfl: 3    Misc. Devices (ADJUST BATH/SHOWER SEAT/BACK) MISC, 1 each by Does not apply route daily, Disp: 1 each, Rfl: 0    Misc. Devices (COMMODE BEDSIDE) MISC, 1 each by Does not apply route daily, Disp: 1 each, Rfl: 0    Misc.  Devices (NOVA CUSHION GEL SEAT PAD) MISC, 1 each by Does not apply route daily, Disp: 1 each, Rfl: 0    blood glucose monitor kit and supplies, 1 kit by Other route daily Test 1 times a day & as needed for symptoms of irregular blood glucose., Disp: 1 kit, Rfl: 0    senna (SENOKOT) 8.6 MG TABS tablet, TAKE 1 TABLET BY MOUTH TWICE DAILY, Disp: 180 tablet, Rfl: 3    CVS ASPIRIN CHILD 81 MG chewable tablet, TAKE 1 TABLET BY MOUTH DAILY, Disp: 90 tablet, Rfl: 0 lisinopril (PRINIVIL;ZESTRIL) 5 MG tablet, Take 5 mg by mouth daily, Disp: , Rfl:     budesonide (PULMICORT) 0.5 MG/2ML nebulizer suspension, Take 2 mLs by nebulization 2 times daily, Disp: 120 mL, Rfl: 11    Physical Exam:  Vital signs and Nurse's note reviewed. Pulse 74   Resp 19   Ht 5' 6\" (1.676 m)   Wt 183 lb (83 kg)   SpO2 98%   BMI 29.54 kg/m²    height is 5' 6\" (1.676 m) and weight is 183 lb (83 kg). His pulse is 74. His respiration is 19 and oxygen saturation is 98%. Gen:  A&Ox3, NAD. Pleasant and cooperative. HEENT: PERRLA, EOMI, no scleral icterus  Neck:  no goiter  CVS: Regular rate and rhythm  Resp: Good bilateral air entry, no active wheezing, no labored breathing  Abd: soft, non-tender, non-distended, bowel sounds present, reducible umbilical hernia containing some omentum, prior healed PEG tube site  Ext: Moves all extremities, no gross focal motor deficits  Skin: No erythema or ulcerations     Labs:   Lab Results   Component Value Date/Time    WBC 8.5 09/09/2021 10:10 AM    HGB 12.7 09/09/2021 10:10 AM    HCT 40.5 09/09/2021 10:10 AM    MCV 87.3 09/09/2021 10:10 AM     09/09/2021 10:10 AM     Lab Results   Component Value Date/Time     05/12/2022 09:15 AM    K 3.9 05/12/2022 09:15 AM    CL 99 05/12/2022 09:15 AM    CO2 25 05/12/2022 09:15 AM    BUN 10 05/12/2022 09:15 AM    CREATININE 1.35 05/12/2022 09:15 AM    GLUCOSE 182 05/12/2022 09:15 AM    CALCIUM 8.9 05/12/2022 09:15 AM     Lab Results   Component Value Date/Time    ALKPHOS 93 09/30/2020 10:46 AM    ALT 20 09/09/2021 10:10 AM    AST 18 09/09/2021 10:10 AM    PROT 7.9 09/30/2020 10:46 AM    BILITOT 0.27 09/30/2020 10:46 AM    BILIDIR <0.08 09/30/2020 10:46 AM    LABALBU 4.8 09/30/2020 10:46 AM     No results found for: AMYLASE  No results found for: LIPASE  No results found for: INR    Radiologic Studies:      Impressions/Recommendations:     Chronic reducible umbilical hernia. H/o stroke, non-ambulatory, COPD, DM. Detailed discussion with patient and family and aide. Discussed signs and symptoms for which to monitor regarding incarceration/strangulation. If it bothers him pain-wise on a daily basis then recommend open repair with mesh. Present for 9 years and has stayed the same size. Questions answered. They will call back if they want surgery. Wheelchair bound. [facility] for direct set up if they call back, 1 hour, general, open. Clearances. Thank you CARO Billingsley CNP for allowing me to participate in the care of your patients.      Jonas Day DO, MPH, [address] [place] [address], [address] [place] [address] Rd office [phone]  [place] office [phone]

## 2022-11-21 DIAGNOSIS — J44.9 COPD, SEVERE (HCC): ICD-10-CM

## 2022-11-21 RX ORDER — BUDESONIDE 0.5 MG/2ML
INHALANT ORAL
Qty: 120 ML | Refills: 11 | Status: SHIPPED | OUTPATIENT
Start: 2022-11-21

## 2022-11-23 RX ORDER — FOLIC ACID 1 MG/1
TABLET ORAL
Qty: 90 TABLET | Refills: 3 | Status: SHIPPED | OUTPATIENT
Start: 2022-11-23

## 2022-11-23 NOTE — TELEPHONE ENCOUNTER
Health Maintenance   Topic Date Due    DTaP/Tdap/Td vaccine (1 - Tdap) Never done    Diabetic retinal exam  07/25/2020    Diabetic foot exam  07/29/2021    Flu vaccine (1) 08/01/2022    Diabetic microalbuminuria test  09/09/2022    Lipids  09/09/2022    Shingles vaccine (1 of 2) 06/29/2023 (Originally 9/6/2011)    COVID-19 Vaccine (1) 06/29/2023 (Originally 3/6/1962)    Hepatitis C screen  06/29/2023 (Originally 9/6/1979)    HIV screen  06/29/2023 (Originally 9/6/1976)    A1C test (Diabetic or Prediabetic)  05/12/2023    Depression Monitoring  06/29/2023    Low dose CT lung screening  09/01/2023    Colorectal Cancer Screen  04/04/2024    Pneumococcal 0-64 years Vaccine  Completed    Hepatitis A vaccine  Aged Out    Hib vaccine  Aged Out    Meningococcal (ACWY) vaccine  Aged Out             (applicable per patient's age: Cancer Screenings, Depression Screening, Fall Risk Screening, Immunizations)    Hemoglobin A1C (%)   Date Value   05/12/2022 7.8 (H)   09/09/2021 5.9   01/29/2021 6.9 (H)     Microalb/Crt.  Ratio (mcg/mg creat)   Date Value   09/09/2021 CANNOT BE CALCULATED     LDL Cholesterol (mg/dL)   Date Value   09/09/2021 63     AST (U/L)   Date Value   09/09/2021 18     ALT (U/L)   Date Value   09/09/2021 20     BUN (mg/dL)   Date Value   05/12/2022 10      (goal A1C is < 7)   (goal LDL is <100) need 30-50% reduction from baseline     BP Readings from Last 3 Encounters:   06/29/22 128/74   10/28/21 131/70   10/27/21 132/80    (goal /80)      All Future Testing planned in [facility]:  Lab Frequency Next Occurrence   CBC with Auto Differential Once 12/26/2022   ALT Once 12/29/2022   AST Once 12/29/2022   Hemoglobin A1C Once 12/26/2022   Lipid Panel Once 12/26/2022   Microalbumin, Ur Once 12/91/5990   Basic Metabolic Panel Once 73/22/7489   PSA Screening Once 12/29/2022       Next Visit Date:  Future Appointments   Date Time Provider Jose White   12/29/2022 10:00 AM Harsha Adkins, APRN - CNP [facility]   3/15/2023  8:45 AM [facility]            Patient Active Problem List:     Gastroesophageal reflux disease     Allergic rhinitis     Controlled type 2 diabetes mellitus without complication, without long-term current use of insulin (HCC)     COPD, severe (HCC)     Dysthymia     Cerebral infarction (HCC)     Left-sided weakness     Lower leg edema

## 2022-11-30 DIAGNOSIS — E11.9 CONTROLLED TYPE 2 DIABETES MELLITUS WITHOUT COMPLICATION, WITHOUT LONG-TERM CURRENT USE OF INSULIN (HCC): ICD-10-CM

## 2022-11-30 DIAGNOSIS — K21.9 GASTROESOPHAGEAL REFLUX DISEASE WITHOUT ESOPHAGITIS: ICD-10-CM

## 2022-11-30 RX ORDER — PIOGLITAZONEHYDROCHLORIDE 15 MG/1
TABLET ORAL
Qty: 90 TABLET | Refills: 3 | Status: SHIPPED | OUTPATIENT
Start: 2022-11-30

## 2022-11-30 RX ORDER — OMEPRAZOLE 40 MG/1
CAPSULE, DELAYED RELEASE ORAL
Qty: 90 CAPSULE | Refills: 3 | Status: SHIPPED | OUTPATIENT
Start: 2022-11-30

## 2022-11-30 RX ORDER — CETIRIZINE HYDROCHLORIDE 10 MG/1
TABLET ORAL
Qty: 90 TABLET | Refills: 3 | Status: SHIPPED | OUTPATIENT
Start: 2022-11-30

## 2022-11-30 RX ORDER — BACLOFEN 10 MG/1
TABLET ORAL
Qty: 180 TABLET | Refills: 1 | Status: SHIPPED | OUTPATIENT
Start: 2022-11-30

## 2022-11-30 NOTE — TELEPHONE ENCOUNTER
Health Maintenance   Topic Date Due    DTaP/Tdap/Td vaccine (1 - Tdap) Never done    Diabetic retinal exam  07/25/2020    Diabetic foot exam  07/29/2021    Flu vaccine (1) 08/01/2022    Diabetic microalbuminuria test  09/09/2022    Lipids  09/09/2022    Shingles vaccine (1 of 2) 06/29/2023 (Originally 9/6/2011)    COVID-19 Vaccine (1) 06/29/2023 (Originally 3/6/1961)    Hepatitis C screen  06/29/2023 (Originally 9/6/1979)    HIV screen  06/29/2023 (Originally 9/6/1976)    A1C test (Diabetic or Prediabetic)  05/12/2023    Depression Monitoring  06/29/2023    Low dose CT lung screening  09/01/2023    Colorectal Cancer Screen  04/04/2024    Pneumococcal 0-64 years Vaccine  Completed    Hepatitis A vaccine  Aged Out    Hib vaccine  Aged Out    Meningococcal (ACWY) vaccine  Aged Out             (applicable per patient's age: Cancer Screenings, Depression Screening, Fall Risk Screening, Immunizations)    Hemoglobin A1C (%)   Date Value   05/12/2022 7.8 (H)   09/09/2021 5.9   01/29/2021 6.9 (H)     Microalb/Crt.  Ratio (mcg/mg creat)   Date Value   09/09/2021 CANNOT BE CALCULATED     LDL Cholesterol (mg/dL)   Date Value   09/09/2021 63     AST (U/L)   Date Value   09/09/2021 18     ALT (U/L)   Date Value   09/09/2021 20     BUN (mg/dL)   Date Value   05/12/2022 10      (goal A1C is < 7)   (goal LDL is <100) need 30-50% reduction from baseline     BP Readings from Last 3 Encounters:   06/29/22 128/74   10/28/21 131/70   10/27/21 132/80    (goal /80)      All Future Testing planned in [facility]:  Lab Frequency Next Occurrence   CBC with Auto Differential Once 12/26/2022   ALT Once 12/29/2022   AST Once 12/29/2022   Hemoglobin A1C Once 12/26/2022   Lipid Panel Once 12/26/2022   Microalbumin, Ur Once 24/54/5196   Basic Metabolic Panel Once 19/19/2536   PSA Screening Once 12/29/2022       Next Visit Date:  Future Appointments   Date Time Provider Jose White   12/29/2022 10:00 AM Carlene Adkins, APRN - CNP [facility]   3/15/2023  8:45 AM DO LIDYA Velazquez [facility]            Patient Active Problem List:     Gastroesophageal reflux disease     Allergic rhinitis     Controlled type 2 diabetes mellitus without complication, without long-term current use of insulin (HCC)     COPD, severe (HCC)     Dysthymia     Cerebral infarction (HCC)     Left-sided weakness     Lower leg edema

## 2022-12-21 ENCOUNTER — TELEPHONE (OUTPATIENT)
Dept: PRIMARY CARE CLINIC | Age: 62
End: 2022-12-21

## 2022-12-21 ENCOUNTER — HOSPITAL ENCOUNTER (OUTPATIENT)
Age: 62
Discharge: HOME | End: 2022-12-21
Payer: MEDICAID

## 2022-12-21 DIAGNOSIS — N28.9 DECREASED RENAL FUNCTION: Primary | ICD-10-CM

## 2022-12-21 DIAGNOSIS — Z12.5 SCREENING PSA (PROSTATE SPECIFIC ANTIGEN): ICD-10-CM

## 2022-12-21 DIAGNOSIS — E11.9 CONTROLLED TYPE 2 DIABETES MELLITUS WITHOUT COMPLICATION, WITHOUT LONG-TERM CURRENT USE OF INSULIN (HCC): ICD-10-CM

## 2022-12-21 LAB
ABSOLUTE EOS #: 0.24 K/UL (ref 0–0.44)
ABSOLUTE IMMATURE GRANULOCYTE: 0.11 K/UL (ref 0–0.3)
ABSOLUTE LYMPH #: 1.54 K/UL (ref 1.1–3.7)
ABSOLUTE MONO #: 0.33 K/UL (ref 0.1–1.2)
ALT SERPL-CCNC: 12 U/L (ref 5–41)
ANION GAP SERPL CALCULATED.3IONS-SCNC: 14 MMOL/L (ref 9–17)
AST SERPL-CCNC: 10 U/L
BASOPHILS # BLD: 0 % (ref 0–2)
BASOPHILS ABSOLUTE: 0.03 K/UL (ref 0–0.2)
BUN BLDV-MCNC: 16 MG/DL (ref 8–23)
BUN/CREAT BLD: 9 (ref 9–20)
CALCIUM SERPL-MCNC: 9.1 MG/DL (ref 8.6–10.4)
CHLORIDE BLD-SCNC: 99 MMOL/L (ref 98–107)
CHOLESTEROL/HDL RATIO: 7.7
CHOLESTEROL: 216 MG/DL
CO2: 26 MMOL/L (ref 20–31)
CREAT SERPL-MCNC: 1.84 MG/DL (ref 0.7–1.2)
EOSINOPHILS RELATIVE PERCENT: 3 % (ref 1–4)
ESTIMATED AVERAGE GLUCOSE: 137 MG/DL
GFR SERPL CREATININE-BSD FRML MDRD: 41 ML/MIN/1.73M2
GLUCOSE BLD-MCNC: 156 MG/DL (ref 70–99)
HBA1C MFR BLD: 6.4 % (ref 4–6)
HCT VFR BLD CALC: 37.6 % (ref 40.7–50.3)
HDLC SERPL-MCNC: 28 MG/DL
HEMOGLOBIN: 11.4 G/DL (ref 13–17)
IMMATURE GRANULOCYTES: 1 %
LDL CHOLESTEROL: 127 MG/DL (ref 0–130)
LYMPHOCYTES # BLD: 20 % (ref 24–43)
MCH RBC QN AUTO: 26 PG (ref 25.2–33.5)
MCHC RBC AUTO-ENTMCNC: 30.3 G/DL (ref 28.4–34.8)
MCV RBC AUTO: 85.8 FL (ref 82.6–102.9)
MONOCYTES # BLD: 4 % (ref 3–12)
NRBC AUTOMATED: 0 PER 100 WBC
PDW BLD-RTO: 16 % (ref 11.8–14.4)
PLATELET # BLD: 193 K/UL (ref 138–453)
PMV BLD AUTO: 9.6 FL (ref 8.1–13.5)
POTASSIUM SERPL-SCNC: 3.6 MMOL/L (ref 3.7–5.3)
PROSTATE SPECIFIC ANTIGEN: 1.53 NG/ML
RBC # BLD: 4.38 M/UL (ref 4.21–5.77)
SEG NEUTROPHILS: 72 % (ref 36–65)
SEGMENTED NEUTROPHILS ABSOLUTE COUNT: 5.39 K/UL (ref 1.5–8.1)
SODIUM BLD-SCNC: 139 MMOL/L (ref 135–144)
TRIGL SERPL-MCNC: 304 MG/DL
WBC # BLD: 7.6 K/UL (ref 3.5–11.3)

## 2022-12-21 PROCEDURE — 83036 HEMOGLOBIN GLYCOSYLATED A1C: CPT

## 2022-12-21 PROCEDURE — 36415 COLL VENOUS BLD VENIPUNCTURE: CPT

## 2022-12-21 PROCEDURE — 80061 LIPID PANEL: CPT

## 2022-12-21 PROCEDURE — 84460 ALANINE AMINO (ALT) (SGPT): CPT

## 2022-12-21 PROCEDURE — 85025 COMPLETE CBC W/AUTO DIFF WBC: CPT

## 2022-12-21 PROCEDURE — 82043 UR ALBUMIN QUANTITATIVE: CPT

## 2022-12-21 PROCEDURE — 82570 ASSAY OF URINE CREATININE: CPT

## 2022-12-21 PROCEDURE — G0103 PSA SCREENING: HCPCS

## 2022-12-21 PROCEDURE — 80048 BASIC METABOLIC PNL TOTAL CA: CPT

## 2022-12-21 PROCEDURE — 84450 TRANSFERASE (AST) (SGOT): CPT

## 2022-12-21 NOTE — TELEPHONE ENCOUNTER
----- Message from [address] [place] [address], APRN - CNP sent at 12/21/2022 10:02 AM EST -----  Kidney function has been worsening. I am referring him to nephrology for evaluation. Make sure he is drinking water routinely.

## 2022-12-22 DIAGNOSIS — R60.0 LOWER LEG EDEMA: ICD-10-CM

## 2022-12-22 LAB
CREATININE URINE: 113.3 MG/DL (ref 39–259)
MICROALBUMIN/CREAT 24H UR: 13 MG/L
MICROALBUMIN/CREAT UR-RTO: 11 MCG/MG CREAT

## 2022-12-22 RX ORDER — FUROSEMIDE 20 MG/1
TABLET ORAL
Qty: 180 TABLET | Refills: 3 | Status: SHIPPED | OUTPATIENT
Start: 2022-12-22

## 2022-12-22 NOTE — TELEPHONE ENCOUNTER
Health Maintenance   Topic Date Due    DTaP/Tdap/Td vaccine (1 - Tdap) Never done    Diabetic retinal exam  07/25/2020    Diabetic foot exam  07/29/2021    Flu vaccine (1) 08/01/2022    Shingles vaccine (1 of 2) 06/29/2023 (Originally 9/6/2011)    COVID-19 Vaccine (1) 06/29/2023 (Originally 3/6/1961)    Hepatitis C screen  06/29/2023 (Originally 9/6/1979)    HIV screen  06/29/2023 (Originally 9/6/1976)    Depression Monitoring  06/29/2023    Low dose CT lung screening  09/01/2023    A1C test (Diabetic or Prediabetic)  12/21/2023    Diabetic microalbuminuria test  12/21/2023    Lipids  12/21/2023    Colorectal Cancer Screen  04/04/2024    Pneumococcal 0-64 years Vaccine  Completed    Hepatitis A vaccine  Aged Out    Hib vaccine  Aged Out    Meningococcal (ACWY) vaccine  Aged Out             (applicable per patient's age: Cancer Screenings, Depression Screening, Fall Risk Screening, Immunizations)    Hemoglobin A1C (%)   Date Value   12/21/2022 6.4 (H)   05/12/2022 7.8 (H)   09/09/2021 5.9     Microalb/Crt.  Ratio (mcg/mg creat)   Date Value   12/21/2022 11     LDL Cholesterol (mg/dL)   Date Value   12/21/2022 127     AST (U/L)   Date Value   12/21/2022 10     ALT (U/L)   Date Value   12/21/2022 12     BUN (mg/dL)   Date Value   12/21/2022 16      (goal A1C is < 7)   (goal LDL is <100) need 30-50% reduction from baseline     BP Readings from Last 3 Encounters:   06/29/22 128/74   10/28/21 131/70   10/27/21 132/80    (goal /80)      All Future Testing planned in [facility]:  Lab Frequency Next Occurrence       Next Visit Date:  Future Appointments   Date Time Provider Jose White   1/6/2023  9:00 AM CARO Villagran - CNP [facility]   3/15/2023  8:45 AM Venkata Mayers DO [facility]            Patient Active Problem List:     Gastroesophageal reflux disease     Allergic rhinitis     Controlled type 2 diabetes mellitus without complication, without long-term current use of insulin (HCC)     COPD, severe (HCC)     Dysthymia     Cerebral infarction (HCC)     Left-sided weakness     Lower leg edema

## 2023-01-03 ENCOUNTER — TELEPHONE (OUTPATIENT)
Dept: PRIMARY CARE CLINIC | Age: 63
End: 2023-01-03

## 2023-01-04 DIAGNOSIS — E11.9 CONTROLLED TYPE 2 DIABETES MELLITUS WITHOUT COMPLICATION, WITHOUT LONG-TERM CURRENT USE OF INSULIN (HCC): ICD-10-CM

## 2023-01-04 RX ORDER — SERTRALINE HYDROCHLORIDE 100 MG/1
TABLET, FILM COATED ORAL
Qty: 180 TABLET | Refills: 3 | Status: SHIPPED | OUTPATIENT
Start: 2023-01-04

## 2023-01-06 ENCOUNTER — OFFICE VISIT (OUTPATIENT)
Dept: PRIMARY CARE CLINIC | Age: 63
End: 2023-01-06
Payer: MEDICAID

## 2023-01-06 VITALS
RESPIRATION RATE: 22 BRPM | TEMPERATURE: 97.7 F | DIASTOLIC BLOOD PRESSURE: 72 MMHG | HEART RATE: 78 BPM | SYSTOLIC BLOOD PRESSURE: 122 MMHG | OXYGEN SATURATION: 97 %

## 2023-01-06 DIAGNOSIS — J44.9 COPD, SEVERE (HCC): ICD-10-CM

## 2023-01-06 DIAGNOSIS — K21.9 GASTROESOPHAGEAL REFLUX DISEASE WITHOUT ESOPHAGITIS: ICD-10-CM

## 2023-01-06 DIAGNOSIS — E11.9 CONTROLLED TYPE 2 DIABETES MELLITUS WITHOUT COMPLICATION, WITHOUT LONG-TERM CURRENT USE OF INSULIN (HCC): Primary | ICD-10-CM

## 2023-01-06 DIAGNOSIS — N28.9 DECREASED RENAL FUNCTION: ICD-10-CM

## 2023-01-06 PROCEDURE — 99214 OFFICE O/P EST MOD 30 MIN: CPT | Performed by: NURSE PRACTITIONER

## 2023-01-06 RX ORDER — GABAPENTIN 300 MG/1
CAPSULE ORAL
Qty: 180 CAPSULE | Refills: 0 | Status: SHIPPED | OUTPATIENT
Start: 2023-01-06 | End: 2023-04-23

## 2023-01-06 ASSESSMENT — PATIENT HEALTH QUESTIONNAIRE - PHQ9
3. TROUBLE FALLING OR STAYING ASLEEP: 0
10. IF YOU CHECKED OFF ANY PROBLEMS, HOW DIFFICULT HAVE THESE PROBLEMS MADE IT FOR YOU TO DO YOUR WORK, TAKE CARE OF THINGS AT HOME, OR GET ALONG WITH OTHER PEOPLE: 0
2. FEELING DOWN, DEPRESSED OR HOPELESS: 0
SUM OF ALL RESPONSES TO PHQ9 QUESTIONS 1 & 2: 0
SUM OF ALL RESPONSES TO PHQ QUESTIONS 1-9: 0
9. THOUGHTS THAT YOU WOULD BE BETTER OFF DEAD, OR OF HURTING YOURSELF: 0
SUM OF ALL RESPONSES TO PHQ QUESTIONS 1-9: 0
8. MOVING OR SPEAKING SO SLOWLY THAT OTHER PEOPLE COULD HAVE NOTICED. OR THE OPPOSITE, BEING SO FIGETY OR RESTLESS THAT YOU HAVE BEEN MOVING AROUND A LOT MORE THAN USUAL: 0
5. POOR APPETITE OR OVEREATING: 0
1. LITTLE INTEREST OR PLEASURE IN DOING THINGS: 0
4. FEELING TIRED OR HAVING LITTLE ENERGY: 0
SUM OF ALL RESPONSES TO PHQ QUESTIONS 1-9: 0
SUM OF ALL RESPONSES TO PHQ QUESTIONS 1-9: 0
6. FEELING BAD ABOUT YOURSELF - OR THAT YOU ARE A FAILURE OR HAVE LET YOURSELF OR YOUR FAMILY DOWN: 0
7. TROUBLE CONCENTRATING ON THINGS, SUCH AS READING THE NEWSPAPER OR WATCHING TELEVISION: 0

## 2023-01-06 ASSESSMENT — ENCOUNTER SYMPTOMS
FREQUENT THROAT CLEARING: 0
SHORTNESS OF BREATH: 0
SPUTUM PRODUCTION: 1
WHEEZING: 0
DIFFICULTY BREATHING: 0
VOMITING: 0
HEARTBURN: 1
NAUSEA: 0
BELCHING: 0
HEMOPTYSIS: 0
CHEST TIGHTNESS: 0
TROUBLE SWALLOWING: 0
DIARRHEA: 0
CONSTIPATION: 0
RHINORRHEA: 0
CHOKING: 0
SORE THROAT: 0
COUGH: 0
ABDOMINAL PAIN: 0
HOARSE VOICE: 0
GLOBUS SENSATION: 0

## 2023-01-06 ASSESSMENT — COPD QUESTIONNAIRES: COPD: 1

## 2023-01-06 NOTE — PROGRESS NOTES
Name: Swati Baer. : 1960         Chief Complaint:     Chief Complaint   Patient presents with    Diabetes     Routine check. Gastroesophageal Reflux    COPD       History of Present Illness:      Swati Baer. is a 65 y.o.  male who presents with Diabetes (Routine check.), Gastroesophageal Reflux, and COPD      Kita Brizuela is here today with his wife and caregiver for a routine office visit. Decreased renal function-creatinine was a little concerning at 1.85. This is a jump from 1.3. We did refer to nephrology. The family have increased his water intake since being made aware of the results. Diabetes  He presents for his follow-up diabetic visit. He has type 2 diabetes mellitus. Onset time: YEARS. His disease course has been stable. Hypoglycemia symptoms include speech difficulty (chronic). Pertinent negatives for hypoglycemia include no dizziness, headaches or sweats. Pertinent negatives for diabetes include no chest pain, no fatigue, no foot ulcerations, no polydipsia, no polyphagia, no polyuria and no weight loss. There are no hypoglycemic complications. Symptoms are stable. Diabetic complications include a CVA and peripheral neuropathy. Pertinent negatives for diabetic complications include no heart disease. Risk factors for coronary artery disease include diabetes mellitus, male sex and sedentary lifestyle. Current diabetic treatment includes oral agent (monotherapy). He is compliant with treatment all of the time. His weight is stable. He is following a generally healthy diet. When asked about meal planning, he reported none. He has had a previous visit with a dietitian. He never participates in exercise. There is no change in his home blood glucose trend. His breakfast blood glucose range is generally 130-140 mg/dl. An ACE inhibitor/angiotensin II receptor blocker is being taken. He does not see a podiatrist.Eye exam is not current. Gastroesophageal Reflux  He complains of heartburn. He reports no abdominal pain, no belching, no chest pain, no choking, no coughing, no early satiety, no globus sensation, no hoarse voice, no nausea, no sore throat or no wheezing. This is a chronic problem. The current episode started more than 1 year ago. The problem occurs rarely. The problem has been unchanged. The heartburn duration is less than a minute. The heartburn is located in the substernum. The heartburn is of mild intensity. The heartburn does not wake him from sleep. The heartburn does not limit his activity. The heartburn doesn't change with position. The symptoms are aggravated by certain foods and caffeine. Pertinent negatives include no fatigue, melena, orthopnea or weight loss. Risk factors include lack of exercise and caffeine use. He has tried a PPI for the symptoms. The treatment provided significant relief. Past procedures include an EGD. Past procedures do not include a UGI. Past invasive treatments do not include gastroplasty, gastroplication or reflux surgery. COPD  He complains of sputum production. There is no chest tightness, cough, difficulty breathing, frequent throat clearing, hemoptysis, hoarse voice, shortness of breath or wheezing. This is a chronic problem. The current episode started more than 1 year ago. The problem occurs intermittently. The problem has been unchanged. Associated symptoms include dyspnea on exertion and heartburn. Pertinent negatives include no appetite change, chest pain, ear congestion, ear pain, fever, headaches, malaise/fatigue, myalgias, nasal congestion, orthopnea, PND, postnasal drip, rhinorrhea, sneezing, sore throat, sweats, trouble swallowing or weight loss. His symptoms are aggravated by URI, exposure to smoke, exposure to fumes and change in weather. His symptoms are alleviated by beta-agonist, rest and steroid inhaler. He reports significant improvement on treatment.  His symptoms are not alleviated by rest. Risk factors for lung disease include smoking/tobacco exposure. His past medical history is significant for asthma, bronchitis, COPD, emphysema and pneumonia. There is no history of bronchiectasis. Past Medical History:     Past Medical History:   Diagnosis Date    Acid reflux     Asthma     Cerebrovascular disease     Chronic cough     Controlled type 2 diabetes mellitus without complication, without long-term current use of insulin [facility] 02/09/2018    COPD (chronic obstructive pulmonary disease) (HCC)     Dysphagia     Dyspnea     Type II or unspecified type diabetes mellitus without mention of complication, not stated as uncontrolled     Unspecified cerebral artery occlusion with cerebral infarction 11/05/2008    Wheelchair dependent       Reviewed all health maintenance requirements and ordered appropriate tests  Health Maintenance Due   Topic Date Due    Diabetic Alb to Cr ratio (uACR) test  Never done       Past Surgical History:     Past Surgical History:   Procedure Laterality Date    BRAIN SURGERY      CRANIOTOMY      GASTROSTOMY TUBE PLACEMENT      removed    KNEE SURGERY          Medications:       Prior to Admission medications    Medication Sig Start Date End Date Taking?  Authorizing Provider   gabapentin (NEURONTIN) 300 MG capsule Take 1 capsule BID 1/6/23 4/23/23 Yes Adrian MANZO Might, APRN - CNP   metFORMIN (GLUCOPHAGE) 1000 MG tablet TAKE ONE TABLET BY MOUTH TWICE A DAY 1/4/23  Yes [place] Setting Might, APRN - CNP   sertraline (ZOLOFT) 100 MG tablet TAKE TWO TABLETS BY MOUTH DAILY 1/4/23  Yes [place] Setting Might, APRN - CNP   furosemide (LASIX) 20 MG tablet TAKE ONE TABLET BY MOUTH TWICE A DAY 12/22/22  Yes [place] Setting Might, APRN - CNP   cetirizine (ZYRTEC) 10 MG tablet TAKE ONE TABLET BY MOUTH DAILY 11/30/22  Yes [place] Might, APRN - CNP   omeprazole (PRILOSEC) 40 MG delayed release capsule TAKE ONE CAPSULE BY MOUTH DAILY 11/30/22  Yes [place] Setting Might, APRN - CNP   pioglitazone (ACTOS) 15 MG tablet TAKE ONE TABLET BY MOUTH DAILY 11/30/22  Yes [place] Lucille, APRN - CNP   baclofen (LIORESAL) 10 MG tablet TAKE ONE TABLET BY MOUTH TWICE A DAY 11/30/22  Yes CARO Trejo CNP   folic acid (FOLVITE) 1 MG tablet TAKE ONE TABLET BY MOUTH DAILY 11/23/22  Yes CARO Trejo CNP   budesonide (PULMICORT) 0.5 MG/2ML nebulizer suspension USE ONE VIAL VIA NEBULIZER MACHINE TWO TIMES DAILY 11/21/22  Yes Golden Rodriguez, DO   levETIRAcetam (KEPPRA) 500 MG tablet TAKE 1/2 TABLET BY MOUTH TWO TIMES A DAY 11/9/22  Yes CARO Hatfield CNPc. Devices [facility] MISC 1 each by Does not apply route daily WITH ELEVATING LEG RESTS 7/18/22  Yes CARO Trejo CNP   ipratropium-albuterol (DUONEB) 0.5-2.5 (3) MG/3ML SOLN nebulizer solution INHALE 3 MLS PER NEBULIZER INTO THE LUNGS EVERY 6 HOURS 10/28/21  Yes Golden Rodriguez, DO   blood glucose test strips (TRUE METRIX BLOOD GLUCOSE TEST) strip As needed. 10/27/21  Yes CARO Trejo CNP   simvastatin (ZOCOR) 20 MG tablet Take 1 tablet by mouth nightly 10/27/21  Yes CARO Trejo CNP   Multiple Vitamins-Minerals ([employer] SPECTRAVITE ADVANCED) TABS TAKE 1 TABLET BY MOUTH EVERY DAY 10/27/21  Yes CARO Trejo CNP   Lancets MISC 1 each by Does not apply route daily DISPENSE BRAND COMPATIBLE WITH METER AND STRIPS TRUE METRIX 10/27/21  Yes CARO Trejo CNP   fluticasone (FLONASE) 50 MCG/ACT nasal spray INSTILL 2 SPRAYS INTO EACH NOSTRIL EVERY DAY 9/15/21  Yes Eva Reeder MD   Misc. Devices (ADJUST BATH/SHOWER SEAT/BACK) MISC 1 each by Does not apply route daily 10/17/19  Yes CARO Trejo CNP. Devices (COMMODE BEDSIDE) MISC 1 each by Does not apply route daily 10/17/19  Yes CARO Trejo CNP   Misc. Devices (NOVA CUSHION GEL SEAT PAD) MISC 1 each by Does not apply route daily 3/28/19  Yes CARO Trejo CNP   blood glucose monitor kit and supplies 1 kit by Other route daily Test 1 times a day & as needed for symptoms of irregular blood glucose. 8/6/18  Yes CARO Stewart CNP   [facility] ASPIRIN CHILD 81 MG chewable tablet TAKE 1 TABLET BY MOUTH DAILY 6/28/17  Yes CARO Welch CNP   lisinopril (PRINIVIL;ZESTRIL) 5 MG tablet Take 5 mg by mouth daily   Yes Historical Provider, MD   senna (SENOKOT) 8.6 MG TABS tablet TAKE 1 TABLET BY MOUTH TWICE DAILY  Patient not taking: Reported on 1/6/2023 11/17/17   CARO Welch CNP        Allergies:       Pcn [penicillins], Morphine, and Sulfa antibiotics    Social History:     Tobacco:    reports that he quit smoking about 15 years ago. His smoking use included cigarettes. He has a 45.00 pack-year smoking history. He has never used smokeless tobacco.  Alcohol:      reports no history of alcohol use. Drug Use:  reports no history of drug use. Family History:     Family History   Problem Relation Age of Onset    Cancer Mother     Diabetes Mother     Heart Failure Father     Diabetes Father     Diabetes Sister        Review of Systems:     Positive and Negative as described in HPI    Review of Systems   Constitutional:  Negative for appetite change, chills, fatigue, fever, malaise/fatigue and weight loss. HENT:  Negative for congestion, ear pain, hoarse voice, postnasal drip, rhinorrhea, sneezing, sore throat and trouble swallowing. Eyes:  Negative for visual disturbance. Respiratory:  Positive for sputum production. Negative for cough, hemoptysis, choking, shortness of breath and wheezing. Cardiovascular:  Positive for dyspnea on exertion. Negative for chest pain, palpitations and PND. Gastrointestinal:  Positive for heartburn. Negative for abdominal pain, constipation, diarrhea, melena, nausea and vomiting. Endocrine: Negative for polydipsia, polyphagia and polyuria. Genitourinary:  Negative for difficulty urinating and dysuria. Musculoskeletal:  Positive for gait problem (chronic). Negative for myalgias, neck pain and neck stiffness. Skin:  Negative for rash.    Neurological: Positive for speech difficulty (chronic). Negative for dizziness, syncope, light-headedness and headaches. Physical Exam:   Vitals:  /72 (Position: Sitting)   Pulse 78   Temp 97.7 °F (36.5 °C) (Temporal)   Resp 22   SpO2 97%     Physical Exam  Vitals and nursing note reviewed. Constitutional:       General: He is not in acute distress. Appearance: Normal appearance. He is well-developed. He is not ill-appearing. HENT:      Mouth/Throat:      Mouth: Mucous membranes are moist.      Dentition: Abnormal dentition (poor). Pharynx: No posterior oropharyngeal erythema. Eyes:      General: No scleral icterus. Conjunctiva/sclera: Conjunctivae normal.   Cardiovascular:      Rate and Rhythm: Normal rate and regular rhythm. Heart sounds: Normal heart sounds. No murmur heard. Pulmonary:      Effort: Pulmonary effort is normal.      Breath sounds: Normal breath sounds. No wheezing or rales. Abdominal:      General: Bowel sounds are normal. There is no distension. Palpations: Abdomen is soft. Tenderness: There is no abdominal tenderness. Musculoskeletal:      Cervical back: Normal range of motion and neck supple. Right lower leg: No edema. Left lower leg: Edema (trace pitting) present. Lymphadenopathy:      Cervical: No cervical adenopathy. Skin:     General: Skin is warm and dry. Findings: No rash. Neurological:      Mental Status: He is alert and oriented to person, place, and time. Mental status is at baseline. Coordination: Coordination abnormal (left hemiplegia).    Psychiatric:         Mood and Affect: Mood normal.         Behavior: Behavior normal.       Data:     Lab Results   Component Value Date/Time     12/21/2022 09:14 AM    K 3.6 12/21/2022 09:14 AM    CL 99 12/21/2022 09:14 AM    CO2 26 12/21/2022 09:14 AM    BUN 16 12/21/2022 09:14 AM    CREATININE 1.84 12/21/2022 09:14 AM    GLUCOSE 156 12/21/2022 09:14 AM    PROT 7.9 09/30/2020 10:46 AM    LABALBU 4.8 09/30/2020 10:46 AM    BILITOT 0.27 09/30/2020 10:46 AM    ALKPHOS 93 09/30/2020 10:46 AM    AST 10 12/21/2022 09:14 AM    ALT 12 12/21/2022 09:14 AM     Lab Results   Component Value Date/Time    WBC 7.6 12/21/2022 09:14 AM    RBC 4.38 12/21/2022 09:14 AM    HGB 11.4 12/21/2022 09:14 AM    HCT 37.6 12/21/2022 09:14 AM    MCV 85.8 12/21/2022 09:14 AM    MCH 26.0 12/21/2022 09:14 AM    MCHC 30.3 12/21/2022 09:14 AM    RDW 16.0 12/21/2022 09:14 AM     12/21/2022 09:14 AM    MPV 9.6 12/21/2022 09:14 AM     Lab Results   Component Value Date/Time    TSH 2.65 05/23/2014 08:44 AM     Lab Results   Component Value Date/Time    CHOL 216 12/21/2022 09:15 AM    HDL 28 12/21/2022 09:15 AM    PSA 1.53 12/21/2022 09:14 AM    LABA1C 6.4 12/21/2022 09:14 AM       Assessment/Plan:      Diagnosis Orders   1. Controlled type 2 diabetes mellitus without complication, without long-term current use of insulin (HCC)  Basic Metabolic Panel    Hemoglobin A1C      2. Gastroesophageal reflux disease without esophagitis        3. COPD, severe ([address]        4. Decreased renal function  Basic Metabolic Panel        We will continue all current medications. A1c is stable. We will recheck kidney function next week and follow-up with results. He does have an appointment with nephrology in March. We will see him back in 6 months with repeat labs, sooner if any issues. 1.  Helene Reynoso received counseling on the following healthy behaviors: nutrition, exercise, and medication adherence  2. Patient given educational materials - see patient instructions  3. Was a self-tracking handout given in paper form or via [employer]? No  If yes, see orders or list here. 4.  Discussed use, benefit, and side effects of prescribed medications. Barriers to medication compliance addressed. All patient questions answered. Pt voiced understanding. 5.  Reviewed prior labs and health maintenance  6.   Continue current medications, diet and exercise. Completed Refills   Requested Prescriptions     Signed Prescriptions Disp Refills    gabapentin (NEURONTIN) 300 MG capsule 180 capsule 0     Sig: Take 1 capsule BID         Return in about 6 months (around 7/6/2023) for Check up.

## 2023-01-06 NOTE — PATIENT INSTRUCTIONS
SURVEY:     You may be receiving a survey from [employer] regarding your visit today. Please complete the survey to enable us to provide the highest quality of care to you and your family. If you cannot score us a very good on any question, please call the office to discuss how we could have made your experience a very good one.      Thank you,    Anaid Adkins, APRN-SILVINO Meza, APRN-CNP  ADILENE Cheek, HUY Harrison, HUY Noel, CMA  Priscilla, ROSALIND Lopez, PM

## 2023-02-07 ENCOUNTER — HOSPITAL ENCOUNTER (OUTPATIENT)
Age: 63
Discharge: HOME | End: 2023-02-07
Payer: MEDICAID

## 2023-02-07 ENCOUNTER — TELEPHONE (OUTPATIENT)
Dept: PRIMARY CARE CLINIC | Age: 63
End: 2023-02-07

## 2023-02-07 DIAGNOSIS — N28.9 DECREASED RENAL FUNCTION: ICD-10-CM

## 2023-02-07 LAB
ANION GAP SERPL CALCULATED.3IONS-SCNC: 11 MMOL/L (ref 9–17)
BUN SERPL-MCNC: 17 MG/DL (ref 8–23)
BUN/CREAT BLD: 9 (ref 9–20)
CALCIUM SERPL-MCNC: 9 MG/DL (ref 8.6–10.4)
CHLORIDE SERPL-SCNC: 99 MMOL/L (ref 98–107)
CO2 SERPL-SCNC: 29 MMOL/L (ref 20–31)
CREAT SERPL-MCNC: 1.89 MG/DL (ref 0.7–1.2)
GFR SERPL CREATININE-BSD FRML MDRD: 40 ML/MIN/1.73M2
GLUCOSE SERPL-MCNC: 165 MG/DL (ref 70–99)
POTASSIUM SERPL-SCNC: 4.3 MMOL/L (ref 3.7–5.3)
SODIUM SERPL-SCNC: 139 MMOL/L (ref 135–144)

## 2023-02-07 PROCEDURE — 80048 BASIC METABOLIC PNL TOTAL CA: CPT

## 2023-02-07 PROCEDURE — 36415 COLL VENOUS BLD VENIPUNCTURE: CPT

## 2023-02-07 NOTE — TELEPHONE ENCOUNTER
----- Message from [address] [facility], APRN - CNP sent at 2/7/2023  1:12 PM EST -----  Kidney function is about the same. Keep well-hydrated and follow-up with nephrology as planned. Thank you.

## 2023-03-19 NOTE — PROGRESS NOTES
Subjective:      Patient ID: Isabel Clements. is a 65 y.o. male. HPI  Patient here for follow-up for severe COPD and smoking. Last seen in office per Dr. Anna Castillo in October 2021     Patient has a history of left hemiparesis secondary to CVA, uses a wheelchair for mobility. Patient accompanied by family member. Family member states that patient was seen last year in September 2022, I am unable to find any chart records. Patient had CT lung screening done in September 2022. He is no longer eligible, quit smoking 15 years ago. Denies any changes to his shortness of breath, cough, sputum production. No significant weight changes. Family member significant other notes that he is more short of breath with activity, not currently participating in therapy, discussed deconditioning and the impact on breathing with patient and significant other, encouraged him to be as active as possible. Family member indicates that summer is easier to work on his activity. No compliance data available for my review       Medications:   DuoNeb: 3-4 times daily  Budesonide twice daily:        PRIOR WORKUP:  PFT:  PFT 2/26/2013: Spirometry demonstrates a severe obstructive ventilatory defect with a significant bronchospastic component identified. Final impression consistent with clinical diagnosis of COPD. CT Imaging:  CT lung screening 9/1/2022: No mediastinal or hilar adenopathy. Mild centrilobular emphysema. Mild subsegmental atelectasis in the posterior left lung base. No significant nodules or masses. No focal consolidation, pleural effusion or pneumothorax. Chest x-ray 9/15/2020: Generalized interstitial thickening suggestive of chronic lung disease. No focal airspace consolidation, sizable pleural effusion or pneumothorax. CT Chest Imaging 8/7/2017: Negative for pulmonary nodules. Panlobular emphysema with right apical bulla. Low lung volumes with bilateral linear scarring/atelectasis.         Sleep

## 2023-03-27 ENCOUNTER — OFFICE VISIT (OUTPATIENT)
Dept: PULMONOLOGY | Age: 63
End: 2023-03-27
Payer: MEDICAID

## 2023-03-27 VITALS
WEIGHT: 183 LBS | TEMPERATURE: 97.4 F | RESPIRATION RATE: 20 BRPM | BODY MASS INDEX: 29.41 KG/M2 | HEART RATE: 92 BPM | DIASTOLIC BLOOD PRESSURE: 75 MMHG | HEIGHT: 66 IN | OXYGEN SATURATION: 90 % | SYSTOLIC BLOOD PRESSURE: 137 MMHG

## 2023-03-27 DIAGNOSIS — Z28.21 COVID-19 VACCINATION REFUSED: ICD-10-CM

## 2023-03-27 DIAGNOSIS — G47.33 OSA ON CPAP: Primary | ICD-10-CM

## 2023-03-27 DIAGNOSIS — I63.9 CEREBROVASCULAR ACCIDENT (CVA), UNSPECIFIED MECHANISM (HCC): ICD-10-CM

## 2023-03-27 DIAGNOSIS — J44.9 COPD, SEVERE (HCC): ICD-10-CM

## 2023-03-27 DIAGNOSIS — Z87.891 PERSONAL HISTORY OF TOBACCO USE: ICD-10-CM

## 2023-03-27 DIAGNOSIS — Z99.89 OSA ON CPAP: Primary | ICD-10-CM

## 2023-03-27 DIAGNOSIS — J30.89 PERENNIAL ALLERGIC RHINITIS: ICD-10-CM

## 2023-03-27 PROCEDURE — 99214 OFFICE O/P EST MOD 30 MIN: CPT | Performed by: NURSE PRACTITIONER

## 2023-03-27 RX ORDER — IPRATROPIUM BROMIDE AND ALBUTEROL SULFATE 2.5; .5 MG/3ML; MG/3ML
SOLUTION RESPIRATORY (INHALATION)
Qty: 1080 ML | Refills: 3 | Status: SHIPPED | OUTPATIENT
Start: 2023-03-27

## 2023-03-27 RX ORDER — BUDESONIDE 0.5 MG/2ML
INHALANT ORAL
Qty: 60 ML | Refills: 11 | Status: SHIPPED | OUTPATIENT
Start: 2023-03-27

## 2023-03-27 ASSESSMENT — ENCOUNTER SYMPTOMS
EYES NEGATIVE: 1
ALLERGIC/IMMUNOLOGIC NEGATIVE: 1
GASTROINTESTINAL NEGATIVE: 1

## 2023-03-27 NOTE — PATIENT INSTRUCTIONS
SURVEY:    You may be receiving a survey from [employer] regarding your visit today. Please complete the survey to enable us to provide the highest quality of care to you and your family. If you cannot score us a very good on any question, please call the office to discuss how we could have made your experience a very good one. Thank you.

## 2023-04-14 ENCOUNTER — OFFICE VISIT (OUTPATIENT)
Dept: NEPHROLOGY | Age: 63
End: 2023-04-14
Payer: MEDICAID

## 2023-04-14 VITALS
WEIGHT: 183 LBS | SYSTOLIC BLOOD PRESSURE: 134 MMHG | HEART RATE: 93 BPM | HEIGHT: 66 IN | BODY MASS INDEX: 29.41 KG/M2 | DIASTOLIC BLOOD PRESSURE: 74 MMHG | RESPIRATION RATE: 18 BRPM | TEMPERATURE: 98.3 F

## 2023-04-14 DIAGNOSIS — I10 HTN (HYPERTENSION), BENIGN: ICD-10-CM

## 2023-04-14 DIAGNOSIS — N18.32 STAGE 3B CHRONIC KIDNEY DISEASE (HCC): Primary | ICD-10-CM

## 2023-04-14 DIAGNOSIS — I10 ESSENTIAL HYPERTENSION: ICD-10-CM

## 2023-04-14 PROCEDURE — 99204 OFFICE O/P NEW MOD 45 MIN: CPT | Performed by: INTERNAL MEDICINE

## 2023-04-14 PROCEDURE — 3074F SYST BP LT 130 MM HG: CPT | Performed by: INTERNAL MEDICINE

## 2023-04-14 PROCEDURE — 99214 OFFICE O/P EST MOD 30 MIN: CPT | Performed by: INTERNAL MEDICINE

## 2023-04-14 PROCEDURE — 3078F DIAST BP <80 MM HG: CPT | Performed by: INTERNAL MEDICINE

## 2023-04-21 ENCOUNTER — OFFICE VISIT (OUTPATIENT)
Dept: PRIMARY CARE CLINIC | Age: 63
End: 2023-04-21
Payer: MEDICAID

## 2023-04-21 VITALS
HEART RATE: 89 BPM | OXYGEN SATURATION: 97 % | RESPIRATION RATE: 18 BRPM | HEIGHT: 66 IN | WEIGHT: 165.3 LBS | SYSTOLIC BLOOD PRESSURE: 136 MMHG | DIASTOLIC BLOOD PRESSURE: 68 MMHG | TEMPERATURE: 97.9 F | BODY MASS INDEX: 26.57 KG/M2

## 2023-04-21 DIAGNOSIS — L02.611 TOE ABSCESS, RIGHT: Primary | ICD-10-CM

## 2023-04-21 PROCEDURE — 99213 OFFICE O/P EST LOW 20 MIN: CPT | Performed by: NURSE PRACTITIONER

## 2023-04-21 RX ORDER — DOXYCYCLINE HYCLATE 100 MG
100 TABLET ORAL 2 TIMES DAILY
Qty: 20 TABLET | Refills: 0 | Status: SHIPPED | OUTPATIENT
Start: 2023-04-21 | End: 2023-05-01

## 2023-04-21 SDOH — ECONOMIC STABILITY: FOOD INSECURITY: WITHIN THE PAST 12 MONTHS, THE FOOD YOU BOUGHT JUST DIDN'T LAST AND YOU DIDN'T HAVE MONEY TO GET MORE.: NEVER TRUE

## 2023-04-21 SDOH — ECONOMIC STABILITY: HOUSING INSECURITY
IN THE LAST 12 MONTHS, WAS THERE A TIME WHEN YOU DID NOT HAVE A STEADY PLACE TO SLEEP OR SLEPT IN A SHELTER (INCLUDING NOW)?: NO

## 2023-04-21 SDOH — ECONOMIC STABILITY: FOOD INSECURITY: WITHIN THE PAST 12 MONTHS, YOU WORRIED THAT YOUR FOOD WOULD RUN OUT BEFORE YOU GOT MONEY TO BUY MORE.: NEVER TRUE

## 2023-04-21 SDOH — ECONOMIC STABILITY: INCOME INSECURITY: HOW HARD IS IT FOR YOU TO PAY FOR THE VERY BASICS LIKE FOOD, HOUSING, MEDICAL CARE, AND HEATING?: NOT HARD AT ALL

## 2023-04-21 NOTE — PROGRESS NOTES
12 12/21/2022 09:14 AM     Lab Results   Component Value Date/Time    WBC 7.6 12/21/2022 09:14 AM    RBC 4.38 12/21/2022 09:14 AM    HGB 11.4 12/21/2022 09:14 AM    HCT 37.6 12/21/2022 09:14 AM    MCV 85.8 12/21/2022 09:14 AM    MCH 26.0 12/21/2022 09:14 AM    MCHC 30.3 12/21/2022 09:14 AM    RDW 16.0 12/21/2022 09:14 AM     12/21/2022 09:14 AM    MPV 9.6 12/21/2022 09:14 AM     Lab Results   Component Value Date/Time    TSH 2.65 05/23/2014 08:44 AM     Lab Results   Component Value Date/Time    CHOL 216 12/21/2022 09:15 AM    HDL 28 12/21/2022 09:15 AM    PSA 1.53 12/21/2022 09:14 AM    LABA1C 6.4 12/21/2022 09:14 AM       Assessment/Plan:      Diagnosis Orders   1. Toe abscess, right  mupirocin (BACTROBAN) 2 % ointment    doxycycline hyclate (VIBRA-TABS) 100 MG tablet        Medications a directed. Stop peroxide. Keep clean and covered. Call if any issues, sooner if worsening. 1.  Luis Baer received counseling on the following healthy behaviors: medication adherence  2. Patient given educational materials - see patient instructions  3. Was a self-tracking handout given in paper form or via [employer]? No  If yes, see orders or list here. 4.  Discussed use, benefit, and side effects of prescribed medications. Barriers to medication compliance addressed. All patient questions answered. Pt voiced understanding. 5.  Reviewed prior labs and health maintenance  6. Continue current medications, diet and exercise. Completed Refills   Requested Prescriptions     Signed Prescriptions Disp Refills    mupirocin (BACTROBAN) 2 % ointment 15 g 0     Sig: Apply topically 3 times daily. doxycycline hyclate (VIBRA-TABS) 100 MG tablet 20 tablet 0     Sig: Take 1 tablet by mouth 2 times daily for 10 days         Return if symptoms worsen or fail to improve.

## 2023-04-21 NOTE — PATIENT INSTRUCTIONS
SURVEY:     You may be receiving a survey from [employer] regarding your visit today. Please complete the survey to enable us to provide the highest quality of care to you and your family. If you cannot score us a very good on any question, please call the office to discuss how we could have made your experience a very good one.      Thank you,    Pepe Adkins, APRN-SILVINO Myers, APRN-CNP  Kristyn Sanchez, LONAN  Valerio Escobar, HUY Wong, CMA  Sadie, CMA  Priscilla, PCA  Jessica, PM

## 2023-04-22 ASSESSMENT — ENCOUNTER SYMPTOMS
NAUSEA: 0
COUGH: 0
VOMITING: 0

## 2023-04-26 ENCOUNTER — HOSPITAL ENCOUNTER (OUTPATIENT)
Age: 63
Discharge: HOME | End: 2023-04-26
Payer: MEDICAID

## 2023-04-26 ENCOUNTER — HOSPITAL ENCOUNTER (OUTPATIENT)
Dept: ULTRASOUND IMAGING | Age: 63
Discharge: HOME | End: 2023-04-28
Payer: MEDICAID

## 2023-04-26 DIAGNOSIS — I10 HTN (HYPERTENSION), BENIGN: ICD-10-CM

## 2023-04-26 DIAGNOSIS — N18.32 STAGE 3B CHRONIC KIDNEY DISEASE (HCC): ICD-10-CM

## 2023-04-26 DIAGNOSIS — I10 ESSENTIAL HYPERTENSION: ICD-10-CM

## 2023-04-26 LAB
ALBUMIN SERPL-MCNC: 4.3 G/DL (ref 3.5–5.2)
ALBUMIN/GLOBULIN RATIO: 1.1 (ref 1–2.5)
ALP SERPL-CCNC: 88 U/L (ref 40–129)
ALT SERPL-CCNC: 19 U/L (ref 5–41)
ANION GAP SERPL CALCULATED.3IONS-SCNC: 13 MMOL/L (ref 9–17)
AST SERPL-CCNC: 24 U/L
BILIRUB SERPL-MCNC: 0.2 MG/DL (ref 0.3–1.2)
BILIRUBIN URINE: NEGATIVE
BUN SERPL-MCNC: 20 MG/DL (ref 8–23)
BUN/CREAT BLD: 11 (ref 9–20)
CALCIUM SERPL-MCNC: 9.8 MG/DL (ref 8.6–10.4)
CHLORIDE SERPL-SCNC: 108 MMOL/L (ref 98–107)
CO2 SERPL-SCNC: 23 MMOL/L (ref 20–31)
COLOR: YELLOW
CREAT SERPL-MCNC: 1.9 MG/DL (ref 0.7–1.2)
CREATININE URINE: 168 MG/DL (ref 39–259)
CREATININE URINE: 168.6 MG/DL (ref 39–259)
EPITHELIAL CELLS UA: NORMAL /HPF (ref 0–5)
GFR SERPL CREATININE-BSD FRML MDRD: 40 ML/MIN/1.73M2
GLUCOSE SERPL-MCNC: 122 MG/DL (ref 70–99)
GLUCOSE UR STRIP.AUTO-MCNC: NEGATIVE MG/DL
HCT VFR BLD AUTO: 38.7 % (ref 40.7–50.3)
HGB BLD-MCNC: 11.5 G/DL (ref 13–17)
KETONES UR STRIP.AUTO-MCNC: NEGATIVE MG/DL
LEUKOCYTE ESTERASE UR QL STRIP.AUTO: NEGATIVE
MCH RBC QN AUTO: 25.7 PG (ref 25.2–33.5)
MCHC RBC AUTO-ENTMCNC: 29.7 G/DL (ref 28.4–34.8)
MCV RBC AUTO: 86.6 FL (ref 82.6–102.9)
MICROALBUMIN/CREAT 24H UR: 15 MG/L
MICROALBUMIN/CREAT UR-RTO: 9 MCG/MG CREAT
NITRITE UR QL STRIP.AUTO: NEGATIVE
NRBC AUTOMATED: 0 PER 100 WBC
PDW BLD-RTO: 16.9 % (ref 11.8–14.4)
PLATELET # BLD AUTO: 264 K/UL (ref 138–453)
PMV BLD AUTO: 10.1 FL (ref 8.1–13.5)
POTASSIUM SERPL-SCNC: 4.6 MMOL/L (ref 3.7–5.3)
PROT SERPL-MCNC: 8.3 G/DL (ref 6.4–8.3)
PROT UR STRIP.AUTO-MCNC: 6 MG/DL (ref 5–9)
PROT UR STRIP.AUTO-MCNC: NEGATIVE MG/DL
RBC # BLD: 4.47 M/UL (ref 4.21–5.77)
RBC CLUMPS #/AREA URNS AUTO: NORMAL /HPF (ref 0–2)
SODIUM SERPL-SCNC: 144 MMOL/L (ref 135–144)
SPECIFIC GRAVITY UA: 1.02 (ref 1.01–1.02)
TOTAL PROTEIN, URINE: 24 MG/DL
TURBIDITY: CLEAR
URATE SERPL-MCNC: 8.2 MG/DL (ref 3.4–7)
URINE HGB: NEGATIVE
URINE TOTAL PROTEIN CREATININE RATIO: 0.14 (ref 0–0.2)
UROBILINOGEN, URINE: NORMAL
WBC # BLD AUTO: 7.8 K/UL (ref 3.5–11.3)
WBC UA: NORMAL /HPF (ref 0–5)

## 2023-04-26 PROCEDURE — 84550 ASSAY OF BLOOD/URIC ACID: CPT

## 2023-04-26 PROCEDURE — 76775 US EXAM ABDO BACK WALL LIM: CPT

## 2023-04-26 PROCEDURE — 82043 UR ALBUMIN QUANTITATIVE: CPT

## 2023-04-26 PROCEDURE — 85027 COMPLETE CBC AUTOMATED: CPT

## 2023-04-26 PROCEDURE — 81001 URINALYSIS AUTO W/SCOPE: CPT

## 2023-04-26 PROCEDURE — 82570 ASSAY OF URINE CREATININE: CPT

## 2023-04-26 PROCEDURE — 36415 COLL VENOUS BLD VENIPUNCTURE: CPT

## 2023-04-26 PROCEDURE — 80053 COMPREHEN METABOLIC PANEL: CPT

## 2023-04-26 PROCEDURE — 84156 ASSAY OF PROTEIN URINE: CPT

## 2023-05-09 ENCOUNTER — OFFICE VISIT (OUTPATIENT)
Dept: NEPHROLOGY | Age: 63
End: 2023-05-09
Payer: MEDICAID

## 2023-05-09 VITALS
HEART RATE: 103 BPM | HEIGHT: 66 IN | WEIGHT: 178 LBS | DIASTOLIC BLOOD PRESSURE: 81 MMHG | RESPIRATION RATE: 16 BRPM | BODY MASS INDEX: 28.61 KG/M2 | SYSTOLIC BLOOD PRESSURE: 142 MMHG | TEMPERATURE: 98.1 F

## 2023-05-09 DIAGNOSIS — I10 HTN (HYPERTENSION), BENIGN: ICD-10-CM

## 2023-05-09 DIAGNOSIS — N18.32 STAGE 3B CHRONIC KIDNEY DISEASE (HCC): Primary | ICD-10-CM

## 2023-05-09 PROCEDURE — 99213 OFFICE O/P EST LOW 20 MIN: CPT | Performed by: INTERNAL MEDICINE

## 2023-05-09 PROCEDURE — 99214 OFFICE O/P EST MOD 30 MIN: CPT | Performed by: INTERNAL MEDICINE

## 2023-05-09 PROCEDURE — 3079F DIAST BP 80-89 MM HG: CPT | Performed by: INTERNAL MEDICINE

## 2023-05-09 PROCEDURE — 3077F SYST BP >= 140 MM HG: CPT | Performed by: INTERNAL MEDICINE

## 2023-05-09 NOTE — PATIENT INSTRUCTIONS
SURVEY:    You may be receiving a survey from [employer] regarding your visit today. Please complete the survey to enable us to provide the highest quality of care to you and your family. If you cannot score us a very good on any question, please call the office to discuss how we could have made your experience a very good one. Thank you. Please recheck your blood pressure when you go home and make sure you take your prescribed medication if applicable . Please follow up with your PCP or ER if needed.

## 2023-05-09 NOTE — PROGRESS NOTES
echogenicity consistent with medical renal disease left renal agenesis     Other : old  lab data have been reviewed and noted patient's creatinine used to be running around 1.0 in 2021 however in May 2022 it was 1.35 and has been progressing    Echocardiogram 2020 shows ejection fraction 55%  Assessment    Renal -as per MDRD patient is chronic kidney disease stage IIIb etiology not so clear mostly due to longstanding hypertension and diabetes  -Consistently has been going up in the last few years  -Overall creatinine is stable at least for the last 5 months I think this is his new baseline  -Renal ultrasound scan consistent with MRD, and also enlarged kidney. No proteinuria noted  Electrolytes appear to be within normal limits  Essential hypertension running well continue current medications  Hx of diabetes mellitus  History of stroke  Hx of leg swelling advised to take Lasix as needed. Single kidney, congenital  Medications reviewed discussed with the patient and caregiver  Follow-up in 6 months    Tests and orders placed this Encounter     Orders Placed This Encounter   Procedures    Protein, urine, random    MICROALBUMIN, RANDOM URINE (W/O CREATININE)    Creatinine, Random Urine    Comprehensive Metabolic Panel       Laila Gonzalez M.D  [facility].

## 2023-06-02 NOTE — TELEPHONE ENCOUNTER
Spoke with Stacy Celestin and she informed me they do not need a script for the nebulizer supplies because they were able to get them through [employer] in [place]. no

## 2023-06-28 RX ORDER — BACLOFEN 10 MG/1
TABLET ORAL
Qty: 60 TABLET | Refills: 2 | Status: SHIPPED | OUTPATIENT
Start: 2023-06-28

## 2023-07-20 RX ORDER — GABAPENTIN 300 MG/1
CAPSULE ORAL
Qty: 60 CAPSULE | Refills: 5 | Status: SHIPPED | OUTPATIENT
Start: 2023-07-20 | End: 2023-08-20

## 2023-07-20 NOTE — TELEPHONE ENCOUNTER
Next Visit Date:  Future Appointments   Date Time Provider [address]   8/3/2023  9:20 AM [facility]   11/14/2023 11:00 AM [facility]   3/25/2024  1:00 PM [facility]            Patient Active Problem List:     Gastroesophageal reflux disease     Allergic rhinitis     Controlled type 2 diabetes mellitus without complication, without long-term current use of insulin (HCC)     COPD, severe (HCC)     Dysthymia     Cerebral infarction ([address])     Left-sided weakness     Lower leg edema

## 2023-07-26 ENCOUNTER — HOSPITAL ENCOUNTER (OUTPATIENT)
Age: 63
Discharge: HOME | End: 2023-07-26
Payer: MEDICAID

## 2023-07-26 DIAGNOSIS — E11.9 CONTROLLED TYPE 2 DIABETES MELLITUS WITHOUT COMPLICATION, WITHOUT LONG-TERM CURRENT USE OF INSULIN (HCC): ICD-10-CM

## 2023-07-26 LAB
ANION GAP SERPL CALCULATED.3IONS-SCNC: 13 MMOL/L (ref 9–17)
BUN SERPL-MCNC: 15 MG/DL (ref 8–23)
BUN/CREAT SERPL: 8 (ref 9–20)
CALCIUM SERPL-MCNC: 9 MG/DL (ref 8.6–10.4)
CHLORIDE SERPL-SCNC: 105 MMOL/L (ref 98–107)
CO2 SERPL-SCNC: 27 MMOL/L (ref 20–31)
CREAT SERPL-MCNC: 1.8 MG/DL (ref 0.7–1.2)
GFR SERPL CREATININE-BSD FRML MDRD: 42 ML/MIN/1.73M2
GLUCOSE SERPL-MCNC: 147 MG/DL (ref 70–99)
POTASSIUM SERPL-SCNC: 4.5 MMOL/L (ref 3.7–5.3)
SODIUM SERPL-SCNC: 145 MMOL/L (ref 135–144)

## 2023-07-26 PROCEDURE — 83036 HEMOGLOBIN GLYCOSYLATED A1C: CPT

## 2023-07-26 PROCEDURE — 36415 COLL VENOUS BLD VENIPUNCTURE: CPT

## 2023-07-26 PROCEDURE — 80048 BASIC METABOLIC PNL TOTAL CA: CPT

## 2023-07-27 LAB
EST. AVERAGE GLUCOSE BLD GHB EST-MCNC: 126 MG/DL
HBA1C MFR BLD: 6 % (ref 4–6)

## 2023-08-03 ENCOUNTER — OFFICE VISIT (OUTPATIENT)
Dept: PRIMARY CARE CLINIC | Age: 63
End: 2023-08-03
Payer: MEDICAID

## 2023-08-03 ENCOUNTER — HOSPITAL ENCOUNTER (OUTPATIENT)
Age: 63
Discharge: HOME | End: 2023-08-05
Payer: MEDICAID

## 2023-08-03 ENCOUNTER — HOSPITAL ENCOUNTER (OUTPATIENT)
Dept: GENERAL RADIOLOGY | Age: 63
Discharge: HOME | End: 2023-08-05
Payer: MEDICAID

## 2023-08-03 VITALS
DIASTOLIC BLOOD PRESSURE: 72 MMHG | OXYGEN SATURATION: 96 % | BODY MASS INDEX: 29.04 KG/M2 | WEIGHT: 179.9 LBS | RESPIRATION RATE: 20 BRPM | HEART RATE: 68 BPM | TEMPERATURE: 98.8 F | SYSTOLIC BLOOD PRESSURE: 118 MMHG

## 2023-08-03 DIAGNOSIS — M79.672 LEFT FOOT PAIN: ICD-10-CM

## 2023-08-03 DIAGNOSIS — E11.22 CONTROLLED TYPE 2 DIABETES MELLITUS WITH STAGE 3 CHRONIC KIDNEY DISEASE, WITHOUT LONG-TERM CURRENT USE OF INSULIN (HCC): Primary | ICD-10-CM

## 2023-08-03 DIAGNOSIS — N18.30 CONTROLLED TYPE 2 DIABETES MELLITUS WITH STAGE 3 CHRONIC KIDNEY DISEASE, WITHOUT LONG-TERM CURRENT USE OF INSULIN (HCC): Primary | ICD-10-CM

## 2023-08-03 DIAGNOSIS — S97.82XA CRUSHING INJURY OF LEFT FOOT, INITIAL ENCOUNTER: ICD-10-CM

## 2023-08-03 DIAGNOSIS — Z12.5 SCREENING PSA (PROSTATE SPECIFIC ANTIGEN): ICD-10-CM

## 2023-08-03 PROCEDURE — 73610 X-RAY EXAM OF ANKLE: CPT

## 2023-08-03 PROCEDURE — 99214 OFFICE O/P EST MOD 30 MIN: CPT | Performed by: NURSE PRACTITIONER

## 2023-08-03 PROCEDURE — 73630 X-RAY EXAM OF FOOT: CPT

## 2023-08-03 PROCEDURE — 3044F HG A1C LEVEL LT 7.0%: CPT | Performed by: NURSE PRACTITIONER

## 2023-08-03 RX ORDER — CALCIUM CITRATE/VITAMIN D3 200MG-6.25
TABLET ORAL
Qty: 100 STRIP | Refills: 3 | Status: SHIPPED | OUTPATIENT
Start: 2023-08-03

## 2023-08-03 SDOH — ECONOMIC STABILITY: FOOD INSECURITY: WITHIN THE PAST 12 MONTHS, THE FOOD YOU BOUGHT JUST DIDN'T LAST AND YOU DIDN'T HAVE MONEY TO GET MORE.: PATIENT DECLINED

## 2023-08-03 SDOH — ECONOMIC STABILITY: HOUSING INSECURITY
IN THE LAST 12 MONTHS, WAS THERE A TIME WHEN YOU DID NOT HAVE A STEADY PLACE TO SLEEP OR SLEPT IN A SHELTER (INCLUDING NOW)?: PATIENT REFUSED

## 2023-08-03 SDOH — ECONOMIC STABILITY: FOOD INSECURITY: WITHIN THE PAST 12 MONTHS, YOU WORRIED THAT YOUR FOOD WOULD RUN OUT BEFORE YOU GOT MONEY TO BUY MORE.: PATIENT DECLINED

## 2023-08-03 SDOH — ECONOMIC STABILITY: INCOME INSECURITY: HOW HARD IS IT FOR YOU TO PAY FOR THE VERY BASICS LIKE FOOD, HOUSING, MEDICAL CARE, AND HEATING?: PATIENT DECLINED

## 2023-08-03 ASSESSMENT — PATIENT HEALTH QUESTIONNAIRE - PHQ9
SUM OF ALL RESPONSES TO PHQ QUESTIONS 1-9: 0
3. TROUBLE FALLING OR STAYING ASLEEP: 0
8. MOVING OR SPEAKING SO SLOWLY THAT OTHER PEOPLE COULD HAVE NOTICED. OR THE OPPOSITE, BEING SO FIGETY OR RESTLESS THAT YOU HAVE BEEN MOVING AROUND A LOT MORE THAN USUAL: 0
7. TROUBLE CONCENTRATING ON THINGS, SUCH AS READING THE NEWSPAPER OR WATCHING TELEVISION: 0
1. LITTLE INTEREST OR PLEASURE IN DOING THINGS: 0
2. FEELING DOWN, DEPRESSED OR HOPELESS: 0
5. POOR APPETITE OR OVEREATING: 0
9. THOUGHTS THAT YOU WOULD BE BETTER OFF DEAD, OR OF HURTING YOURSELF: 0
SUM OF ALL RESPONSES TO PHQ9 QUESTIONS 1 & 2: 0
SUM OF ALL RESPONSES TO PHQ QUESTIONS 1-9: 0
6. FEELING BAD ABOUT YOURSELF - OR THAT YOU ARE A FAILURE OR HAVE LET YOURSELF OR YOUR FAMILY DOWN: 0
10. IF YOU CHECKED OFF ANY PROBLEMS, HOW DIFFICULT HAVE THESE PROBLEMS MADE IT FOR YOU TO DO YOUR WORK, TAKE CARE OF THINGS AT HOME, OR GET ALONG WITH OTHER PEOPLE: 0
4. FEELING TIRED OR HAVING LITTLE ENERGY: 0

## 2023-08-03 ASSESSMENT — ENCOUNTER SYMPTOMS
NAUSEA: 0
SORE THROAT: 0
ABDOMINAL PAIN: 0
SHORTNESS OF BREATH: 0
VOMITING: 0
CHOKING: 0
DIARRHEA: 0
COUGH: 0
CONSTIPATION: 0
WHEEZING: 0
TROUBLE SWALLOWING: 0
RHINORRHEA: 0

## 2023-08-03 ASSESSMENT — COPD QUESTIONNAIRES: COPD: 1

## 2023-08-03 NOTE — PATIENT INSTRUCTIONS
SURVEY:     You may be receiving a survey from [employer] regarding your visit today. Please complete the survey to enable us to provide the highest quality of care to you and your family. If you cannot score us a very good on any question, please call the office to discuss how we could have made your experience a very good one.      Thank you,    Amelia Adkins, APRN-CNP  Gina Lloyd, APRN-CNP  Hansel Velasco, LONAN  Carlos Eduardo Nava, CMA  Matt Egan, CMA  Sadie, CMA  Priscilla, PCA  Jessica, PM

## 2023-08-07 ENCOUNTER — TELEPHONE (OUTPATIENT)
Dept: PRIMARY CARE CLINIC | Age: 63
End: 2023-08-07

## 2023-08-07 NOTE — TELEPHONE ENCOUNTER
----- Message from [address] [facility], APRN - CNP sent at 8/6/2023  2:54 PM EDT -----  No fracture noted in either the foot or the ankle. Recommend ACE wrap and elevation. Ice as needed 20 min on and 20 min off several times per day.

## 2023-08-07 NOTE — TELEPHONE ENCOUNTER
----- Message from [address] [facility], APRN - CNP sent at 8/6/2023  2:53 PM EDT -----  No fracture noted in either the foot or the ankle. Recommend ACE wrap and elevation. Ice as needed 20 min on and 20 min off several times per day.

## 2023-08-14 ENCOUNTER — TELEPHONE (OUTPATIENT)
Dept: ONCOLOGY | Age: 63
End: 2023-08-14

## 2023-08-14 DIAGNOSIS — Z87.891 PERSONAL HISTORY OF NICOTINE DEPENDENCE: Primary | ICD-10-CM

## 2023-08-14 NOTE — TELEPHONE ENCOUNTER
Our records indicate that your patient is coming due for their annual lung cancer screening follow up testing. For your convenience, we have pended the order for the scan for you. If you do not agree with the need for the test, please cancel the order and let us know. Sincerely,    [address] Screening Program    Auto printed reminder letter sent to patient.

## 2023-10-11 RX ORDER — BACLOFEN 10 MG/1
10 TABLET ORAL 2 TIMES DAILY
Qty: 60 TABLET | Refills: 5 | Status: SHIPPED | OUTPATIENT
Start: 2023-10-11

## 2023-10-11 NOTE — TELEPHONE ENCOUNTER
Pending medication    Health Maintenance   Topic Date Due    Pneumococcal 0-64 years Vaccine (2 - PCV) 10/22/2016    Hepatitis B vaccine (1 of 3 - Risk 3-dose series) Never done    Diabetic foot exam  01/06/2024 (Originally 7/29/2021)    Diabetic retinal exam  01/06/2024 (Originally 7/25/2020)    DTaP/Tdap/Td vaccine (1 - Tdap) 01/06/2024 (Originally 9/6/1980)    Flu vaccine (1) 06/30/2024 (Originally 8/1/2023)    Shingles vaccine (1 of 2) 08/03/2024 (Originally 9/6/2011)    COVID-19 Vaccine (1) 08/03/2024 (Originally 3/6/1962)    Hepatitis C screen  08/03/2024 (Originally 9/6/1979)    HIV screen  08/03/2024 (Originally 9/6/1976)    Lipids  12/21/2023    Colorectal Cancer Screen  04/04/2024    Diabetic Alb to Cr ratio (uACR) test  04/26/2024    A1C test (Diabetic or Prediabetic)  07/26/2024    GFR test (Diabetes, CKD 3-4, OR last GFR 15-59)  07/26/2024    Depression Monitoring  08/03/2024    Hepatitis A vaccine  Aged Out    Hib vaccine  Aged Out    Meningococcal (ACWY) vaccine  Aged Out    Low dose CT lung screening &/or counseling  Discontinued    Prostate Specific Antigen (PSA) Screening or Monitoring  Discontinued             (applicable per patient's age: Cancer Screenings, Depression Screening, Fall Risk Screening, Immunizations)    Hemoglobin A1C (%)   Date Value   07/26/2023 6.0   12/21/2022 6.4 (H)   05/12/2022 7.8 (H)     LDL Cholesterol (mg/dL)   Date Value   12/21/2022 127     AST (U/L)   Date Value   04/26/2023 24     ALT (U/L)   Date Value   04/26/2023 19     BUN (mg/dL)   Date Value   07/26/2023 15      (goal A1C is < 7)   (goal LDL is <100) need 30-50% reduction from baseline     BP Readings from Last 3 Encounters:   08/03/23 118/72   05/09/23 (!) 142/81   04/21/23 136/68    (goal /80)      All Future Testing planned in [facility]:  Lab Frequency Next Occurrence   Protein, urine, random Once 10/16/2023   MICROALBUMIN, RANDOM URINE (W/O CREATININE) Once 10/16/2023   Creatinine, Random Urine Once

## 2023-11-10 ENCOUNTER — HOSPITAL ENCOUNTER (OUTPATIENT)
Age: 63
Discharge: HOME | End: 2023-11-10
Payer: MEDICAID

## 2023-11-10 ENCOUNTER — TELEPHONE (OUTPATIENT)
Dept: NEPHROLOGY | Age: 63
End: 2023-11-10

## 2023-11-10 DIAGNOSIS — I10 HTN (HYPERTENSION), BENIGN: ICD-10-CM

## 2023-11-10 DIAGNOSIS — N18.32 STAGE 3B CHRONIC KIDNEY DISEASE (HCC): ICD-10-CM

## 2023-11-10 DIAGNOSIS — N18.32 STAGE 3B CHRONIC KIDNEY DISEASE (HCC): Primary | ICD-10-CM

## 2023-11-10 LAB
ALBUMIN SERPL-MCNC: 4.5 G/DL (ref 3.5–5.2)
ALBUMIN/GLOB SERPL: 1.1 {RATIO} (ref 1–2.5)
ALP SERPL-CCNC: 94 U/L (ref 40–129)
ALT SERPL-CCNC: 11 U/L (ref 5–41)
ANION GAP SERPL CALCULATED.3IONS-SCNC: 16 MMOL/L (ref 9–17)
AST SERPL-CCNC: 15 U/L
BILIRUB SERPL-MCNC: 0.2 MG/DL (ref 0.3–1.2)
BUN SERPL-MCNC: 19 MG/DL (ref 8–23)
BUN/CREAT SERPL: 7 (ref 9–20)
CALCIUM SERPL-MCNC: 9.2 MG/DL (ref 8.6–10.4)
CHLORIDE SERPL-SCNC: 101 MMOL/L (ref 98–107)
CO2 SERPL-SCNC: 23 MMOL/L (ref 20–31)
CREAT SERPL-MCNC: 2.6 MG/DL (ref 0.7–1.2)
GFR SERPL CREATININE-BSD FRML MDRD: 27 ML/MIN/1.73M2
GLUCOSE SERPL-MCNC: 112 MG/DL (ref 70–99)
POTASSIUM SERPL-SCNC: 4.5 MMOL/L (ref 3.7–5.3)
PROT SERPL-MCNC: 8.6 G/DL (ref 6.4–8.3)
SODIUM SERPL-SCNC: 140 MMOL/L (ref 135–144)

## 2023-11-10 PROCEDURE — 80053 COMPREHEN METABOLIC PANEL: CPT

## 2023-11-10 PROCEDURE — 36415 COLL VENOUS BLD VENIPUNCTURE: CPT

## 2023-11-10 NOTE — TELEPHONE ENCOUNTER
----- Message from Eliecer Toure MD sent at 11/10/2023  1:18 PM EST -----  Hold lasix until I see him next week and BMP on Monday prior to my appt

## 2023-11-13 ENCOUNTER — HOSPITAL ENCOUNTER (OUTPATIENT)
Age: 63
Discharge: HOME | End: 2023-11-13
Payer: MEDICAID

## 2023-11-13 DIAGNOSIS — N18.32 STAGE 3B CHRONIC KIDNEY DISEASE (HCC): ICD-10-CM

## 2023-11-13 LAB
ANION GAP SERPL CALCULATED.3IONS-SCNC: 13 MMOL/L (ref 9–17)
BUN SERPL-MCNC: 16 MG/DL (ref 8–23)
BUN/CREAT SERPL: 9 (ref 9–20)
CALCIUM SERPL-MCNC: 8.9 MG/DL (ref 8.6–10.4)
CHLORIDE SERPL-SCNC: 100 MMOL/L (ref 98–107)
CO2 SERPL-SCNC: 26 MMOL/L (ref 20–31)
CREAT SERPL-MCNC: 1.7 MG/DL (ref 0.7–1.2)
CREAT UR-MCNC: 38.3 MG/DL (ref 39–259)
GFR SERPL CREATININE-BSD FRML MDRD: 45 ML/MIN/1.73M2
GLUCOSE SERPL-MCNC: 106 MG/DL (ref 70–99)
MICROALBUMIN UR-MCNC: <12 MG/L
MICROALBUMIN/CREAT UR-RTO: ABNORMAL MCG/MG CREAT
POTASSIUM SERPL-SCNC: 4.9 MMOL/L (ref 3.7–5.3)
SODIUM SERPL-SCNC: 139 MMOL/L (ref 135–144)
TOTAL PROTEIN, URINE: 7 MG/DL

## 2023-11-13 PROCEDURE — 84156 ASSAY OF PROTEIN URINE: CPT

## 2023-11-13 PROCEDURE — 36415 COLL VENOUS BLD VENIPUNCTURE: CPT

## 2023-11-13 PROCEDURE — 80048 BASIC METABOLIC PNL TOTAL CA: CPT

## 2023-11-13 PROCEDURE — 82570 ASSAY OF URINE CREATININE: CPT

## 2023-11-13 PROCEDURE — 82043 UR ALBUMIN QUANTITATIVE: CPT

## 2023-11-14 ENCOUNTER — OFFICE VISIT (OUTPATIENT)
Dept: NEPHROLOGY | Age: 63
End: 2023-11-14
Payer: MEDICAID

## 2023-11-14 VITALS
HEIGHT: 66 IN | BODY MASS INDEX: 29.04 KG/M2 | HEART RATE: 91 BPM | DIASTOLIC BLOOD PRESSURE: 79 MMHG | RESPIRATION RATE: 18 BRPM | SYSTOLIC BLOOD PRESSURE: 135 MMHG | TEMPERATURE: 97.5 F

## 2023-11-14 DIAGNOSIS — I10 HTN (HYPERTENSION), BENIGN: ICD-10-CM

## 2023-11-14 DIAGNOSIS — N18.32 STAGE 3B CHRONIC KIDNEY DISEASE (HCC): Primary | ICD-10-CM

## 2023-11-14 DIAGNOSIS — I10 ESSENTIAL HYPERTENSION: ICD-10-CM

## 2023-11-14 PROCEDURE — 3075F SYST BP GE 130 - 139MM HG: CPT | Performed by: INTERNAL MEDICINE

## 2023-11-14 PROCEDURE — 99213 OFFICE O/P EST LOW 20 MIN: CPT | Performed by: INTERNAL MEDICINE

## 2023-11-14 PROCEDURE — 3078F DIAST BP <80 MM HG: CPT | Performed by: INTERNAL MEDICINE

## 2023-11-14 NOTE — PROGRESS NOTES
[facility]        Outpatient Follow-Up note         11/14/2023 10:55 AM    Patient Name:   Joe Dewey. YOB: 1960  Primary Care Physician:  Marge Adkins, APRN - CNP   [facility] [address] [facility] [address]  Dept: [phone]     Chief Complaint / Reason for follow-up : Follow Up of CKD     Interval History :  Patient seen and examined by me. Feels okay no complaints  No chest pain or shortness of breath at this time  Apparently was having leg swelling and he has received Lasix and his renal function is worse so we kind of held it now it is improved     Past History :  Past Medical History:   Diagnosis Date    Acid reflux     Asthma     Cerebrovascular disease     Chronic cough     COPD (chronic obstructive pulmonary disease) ([address])     Dysphagia     Dyspnea     Unspecified cerebral artery occlusion with cerebral infarction 11/05/2008    Wheelchair dependent      Past Surgical History:   Procedure Laterality Date    BRAIN SURGERY      CRANIOTOMY      GASTROSTOMY TUBE PLACEMENT      removed    KNEE SURGERY          Medications :     Outpatient Medications Marked as Taking for the 11/14/23 encounter (Office Visit) with Russell Azul MD   Medication Sig Dispense Refill    baclofen (LIORESAL) 10 MG tablet TAKE 1 TABLET BY MOUTH TWICE A DAY 60 tablet 5    blood glucose test strips (TRUE METRIX BLOOD GLUCOSE TEST) strip As needed.  100 strip 3    gabapentin (NEURONTIN) 300 MG capsule TAKE ONE CAPSULE BY MOUTH TWICE A DAY 60 capsule 5    ipratropium-albuterol (DUONEB) 0.5-2.5 (3) MG/3ML SOLN nebulizer solution INHALE 3 MLS PER NEBULIZER INTO THE LUNGS EVERY 6 HOURS 1080 mL 3    budesonide (PULMICORT) 0.5 MG/2ML nebulizer suspension USE ONE VIAL VIA NEBULIZER MACHINE TWO TIMES DAILY 60 mL 11    metFORMIN (GLUCOPHAGE) 1000 MG tablet TAKE ONE TABLET BY MOUTH

## 2023-11-14 NOTE — PATIENT INSTRUCTIONS
SURVEY:    You may be receiving a survey from [employer] regarding your visit today. Please complete the survey to enable us to provide the highest quality of care to you and your family. If you cannot score us a very good on any question, please call the office to discuss how we could have made your experience a very good one. Thank you.

## 2023-11-29 DIAGNOSIS — K21.9 GASTROESOPHAGEAL REFLUX DISEASE WITHOUT ESOPHAGITIS: ICD-10-CM

## 2023-11-29 DIAGNOSIS — E11.9 CONTROLLED TYPE 2 DIABETES MELLITUS WITHOUT COMPLICATION, WITHOUT LONG-TERM CURRENT USE OF INSULIN (HCC): ICD-10-CM

## 2023-11-29 RX ORDER — PIOGLITAZONEHYDROCHLORIDE 15 MG/1
TABLET ORAL
Qty: 90 TABLET | Refills: 3 | Status: SHIPPED | OUTPATIENT
Start: 2023-11-29

## 2023-11-29 RX ORDER — OMEPRAZOLE 40 MG/1
CAPSULE, DELAYED RELEASE ORAL
Qty: 90 CAPSULE | Refills: 3 | Status: SHIPPED | OUTPATIENT
Start: 2023-11-29

## 2023-11-29 NOTE — TELEPHONE ENCOUNTER
01/30/2024   ALT Once 02/03/2024   AST Once 41/45/8423   Basic Metabolic Panel Once 29/62/1437   Hemoglobin A1C Once 01/30/2024   Microalbumin, Ur Once 01/30/2024   CT lung screen [Initial/Annual] Once 09/01/2023   Urinalysis with Microscopic Once 05/21/2024   Microalbumin / Creatinine Urine Ratio Once 05/21/2024   Protein / creatinine ratio, urine Once 05/21/2024   Renal Function Panel Once 05/21/2024       Next Visit Date:  Future Appointments   Date Time Provider [address]   2/6/2024  9:40 AM Raimundo Adkins, APRN - CNP [facility]   3/25/2024  1:00 PM Darryle Jury, [facility]   5/14/2024 11:40 AM Doreen Ash MD [facility] KID&HYP [facility]            Patient Active Problem List:     Gastroesophageal reflux disease     Allergic rhinitis     Controlled type 2 diabetes mellitus with stage 3 chronic kidney disease, without long-term current use of insulin (HCC)     COPD, severe (HCC)     Dysthymia     Cerebral infarction ([address])     Left-sided weakness     Lower leg edema

## 2023-12-13 RX ORDER — FOLIC ACID 1 MG/1
TABLET ORAL
Qty: 90 TABLET | Refills: 3 | Status: SHIPPED | OUTPATIENT
Start: 2023-12-13

## 2023-12-13 NOTE — TELEPHONE ENCOUNTER
Health Maintenance   Topic Date Due    Pneumococcal 0-64 years Vaccine (2 - PCV) 10/22/2016    Hepatitis B vaccine (1 of 3 - Risk 3-dose series) Never done    Respiratory Syncytial Virus (RSV) age 61 yrs+ (1 - 1-dose 60+ series) Never done    Lipids  12/21/2023    Diabetic foot exam  01/06/2024 (Originally 7/29/2021)    Diabetic retinal exam  01/06/2024 (Originally 7/25/2020)    DTaP/Tdap/Td vaccine (1 - Tdap) 01/06/2024 (Originally 9/6/1980)    Flu vaccine (1) 06/30/2024 (Originally 8/1/2023)    Shingles vaccine (1 of 2) 08/03/2024 (Originally 9/6/2011)    COVID-19 Vaccine (1) 08/03/2024 (Originally 3/6/1962)    Hepatitis C screen  08/03/2024 (Originally 9/6/1979)    HIV screen  08/03/2024 (Originally 9/6/1976)    Colorectal Cancer Screen  04/04/2024    A1C test (Diabetic or Prediabetic)  07/26/2024    Depression Monitoring  08/03/2024    Diabetic Alb to Cr ratio (uACR) test  11/13/2024    GFR test (Diabetes, CKD 3-4, OR last GFR 15-59)  11/13/2024    Hepatitis A vaccine  Aged Out    Hib vaccine  Aged Out    Meningococcal (ACWY) vaccine  Aged Out    Low dose CT lung screening &/or counseling  Discontinued    Prostate Specific Antigen (PSA) Screening or Monitoring  Discontinued             (applicable per patient's age: Cancer Screenings, Depression Screening, Fall Risk Screening, Immunizations)    Hemoglobin A1C (%)   Date Value   07/26/2023 6.0   12/21/2022 6.4 (H)   05/12/2022 7.8 (H)     LDL Cholesterol (mg/dL)   Date Value   12/21/2022 127     AST (U/L)   Date Value   11/10/2023 15     ALT (U/L)   Date Value   11/10/2023 11     BUN (mg/dL)   Date Value   11/13/2023 16      (goal A1C is < 7)   (goal LDL is <100) need 30-50% reduction from baseline     BP Readings from Last 3 Encounters:   11/14/23 135/79   08/03/23 118/72   05/09/23 (!) 142/81    (goal /80)      All Future Testing planned in [facility]:  Lab Frequency Next Occurrence   PSA Screening Once 02/03/2024   CBC with Auto Differential Once

## 2024-01-04 RX ORDER — SERTRALINE HYDROCHLORIDE 100 MG/1
TABLET, FILM COATED ORAL
Qty: 180 TABLET | Refills: 3 | Status: SHIPPED | OUTPATIENT
Start: 2024-01-04

## 2024-01-04 NOTE — TELEPHONE ENCOUNTER
Health Maintenance   Topic Date Due    Pneumococcal 0-64 years Vaccine (2 - PCV) 10/22/2016    Respiratory Syncytial Virus (RSV) Pregnant or age 60 yrs+ (1 - 1-dose 60+ series) Never done    Lipids  12/21/2023    Diabetic foot exam  01/06/2024 (Originally 7/29/2021)    Diabetic retinal exam  01/06/2024 (Originally 7/25/2020)    DTaP/Tdap/Td vaccine (1 - Tdap) 01/06/2024 (Originally 9/6/1980)    Flu vaccine (1) 06/30/2024 (Originally 8/1/2023)    Shingles vaccine (1 of 2) 08/03/2024 (Originally 9/6/2011)    COVID-19 Vaccine (1) 08/03/2024 (Originally 3/6/1962)    Hepatitis C screen  08/03/2024 (Originally 9/6/1979)    HIV screen  08/03/2024 (Originally 9/6/1976)    Colorectal Cancer Screen  04/04/2024    A1C test (Diabetic or Prediabetic)  07/26/2024    Depression Monitoring  08/03/2024    Diabetic Alb to Cr ratio (uACR) test  11/13/2024    GFR test (Diabetes, CKD 3-4, OR last GFR 15-59)  11/13/2024    Hepatitis A vaccine  Aged Out    Hepatitis B vaccine  Aged Out    Hib vaccine  Aged Out    Polio vaccine  Aged Out    Meningococcal (ACWY) vaccine  Aged Out    Low dose CT lung screening &/or counseling  Discontinued    Prostate Specific Antigen (PSA) Screening or Monitoring  Discontinued             (applicable per patient's age: Cancer Screenings, Depression Screening, Fall Risk Screening, Immunizations)    Hemoglobin A1C (%)   Date Value   07/26/2023 6.0   12/21/2022 6.4 (H)   05/12/2022 7.8 (H)     LDL Cholesterol (mg/dL)   Date Value   12/21/2022 127     AST (U/L)   Date Value   11/10/2023 15     ALT (U/L)   Date Value   11/10/2023 11     BUN (mg/dL)   Date Value   11/13/2023 16      (goal A1C is < 7)   (goal LDL is <100) need 30-50% reduction from baseline     BP Readings from Last 3 Encounters:   11/14/23 135/79   08/03/23 118/72   05/09/23 (!) 142/81    (goal /80)      All Future Testing planned in [facility]:  Lab Frequency Next Occurrence   PSA Screening Once 02/03/2024   CBC with Auto Differential Once

## 2024-01-10 DIAGNOSIS — Z87.898 HISTORY OF SEIZURES: ICD-10-CM

## 2024-01-10 RX ORDER — LEVETIRACETAM 500 MG/1
TABLET ORAL
Qty: 90 TABLET | Refills: 3 | Status: SHIPPED | OUTPATIENT
Start: 2024-01-10

## 2024-01-10 NOTE — TELEPHONE ENCOUNTER
Health Maintenance   Topic Date Due    DTaP/Tdap/Td vaccine (1 - Tdap) Never done    Pneumococcal 0-64 years Vaccine (2 - PCV) 10/22/2016    Diabetic retinal exam  07/25/2020    Diabetic foot exam  07/29/2021    Respiratory Syncytial Virus (RSV) Pregnant or age 60 yrs+ (1 - 1-dose 60+ series) Never done    Lipids  12/21/2023    Flu vaccine (1) 06/30/2024 (Originally 8/1/2023)    Shingles vaccine (1 of 2) 08/03/2024 (Originally 9/6/2011)    COVID-19 Vaccine (1) 08/03/2024 (Originally 3/6/1962)    Hepatitis C screen  08/03/2024 (Originally 9/6/1979)    HIV screen  08/03/2024 (Originally 9/6/1976)    Colorectal Cancer Screen  04/04/2024    A1C test (Diabetic or Prediabetic)  07/26/2024    Depression Monitoring  08/03/2024    Diabetic Alb to Cr ratio (uACR) test  11/13/2024    GFR test (Diabetes, CKD 3-4, OR last GFR 15-59)  11/13/2024    Hepatitis A vaccine  Aged Out    Hepatitis B vaccine  Aged Out    Hib vaccine  Aged Out    Polio vaccine  Aged Out    Meningococcal (ACWY) vaccine  Aged Out    Low dose CT lung screening &/or counseling  Discontinued    Prostate Specific Antigen (PSA) Screening or Monitoring  Discontinued             (applicable per patient's age: Cancer Screenings, Depression Screening, Fall Risk Screening, Immunizations)    Hemoglobin A1C (%)   Date Value   07/26/2023 6.0   12/21/2022 6.4 (H)   05/12/2022 7.8 (H)     LDL Cholesterol (mg/dL)   Date Value   12/21/2022 127     AST (U/L)   Date Value   11/10/2023 15     ALT (U/L)   Date Value   11/10/2023 11     BUN (mg/dL)   Date Value   11/13/2023 16      (goal A1C is < 7)   (goal LDL is <100) need 30-50% reduction from baseline     BP Readings from Last 3 Encounters:   11/14/23 135/79   08/03/23 118/72   05/09/23 (!) 142/81    (goal /80)      All Future Testing planned in [facility]:  Lab Frequency Next Occurrence   PSA Screening Once 02/03/2024   CBC with Auto Differential Once 01/30/2024   ALT Once 02/03/2024   AST Once 02/03/2024   Basic

## 2024-01-19 DIAGNOSIS — E11.9 CONTROLLED TYPE 2 DIABETES MELLITUS WITHOUT COMPLICATION, WITHOUT LONG-TERM CURRENT USE OF INSULIN (HCC): ICD-10-CM

## 2024-01-31 RX ORDER — GABAPENTIN 300 MG/1
CAPSULE ORAL
Qty: 60 CAPSULE | Refills: 5 | Status: SHIPPED | OUTPATIENT
Start: 2024-01-31 | End: 2024-07-31

## 2024-02-02 ENCOUNTER — HOSPITAL ENCOUNTER (OUTPATIENT)
Age: 64
Discharge: HOME | End: 2024-02-02
Payer: MEDICAID

## 2024-02-02 DIAGNOSIS — E11.22 CONTROLLED TYPE 2 DIABETES MELLITUS WITH STAGE 3 CHRONIC KIDNEY DISEASE, WITHOUT LONG-TERM CURRENT USE OF INSULIN (HCC): ICD-10-CM

## 2024-02-02 DIAGNOSIS — N18.30 CONTROLLED TYPE 2 DIABETES MELLITUS WITH STAGE 3 CHRONIC KIDNEY DISEASE, WITHOUT LONG-TERM CURRENT USE OF INSULIN (HCC): ICD-10-CM

## 2024-02-02 DIAGNOSIS — Z12.5 SCREENING PSA (PROSTATE SPECIFIC ANTIGEN): ICD-10-CM

## 2024-02-02 LAB
ALT SERPL-CCNC: 11 U/L (ref 5–41)
ANION GAP SERPL CALCULATED.3IONS-SCNC: 15 MMOL/L (ref 9–17)
AST SERPL-CCNC: 11 U/L
BASOPHILS # BLD: 0.03 K/UL (ref 0–0.2)
BASOPHILS NFR BLD: 0 % (ref 0–2)
BUN SERPL-MCNC: 17 MG/DL (ref 8–23)
BUN/CREAT SERPL: 9 (ref 9–20)
CALCIUM SERPL-MCNC: 8.9 MG/DL (ref 8.6–10.4)
CHLORIDE SERPL-SCNC: 102 MMOL/L (ref 98–107)
CO2 SERPL-SCNC: 25 MMOL/L (ref 20–31)
CREAT SERPL-MCNC: 2 MG/DL (ref 0.7–1.2)
CREAT UR-MCNC: 79.2 MG/DL (ref 39–259)
EOSINOPHIL # BLD: 0.33 K/UL (ref 0–0.44)
EOSINOPHILS RELATIVE PERCENT: 4 % (ref 1–4)
ERYTHROCYTE [DISTWIDTH] IN BLOOD BY AUTOMATED COUNT: 17.7 % (ref 11.8–14.4)
EST. AVERAGE GLUCOSE BLD GHB EST-MCNC: 97 MG/DL
GFR SERPL CREATININE-BSD FRML MDRD: 37 ML/MIN/1.73M2
GLUCOSE SERPL-MCNC: 109 MG/DL (ref 70–99)
HBA1C MFR BLD: 5 % (ref 4–6)
HCT VFR BLD AUTO: 35.2 % (ref 40.7–50.3)
HGB BLD-MCNC: 10.3 G/DL (ref 13–17)
IMM GRANULOCYTES # BLD AUTO: 0.08 K/UL (ref 0–0.3)
IMM GRANULOCYTES NFR BLD: 1 %
LYMPHOCYTES NFR BLD: 1.81 K/UL (ref 1.1–3.7)
LYMPHOCYTES RELATIVE PERCENT: 23 % (ref 24–43)
MCH RBC QN AUTO: 25 PG (ref 25.2–33.5)
MCHC RBC AUTO-ENTMCNC: 29.3 G/DL (ref 28.4–34.8)
MCV RBC AUTO: 85.4 FL (ref 82.6–102.9)
MICROALBUMIN UR-MCNC: <12 MG/L
MICROALBUMIN/CREAT UR-RTO: NORMAL MCG/MG CREAT
MONOCYTES NFR BLD: 0.37 K/UL (ref 0.1–1.2)
MONOCYTES NFR BLD: 5 % (ref 3–12)
NEUTROPHILS NFR BLD: 67 % (ref 36–65)
NEUTS SEG NFR BLD: 5.21 K/UL (ref 1.5–8.1)
NRBC BLD-RTO: 0 PER 100 WBC
PLATELET # BLD AUTO: 226 K/UL (ref 138–453)
PMV BLD AUTO: 10 FL (ref 8.1–13.5)
POTASSIUM SERPL-SCNC: 4.5 MMOL/L (ref 3.7–5.3)
PSA SERPL-MCNC: 1.4 NG/ML (ref 0–4)
RBC # BLD AUTO: 4.12 M/UL (ref 4.21–5.77)
SODIUM SERPL-SCNC: 142 MMOL/L (ref 135–144)
WBC OTHER # BLD: 7.8 K/UL (ref 3.5–11.3)

## 2024-02-02 PROCEDURE — 85025 COMPLETE CBC W/AUTO DIFF WBC: CPT

## 2024-02-02 PROCEDURE — 84450 TRANSFERASE (AST) (SGOT): CPT

## 2024-02-02 PROCEDURE — 82570 ASSAY OF URINE CREATININE: CPT

## 2024-02-02 PROCEDURE — 36415 COLL VENOUS BLD VENIPUNCTURE: CPT

## 2024-02-02 PROCEDURE — 84460 ALANINE AMINO (ALT) (SGPT): CPT

## 2024-02-02 PROCEDURE — 83036 HEMOGLOBIN GLYCOSYLATED A1C: CPT

## 2024-02-02 PROCEDURE — 80048 BASIC METABOLIC PNL TOTAL CA: CPT

## 2024-02-02 PROCEDURE — 82043 UR ALBUMIN QUANTITATIVE: CPT

## 2024-02-02 PROCEDURE — G0103 PSA SCREENING: HCPCS

## 2024-02-06 ENCOUNTER — OFFICE VISIT (OUTPATIENT)
Dept: PRIMARY CARE CLINIC | Age: 64
End: 2024-02-06
Payer: MEDICAID

## 2024-02-06 VITALS
RESPIRATION RATE: 20 BRPM | TEMPERATURE: 98.5 F | HEART RATE: 84 BPM | OXYGEN SATURATION: 93 % | SYSTOLIC BLOOD PRESSURE: 132 MMHG | DIASTOLIC BLOOD PRESSURE: 82 MMHG

## 2024-02-06 DIAGNOSIS — M77.8 RIGHT SHOULDER TENDINITIS: ICD-10-CM

## 2024-02-06 DIAGNOSIS — E11.21 CONTROLLED TYPE 2 DIABETES MELLITUS WITH DIABETIC NEPHROPATHY, WITHOUT LONG-TERM CURRENT USE OF INSULIN (HCC): Primary | ICD-10-CM

## 2024-02-06 DIAGNOSIS — J44.9 MODERATE COPD (CHRONIC OBSTRUCTIVE PULMONARY DISEASE) (HCC): ICD-10-CM

## 2024-02-06 PROCEDURE — 3044F HG A1C LEVEL LT 7.0%: CPT | Performed by: NURSE PRACTITIONER

## 2024-02-06 PROCEDURE — 99214 OFFICE O/P EST MOD 30 MIN: CPT | Performed by: NURSE PRACTITIONER

## 2024-02-06 RX ORDER — METHYLPREDNISOLONE 4 MG/1
TABLET ORAL
Qty: 1 KIT | Refills: 0 | Status: SHIPPED | OUTPATIENT
Start: 2024-02-06 | End: 2024-02-12

## 2024-02-06 RX ORDER — ATORVASTATIN CALCIUM 40 MG/1
40 TABLET, FILM COATED ORAL DAILY
Qty: 90 TABLET | Refills: 3 | Status: SHIPPED | OUTPATIENT
Start: 2024-02-06

## 2024-02-06 RX ORDER — PIOGLITAZONEHYDROCHLORIDE 30 MG/1
30 TABLET ORAL DAILY
Qty: 90 TABLET | Refills: 3 | Status: SHIPPED | OUTPATIENT
Start: 2024-02-06 | End: 2024-02-06

## 2024-02-06 SDOH — ECONOMIC STABILITY: HOUSING INSECURITY
IN THE LAST 12 MONTHS, WAS THERE A TIME WHEN YOU DID NOT HAVE A STEADY PLACE TO SLEEP OR SLEPT IN A SHELTER (INCLUDING NOW)?: PATIENT DECLINED

## 2024-02-06 SDOH — ECONOMIC STABILITY: FOOD INSECURITY: WITHIN THE PAST 12 MONTHS, YOU WORRIED THAT YOUR FOOD WOULD RUN OUT BEFORE YOU GOT MONEY TO BUY MORE.: PATIENT DECLINED

## 2024-02-06 SDOH — ECONOMIC STABILITY: FOOD INSECURITY: WITHIN THE PAST 12 MONTHS, THE FOOD YOU BOUGHT JUST DIDN'T LAST AND YOU DIDN'T HAVE MONEY TO GET MORE.: PATIENT DECLINED

## 2024-02-06 SDOH — ECONOMIC STABILITY: INCOME INSECURITY: HOW HARD IS IT FOR YOU TO PAY FOR THE VERY BASICS LIKE FOOD, HOUSING, MEDICAL CARE, AND HEATING?: PATIENT DECLINED

## 2024-02-06 ASSESSMENT — PATIENT HEALTH QUESTIONNAIRE - PHQ9
SUM OF ALL RESPONSES TO PHQ QUESTIONS 1-9: 0
5. POOR APPETITE OR OVEREATING: 0
9. THOUGHTS THAT YOU WOULD BE BETTER OFF DEAD, OR OF HURTING YOURSELF: 0
7. TROUBLE CONCENTRATING ON THINGS, SUCH AS READING THE NEWSPAPER OR WATCHING TELEVISION: 0
10. IF YOU CHECKED OFF ANY PROBLEMS, HOW DIFFICULT HAVE THESE PROBLEMS MADE IT FOR YOU TO DO YOUR WORK, TAKE CARE OF THINGS AT HOME, OR GET ALONG WITH OTHER PEOPLE: 0
3. TROUBLE FALLING OR STAYING ASLEEP: 0
1. LITTLE INTEREST OR PLEASURE IN DOING THINGS: 0
2. FEELING DOWN, DEPRESSED OR HOPELESS: 0
6. FEELING BAD ABOUT YOURSELF - OR THAT YOU ARE A FAILURE OR HAVE LET YOURSELF OR YOUR FAMILY DOWN: 0
SUM OF ALL RESPONSES TO PHQ QUESTIONS 1-9: 0
SUM OF ALL RESPONSES TO PHQ QUESTIONS 1-9: 0
SUM OF ALL RESPONSES TO PHQ9 QUESTIONS 1 & 2: 0
8. MOVING OR SPEAKING SO SLOWLY THAT OTHER PEOPLE COULD HAVE NOTICED. OR THE OPPOSITE, BEING SO FIGETY OR RESTLESS THAT YOU HAVE BEEN MOVING AROUND A LOT MORE THAN USUAL: 0
4. FEELING TIRED OR HAVING LITTLE ENERGY: 0
SUM OF ALL RESPONSES TO PHQ QUESTIONS 1-9: 0

## 2024-02-06 ASSESSMENT — ENCOUNTER SYMPTOMS
SPUTUM PRODUCTION: 1
SHORTNESS OF BREATH: 0
FREQUENT THROAT CLEARING: 0
RHINORRHEA: 0
TROUBLE SWALLOWING: 0
DIFFICULTY BREATHING: 0
DIARRHEA: 0
VOMITING: 0
HEMOPTYSIS: 0
CONSTIPATION: 0
CHEST TIGHTNESS: 0

## 2024-02-06 ASSESSMENT — COPD QUESTIONNAIRES: COPD: 1

## 2024-02-06 NOTE — PATIENT INSTRUCTIONS
SURVEY:     You may be receiving a survey from [facility] [employer] regarding your visit today.     Please complete the survey to enable us to provide the highest quality of care to you and your family.     If you cannot score us a very good on any question, please call the office to discuss how we could have made your experience a very good one.     Thank you,    Adrian Adkins, APRN-CNP  Eugenia Boucher, APRN-CNP  Muriel, ADILENE Lara, HUY Russell, CMA  Sadie, CMA  Priscilla, PCA  Jessica, PM

## 2024-02-06 NOTE — PROGRESS NOTES
Name: Talon Walker Jr.  : 1960         Chief Complaint:     Chief Complaint   Patient presents with    Diabetes     Routine check.     Shoulder Pain     Right shoulder ongoing issue.       History of Present Illness:      Talon Walker Jr. is a 63 y.o.  male who presents with Diabetes (Routine check. ) and Shoulder Pain (Right shoulder ongoing issue.)      Talon is here today with his wife and caregiver for a routine office visit.    Overall he is doing well.  He has been compliant with all of his medications.  He has been having some shoulder pain with transfers.  Denies any obvious injury.  See below for further comments.    Diabetes  He presents for his follow-up diabetic visit. He has type 2 diabetes mellitus. Onset time: YEARS. His disease course has been stable. Hypoglycemia symptoms include speech difficulty (chronic). Pertinent negatives for hypoglycemia include no dizziness, headaches or sweats. Pertinent negatives for diabetes include no foot ulcerations, no polydipsia, no polyphagia and no polyuria. There are no hypoglycemic complications. Symptoms are stable. Diabetic complications include a CVA and peripheral neuropathy. Pertinent negatives for diabetic complications include no heart disease. Risk factors for coronary artery disease include diabetes mellitus, male sex and sedentary lifestyle. Current diabetic treatment includes oral agent (monotherapy). He is compliant with treatment all of the time. His weight is stable. He is following a generally healthy diet. When asked about meal planning, he reported none. He has had a previous visit with a dietitian. He never participates in exercise. There is no change in his home blood glucose trend. His breakfast blood glucose range is generally 130-140 mg/dl. An ACE inhibitor/angiotensin II receptor blocker is being taken. He does not see a podiatrist.Eye exam is not current.   COPD  He complains of sputum production. There is no chest tightness,

## 2024-04-30 RX ORDER — PIOGLITAZONEHYDROCHLORIDE 15 MG/1
TABLET ORAL
COMMUNITY
Start: 2024-03-01

## 2024-04-30 RX ORDER — BACLOFEN 10 MG/1
10 TABLET ORAL 2 TIMES DAILY
Qty: 60 TABLET | Refills: 5 | Status: SHIPPED | OUTPATIENT
Start: 2024-04-30

## 2024-04-30 NOTE — TELEPHONE ENCOUNTER
Health Maintenance   Topic Date Due    Lipids  12/21/2023    Colorectal Cancer Screen  04/04/2024    Shingles vaccine (1 of 2) 08/03/2024 (Originally 9/6/2011)    COVID-19 Vaccine (1) 08/03/2024 (Originally 3/6/1962)    Hepatitis C screen  08/03/2024 (Originally 9/6/1979)    HIV screen  08/03/2024 (Originally 9/6/1976)    DTaP/Tdap/Td vaccine (1 - Tdap) 02/06/2025 (Originally 9/6/1980)    Pneumococcal 0-64 years Vaccine (2 of 2 - PCV) 02/06/2025 (Originally 10/22/2016)    Respiratory Syncytial Virus (RSV) Pregnant or age 60 yrs+ (1 - 1-dose 60+ series) 02/06/2025 (Originally 9/6/2021)    Diabetic retinal exam  02/15/2025 (Originally 7/25/2020)    Flu vaccine (Season Ended) 08/01/2024    A1C test (Diabetic or Prediabetic)  02/02/2025    Diabetic Alb to Cr ratio (uACR) test  02/02/2025    GFR test (Diabetes, CKD 3-4, OR last GFR 15-59)  02/02/2025    Diabetic foot exam  02/06/2025    Depression Monitoring  02/06/2025    Hepatitis A vaccine  Aged Out    Hepatitis B vaccine  Aged Out    Hib vaccine  Aged Out    Polio vaccine  Aged Out    Meningococcal (ACWY) vaccine  Aged Out    Low dose CT lung screening &/or counseling  Discontinued    Prostate Specific Antigen (PSA) Screening or Monitoring  Discontinued             (applicable per patient's age: Cancer Screenings, Depression Screening, Fall Risk Screening, Immunizations)    Hemoglobin A1C (%)   Date Value   02/02/2024 5.0   07/26/2023 6.0   12/21/2022 6.4 (H)     LDL Cholesterol (mg/dL)   Date Value   12/21/2022 127     AST (U/L)   Date Value   02/02/2024 11     ALT (U/L)   Date Value   02/02/2024 11     BUN (mg/dL)   Date Value   02/02/2024 17      (goal A1C is < 7)   (goal LDL is <100) need 30-50% reduction from baseline     BP Readings from Last 3 Encounters:   02/06/24 132/82   11/14/23 135/79   08/03/23 118/72    (goal /80)      All Future Testing planned in [facility]:  Lab Frequency Next Occurrence   CT lung screen [Initial/Annual] Once 09/01/2023

## 2024-05-06 ENCOUNTER — HOSPITAL ENCOUNTER (OUTPATIENT)
Age: 64
Discharge: HOME | End: 2024-05-06
Payer: MEDICAID

## 2024-05-06 DIAGNOSIS — N18.32 STAGE 3B CHRONIC KIDNEY DISEASE (HCC): ICD-10-CM

## 2024-05-06 DIAGNOSIS — I10 HTN (HYPERTENSION), BENIGN: ICD-10-CM

## 2024-05-06 LAB
ALBUMIN: 4 G/DL (ref 3.5–5.2)
ANION GAP SERPL CALCULATED.3IONS-SCNC: 17 MMOL/L (ref 9–17)
BACTERIA URNS QL MICRO: ABNORMAL
BILIRUB UR QL STRIP: NEGATIVE
BUN SERPL-MCNC: 14 MG/DL (ref 8–23)
BUN/CREAT SERPL: 8 (ref 9–20)
CALCIUM SERPL-MCNC: 8.7 MG/DL (ref 8.6–10.4)
CHLORIDE SERPL-SCNC: 102 MMOL/L (ref 98–107)
CLARITY UR: CLEAR
CO2 SERPL-SCNC: 23 MMOL/L (ref 20–31)
COLOR UR: YELLOW
CREAT SERPL-MCNC: 1.7 MG/DL (ref 0.7–1.2)
CREAT UR-MCNC: 76.2 MG/DL (ref 39–259)
EPI CELLS #/AREA URNS HPF: ABNORMAL /HPF (ref 0–5)
GFR, ESTIMATED: 45 ML/MIN/1.73M2
GLUCOSE SERPL-MCNC: 100 MG/DL (ref 70–99)
GLUCOSE UR STRIP-MCNC: NEGATIVE MG/DL
HGB UR QL STRIP.AUTO: ABNORMAL
KETONES UR STRIP-MCNC: NEGATIVE MG/DL
LEUKOCYTE ESTERASE UR QL STRIP: NEGATIVE
NITRITE UR QL STRIP: NEGATIVE
PH UR STRIP: 5.5 [PH] (ref 5–9)
PHOSPHATE SERPL-MCNC: 3.3 MG/DL (ref 2.5–4.5)
POTASSIUM SERPL-SCNC: 4.4 MMOL/L (ref 3.7–5.3)
PROT UR STRIP-MCNC: NEGATIVE MG/DL
RBC #/AREA URNS HPF: ABNORMAL /HPF (ref 0–2)
SODIUM SERPL-SCNC: 142 MMOL/L (ref 135–144)
SP GR UR STRIP: 1.01 (ref 1.01–1.02)
TOTAL PROTEIN, URINE: 14 MG/DL
URINE TOTAL PROTEIN CREATININE RATIO: 0.18 (ref 0–0.2)
UROBILINOGEN UR STRIP-ACNC: NORMAL EU/DL (ref 0–1)
WBC #/AREA URNS HPF: ABNORMAL /HPF (ref 0–5)

## 2024-05-06 PROCEDURE — 82570 ASSAY OF URINE CREATININE: CPT

## 2024-05-06 PROCEDURE — 36415 COLL VENOUS BLD VENIPUNCTURE: CPT

## 2024-05-06 PROCEDURE — 80069 RENAL FUNCTION PANEL: CPT

## 2024-05-06 PROCEDURE — 81001 URINALYSIS AUTO W/SCOPE: CPT

## 2024-05-06 PROCEDURE — 84156 ASSAY OF PROTEIN URINE: CPT

## 2024-05-14 ENCOUNTER — OFFICE VISIT (OUTPATIENT)
Dept: NEPHROLOGY | Age: 64
End: 2024-05-14
Payer: MEDICAID

## 2024-05-14 VITALS
DIASTOLIC BLOOD PRESSURE: 85 MMHG | WEIGHT: 178 LBS | HEIGHT: 66 IN | SYSTOLIC BLOOD PRESSURE: 138 MMHG | RESPIRATION RATE: 18 BRPM | BODY MASS INDEX: 28.61 KG/M2 | HEART RATE: 110 BPM | TEMPERATURE: 97.8 F

## 2024-05-14 DIAGNOSIS — I10 HTN (HYPERTENSION), BENIGN: ICD-10-CM

## 2024-05-14 DIAGNOSIS — N18.32 STAGE 3B CHRONIC KIDNEY DISEASE (HCC): Primary | ICD-10-CM

## 2024-05-14 PROCEDURE — 99214 OFFICE O/P EST MOD 30 MIN: CPT | Performed by: INTERNAL MEDICINE

## 2024-05-14 PROCEDURE — 99213 OFFICE O/P EST LOW 20 MIN: CPT | Performed by: INTERNAL MEDICINE

## 2024-05-14 PROCEDURE — 3075F SYST BP GE 130 - 139MM HG: CPT | Performed by: INTERNAL MEDICINE

## 2024-05-14 PROCEDURE — 3079F DIAST BP 80-89 MM HG: CPT | Performed by: INTERNAL MEDICINE

## 2024-05-14 NOTE — PROGRESS NOTES
[facility]        Outpatient Follow-Up note         5/14/2024 11:49 AM    Patient Name:   Talon Walker Jr.  YOB: 1960  Primary Care Physician:  Adrian Adkins, CARO - CNP   [facility]  [address] [address]  Dept: [phone]     Chief Complaint / Reason for follow-up : Follow Up of CKD     Interval History :  Patient seen and examined by me.   Feels okay no complaints  No chest pain or shortness of breath at this time  Apparently was having leg swelling and he has received Lasix and his renal function is worse so we kind of held it now it is improved     Past History :  Past Medical History:   Diagnosis Date    Acid reflux     Asthma     Cerebrovascular disease     Chronic cough     COPD (chronic obstructive pulmonary disease) (HCC)     Dysphagia     Dyspnea     Unspecified cerebral artery occlusion with cerebral infarction 11/05/2008    Wheelchair dependent      Past Surgical History:   Procedure Laterality Date    BRAIN SURGERY      CRANIOTOMY      GASTROSTOMY TUBE PLACEMENT      removed    KNEE SURGERY          Medications :     Outpatient Medications Marked as Taking for the 5/14/24 encounter (Office Visit) with Deion Ash MD   Medication Sig Dispense Refill    baclofen (LIORESAL) 10 MG tablet Take 1 tablet by mouth 2 times daily 60 tablet 5    pioglitazone (ACTOS) 15 MG tablet Take 1 tablet by mouth daily      atorvastatin (LIPITOR) 40 MG tablet Take 1 tablet by mouth daily 90 tablet 3    gabapentin (NEURONTIN) 300 MG capsule TAKE 1 CAPSULE BY MOUTH TWICE A DAY 60 capsule 5    metFORMIN (GLUCOPHAGE) 1000 MG tablet TAKE ONE TABLET BY MOUTH TWICE A  tablet 3    levETIRAcetam (KEPPRA) 500 MG tablet TAKE 1/2 TABLET BY MOUTH TWO TIMES A DAY 90 tablet 3    sertraline (ZOLOFT) 100 MG tablet TAKE TWO TABLETS BY MOUTH DAILY 180 tablet 3

## 2024-05-14 NOTE — PATIENT INSTRUCTIONS
SURVEY:    Thank you for allowing us to care for you today.    You may be receiving a survey from [facility] regarding your visit today- electronically or via mail.      Please help us by completing the survey as this will provide the needed feedback to ensure we are providing the very best care for you and your family.    If you cannot score us a very good on any question, please call the office to discuss how we could have made your experience a very good one.    Thank you.       STAFF:    Ericka Yen, Analilia Méndez, Leydi Klein      CLINICAL STAFF:    Araseli Garcia LPN, Sisi Vo LPN, Kathy Santana LPN, Rox Escobar CMA

## 2024-07-10 DIAGNOSIS — J44.9 COPD, SEVERE (HCC): ICD-10-CM

## 2024-07-10 RX ORDER — IPRATROPIUM BROMIDE AND ALBUTEROL SULFATE 2.5; .5 MG/3ML; MG/3ML
SOLUTION RESPIRATORY (INHALATION)
Qty: 1080 ML | Refills: 3 | Status: ON HOLD | OUTPATIENT
Start: 2024-07-10

## 2024-07-11 ENCOUNTER — APPOINTMENT (OUTPATIENT)
Dept: GENERAL RADIOLOGY | Age: 64
End: 2024-07-11
Payer: MEDICAID

## 2024-07-11 ENCOUNTER — APPOINTMENT (OUTPATIENT)
Dept: CT IMAGING | Age: 64
End: 2024-07-11
Payer: MEDICAID

## 2024-07-11 ENCOUNTER — HOSPITAL ENCOUNTER (INPATIENT)
Age: 64
LOS: 6 days | Discharge: HOME | End: 2024-07-17
Attending: STUDENT IN AN ORGANIZED HEALTH CARE EDUCATION/TRAINING PROGRAM | Admitting: STUDENT IN AN ORGANIZED HEALTH CARE EDUCATION/TRAINING PROGRAM
Payer: MEDICAID

## 2024-07-11 DIAGNOSIS — I50.23 ACUTE ON CHRONIC SYSTOLIC CONGESTIVE HEART FAILURE (HCC): ICD-10-CM

## 2024-07-11 DIAGNOSIS — G82.20 PARAPLEGIA (HCC): ICD-10-CM

## 2024-07-11 DIAGNOSIS — R60.0 LOWER LEG EDEMA: ICD-10-CM

## 2024-07-11 DIAGNOSIS — J44.1 COPD EXACERBATION (HCC): Primary | ICD-10-CM

## 2024-07-11 DIAGNOSIS — R06.02 SHORTNESS OF BREATH: ICD-10-CM

## 2024-07-11 PROBLEM — J96.01 ACUTE RESPIRATORY FAILURE WITH HYPOXIA (HCC): Status: ACTIVE | Noted: 2024-07-11

## 2024-07-11 LAB
ANION GAP SERPL CALCULATED.3IONS-SCNC: 11 MMOL/L (ref 9–17)
ARTERIAL PATENCY WRIST A: ABNORMAL
BASOPHILS # BLD: 0 K/UL (ref 0–0.2)
BASOPHILS NFR BLD: 0 % (ref 0–2)
BNP SERPL-MCNC: 9772 PG/ML
BODY TEMPERATURE: 37
BUN SERPL-MCNC: 21 MG/DL (ref 8–23)
BUN/CREAT SERPL: 11 (ref 9–20)
CALCIUM SERPL-MCNC: 8.4 MG/DL (ref 8.6–10.4)
CHLORIDE SERPL-SCNC: 100 MMOL/L (ref 98–107)
CO2 SERPL-SCNC: 23 MMOL/L (ref 20–31)
CREAT SERPL-MCNC: 1.9 MG/DL (ref 0.7–1.2)
EOSINOPHIL # BLD: 0 K/UL (ref 0–0.44)
EOSINOPHILS RELATIVE PERCENT: 0 % (ref 1–4)
ERYTHROCYTE [DISTWIDTH] IN BLOOD BY AUTOMATED COUNT: 16.5 % (ref 11.8–14.4)
FIO2 ON VENT: 30 %
GAS FLOW.O2 O2 DELIVERY SYS: ABNORMAL L/MIN
GFR, ESTIMATED: 39 ML/MIN/1.73M2
GLUCOSE SERPL-MCNC: 182 MG/DL (ref 70–99)
HCO3 VENOUS: 23.3 MMOL/L (ref 24–30)
HCT VFR BLD AUTO: 33.8 % (ref 40.7–50.3)
HGB BLD-MCNC: 10.8 G/DL (ref 13–17)
IMM GRANULOCYTES # BLD AUTO: 0.12 K/UL (ref 0–0.3)
IMM GRANULOCYTES NFR BLD: 1 %
LYMPHOCYTES NFR BLD: 1.09 K/UL (ref 1.1–3.7)
LYMPHOCYTES RELATIVE PERCENT: 9 % (ref 24–43)
MAGNESIUM SERPL-MCNC: 2 MG/DL (ref 1.6–2.6)
MCH RBC QN AUTO: 25.7 PG (ref 25.2–33.5)
MCHC RBC AUTO-ENTMCNC: 32 G/DL (ref 28.4–34.8)
MCV RBC AUTO: 80.3 FL (ref 82.6–102.9)
MONOCYTES NFR BLD: 0.48 K/UL (ref 0.1–1.2)
MONOCYTES NFR BLD: 4 % (ref 3–12)
MORPHOLOGY: ABNORMAL
NEGATIVE BASE EXCESS, VEN: 3.6 MMOL/L (ref 0–2)
NEUTROPHILS NFR BLD: 86 % (ref 36–65)
NEUTS SEG NFR BLD: 10.41 K/UL (ref 1.5–8.1)
NRBC BLD-RTO: 0 PER 100 WBC
O2 SAT, VEN: 82.3 % (ref 60–85)
PCO2 VENOUS: 48.8 MM HG (ref 39–55)
PCO2, VEN, TEMP ADJ: 48.8 MMHG (ref 39–55)
PH VENOUS: 7.3 (ref 7.32–7.42)
PH, VEN, TEMP ADJ: 7.3 (ref 7.32–7.42)
PLATELET # BLD AUTO: ABNORMAL K/UL (ref 138–453)
PLATELET, FLUORESCENCE: 257 K/UL (ref 138–453)
PLATELETS.RETICULATED NFR BLD AUTO: 2.3 % (ref 1.1–10.3)
PO2 VENOUS: 51.4 MM HG (ref 30–50)
PO2, VEN, TEMP ADJ: 51.4 MMHG (ref 30–50)
POTASSIUM SERPL-SCNC: 5.4 MMOL/L (ref 3.7–5.3)
RBC # BLD AUTO: 4.21 M/UL (ref 4.21–5.77)
SODIUM SERPL-SCNC: 134 MMOL/L (ref 135–144)
TROPONIN I SERPL HS-MCNC: 52 NG/L (ref 0–22)
WBC OTHER # BLD: 12.1 K/UL (ref 3.5–11.3)

## 2024-07-11 PROCEDURE — 96365 THER/PROPH/DIAG IV INF INIT: CPT

## 2024-07-11 PROCEDURE — 71045 X-RAY EXAM CHEST 1 VIEW: CPT

## 2024-07-11 PROCEDURE — 82805 BLOOD GASES W/O2 SATURATION: CPT

## 2024-07-11 PROCEDURE — 6370000000 HC RX 637 (ALT 250 FOR IP): Performed by: STUDENT IN AN ORGANIZED HEALTH CARE EDUCATION/TRAINING PROGRAM

## 2024-07-11 PROCEDURE — 83880 ASSAY OF NATRIURETIC PEPTIDE: CPT

## 2024-07-11 PROCEDURE — 94640 AIRWAY INHALATION TREATMENT: CPT

## 2024-07-11 PROCEDURE — 96375 TX/PRO/DX INJ NEW DRUG ADDON: CPT

## 2024-07-11 PROCEDURE — 85025 COMPLETE CBC W/AUTO DIFF WBC: CPT

## 2024-07-11 PROCEDURE — 99285 EMERGENCY DEPT VISIT HI MDM: CPT

## 2024-07-11 PROCEDURE — 6360000002 HC RX W HCPCS: Performed by: STUDENT IN AN ORGANIZED HEALTH CARE EDUCATION/TRAINING PROGRAM

## 2024-07-11 PROCEDURE — 71250 CT THORAX DX C-: CPT

## 2024-07-11 PROCEDURE — 83735 ASSAY OF MAGNESIUM: CPT

## 2024-07-11 PROCEDURE — 36415 COLL VENOUS BLD VENIPUNCTURE: CPT

## 2024-07-11 PROCEDURE — 84484 ASSAY OF TROPONIN QUANT: CPT

## 2024-07-11 PROCEDURE — 86140 C-REACTIVE PROTEIN: CPT

## 2024-07-11 PROCEDURE — 93005 ELECTROCARDIOGRAM TRACING: CPT | Performed by: STUDENT IN AN ORGANIZED HEALTH CARE EDUCATION/TRAINING PROGRAM

## 2024-07-11 PROCEDURE — 80048 BASIC METABOLIC PNL TOTAL CA: CPT

## 2024-07-11 PROCEDURE — 1200000000 HC SEMI PRIVATE

## 2024-07-11 RX ORDER — METHYLPREDNISOLONE SODIUM SUCCINATE 125 MG/2ML
125 INJECTION, POWDER, LYOPHILIZED, FOR SOLUTION INTRAMUSCULAR; INTRAVENOUS ONCE
Status: COMPLETED | OUTPATIENT
Start: 2024-07-11 | End: 2024-07-11

## 2024-07-11 RX ORDER — ALBUTEROL SULFATE 2.5 MG/3ML
2.5 SOLUTION RESPIRATORY (INHALATION)
Status: DISCONTINUED | OUTPATIENT
Start: 2024-07-11 | End: 2024-07-12

## 2024-07-11 RX ORDER — MAGNESIUM SULFATE IN WATER 40 MG/ML
2000 INJECTION, SOLUTION INTRAVENOUS ONCE
Status: COMPLETED | OUTPATIENT
Start: 2024-07-11 | End: 2024-07-12

## 2024-07-11 RX ORDER — IPRATROPIUM BROMIDE AND ALBUTEROL SULFATE 2.5; .5 MG/3ML; MG/3ML
1 SOLUTION RESPIRATORY (INHALATION)
Status: DISCONTINUED | OUTPATIENT
Start: 2024-07-11 | End: 2024-07-12

## 2024-07-11 RX ADMIN — IPRATROPIUM BROMIDE AND ALBUTEROL SULFATE 1 DOSE: .5; 3 SOLUTION RESPIRATORY (INHALATION) at 22:17

## 2024-07-11 RX ADMIN — MAGNESIUM SULFATE HEPTAHYDRATE 2000 MG: 40 INJECTION, SOLUTION INTRAVENOUS at 22:31

## 2024-07-11 RX ADMIN — METHYLPREDNISOLONE SODIUM SUCCINATE 125 MG: 125 INJECTION, POWDER, LYOPHILIZED, FOR SOLUTION INTRAMUSCULAR; INTRAVENOUS at 22:52

## 2024-07-11 ASSESSMENT — PAIN - FUNCTIONAL ASSESSMENT: PAIN_FUNCTIONAL_ASSESSMENT: NONE - DENIES PAIN

## 2024-07-12 ENCOUNTER — APPOINTMENT (OUTPATIENT)
Age: 64
End: 2024-07-12
Attending: STUDENT IN AN ORGANIZED HEALTH CARE EDUCATION/TRAINING PROGRAM
Payer: MEDICAID

## 2024-07-12 PROBLEM — J18.9 MULTIFOCAL PNEUMONIA: Status: ACTIVE | Noted: 2024-07-12

## 2024-07-12 LAB
ANION GAP SERPL CALCULATED.3IONS-SCNC: 9 MMOL/L (ref 9–17)
ARTERIAL PATENCY WRIST A: YES
B PARAP IS1001 DNA NPH QL NAA+NON-PROBE: NOT DETECTED
B PERT DNA SPEC QL NAA+PROBE: NOT DETECTED
BASOPHILS # BLD: <0.03 K/UL (ref 0–0.2)
BASOPHILS NFR BLD: 0 % (ref 0–2)
BDY SITE: ABNORMAL
BNP SERPL-MCNC: ABNORMAL PG/ML
BODY TEMPERATURE: 37
BUN SERPL-MCNC: 22 MG/DL (ref 8–23)
BUN/CREAT SERPL: 11 (ref 9–20)
C PNEUM DNA NPH QL NAA+NON-PROBE: NOT DETECTED
CALCIUM SERPL-MCNC: 8.4 MG/DL (ref 8.6–10.4)
CHLORIDE SERPL-SCNC: 100 MMOL/L (ref 98–107)
CO2 SERPL-SCNC: 23 MMOL/L (ref 20–31)
CREAT SERPL-MCNC: 2 MG/DL (ref 0.7–1.2)
CRP SERPL HS-MCNC: 173.4 MG/L (ref 0–5)
CRP SERPL HS-MCNC: 200 MG/L (ref 0–5)
ECHO AO ROOT DIAM: 3.8 CM
ECHO AO ROOT INDEX: 2.03 CM/M2
ECHO AV CUSP MM: 2.1 CM
ECHO AV MEAN GRADIENT: 2 MMHG
ECHO AV MEAN VELOCITY: 0.7 M/S
ECHO AV PEAK GRADIENT: 5 MMHG
ECHO AV PEAK VELOCITY: 1.1 M/S
ECHO AV VELOCITY RATIO: 0.91
ECHO AV VTI: 16.2 CM
ECHO BSA: 1.9 M2
ECHO EST RA PRESSURE: 3 MMHG
ECHO LV E' LATERAL VELOCITY: 11 CM/S
ECHO LV EDV A4C: 56 ML
ECHO LV EDV INDEX A4C: 30 ML/M2
ECHO LV EJECTION FRACTION A4C: 58 %
ECHO LV EJECTION FRACTION BIPLANE: 57 % (ref 55–100)
ECHO LV ESV A4C: 24 ML
ECHO LV ESV INDEX A4C: 13 ML/M2
ECHO LV FRACTIONAL SHORTENING: 27 % (ref 28–44)
ECHO LV INTERNAL DIMENSION DIASTOLE INDEX: 2.35 CM/M2
ECHO LV INTERNAL DIMENSION DIASTOLIC: 4.4 CM (ref 4.2–5.9)
ECHO LV INTERNAL DIMENSION SYSTOLIC INDEX: 1.71 CM/M2
ECHO LV INTERNAL DIMENSION SYSTOLIC: 3.2 CM
ECHO LV IVSD: 0.9 CM (ref 0.6–1)
ECHO LV MASS 2D: 118.6 G (ref 88–224)
ECHO LV MASS INDEX 2D: 63.4 G/M2 (ref 49–115)
ECHO LV POSTERIOR WALL DIASTOLIC: 0.8 CM (ref 0.6–1)
ECHO LV RELATIVE WALL THICKNESS RATIO: 0.36
ECHO LVOT AV VTI INDEX: 0.81
ECHO LVOT MEAN GRADIENT: 2 MMHG
ECHO LVOT PEAK GRADIENT: 4 MMHG
ECHO LVOT PEAK VELOCITY: 1 M/S
ECHO LVOT VTI: 13.2 CM
ECHO MV A VELOCITY: 1.11 M/S
ECHO MV E DECELERATION TIME (DT): 137 MS
ECHO MV E VELOCITY: 0.62 M/S
ECHO MV E/A RATIO: 0.56
ECHO MV E/E' LATERAL: 5.64
ECHO PV MAX VELOCITY: 0.9 M/S
ECHO PV PEAK GRADIENT: 3 MMHG
ECHO RIGHT VENTRICULAR SYSTOLIC PRESSURE (RVSP): 36 MMHG
ECHO RV TAPSE: 1.6 CM (ref 1.7–?)
ECHO TV REGURGITANT MAX VELOCITY: 2.87 M/S
ECHO TV REGURGITANT PEAK GRADIENT: 33 MMHG
EKG ATRIAL RATE: 124 BPM
EKG P AXIS: 42 DEGREES
EKG P-R INTERVAL: 158 MS
EKG Q-T INTERVAL: 320 MS
EKG QRS DURATION: 84 MS
EKG QTC CALCULATION (BAZETT): 459 MS
EKG R AXIS: -68 DEGREES
EKG T AXIS: 17 DEGREES
EKG VENTRICULAR RATE: 124 BPM
EOSINOPHIL # BLD: <0.03 K/UL (ref 0–0.44)
EOSINOPHILS RELATIVE PERCENT: 0 % (ref 1–4)
ERYTHROCYTE [DISTWIDTH] IN BLOOD BY AUTOMATED COUNT: 16.4 % (ref 11.8–14.4)
EST. AVERAGE GLUCOSE BLD GHB EST-MCNC: 103 MG/DL
FIO2 ON VENT: 28 %
FLUAV RNA NPH QL NAA+NON-PROBE: NOT DETECTED
FLUBV RNA NPH QL NAA+NON-PROBE: NOT DETECTED
GAS FLOW.O2 O2 DELIVERY SYS: ABNORMAL L/MIN
GFR, ESTIMATED: 37 ML/MIN/1.73M2
GLUCOSE BLD-MCNC: 161 MG/DL (ref 74–100)
GLUCOSE BLD-MCNC: 170 MG/DL (ref 74–100)
GLUCOSE BLD-MCNC: 206 MG/DL (ref 74–100)
GLUCOSE BLD-MCNC: 209 MG/DL (ref 74–100)
GLUCOSE SERPL-MCNC: 222 MG/DL (ref 70–99)
HADV DNA NPH QL NAA+NON-PROBE: NOT DETECTED
HBA1C MFR BLD: 5.2 % (ref 4–6)
HCO3 ARTERIAL: 23.5 MMOL/L (ref 22–26)
HCOV 229E RNA NPH QL NAA+NON-PROBE: NOT DETECTED
HCOV HKU1 RNA NPH QL NAA+NON-PROBE: NOT DETECTED
HCOV NL63 RNA NPH QL NAA+NON-PROBE: NOT DETECTED
HCOV OC43 RNA NPH QL NAA+NON-PROBE: NOT DETECTED
HCT VFR BLD AUTO: 31.5 % (ref 40.7–50.3)
HGB BLD-MCNC: 9.9 G/DL (ref 13–17)
HMPV RNA NPH QL NAA+NON-PROBE: NOT DETECTED
HPIV1 RNA NPH QL NAA+NON-PROBE: NOT DETECTED
HPIV2 RNA NPH QL NAA+NON-PROBE: NOT DETECTED
HPIV3 RNA NPH QL NAA+NON-PROBE: NOT DETECTED
HPIV4 RNA NPH QL NAA+NON-PROBE: NOT DETECTED
IMM GRANULOCYTES # BLD AUTO: 0.08 K/UL (ref 0–0.3)
IMM GRANULOCYTES NFR BLD: 1 %
LYMPHOCYTES NFR BLD: 0.7 K/UL (ref 1.1–3.7)
LYMPHOCYTES RELATIVE PERCENT: 10 % (ref 24–43)
M PNEUMO DNA NPH QL NAA+NON-PROBE: NOT DETECTED
MCH RBC QN AUTO: 25.3 PG (ref 25.2–33.5)
MCHC RBC AUTO-ENTMCNC: 31.4 G/DL (ref 28.4–34.8)
MCV RBC AUTO: 80.6 FL (ref 82.6–102.9)
MONOCYTES NFR BLD: 0.09 K/UL (ref 0.1–1.2)
MONOCYTES NFR BLD: 1 % (ref 3–12)
NEGATIVE BASE EXCESS, ART: 0.7 MMOL/L (ref 0–2)
NEUTROPHILS NFR BLD: 88 % (ref 36–65)
NEUTS SEG NFR BLD: 6.44 K/UL (ref 1.5–8.1)
NRBC BLD-RTO: 0 PER 100 WBC
O2 SAT, ARTERIAL: 89.3 % (ref 95–98)
PCO2 ARTERIAL: 37.5 MMHG (ref 35–45)
PCO2, ART, TEMP ADJ: 37.5 (ref 35–45)
PH ARTERIAL: 7.41 (ref 7.35–7.45)
PH, ART, TEMP ADJ: 7.41 (ref 7.35–7.45)
PLATELET # BLD AUTO: 209 K/UL (ref 138–453)
PMV BLD AUTO: 10.1 FL (ref 8.1–13.5)
PO2 ARTERIAL: 55.1 MMHG (ref 80–100)
PO2, ART, TEMP ADJ: 55.1 MMHG (ref 80–100)
POTASSIUM SERPL-SCNC: 5 MMOL/L (ref 3.7–5.3)
PROCALCITONIN SERPL-MCNC: 0.6 NG/ML (ref 0–0.09)
RBC # BLD AUTO: 3.91 M/UL (ref 4.21–5.77)
RSV RNA NPH QL NAA+NON-PROBE: NOT DETECTED
RV+EV RNA NPH QL NAA+NON-PROBE: NOT DETECTED
SARS-COV-2 RNA NPH QL NAA+NON-PROBE: NOT DETECTED
SODIUM SERPL-SCNC: 132 MMOL/L (ref 135–144)
SPECIMEN DESCRIPTION: NORMAL
TROPONIN I SERPL HS-MCNC: 56 NG/L (ref 0–22)
WBC OTHER # BLD: 7.3 K/UL (ref 3.5–11.3)

## 2024-07-12 PROCEDURE — 36415 COLL VENOUS BLD VENIPUNCTURE: CPT

## 2024-07-12 PROCEDURE — 6360000002 HC RX W HCPCS: Performed by: STUDENT IN AN ORGANIZED HEALTH CARE EDUCATION/TRAINING PROGRAM

## 2024-07-12 PROCEDURE — 2700000000 HC OXYGEN THERAPY PER DAY

## 2024-07-12 PROCEDURE — 80048 BASIC METABOLIC PNL TOTAL CA: CPT

## 2024-07-12 PROCEDURE — 84145 PROCALCITONIN (PCT): CPT

## 2024-07-12 PROCEDURE — 2580000003 HC RX 258

## 2024-07-12 PROCEDURE — 6370000000 HC RX 637 (ALT 250 FOR IP): Performed by: NURSE PRACTITIONER

## 2024-07-12 PROCEDURE — 36600 WITHDRAWAL OF ARTERIAL BLOOD: CPT

## 2024-07-12 PROCEDURE — 94669 MECHANICAL CHEST WALL OSCILL: CPT

## 2024-07-12 PROCEDURE — 83880 ASSAY OF NATRIURETIC PEPTIDE: CPT

## 2024-07-12 PROCEDURE — 94761 N-INVAS EAR/PLS OXIMETRY MLT: CPT

## 2024-07-12 PROCEDURE — 93306 TTE W/DOPPLER COMPLETE: CPT | Performed by: INTERNAL MEDICINE

## 2024-07-12 PROCEDURE — 6370000000 HC RX 637 (ALT 250 FOR IP): Performed by: STUDENT IN AN ORGANIZED HEALTH CARE EDUCATION/TRAINING PROGRAM

## 2024-07-12 PROCEDURE — 93010 ELECTROCARDIOGRAM REPORT: CPT | Performed by: INTERNAL MEDICINE

## 2024-07-12 PROCEDURE — 85025 COMPLETE CBC W/AUTO DIFF WBC: CPT

## 2024-07-12 PROCEDURE — 82947 ASSAY GLUCOSE BLOOD QUANT: CPT

## 2024-07-12 PROCEDURE — 86140 C-REACTIVE PROTEIN: CPT

## 2024-07-12 PROCEDURE — 6360000002 HC RX W HCPCS: Performed by: NURSE PRACTITIONER

## 2024-07-12 PROCEDURE — 94640 AIRWAY INHALATION TREATMENT: CPT

## 2024-07-12 PROCEDURE — 94664 DEMO&/EVAL PT USE INHALER: CPT

## 2024-07-12 PROCEDURE — 1200000000 HC SEMI PRIVATE

## 2024-07-12 PROCEDURE — 93306 TTE W/DOPPLER COMPLETE: CPT

## 2024-07-12 PROCEDURE — 83036 HEMOGLOBIN GLYCOSYLATED A1C: CPT

## 2024-07-12 PROCEDURE — 82805 BLOOD GASES W/O2 SATURATION: CPT

## 2024-07-12 PROCEDURE — 0202U NFCT DS 22 TRGT SARS-COV-2: CPT

## 2024-07-12 PROCEDURE — 2580000003 HC RX 258: Performed by: STUDENT IN AN ORGANIZED HEALTH CARE EDUCATION/TRAINING PROGRAM

## 2024-07-12 RX ORDER — SODIUM CHLORIDE 0.9 % (FLUSH) 0.9 %
5-40 SYRINGE (ML) INJECTION PRN
Status: DISCONTINUED | OUTPATIENT
Start: 2024-07-12 | End: 2024-07-17 | Stop reason: HOSPADM

## 2024-07-12 RX ORDER — WATER 10 ML/10ML
INJECTION INTRAMUSCULAR; INTRAVENOUS; SUBCUTANEOUS
Status: COMPLETED
Start: 2024-07-12 | End: 2024-07-12

## 2024-07-12 RX ORDER — BUDESONIDE 0.5 MG/2ML
0.5 INHALANT ORAL
Status: DISCONTINUED | OUTPATIENT
Start: 2024-07-12 | End: 2024-07-17 | Stop reason: HOSPADM

## 2024-07-12 RX ORDER — INSULIN LISPRO 100 [IU]/ML
0-4 INJECTION, SOLUTION INTRAVENOUS; SUBCUTANEOUS
Status: DISCONTINUED | OUTPATIENT
Start: 2024-07-12 | End: 2024-07-17 | Stop reason: HOSPADM

## 2024-07-12 RX ORDER — ENOXAPARIN SODIUM 100 MG/ML
40 INJECTION SUBCUTANEOUS DAILY
Status: DISCONTINUED | OUTPATIENT
Start: 2024-07-12 | End: 2024-07-17 | Stop reason: HOSPADM

## 2024-07-12 RX ORDER — FOLIC ACID 1 MG/1
1000 TABLET ORAL DAILY
Status: DISCONTINUED | OUTPATIENT
Start: 2024-07-12 | End: 2024-07-17 | Stop reason: HOSPADM

## 2024-07-12 RX ORDER — BACLOFEN 10 MG/1
10 TABLET ORAL 2 TIMES DAILY
Status: DISCONTINUED | OUTPATIENT
Start: 2024-07-12 | End: 2024-07-17 | Stop reason: HOSPADM

## 2024-07-12 RX ORDER — POLYETHYLENE GLYCOL 3350 17 G/17G
17 POWDER, FOR SOLUTION ORAL DAILY PRN
Status: DISCONTINUED | OUTPATIENT
Start: 2024-07-12 | End: 2024-07-17 | Stop reason: HOSPADM

## 2024-07-12 RX ORDER — FUROSEMIDE 20 MG/1
20 TABLET ORAL 2 TIMES DAILY
Status: DISCONTINUED | OUTPATIENT
Start: 2024-07-12 | End: 2024-07-17 | Stop reason: HOSPADM

## 2024-07-12 RX ORDER — INSULIN LISPRO 100 [IU]/ML
0-4 INJECTION, SOLUTION INTRAVENOUS; SUBCUTANEOUS NIGHTLY
Status: DISCONTINUED | OUTPATIENT
Start: 2024-07-12 | End: 2024-07-17 | Stop reason: HOSPADM

## 2024-07-12 RX ORDER — PIOGLITAZONEHYDROCHLORIDE 15 MG/1
15 TABLET ORAL DAILY
Status: DISCONTINUED | OUTPATIENT
Start: 2024-07-12 | End: 2024-07-17 | Stop reason: HOSPADM

## 2024-07-12 RX ORDER — FLUTICASONE PROPIONATE 50 MCG
2 SPRAY, SUSPENSION (ML) NASAL DAILY
Status: DISCONTINUED | OUTPATIENT
Start: 2024-07-12 | End: 2024-07-17 | Stop reason: HOSPADM

## 2024-07-12 RX ORDER — IPRATROPIUM BROMIDE AND ALBUTEROL SULFATE 2.5; .5 MG/3ML; MG/3ML
1 SOLUTION RESPIRATORY (INHALATION)
Status: DISCONTINUED | OUTPATIENT
Start: 2024-07-12 | End: 2024-07-17 | Stop reason: HOSPADM

## 2024-07-12 RX ORDER — LEVOFLOXACIN 500 MG/1
500 TABLET, FILM COATED ORAL DAILY
Status: DISCONTINUED | OUTPATIENT
Start: 2024-07-12 | End: 2024-07-12

## 2024-07-12 RX ORDER — METHYLPREDNISOLONE SODIUM SUCCINATE 125 MG/2ML
60 INJECTION, POWDER, LYOPHILIZED, FOR SOLUTION INTRAMUSCULAR; INTRAVENOUS EVERY 8 HOURS
Status: DISPENSED | OUTPATIENT
Start: 2024-07-12 | End: 2024-07-17

## 2024-07-12 RX ORDER — GUAIFENESIN/DEXTROMETHORPHAN 100-10MG/5
5 SYRUP ORAL EVERY 4 HOURS PRN
Status: DISCONTINUED | OUTPATIENT
Start: 2024-07-12 | End: 2024-07-17 | Stop reason: HOSPADM

## 2024-07-12 RX ORDER — SODIUM CHLORIDE 9 MG/ML
INJECTION, SOLUTION INTRAVENOUS PRN
Status: DISCONTINUED | OUTPATIENT
Start: 2024-07-12 | End: 2024-07-17 | Stop reason: HOSPADM

## 2024-07-12 RX ORDER — PREDNISONE 10 MG/1
10 TABLET ORAL 2 TIMES DAILY
Status: DISCONTINUED | OUTPATIENT
Start: 2024-07-17 | End: 2024-07-17 | Stop reason: HOSPADM

## 2024-07-12 RX ORDER — ALBUTEROL SULFATE 2.5 MG/3ML
2.5 SOLUTION RESPIRATORY (INHALATION)
Status: DISCONTINUED | OUTPATIENT
Start: 2024-07-12 | End: 2024-07-12

## 2024-07-12 RX ORDER — DEXTROSE MONOHYDRATE 100 MG/ML
INJECTION, SOLUTION INTRAVENOUS CONTINUOUS PRN
Status: DISCONTINUED | OUTPATIENT
Start: 2024-07-12 | End: 2024-07-17 | Stop reason: HOSPADM

## 2024-07-12 RX ORDER — GLUCAGON 1 MG/ML
1 KIT INJECTION PRN
Status: DISCONTINUED | OUTPATIENT
Start: 2024-07-12 | End: 2024-07-17 | Stop reason: HOSPADM

## 2024-07-12 RX ORDER — IPRATROPIUM BROMIDE AND ALBUTEROL SULFATE 2.5; .5 MG/3ML; MG/3ML
1 SOLUTION RESPIRATORY (INHALATION) EVERY 4 HOURS PRN
Status: DISCONTINUED | OUTPATIENT
Start: 2024-07-12 | End: 2024-07-17 | Stop reason: HOSPADM

## 2024-07-12 RX ORDER — ACETAMINOPHEN 325 MG/1
650 TABLET ORAL EVERY 6 HOURS PRN
Status: DISCONTINUED | OUTPATIENT
Start: 2024-07-12 | End: 2024-07-17 | Stop reason: HOSPADM

## 2024-07-12 RX ORDER — ACETAMINOPHEN 650 MG/1
650 SUPPOSITORY RECTAL EVERY 6 HOURS PRN
Status: DISCONTINUED | OUTPATIENT
Start: 2024-07-12 | End: 2024-07-17 | Stop reason: HOSPADM

## 2024-07-12 RX ORDER — LEVOFLOXACIN 250 MG/1
250 TABLET, FILM COATED ORAL DAILY
Status: COMPLETED | OUTPATIENT
Start: 2024-07-13 | End: 2024-07-16

## 2024-07-12 RX ORDER — LEVETIRACETAM 500 MG/1
500 TABLET ORAL 2 TIMES DAILY
Status: DISCONTINUED | OUTPATIENT
Start: 2024-07-12 | End: 2024-07-17 | Stop reason: HOSPADM

## 2024-07-12 RX ORDER — GABAPENTIN 300 MG/1
300 CAPSULE ORAL 2 TIMES DAILY
Status: DISCONTINUED | OUTPATIENT
Start: 2024-07-12 | End: 2024-07-17 | Stop reason: HOSPADM

## 2024-07-12 RX ORDER — ATORVASTATIN CALCIUM 40 MG/1
40 TABLET, FILM COATED ORAL DAILY
Status: DISCONTINUED | OUTPATIENT
Start: 2024-07-12 | End: 2024-07-17 | Stop reason: HOSPADM

## 2024-07-12 RX ORDER — LEVOFLOXACIN 500 MG/1
500 TABLET, FILM COATED ORAL ONCE
Status: COMPLETED | OUTPATIENT
Start: 2024-07-12 | End: 2024-07-12

## 2024-07-12 RX ORDER — SODIUM CHLORIDE 0.9 % (FLUSH) 0.9 %
5-40 SYRINGE (ML) INJECTION EVERY 12 HOURS SCHEDULED
Status: DISCONTINUED | OUTPATIENT
Start: 2024-07-12 | End: 2024-07-17 | Stop reason: HOSPADM

## 2024-07-12 RX ORDER — CETIRIZINE HYDROCHLORIDE 10 MG/1
10 TABLET ORAL DAILY
Status: DISCONTINUED | OUTPATIENT
Start: 2024-07-12 | End: 2024-07-17 | Stop reason: HOSPADM

## 2024-07-12 RX ORDER — ASPIRIN 81 MG/1
81 TABLET, CHEWABLE ORAL DAILY
Status: DISCONTINUED | OUTPATIENT
Start: 2024-07-12 | End: 2024-07-17 | Stop reason: HOSPADM

## 2024-07-12 RX ORDER — SERTRALINE HYDROCHLORIDE 100 MG/1
200 TABLET, FILM COATED ORAL DAILY
Status: DISCONTINUED | OUTPATIENT
Start: 2024-07-12 | End: 2024-07-17 | Stop reason: HOSPADM

## 2024-07-12 RX ORDER — ONDANSETRON 4 MG/1
4 TABLET, ORALLY DISINTEGRATING ORAL EVERY 8 HOURS PRN
Status: DISCONTINUED | OUTPATIENT
Start: 2024-07-12 | End: 2024-07-17 | Stop reason: HOSPADM

## 2024-07-12 RX ORDER — FUROSEMIDE 10 MG/ML
40 INJECTION INTRAMUSCULAR; INTRAVENOUS 2 TIMES DAILY
Status: DISCONTINUED | OUTPATIENT
Start: 2024-07-12 | End: 2024-07-14

## 2024-07-12 RX ORDER — ALBUTEROL SULFATE 2.5 MG/3ML
2.5 SOLUTION RESPIRATORY (INHALATION) EVERY 4 HOURS PRN
Status: DISCONTINUED | OUTPATIENT
Start: 2024-07-12 | End: 2024-07-17 | Stop reason: HOSPADM

## 2024-07-12 RX ORDER — LISINOPRIL 5 MG/1
5 TABLET ORAL DAILY
Status: DISCONTINUED | OUTPATIENT
Start: 2024-07-12 | End: 2024-07-17 | Stop reason: HOSPADM

## 2024-07-12 RX ORDER — PANTOPRAZOLE SODIUM 40 MG/1
40 TABLET, DELAYED RELEASE ORAL
Status: DISCONTINUED | OUTPATIENT
Start: 2024-07-12 | End: 2024-07-17 | Stop reason: HOSPADM

## 2024-07-12 RX ORDER — ONDANSETRON 2 MG/ML
4 INJECTION INTRAMUSCULAR; INTRAVENOUS EVERY 6 HOURS PRN
Status: DISCONTINUED | OUTPATIENT
Start: 2024-07-12 | End: 2024-07-17 | Stop reason: HOSPADM

## 2024-07-12 RX ADMIN — IPRATROPIUM BROMIDE AND ALBUTEROL SULFATE 1 DOSE: 2.5; .5 SOLUTION RESPIRATORY (INHALATION) at 10:43

## 2024-07-12 RX ADMIN — IPRATROPIUM BROMIDE AND ALBUTEROL SULFATE 1 DOSE: 2.5; .5 SOLUTION RESPIRATORY (INHALATION) at 16:24

## 2024-07-12 RX ADMIN — LEVOFLOXACIN 500 MG: 500 TABLET, FILM COATED ORAL at 01:15

## 2024-07-12 RX ADMIN — METFORMIN HYDROCHLORIDE 1000 MG: 500 TABLET ORAL at 08:12

## 2024-07-12 RX ADMIN — METFORMIN HYDROCHLORIDE 1000 MG: 500 TABLET ORAL at 21:02

## 2024-07-12 RX ADMIN — IPRATROPIUM BROMIDE AND ALBUTEROL SULFATE 1 DOSE: 2.5; .5 SOLUTION RESPIRATORY (INHALATION) at 04:29

## 2024-07-12 RX ADMIN — METHYLPREDNISOLONE SODIUM SUCCINATE 60 MG: 125 INJECTION INTRAMUSCULAR; INTRAVENOUS at 17:11

## 2024-07-12 RX ADMIN — ATORVASTATIN CALCIUM 40 MG: 40 TABLET, FILM COATED ORAL at 08:12

## 2024-07-12 RX ADMIN — GABAPENTIN 300 MG: 300 CAPSULE ORAL at 21:02

## 2024-07-12 RX ADMIN — BACLOFEN 10 MG: 10 TABLET ORAL at 21:02

## 2024-07-12 RX ADMIN — LISINOPRIL 5 MG: 5 TABLET ORAL at 08:13

## 2024-07-12 RX ADMIN — FUROSEMIDE 40 MG: 10 INJECTION, SOLUTION INTRAMUSCULAR; INTRAVENOUS at 08:13

## 2024-07-12 RX ADMIN — FLUTICASONE PROPIONATE 2 SPRAY: 50 SPRAY, METERED NASAL at 08:13

## 2024-07-12 RX ADMIN — ASPIRIN 81 MG: 81 TABLET, CHEWABLE ORAL at 08:12

## 2024-07-12 RX ADMIN — BACLOFEN 10 MG: 10 TABLET ORAL at 08:13

## 2024-07-12 RX ADMIN — LEVETIRACETAM 500 MG: 500 TABLET, FILM COATED ORAL at 08:12

## 2024-07-12 RX ADMIN — BUDESONIDE 500 MCG: 0.5 SUSPENSION RESPIRATORY (INHALATION) at 10:43

## 2024-07-12 RX ADMIN — CETIRIZINE HYDROCHLORIDE 10 MG: 10 TABLET, FILM COATED ORAL at 08:13

## 2024-07-12 RX ADMIN — LEVETIRACETAM 500 MG: 500 TABLET, FILM COATED ORAL at 21:02

## 2024-07-12 RX ADMIN — WATER 10 ML: 1 INJECTION INTRAMUSCULAR; INTRAVENOUS; SUBCUTANEOUS at 17:11

## 2024-07-12 RX ADMIN — SERTRALINE 200 MG: 100 TABLET, FILM COATED ORAL at 08:12

## 2024-07-12 RX ADMIN — FOLIC ACID 1000 MCG: 1 TABLET ORAL at 08:12

## 2024-07-12 RX ADMIN — IPRATROPIUM BROMIDE AND ALBUTEROL SULFATE 1 DOSE: 2.5; .5 SOLUTION RESPIRATORY (INHALATION) at 20:14

## 2024-07-12 RX ADMIN — PANTOPRAZOLE SODIUM 40 MG: 40 TABLET, DELAYED RELEASE ORAL at 08:12

## 2024-07-12 RX ADMIN — INSULIN LISPRO 1 UNITS: 100 INJECTION, SOLUTION INTRAVENOUS; SUBCUTANEOUS at 08:05

## 2024-07-12 RX ADMIN — BUDESONIDE 500 MCG: 0.5 SUSPENSION RESPIRATORY (INHALATION) at 20:14

## 2024-07-12 RX ADMIN — WATER 10 ML: 1 INJECTION INTRAMUSCULAR; INTRAVENOUS; SUBCUTANEOUS at 08:13

## 2024-07-12 RX ADMIN — METHYLPREDNISOLONE SODIUM SUCCINATE 60 MG: 125 INJECTION INTRAMUSCULAR; INTRAVENOUS at 08:13

## 2024-07-12 RX ADMIN — ENOXAPARIN SODIUM 40 MG: 100 INJECTION SUBCUTANEOUS at 08:13

## 2024-07-12 RX ADMIN — FUROSEMIDE 40 MG: 10 INJECTION, SOLUTION INTRAMUSCULAR; INTRAVENOUS at 17:11

## 2024-07-12 RX ADMIN — PIOGLITAZONE 15 MG: 15 TABLET ORAL at 08:12

## 2024-07-12 RX ADMIN — GABAPENTIN 300 MG: 300 CAPSULE ORAL at 08:12

## 2024-07-12 RX ADMIN — SODIUM CHLORIDE, PRESERVATIVE FREE 10 ML: 5 INJECTION INTRAVENOUS at 21:02

## 2024-07-12 RX ADMIN — SODIUM CHLORIDE, PRESERVATIVE FREE 10 ML: 5 INJECTION INTRAVENOUS at 08:13

## 2024-07-12 ASSESSMENT — LIFESTYLE VARIABLES
HOW MANY STANDARD DRINKS CONTAINING ALCOHOL DO YOU HAVE ON A TYPICAL DAY: PATIENT DOES NOT DRINK
HOW OFTEN DO YOU HAVE A DRINK CONTAINING ALCOHOL: NEVER

## 2024-07-12 NOTE — H&P
History and Physical    Patient:  Talon Walker Jr.  MRN: [id number]    Chief Complaint: Shortness of breath    History Obtained From:  patient, electronic medical record    PCP: Adrian Adkins APRN - CNP    History of Present Illness:   The patient is a 63 y.o. male who presented to the emergency room with complaints of shortness of breath.  He was transported via EMS from his home.  SpO2 was as low was in the 60s.  He was given nebulizer treatment and placed on a ventilation mask which improved his SpO2 to 93% upon arrival to the emergency room.  Patient does have extensive history including severe COPD, CHF, emphysema.  Upon arrival patient was having respiratory distress and was difficulty to speak full sentences and had severe and audible wheezes that were heard from the door initially.  Patient is also wheelchair dependent with history of cerebral artery occlusion with cerebral infarction.  He does have history of dysphagia.  During patient's evaluation renal function was stable.  Patient's potassium was 5.4.  .4.  Initial proBNP was 9772 and troponins were 52 and 56.  Patient did have mild elevation of white count of 12.1 with a left shift.  Viral panel was completed but still pending.  Patient had VBG's completed which showed mild acidosis with a pH of 7.296, pCO2 of 48.8, pO2 of 51.4 and bicarb of 23.3.  CT chest was completed which showed small bilateral pleural effusions with dependent consolidation in the bilateral lower lobes possibly atelectasis or pneumonia as well as groundglass opacities in the left upper and lower lobes and lesser extent the dependent right upper and right lower lobe also atelectasis or pneumonia.  It did show chronic emphysema as well.  Upon admission assessment patient is on oxygen mask with mild respiratory distress.  He does continue to complain of shortness of breath.  He is not on oxygen at home.    Past Medical History:        Diagnosis Date    Acid reflux     Asthma      Cerebrovascular disease     Chronic cough     COPD (chronic obstructive pulmonary disease) ([facility])     Dysphagia     Dyspnea     Unspecified cerebral artery occlusion with cerebral infarction 11/05/2008    Wheelchair dependent        Past Surgical History:        Procedure Laterality Date    BRAIN SURGERY      CRANIOTOMY      GASTROSTOMY TUBE PLACEMENT      removed    KNEE SURGERY         Medications Prior to Admission:    Prior to Admission medications    Medication Sig Start Date End Date Taking? Authorizing Provider   ipratropium 0.5 mg-albuterol 2.5 mg (DUONEB) 0.5-2.5 (3) MG/3ML SOLN nebulizer solution INHALE THREE MILLILITERS VIA NEBULIZER BY MOUTH EVERY 6 HOURS 7/10/24   Kostas Brunson MD   baclofen (LIORESAL) 10 MG tablet Take 1 tablet by mouth 2 times daily 4/30/24   Adrian Adkins APRN - CNP   pioglitazone (ACTOS) 15 MG tablet Take 1 tablet by mouth daily 3/1/24   ProviderAmanda MD   atorvastatin (LIPITOR) 40 MG tablet Take 1 tablet by mouth daily 2/6/24   Adrian Adkins APRN - CNP   gabapentin (NEURONTIN) 300 MG capsule TAKE 1 CAPSULE BY MOUTH TWICE A DAY 1/31/24 7/31/24  Adrian Adkins APRN - CNP   metFORMIN (GLUCOPHAGE) 1000 MG tablet TAKE ONE TABLET BY MOUTH TWICE A DAY 1/19/24   Adrian Adkins APRN - CNP   levETIRAcetam (KEPPRA) 500 MG tablet TAKE 1/2 TABLET BY MOUTH TWO TIMES A DAY 1/10/24   Adrian Adkins APRN - CNP   sertraline (ZOLOFT) 100 MG tablet TAKE TWO TABLETS BY MOUTH DAILY 1/4/24   Eugenia Boucher APRN - CNP   budesonide (PULMICORT) 0.5 MG/2ML nebulizer suspension USE ONE VIAL VIA NEBULIZER MACHINE TWO TIMES DAILY 12/19/23 12/19/24  Golden Rodriguez DO   folic acid (FOLVITE) 1 MG tablet TAKE ONE TABLET BY MOUTH DAILY 12/13/23   Adrian Adkins APRN - CNP   omeprazole (PRILOSEC) 40 MG delayed release capsule TAKE ONE CAPSULE BY MOUTH DAILY 11/29/23   Adrian Adkins APRN - CNP   blood glucose test strips (TRUE METRIX BLOOD GLUCOSE TEST) strip As needed. 8/3/23   Adrian Adkins

## 2024-07-12 NOTE — ACP (ADVANCE CARE PLANNING)
Advance Care Planning     Palliative Team Advance Care Planning (ACP) Conversation    Date of Conversation: 07/12/24    Individuals present for the conversation: Patient with decision making capacity and Spouse Jannie     ACP documents on file prior to discussion:  -Power of  for Healthcare    Previously completed document/s not on file: None    Healthcare Decision Maker:    Primary Decision Maker (Active): Jannie Walker - Legal Guardian, Legal Guardian - [phone]    Secondary Decision Maker: Wally Walker - Child, Legal Guardian - [phone]     Conversation Summary:  Talon states that he would not want intubation or CPR. DNR-CCA document signed by patients spouse at this time.     Resuscitation Status:   Code Status: DNR-CCA     Documentation Completed:  -Portable DNR    I spent 15 minutes with the patient and/or surrogate decision maker discussing the patient's wishes and goals.      Geovanna Frost RN

## 2024-07-12 NOTE — RT PROTOCOL NOTE
RESPIRATORY ASSESSMENT PROTOCOL                                                                                              Patient Name: Talon Walker Jr. Room#: 0325/0325-01 : 1960     Admitting diagnosis: Shortness of breath [R06.02]  COPD exacerbation (HCC) [J44.1]  Acute on chronic systolic congestive heart failure (HCC) [I50.23]  Acute respiratory failure with hypoxia (HCC) [J96.01]       Medical History:   Past Medical History:   Diagnosis Date    Acid reflux     Asthma     Cerebrovascular disease     Chronic cough     COPD (chronic obstructive pulmonary disease) ([facility])     Dysphagia     Dyspnea     Unspecified cerebral artery occlusion with cerebral infarction 2008    Wheelchair dependent        PATIENT ASSESSMENT    LABORATORY DATA  Hematology:   Lab Results   Component Value Date/Time    WBC 12.1 2024 10:26 PM    RBC 4.21 2024 10:26 PM    HGB 10.8 2024 10:26 PM    HCT 33.8 2024 10:26 PM    PLT See Reflexed IPF Result 2024 10:26 PM     Chemistry:  No results found for: \"PHART\", \"IJO4NJT\", \"PO2ART\", \"D8OKIPDY\", \"ERM0NHG\", \"PBEA\", \"NBEA\"    VITALS  Pulse: (!) 107   Respirations: 20  BP: 120/73  SpO2: 93 % O2 Device: Other (Comment) (oxymask) 3 Lpm  Temp: 98.9 °F (37.2 °C)    SKIN COLOR  [x] Normal  [] Pale  [] Dusky  [] Cyanotic    RESPIRATORY PATTERN  [x] Normal  [] Dyspnea  [] Cheyne-Mares  [] Kussmaul  [] Biots    AMBULATORY  [] Yes  [x] No  [] With Assistance    PEAK FLOW  Predicted:     Personal Best:           Patient Acuity 0 1 2 3 4 Score   Level of Consciousness (LOC) [x]  Alert & Oriented or Pt normal LOC []  Confused;follows directions []  Confused & uncooper-ative []  Obtunded []  Comatose 0   Respiratory Rate  (RR) []  Reg. rate & pattern. 12 - 20 bpm  []  Increased RR. Greater than 20 bpm   [x]  SOB w/ exertion or RR greater than 24 bpm []  Access- ory muscle use at rest. Abn.  resp. []  SOB at rest.   2   Bilateral Breath Sounds (BBS) []  Clear

## 2024-07-12 NOTE — PROGRESS NOTES
Pharmacy Note     Renal Dose Adjustment    Talon Walker Jr. is a 63 y.o. male. Pharmacist assessment of renally cleared medications.    Recent Labs     07/11/24  2226   BUN 21       Recent Labs     07/11/24 2226   CREATININE 1.9*       CrCl cannot be calculated (Unknown ideal weight.).      Height:   Ht Readings from Last 1 Encounters:   05/14/24 1.676 m (5' 6\")     Weight:  Wt Readings from Last 1 Encounters:   05/14/24 80.7 kg (178 lb)       The following medication dose has been adjusted based upon renal function per P&T Guidelines:             Levofloxacin adjusted to 500mg once followed by 250mg daily for renal function.    -Raghav Hull PharmD, BCPS 7/12/2024 12:54 AM

## 2024-07-12 NOTE — CARE COORDINATION
Case Management Assessment  Initial Evaluation    Date/Time of Evaluation: 7/12/2024 5:49 PM  Assessment Completed by: SOPHIA Chaney    If patient is discharged prior to next notation, then this note serves as note for discharge by case management.    Patient Name: Talon Walker Jr.                   YOB: 1960  Diagnosis: Shortness of breath [R06.02]  COPD exacerbation (HCC) [J44.1]  Acute on chronic systolic congestive heart failure (HCC) [I50.23]  Acute respiratory failure with hypoxia (HCC) [J96.01]                   Date / Time: 7/11/2024  9:50 PM    Patient Admission Status: Inpatient   Readmission Risk (Low < 19, Mod (19-27), High > 27): Readmission Risk Score: 17.9    Current PCP: Adrian Adkins APRN - CNP  PCP verified by CM? Yes    Chart Reviewed: Yes      History Provided by: Patient, Significant Other, Child/Family  Patient Orientation: Alert and Oriented, Person, Place, Situation, Self    Patient Cognition: Alert    Hospitalization in the last 30 days (Readmission):  No    If yes, Readmission Assessment in CM Navigator will be completed.    Advance Directives:      Code Status: DNR-CCA   Patient's Primary Decision Maker is: Named in Scanned ACP Document    Primary Decision Maker (Active): Jannie Walker - Legal Guardian, Legal Guardian - [phone]    Secondary Decision Maker: Wally Walker - Child, Legal Guardian - [phone]    Discharge Planning:    Patient lives with: Spouse/Significant Other Type of Home: Apartment  Primary Care Giver: Spouse  Patient Support Systems include: Spouse/Significant Other, Family Members, /, TIFFANY/Passport   Current Financial resources: Medicaid  Current community resources: Other (Comment) (passport)  Current services prior to admission: Durable Medical Equipment, TIFFANY/Passport, Other (Comment) (passport)            Current DME: Wheelchair            Type of Home Care services:  Aide Services    ADLS  Prior functional level:

## 2024-07-12 NOTE — PROGRESS NOTES
Writer to bedside to complete morning assessment. Upon entry to room, pt laying in bed, respirations even and unlabored while on 2L via oxymask.  Pt A&Ox4 at this time. Vitals obtained and assessment completed, see flow sheet for details. Pt denies pain at this time. Power catheter inserted at this time for fluid management. Urine returned. Pt tolerated well.  Pt denies needs from writer at this time. Call light in reach. Bed alarm active. Care ongoing.

## 2024-07-12 NOTE — PROGRESS NOTES
[facility]  Inpatient/Observation/Outpatient Rehabilitation    Date: 7/12/2024  Patient Name: Talon Walker .       [x] Inpatient Acute/Observation        [x] Pt refused/declined therapy at this time due to:        Attempted PT eval with the patient requesting to wait until tomorrow.        Jani Acevedo, PT,DPT, OCS, Cert. DN  Date: 7/12/2024

## 2024-07-12 NOTE — PROGRESS NOTES
Comprehensive Nutrition Assessment    Type and Reason for Visit:  Initial    Nutrition Recommendations/Plan:   Continue current diet.   Attach low sodium diet information to d/c instructions.      Malnutrition Assessment:  Malnutrition Status:  No malnutrition (07/12/24 0657)    Context:  Acute Illness     Findings of the 6 clinical characteristics of malnutrition:  Energy Intake:  No significant decrease in energy intake  Weight Loss:  No significant weight loss     Body Fat Loss:  No significant body fat loss     Muscle Mass Loss:  No significant muscle mass loss    Fluid Accumulation:  Mild Extremities (+ 1 pitting BLE)   Strength:  Not Performed    Nutrition Assessment:    Altered nutrition related labs r/t cardiac/endocrine dysfunction aeb BNP 9,772, glucose 222-on steroid therapy. Na 132, Ca 8.4, no albumin to correct it. Pt denied any weight or PO changes. Feeding himself slowly, declined offer for staff to assist him with eating. Added more syrup to his Romanian toast. Pt denied any concerns at this time. Attach low sodium diet information to d/c.    Nutrition Related Findings:    + 1 pitting BLE Wound Type: None       Current Nutrition Intake & Therapies:    Average Meal Intake: Unable to assess  Average Supplements Intake: None Ordered  ADULT DIET; Regular; Low Sodium (2 gm); 1800 ml    Anthropometric Measures:  Height: 167.6 cm (5' 6\")  Ideal Body Weight (IBW): 142 lbs (65 kg)    Admission Body Weight: 77.6 kg (171 lb 2 oz)  Current Body Weight: 77.6 kg (171 lb 1.2 oz), 120.5 % IBW. Weight Source: Bed Scale  Current BMI (kg/m2): 27.6  Usual Body Weight: 80.7 kg (178 lb)  % Weight Change (Calculated): -3.9  Weight Adjustment For: No Adjustment                 BMI Categories: Overweight (BMI 25.0-29.9)    Estimated Daily Nutrient Needs:  Energy Requirements Based On: Kcal/kg  Weight Used for Energy Requirements: Current  Energy (kcal/day): 5981-6116 (20-23/kg)  Weight Used for Protein Requirements:

## 2024-07-12 NOTE — CONSULTS
Palliative Care Inpatient Consult    NAME:  Talon Walker Jr.  MEDICAL RECORD NUMBER:  [id number]  AGE: 63 y.o.   GENDER: male  : 1960  TODAY'S DATE:  2024    Reason for Consult:  ACP    History of Present Illness     The patient is a 63 y.o.  Non- / non  male who presents with Shortness of Breath (Onset PTA, EMS states his O2 was in the 60's on arrival)      Referred to Palliative Care by  [] Physician   [x] Nursing  [] Family Request   [] Other:      He was admitted to the hospitalist service for Shortness of breath [R06.02]  COPD exacerbation (HCC) [J44.1]  Acute on chronic systolic congestive heart failure (HCC) [I50.23]  Acute respiratory failure with hypoxia (HCC) [J96.01]. The patient has a complicated medical history and has been hospitalized since 2024  9:50 PM.    Active Hospital Problems    Diagnosis Date Noted    Multifocal pneumonia [J18.9] 2024    Acute respiratory failure with hypoxia (HCC) [J96.01] 2024    COPD, severe (HCC) [J44.9] 2018    Controlled type 2 diabetes mellitus with stage 3 chronic kidney disease, without long-term current use of insulin (HCC) [E11.22, N18.30] 2018       Data         /73   Pulse 88   Temp 97.8 °F (36.6 °C) (Temporal)   Resp 24   Ht 1.676 m (5' 6\")   Wt 77.6 kg (171 lb 1.6 oz)   SpO2 91%   BMI 27.62 kg/m²     Wt Readings from Last 3 Encounters:   24 77.6 kg (171 lb 1.6 oz)   24 80.7 kg (178 lb)   23 81.6 kg (179 lb 14.4 oz)        Code Status: DNR-CCA     ADVANCED CARE PLANNING:  Patient has capacity for medical decisions: yes  Health Care Power of : yes  Living Will: no     Personal, Social, and Family History  Marital Status:   Living situation:with family:  spouse  Psychological Distress: mild  Does patient understand diagnosis/treatment? yes  Does caregiver understand diagnosis/treatment? yes    Assessment        Symptom management/ pain control   Pain Assessment:  The  patient is not having any pain.  Anxiety:  none  Dyspnea:  acute dyspnea  Fatigue:  exercise intolerance  Other: christensen in place, history of stroke left sided deficits    Palliative Performance Scale:     [] 100% Full ambulation; normal activity and work; no evidence of disease; able to do own self care; normal intake; fully conscious  [] 90% Full ambulation; normal activity and work; some evidence of disease; able to do own self care; normal intake; fully conscious  [] 80% Full ambulation; normal activity with effort; some evidence of disease; able to do own self care; normal or reduced intake; fully conscious  [] 70% Ambulation reduced; unable to perform normal job/work; significant disease; able to do own self care; normal or reduced intake; fully conscious  [] 60%  Ambulation reduced; cannot do hobbies/housework; significant disease; occasional assist; intake normal or reduced; fully conscious/some confusion  [x] 50%  Mainly sit/lie; can't do any work; extensive disease; considerable assist; intake normal or reduced; fully conscious/some confusion  [] 40%  Mainly in bed; extensive disease; mainly assist; intake normal or reduced; fully conscious/ some confusion   [] 30%  Bed bound; extensive disease; total care; intake reduced; fully conscious/some confusion  [] 20%  Bed bound; extensive disease; total care; intake minimal; drowsy/coma  [] 10%  Bed bound; extensive disease; total care; mouth care only; drowsy/coma  [] 0       Death     Readmission Risk Score: 18%    Plan      Palliative Interaction: Talon is resting in bed. His wife and a caregiver are also at the bedside. Talon and his wife allow the caregiver to answer most of the questions. He lives at home with his wife. He had a caregiver through the [employer] program 7 days a week. 12 hours in the morning and 7 hours overnight. They have changed his insurance and he now has services through [employer]. Services were reduced to 5 days a week for a total of 38

## 2024-07-12 NOTE — PROGRESS NOTES
Patient resting comfortably at this time feeding himself dinner and denies any need for help with assistance. Patient denies any pain and denies any other needs at this time. Patient alert & oriented x4 and able to answer all questions appropriately and follow commands. Assessment and vitals as charted. Bed alarm on, bed locked, call light within reach and able to use appropriately. Will continue to monitor this shift.

## 2024-07-12 NOTE — ED PROVIDER NOTES
[facility] ED  Emergency Department Encounter  Emergency Medicine Attending     Pt Name:Talon Walker Jr.  MRN: [id number]  Birthdate 1960  Date of evaluation: 7/11/24  PCP:  Adrian Adkins APRN - CNP  Note Started: 10:16 PM EDT      CHIEF COMPLAINT       Chief Complaint   Patient presents with    Shortness of Breath     Onset PTA, EMS states his O2 was in the 60's on arrival       HISTORY OF PRESENT ILLNESS  (Location/Symptom, Timing/Onset, Context/Setting, Quality, Duration, Modifying Factors, Severity.)      Talon Walker Jr. is a 63 y.o. male who presents with severe shortness of breath.  EMS was called out to the patient's home for shortness of breath patient saturating in the low 60s.  Patient was given a breathing treatment and put on mask ventilation and has improved his saturation with this up to 93% on arrival.  Patient has a long history of COPD, CHF and emphysema.  As such, patient has had the symptoms of times in the past.  Patient is still currently in enough respiratory distress to prevent him from being able to speak in full sentences, has severe and audible wheezes that can be heard from the door.    PAST MEDICAL / SURGICAL / SOCIAL / FAMILY HISTORY      has a past medical history of Acid reflux, Asthma, Cerebrovascular disease, Chronic cough, COPD (chronic obstructive pulmonary disease) (HCC), Dysphagia, Dyspnea, Unspecified cerebral artery occlusion with cerebral infarction, and Wheelchair dependent.       has a past surgical history that includes craniotomy; brain surgery; knee surgery; and Gastrostomy tube placement.      Social History     Socioeconomic History    Marital status:      Spouse name: Not on file    Number of children: Not on file    Years of education: Not on file    Highest education level: Not on file   Occupational History    Not on file   Tobacco Use    Smoking status: Former     Current packs/day: 0.00     Average packs/day: 1.5 packs/day for 30.0

## 2024-07-12 NOTE — PROGRESS NOTES
Spiritual Services Interventions  0325/0325-01   7/12/2024  Santo Robert    Talon DORYS Walker Jr.  63 y.o. year old male    Encounter Summary  Encounter Overview/Reason: Initial Encounter  Service Provided For: (P) Patient  Referral/Consult From: (P) Rounding  Support System: (P) Family members  Last Encounter : (P) 07/12/24  Complexity of Encounter: (P) Moderate  Begin Time: (P) 0930  End Time : (P) 0945  Total Time Calculated: (P) 15 min     Spiritual/Emotional needs  Type: (P) Spiritual Support                    Assessment/Intervention/Outcome  Assessment: (P) Calm  Intervention: (P) Discussed belief system/Jewish practices/bayron, Discussed illness injury and it’s impact, Prayer (assurance of)/[place]  Outcome: (P) Engaged in conversation, Encouraged

## 2024-07-12 NOTE — PLAN OF CARE
Problem: Safety - Adult  Goal: Free from fall injury  Outcome: Progressing  Note: Bed alarm on, bed locked, side rails up, gripper socks on, call light within reach and able to use appropriately. Will continue to monitor this shift.     Problem: Skin/Tissue Integrity  Goal: Absence of new skin breakdown  Description: 1.  Monitor for areas of redness and/or skin breakdown  2.  Assess vascular access sites hourly  3.  Every 4-6 hours minimum:  Change oxygen saturation probe site  4.  Every 4-6 hours:  If on nasal continuous positive airway pressure, respiratory therapy assess nares and determine need for appliance change or resting period.  Outcome: Progressing  Note: Patient able to reposition self at this time and use call light appropriately for any assistance. Will continue to monitor this shift     Problem: Nutrition Deficit:  Goal: Optimize nutritional status  7/12/2024 1917 by Veronika Turner RN  Outcome: Progressing  Flowsheets (Taken 7/12/2024 1917)  Nutrient intake appropriate for improving, restoring, or maintaining nutritional needs: Assess nutritional status and recommend course of action  Note: Encouraging foods and liquids as tolerated. Will continue to monitor this shift.     Problem: Chronic Conditions and Co-morbidities  Goal: Patient's chronic conditions and co-morbidity symptoms are monitored and maintained or improved  Outcome: Progressing  Flowsheets  Taken 7/12/2024 1917  Care Plan - Patient's Chronic Conditions and Co-Morbidity Symptoms are Monitored and Maintained or Improved: Monitor and assess patient's chronic conditions and comorbid symptoms for stability, deterioration, or improvement  Taken 7/12/2024 1844  Care Plan - Patient's Chronic Conditions and Co-Morbidity Symptoms are Monitored and Maintained or Improved: Monitor and assess patient's chronic conditions and comorbid symptoms for stability, deterioration, or improvement

## 2024-07-12 NOTE — PROGRESS NOTES
[facility] Pt assisted over to bed by staff. Vitals and assessment completed as charted. Pt alert and oriented x4. Pt denies any pain or current needs at this time. Call light is within reach, bed alarm on, care ongoing.

## 2024-07-12 NOTE — PROGRESS NOTES
Occupational Therapy  Facility/Department: [facility] MED SURG  Occupational Therapy Initial Assessment    Name: Talon Walker Jr.  : 1960  MRN: [id number]  Date of Service: 2024    Discharge Recommendations:  Continue to assess pending progress, Subacute/Skilled Nursing Facility  OT Equipment Recommendations  Equipment Needed: Yes  Mobility Devices: Transport Devices  Transport Devices: Patient Mechanical Lift     Patient Diagnosis(es): The primary encounter diagnosis was COPD exacerbation (HCC). Diagnoses of Acute on chronic systolic congestive heart failure (HCC) and Shortness of breath were also pertinent to this visit.  Past Medical History:  has a past medical history of Acid reflux, Asthma, Cerebrovascular disease, Chronic cough, COPD (chronic obstructive pulmonary disease) (HCC), Dysphagia, Dyspnea, Unspecified cerebral artery occlusion with cerebral infarction, and Wheelchair dependent.  Past Surgical History:  has a past surgical history that includes craniotomy; brain surgery; knee surgery; and Gastrostomy tube placement.    Treatment Diagnosis: Generalized weakness    Assessment   Performance deficits / Impairments: Decreased functional mobility ;Decreased safe awareness;Decreased balance;Decreased coordination;Decreased ADL status;Decreased posture;Decreased ROM;Decreased endurance;Decreased high-level IADLs;Decreased strength;Decreased sensation;Decreased fine motor control  Assessment: Pt is 64 yo male presenting to [facility] d/t multifocal pneumonia. 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 Pt demo decreased strength, ROM, endurance, and safety awareness during functional mobility & ADL tasks. Pt would benefit from skilled OT to increase strength, endurance, ROM, and safety awareness during functional mobility/transfers and ADL tasks.  Treatment Diagnosis: Generalized weakness  Prognosis: Fair  Decision Making: Medium Complexity  REQUIRES OT  toilet, bedpan, or urinal)?: Total  How much help is needed for putting on and taking off regular upper body clothing?: A Lot  How much help is needed for taking care of personal grooming?: A Lot  How much help for eating meals?: A Little  AM-PAC Inpatient Daily Activity Raw Score: 10  AM-PAC Inpatient ADL T-Scale Score : 27.31  ADL Inpatient CMS 0-100% Score: 74.7  ADL Inpatient CMS G-Code Modifier : CL    Goals  Short Term Goals  Time Frame for Short Term Goals: 21 visits  Short Term Goal 1: Pt will safely complete RUE PROM/AROM exercises to increase mobility for ADL/feeding tasks  Short Term Goal 2: Pt will safely complete UB ADL tasks with Mod(A) to increase engagement in ADL tasks.  Short Term Goal 3: Pt will safely complete EC/WS strategies with Min(A) during ADL tasks.  Short Term Goal 4: Pt will demo good positioning for left side to increase tolerance for seated ADL/feeding tasks.  Short Term Goal 5: Pt will complete RUE exercises to increase strength/endurance during functional mobility/transfers and ADL tasks.     Therapy Time   Individual Concurrent Group Co-treatment   Time In 1300         Time Out 1315         Minutes 15                 Drew Bonilla, OTAR, OTR/L

## 2024-07-13 LAB
ANION GAP SERPL CALCULATED.3IONS-SCNC: 15 MMOL/L (ref 9–17)
BASOPHILS # BLD: <0.03 K/UL (ref 0–0.2)
BASOPHILS NFR BLD: 0 % (ref 0–2)
BNP SERPL-MCNC: ABNORMAL PG/ML
BUN SERPL-MCNC: 40 MG/DL (ref 8–23)
BUN/CREAT SERPL: 16 (ref 9–20)
CALCIUM SERPL-MCNC: 8.1 MG/DL (ref 8.6–10.4)
CHLORIDE SERPL-SCNC: 100 MMOL/L (ref 98–107)
CO2 SERPL-SCNC: 23 MMOL/L (ref 20–31)
CREAT SERPL-MCNC: 2.5 MG/DL (ref 0.7–1.2)
CRP SERPL HS-MCNC: 90.7 MG/L (ref 0–5)
EOSINOPHIL # BLD: <0.03 K/UL (ref 0–0.44)
EOSINOPHILS RELATIVE PERCENT: 0 % (ref 1–4)
ERYTHROCYTE [DISTWIDTH] IN BLOOD BY AUTOMATED COUNT: 16.5 % (ref 11.8–14.4)
GFR, ESTIMATED: 28 ML/MIN/1.73M2
GLUCOSE BLD-MCNC: 135 MG/DL (ref 74–100)
GLUCOSE BLD-MCNC: 158 MG/DL (ref 74–100)
GLUCOSE BLD-MCNC: 168 MG/DL (ref 74–100)
GLUCOSE BLD-MCNC: 256 MG/DL (ref 74–100)
GLUCOSE SERPL-MCNC: 176 MG/DL (ref 70–99)
HCT VFR BLD AUTO: 28.4 % (ref 40.7–50.3)
HGB BLD-MCNC: 8.8 G/DL (ref 13–17)
IMM GRANULOCYTES # BLD AUTO: 0.07 K/UL (ref 0–0.3)
IMM GRANULOCYTES NFR BLD: 1 %
LYMPHOCYTES NFR BLD: 0.72 K/UL (ref 1.1–3.7)
LYMPHOCYTES RELATIVE PERCENT: 10 % (ref 24–43)
MCH RBC QN AUTO: 25.3 PG (ref 25.2–33.5)
MCHC RBC AUTO-ENTMCNC: 31 G/DL (ref 28.4–34.8)
MCV RBC AUTO: 81.6 FL (ref 82.6–102.9)
MONOCYTES NFR BLD: 0.21 K/UL (ref 0.1–1.2)
MONOCYTES NFR BLD: 3 % (ref 3–12)
NEUTROPHILS NFR BLD: 86 % (ref 36–65)
NEUTS SEG NFR BLD: 6.21 K/UL (ref 1.5–8.1)
NRBC BLD-RTO: 0 PER 100 WBC
PLATELET # BLD AUTO: 217 K/UL (ref 138–453)
PMV BLD AUTO: 10.7 FL (ref 8.1–13.5)
POTASSIUM SERPL-SCNC: 5 MMOL/L (ref 3.7–5.3)
PROCALCITONIN SERPL-MCNC: 0.55 NG/ML (ref 0–0.09)
RBC # BLD AUTO: 3.48 M/UL (ref 4.21–5.77)
SODIUM SERPL-SCNC: 138 MMOL/L (ref 135–144)
WBC OTHER # BLD: 7.2 K/UL (ref 3.5–11.3)

## 2024-07-13 PROCEDURE — 97535 SELF CARE MNGMENT TRAINING: CPT

## 2024-07-13 PROCEDURE — 2580000003 HC RX 258: Performed by: STUDENT IN AN ORGANIZED HEALTH CARE EDUCATION/TRAINING PROGRAM

## 2024-07-13 PROCEDURE — 87040 BLOOD CULTURE FOR BACTERIA: CPT

## 2024-07-13 PROCEDURE — 94761 N-INVAS EAR/PLS OXIMETRY MLT: CPT

## 2024-07-13 PROCEDURE — 2580000003 HC RX 258

## 2024-07-13 PROCEDURE — 82947 ASSAY GLUCOSE BLOOD QUANT: CPT

## 2024-07-13 PROCEDURE — 6360000002 HC RX W HCPCS: Performed by: STUDENT IN AN ORGANIZED HEALTH CARE EDUCATION/TRAINING PROGRAM

## 2024-07-13 PROCEDURE — 36415 COLL VENOUS BLD VENIPUNCTURE: CPT

## 2024-07-13 PROCEDURE — 83880 ASSAY OF NATRIURETIC PEPTIDE: CPT

## 2024-07-13 PROCEDURE — 6370000000 HC RX 637 (ALT 250 FOR IP): Performed by: STUDENT IN AN ORGANIZED HEALTH CARE EDUCATION/TRAINING PROGRAM

## 2024-07-13 PROCEDURE — 97163 PT EVAL HIGH COMPLEX 45 MIN: CPT

## 2024-07-13 PROCEDURE — 94664 DEMO&/EVAL PT USE INHALER: CPT

## 2024-07-13 PROCEDURE — 84145 PROCALCITONIN (PCT): CPT

## 2024-07-13 PROCEDURE — 1200000000 HC SEMI PRIVATE

## 2024-07-13 PROCEDURE — 6360000002 HC RX W HCPCS: Performed by: NURSE PRACTITIONER

## 2024-07-13 PROCEDURE — 2700000000 HC OXYGEN THERAPY PER DAY

## 2024-07-13 PROCEDURE — 85025 COMPLETE CBC W/AUTO DIFF WBC: CPT

## 2024-07-13 PROCEDURE — 80048 BASIC METABOLIC PNL TOTAL CA: CPT

## 2024-07-13 PROCEDURE — 94669 MECHANICAL CHEST WALL OSCILL: CPT

## 2024-07-13 PROCEDURE — 86140 C-REACTIVE PROTEIN: CPT

## 2024-07-13 PROCEDURE — 94640 AIRWAY INHALATION TREATMENT: CPT

## 2024-07-13 PROCEDURE — 6370000000 HC RX 637 (ALT 250 FOR IP): Performed by: NURSE PRACTITIONER

## 2024-07-13 RX ORDER — WATER 10 ML/10ML
INJECTION INTRAMUSCULAR; INTRAVENOUS; SUBCUTANEOUS
Status: COMPLETED
Start: 2024-07-13 | End: 2024-07-13

## 2024-07-13 RX ADMIN — IPRATROPIUM BROMIDE AND ALBUTEROL SULFATE 1 DOSE: 2.5; .5 SOLUTION RESPIRATORY (INHALATION) at 05:09

## 2024-07-13 RX ADMIN — METFORMIN HYDROCHLORIDE 1000 MG: 500 TABLET ORAL at 08:37

## 2024-07-13 RX ADMIN — BACLOFEN 10 MG: 10 TABLET ORAL at 21:05

## 2024-07-13 RX ADMIN — IPRATROPIUM BROMIDE AND ALBUTEROL SULFATE 1 DOSE: 2.5; .5 SOLUTION RESPIRATORY (INHALATION) at 20:04

## 2024-07-13 RX ADMIN — IPRATROPIUM BROMIDE AND ALBUTEROL SULFATE 1 DOSE: 2.5; .5 SOLUTION RESPIRATORY (INHALATION) at 15:14

## 2024-07-13 RX ADMIN — METHYLPREDNISOLONE SODIUM SUCCINATE 60 MG: 125 INJECTION INTRAMUSCULAR; INTRAVENOUS at 18:31

## 2024-07-13 RX ADMIN — SODIUM CHLORIDE, PRESERVATIVE FREE 10 ML: 5 INJECTION INTRAVENOUS at 08:41

## 2024-07-13 RX ADMIN — METFORMIN HYDROCHLORIDE 1000 MG: 500 TABLET ORAL at 21:05

## 2024-07-13 RX ADMIN — ENOXAPARIN SODIUM 40 MG: 100 INJECTION SUBCUTANEOUS at 08:40

## 2024-07-13 RX ADMIN — FOLIC ACID 1000 MCG: 1 TABLET ORAL at 08:37

## 2024-07-13 RX ADMIN — GABAPENTIN 300 MG: 300 CAPSULE ORAL at 21:05

## 2024-07-13 RX ADMIN — METHYLPREDNISOLONE SODIUM SUCCINATE 60 MG: 125 INJECTION INTRAMUSCULAR; INTRAVENOUS at 01:10

## 2024-07-13 RX ADMIN — WATER 10 ML: 1 INJECTION INTRAMUSCULAR; INTRAVENOUS; SUBCUTANEOUS at 18:31

## 2024-07-13 RX ADMIN — BACLOFEN 10 MG: 10 TABLET ORAL at 08:37

## 2024-07-13 RX ADMIN — IPRATROPIUM BROMIDE AND ALBUTEROL SULFATE 1 DOSE: 2.5; .5 SOLUTION RESPIRATORY (INHALATION) at 10:00

## 2024-07-13 RX ADMIN — SODIUM CHLORIDE, PRESERVATIVE FREE 10 ML: 5 INJECTION INTRAVENOUS at 21:05

## 2024-07-13 RX ADMIN — PIOGLITAZONE 15 MG: 15 TABLET ORAL at 08:38

## 2024-07-13 RX ADMIN — GABAPENTIN 300 MG: 300 CAPSULE ORAL at 08:37

## 2024-07-13 RX ADMIN — WATER 10 ML: 1 INJECTION INTRAMUSCULAR; INTRAVENOUS; SUBCUTANEOUS at 08:39

## 2024-07-13 RX ADMIN — WATER 10 ML: 1 INJECTION INTRAMUSCULAR; INTRAVENOUS; SUBCUTANEOUS at 01:10

## 2024-07-13 RX ADMIN — FUROSEMIDE 40 MG: 10 INJECTION, SOLUTION INTRAMUSCULAR; INTRAVENOUS at 08:39

## 2024-07-13 RX ADMIN — FUROSEMIDE 40 MG: 10 INJECTION, SOLUTION INTRAMUSCULAR; INTRAVENOUS at 18:32

## 2024-07-13 RX ADMIN — LEVETIRACETAM 500 MG: 500 TABLET, FILM COATED ORAL at 08:38

## 2024-07-13 RX ADMIN — LEVETIRACETAM 500 MG: 500 TABLET, FILM COATED ORAL at 21:05

## 2024-07-13 RX ADMIN — BUDESONIDE 500 MCG: 0.5 SUSPENSION RESPIRATORY (INHALATION) at 20:04

## 2024-07-13 RX ADMIN — LEVOFLOXACIN 250 MG: 250 TABLET, FILM COATED ORAL at 08:39

## 2024-07-13 RX ADMIN — BUDESONIDE 500 MCG: 0.5 SUSPENSION RESPIRATORY (INHALATION) at 10:00

## 2024-07-13 RX ADMIN — METHYLPREDNISOLONE SODIUM SUCCINATE 60 MG: 125 INJECTION INTRAMUSCULAR; INTRAVENOUS at 08:39

## 2024-07-13 RX ADMIN — SERTRALINE 200 MG: 100 TABLET, FILM COATED ORAL at 08:38

## 2024-07-13 RX ADMIN — INSULIN LISPRO 2 UNITS: 100 INJECTION, SOLUTION INTRAVENOUS; SUBCUTANEOUS at 12:15

## 2024-07-13 RX ADMIN — ASPIRIN 81 MG: 81 TABLET, CHEWABLE ORAL at 08:37

## 2024-07-13 RX ADMIN — ATORVASTATIN CALCIUM 40 MG: 40 TABLET, FILM COATED ORAL at 08:38

## 2024-07-13 RX ADMIN — CETIRIZINE HYDROCHLORIDE 10 MG: 10 TABLET, FILM COATED ORAL at 08:38

## 2024-07-13 RX ADMIN — PANTOPRAZOLE SODIUM 40 MG: 40 TABLET, DELAYED RELEASE ORAL at 08:38

## 2024-07-13 RX ADMIN — LISINOPRIL 5 MG: 5 TABLET ORAL at 08:39

## 2024-07-13 NOTE — PROGRESS NOTES
Vitals and assessment complete at this time. See flowsheets for details. Vitals are WNL. Patient is resting in bed. Patient is A&O x4. Breathing is regular and unlabored. Dyspnea noted with exertion. SPO2 is 92% on 2L O2 nasal cannula. Lung sounds are diminished with expiratory wheezes noted in the bilateral upper lobes. +2 pitting edema noted to BLE. Power is in place and draining. Patient denies further needs at this time. Call light is within reach. Family is at bedside. Care ongoing.

## 2024-07-13 NOTE — PROGRESS NOTES
RESPIRATORY ASSESSMENT PROTOCOL                                                                                              Patient Name: Talon Walker Jr. Room#: 0325/0325-01 : 1960     Admitting diagnosis: Shortness of breath [R06.02]  COPD exacerbation ([facility]) [J44.1]  Acute on chronic systolic congestive heart failure (HCC) [I50.23]  Acute respiratory failure with hypoxia ([facility]) [J96.01]       Medical History:   Past Medical History:   Diagnosis Date    Acid reflux     Asthma     Cerebrovascular disease     Chronic cough     COPD (chronic obstructive pulmonary disease) ([facility])     Dysphagia     Dyspnea     Unspecified cerebral artery occlusion with cerebral infarction 2008    Wheelchair dependent        PATIENT ASSESSMENT    LABORATORY DATA  Hematology:   Lab Results   Component Value Date/Time    WBC 7.2 2024 06:20 AM    RBC 3.48 2024 06:20 AM    HGB 8.8 2024 06:20 AM    HCT 28.4 2024 06:20 AM     2024 06:20 AM     Chemistry:    Lab Results   Component Value Date/Time    PHART 7.414 2024 06:53 AM    IRB6NBG 37.5 2024 06:53 AM    PO2ART 55.1 2024 06:53 AM    Q6KKQKCP 89.3 2024 06:53 AM    FDD5DKR 23.5 2024 06:53 AM    NBEA 0.7 2024 06:53 AM       VITALS  Pulse: 96   Respirations: 20  BP: (!) 107/55  SpO2: 95 % O2 Device: Nasal cannula  Temp: 97.2 °F (36.2 °C)    SKIN COLOR  [x] Normal  [] Pale  [] Dusky  [] Cyanotic    RESPIRATORY PATTERN  [x] Normal  [] Dyspnea  [] Cheyne-Mares  [] Kussmaul  [] Biots    AMBULATORY  [] Yes  [x] No  [] With Assistance    Patient Acuity 0 1 2 3 4 Score   Level of Consciousness (LOC) [x]  Alert & Oriented or Pt normal LOC []  Confused;follows directions []  Confused & uncooper-ative []  Obtunded []  Comatose 0   Respiratory Rate  (RR) []  Reg. rate & pattern. 12 - 20 bpm  [x]  Increased RR. Greater than 20 bpm   []  SOB w/ exertion or RR greater than 24 bpm []  Access- ory muscle use at rest. Abn.   No     ____ NA    Smoking Cessation Booklet given:  ____Yes  ____No ____Patient Refused

## 2024-07-13 NOTE — PROGRESS NOTES
Patient resting comfortably at this time feeding himself dinner. Patient denies any pain and denies any other needs at this time. Patient alert & oriented x4 and able to answer all questions appropriately and follow commands. Assessment and vitals as charted. Patient states he is feeling better today. Lung sounds have improved. Bed alarm on, bed locked, gripper socks on, call light within reach and able to use appropriately. Will continue to monitor this shift.

## 2024-07-13 NOTE — PROGRESS NOTES
Physical Therapy  Facility/Department: [facility] MED SURG  Physical Therapy Initial Assessment    Name: Talon Walker Jr.  : 1960  MRN: [id number]  Date of Service: 2024    Discharge Recommendations:  Continue to assess pending progress, Home with Home health PT          Patient Diagnosis(es): The primary encounter diagnosis was COPD exacerbation (HCC). Diagnoses of Acute on chronic systolic congestive heart failure (HCC) and Shortness of breath were also pertinent to this visit.  Past Medical History:  has a past medical history of Acid reflux, Asthma, Cerebrovascular disease, Chronic cough, COPD (chronic obstructive pulmonary disease) (HCC), Dysphagia, Dyspnea, Unspecified cerebral artery occlusion with cerebral infarction, and Wheelchair dependent.  Past Surgical History:  has a past surgical history that includes craniotomy; brain surgery; knee surgery; and Gastrostomy tube placement.    Assessment   Body Structures, Functions, Activity Limitations Requiring Skilled Therapeutic Intervention: Decreased functional mobility ;Decreased ROM;Decreased posture  Assessment: Pt referred to PT for PROM to BLE. Pt is non ambulatory and completely dependent transfers  Treatment Diagnosis: decreased BLE AROM  Specific Instructions for Next Treatment: 1 times on weekends and holidays  Therapy Prognosis: Fair  Decision Making: High Complexity  Requires PT Follow-Up: Yes  Activity Tolerance  Activity Tolerance: Patient tolerated treatment well     Plan   Physical Therapy Plan  General Plan: 1 times a day 7 days a week  Specific Instructions for Next Treatment: 1 times on weekends and holidays  Current Treatment Recommendations: ROM, Functional mobility training, Safety education & training, Patient/Caregiver education & training, Home exercise program  Safety Devices  Type of Devices: All richa prominences offloaded, All fall risk precautions in place, Call light within reach, Heels elevated for pressure relief, Patient  non-functional  Strength: Grossly decreased, non-functional                 Transfer Training  Transfer Training: Yes  Overall Level of Assistance: Adaptive equipment;Other (comment) (isis lift)                          OutComes Score                                                  AM-PAC - Mobility         AM-PAC Mobility without Stair Climbing Inpatient   How much difficulty turning over in bed?: Unable  How much difficulty sitting down on / standing up from a chair with arms?: Unable  How much help from another person moving to and from a bed to a chair?: Total  How much help from another person needed to walk in hospital room?: Total    Tinneti Score       Goals  Short Term Goals  Time Frame for Short Term Goals: 20 days  Short Term Goal 1: PT will perform PROM to BLE in order to reduce contractures  Short Term Goal 2: Pt will perform bed mobility Max A/Mod A       Education  Patient Education  Education Given To: Patient  Education Provided: Role of Therapy;Plan of Care      Therapy Time   Individual Concurrent Group Co-treatment   Time In 1116         Time Out 1126         Minutes 10                 Samantha Treviño, PT

## 2024-07-13 NOTE — PROGRESS NOTES
Occupational Therapy  Facility/Department: [facility] MED SURG  Daily Treatment Note  NAME: Talon Walker Jr.  : 1960  MRN: [id number]    Date of Service: 2024    Discharge Recommendations:  Continue to assess pending progress, Subacute/Skilled Nursing Facility         Patient Diagnosis(es): The primary encounter diagnosis was COPD exacerbation (HCC). Diagnoses of Acute on chronic systolic congestive heart failure (HCC) and Shortness of breath were also pertinent to this visit.     Assessment    Activity Tolerance: Patient tolerated treatment well  Discharge Recommendations: Continue to assess pending progress;Subacute/Skilled Nursing Facility      Plan   Occupational Therapy Plan  Times Per Day: Once a day  Days Per Week: 7 Days  Current Treatment Recommendations: Strengthening;ROM;Endurance training;Safety education & training;Patient/Caregiver education & training;Positioning;Modalities;Self-Care / ADL     Restrictions  Restrictions/Precautions  Restrictions/Precautions: Fall Risk;General Precautions    Subjective   Subjective  Subjective: Pt sitting up in bed upon arrival. Pt agreed to participate in therapy session.  Pain: Pt had no complaints of pain.           Objective    Vitals           ADL  Functional Mobility: Dependent/Total  Additional Comments: Max A x 2 to complete bed mob of rolling. Dep transfer from bed to chair with use of isis lift.        Safety Devices  Type of Devices: All richa prominences offloaded;All fall risk precautions in place;Call light within reach;Heels elevated for pressure relief;Patient at risk for falls;Nurse notified;Chair alarm in place;Left in chair     Patient Education  Education Given To: Patient;Family  Education Provided: Role of Therapy;Plan of Care  Education Method: Verbal  Barriers to Learning: None  Education Outcome: Verbalized understanding;Continued education needed    Goals  Short Term Goals  Time Frame for Short Term Goals: 21 visits  Short Term Goal

## 2024-07-13 NOTE — PROGRESS NOTES
Asad Arriaga M.D.  Internal Medicine Progress Note    Patient: Talon Walker Jr.  Date of Admission: 7/11/2024  9:50 PM  Date of Evaluation: 7/13/2024      SUBJECTIVE:    Talon Walker Jr. is a 63 y.o. male who was seen today for follow up of Multifocal pneumonia.  Wife and sister are at bedside.  All questions were answered. Pt states he is feeling better today.  He still has a cough and some SOB but is overall better than on admission.  He denies fever or chills and has been afebrile.  He is tolerating diet without any nausea, vomiting or diarrhea.    ROS:   Constitutional: negative  for fevers, and positive for chills.  Respiratory: positive for shortness of breath, negative for cough, and negative for wheezing  Cardiovascular: negative for chest pain, and negative for palpitations  Gastrointestinal: negative for abdominal pain, negative for nausea,negative for vomiting, negative for diarrhea, and negative for constipation     All other systems were reviewed with the patient and are negative unless otherwise stated in HPI    -----------------------------------------------------------------  OBJECTIVE:  Vitals:   Temp: 97.2 °F (36.2 °C)  BP: (!) 107/55  Respirations: 20  Pulse: 96  SpO2: 95 % on supplemental O2    Weight  Wt Readings from Last 3 Encounters:   07/13/24 76.9 kg (169 lb 8 oz)   05/14/24 80.7 kg (178 lb)   08/03/23 81.6 kg (179 lb 14.4 oz)     Body mass index is 27.37 kg/m².    24HR INTAKE/OUTPUT:      Intake/Output Summary (Last 24 hours) at 7/13/2024 1439  Last data filed at 7/13/2024 1221  Gross per 24 hour   Intake 1060 ml   Output 1675 ml   Net -615 ml       Exam:  GEN:    Awake, alert and oriented x 3.  Right parietal concavity from previous Craniotomy  EYES:  EOMI, pupils equal   NECK: Supple. No lymphadenopathy.  No carotid bruit  CVS:    regular rate and rhythm, no audible murmur  PULM:  diminished with bilateral scattered rhonchi, no acute respiratory distress  ABD:    Bowels sounds  opacity in left perihilar region measuring 3.2 cm.  Cardiac silhouette is stable.  Questionable small pleural effusions.  No pneumothorax.  Osseous structures are unremarkable.     1. Bilateral interstitial and airspace opacities with a more focal somewhat nodular opacity in the left perihilar region measuring 3.2 cm. Findings may represent pulmonary edema versus multifocal pneumonia.  Underlying interstitial lung disease is suspected.  Neoplastic process in the left perihilar region cannot be excluded.  Recommend radiographic follow-up or follow-up with CT as clinically indicated. 2. Questionable small pleural effusions.         MEDICAL DECISION MAKING:  Primary Problem(s): Multifocal pneumonia  Condition is stable  Treatment plan:   Supplemental oxygen  Trend CRP  Blood cultures x 2 ordered  Viral panel = negative  Up with assistance  PT/OT  Imaging:   no further imaging studies ordered today  Echo reviewed: normal EF, grade 1 diastolic dysfunction, moderate tricuspid regurg  Medications:   Continue DuoNeb, albuterol  Oral Levaquin  IV Solu-Medrol  Medication Monitoring / High Risk Medications: none     Acute Respiratory Failure with Hypoxia/concern for acute CHF / COPD in addition to pneumonia  Condition is stable  Treatment plan:   Repeat ABG  Viral panel-pending  Supplemental oxygen to maintain SpO2 greater than 90%  Place Power catheter  Trend CRP  Imaging: Echo ordered  Medications:   IV Lasix  Continue DuoNeb  Oral Levaquin     Diabetes mellitus    Condition is a chronic stable condition  Treatment plan:   Monitor POC glucose  Medications:   Continue Metformin, Pioglitazone  Continue insulin sliding scale  Hypoglycemic protocol in place    Nutrition status:   Well developed, well nourished with no malnutrition  Dietician consult appreciated     Hospital Prophylaxis:   DVT: Lovenox   Stress Ulcer: PPI     MDM Data:   Test interpretation:  My independent EKG interpretation: Sinus tachycardia with left axis

## 2024-07-14 LAB
ANION GAP SERPL CALCULATED.3IONS-SCNC: 13 MMOL/L (ref 9–17)
BASOPHILS # BLD: <0.03 K/UL (ref 0–0.2)
BASOPHILS NFR BLD: 0 % (ref 0–2)
BNP SERPL-MCNC: 4954 PG/ML
BUN SERPL-MCNC: 48 MG/DL (ref 8–23)
BUN/CREAT SERPL: 18 (ref 9–20)
CALCIUM SERPL-MCNC: 7.7 MG/DL (ref 8.6–10.4)
CHLORIDE SERPL-SCNC: 100 MMOL/L (ref 98–107)
CO2 SERPL-SCNC: 25 MMOL/L (ref 20–31)
CREAT SERPL-MCNC: 2.6 MG/DL (ref 0.7–1.2)
CRP SERPL HS-MCNC: 32.7 MG/L (ref 0–5)
EOSINOPHIL # BLD: <0.03 K/UL (ref 0–0.44)
EOSINOPHILS RELATIVE PERCENT: 0 % (ref 1–4)
ERYTHROCYTE [DISTWIDTH] IN BLOOD BY AUTOMATED COUNT: 16.8 % (ref 11.8–14.4)
GFR, ESTIMATED: 27 ML/MIN/1.73M2
GLUCOSE BLD-MCNC: 156 MG/DL (ref 74–100)
GLUCOSE BLD-MCNC: 177 MG/DL (ref 74–100)
GLUCOSE BLD-MCNC: 195 MG/DL (ref 74–100)
GLUCOSE BLD-MCNC: 207 MG/DL (ref 74–100)
GLUCOSE SERPL-MCNC: 178 MG/DL (ref 70–99)
HCT VFR BLD AUTO: 26.5 % (ref 40.7–50.3)
HGB BLD-MCNC: 8.2 G/DL (ref 13–17)
IMM GRANULOCYTES # BLD AUTO: 0.08 K/UL (ref 0–0.3)
IMM GRANULOCYTES NFR BLD: 1 %
LYMPHOCYTES NFR BLD: 0.74 K/UL (ref 1.1–3.7)
LYMPHOCYTES RELATIVE PERCENT: 12 % (ref 24–43)
MCH RBC QN AUTO: 25.4 PG (ref 25.2–33.5)
MCHC RBC AUTO-ENTMCNC: 30.9 G/DL (ref 28.4–34.8)
MCV RBC AUTO: 82 FL (ref 82.6–102.9)
MONOCYTES NFR BLD: 0.17 K/UL (ref 0.1–1.2)
MONOCYTES NFR BLD: 3 % (ref 3–12)
NEUTROPHILS NFR BLD: 84 % (ref 36–65)
NEUTS SEG NFR BLD: 5.08 K/UL (ref 1.5–8.1)
NRBC BLD-RTO: 0 PER 100 WBC
PLATELET # BLD AUTO: 190 K/UL (ref 138–453)
PMV BLD AUTO: 10.5 FL (ref 8.1–13.5)
POTASSIUM SERPL-SCNC: 4.8 MMOL/L (ref 3.7–5.3)
PROCALCITONIN SERPL-MCNC: 0.34 NG/ML (ref 0–0.09)
RBC # BLD AUTO: 3.23 M/UL (ref 4.21–5.77)
SODIUM SERPL-SCNC: 138 MMOL/L (ref 135–144)
WBC OTHER # BLD: 6.1 K/UL (ref 3.5–11.3)

## 2024-07-14 PROCEDURE — 80048 BASIC METABOLIC PNL TOTAL CA: CPT

## 2024-07-14 PROCEDURE — 86140 C-REACTIVE PROTEIN: CPT

## 2024-07-14 PROCEDURE — 94669 MECHANICAL CHEST WALL OSCILL: CPT

## 2024-07-14 PROCEDURE — 94664 DEMO&/EVAL PT USE INHALER: CPT

## 2024-07-14 PROCEDURE — 97110 THERAPEUTIC EXERCISES: CPT

## 2024-07-14 PROCEDURE — 6360000002 HC RX W HCPCS: Performed by: NURSE PRACTITIONER

## 2024-07-14 PROCEDURE — 94761 N-INVAS EAR/PLS OXIMETRY MLT: CPT

## 2024-07-14 PROCEDURE — 6360000002 HC RX W HCPCS: Performed by: STUDENT IN AN ORGANIZED HEALTH CARE EDUCATION/TRAINING PROGRAM

## 2024-07-14 PROCEDURE — 83880 ASSAY OF NATRIURETIC PEPTIDE: CPT

## 2024-07-14 PROCEDURE — 1200000000 HC SEMI PRIVATE

## 2024-07-14 PROCEDURE — 2700000000 HC OXYGEN THERAPY PER DAY

## 2024-07-14 PROCEDURE — 6370000000 HC RX 637 (ALT 250 FOR IP): Performed by: NURSE PRACTITIONER

## 2024-07-14 PROCEDURE — 85025 COMPLETE CBC W/AUTO DIFF WBC: CPT

## 2024-07-14 PROCEDURE — 94640 AIRWAY INHALATION TREATMENT: CPT

## 2024-07-14 PROCEDURE — 82947 ASSAY GLUCOSE BLOOD QUANT: CPT

## 2024-07-14 PROCEDURE — 84145 PROCALCITONIN (PCT): CPT

## 2024-07-14 PROCEDURE — 36415 COLL VENOUS BLD VENIPUNCTURE: CPT

## 2024-07-14 PROCEDURE — 2580000003 HC RX 258

## 2024-07-14 PROCEDURE — 6370000000 HC RX 637 (ALT 250 FOR IP): Performed by: STUDENT IN AN ORGANIZED HEALTH CARE EDUCATION/TRAINING PROGRAM

## 2024-07-14 PROCEDURE — 2580000003 HC RX 258: Performed by: STUDENT IN AN ORGANIZED HEALTH CARE EDUCATION/TRAINING PROGRAM

## 2024-07-14 RX ORDER — WATER 10 ML/10ML
INJECTION INTRAMUSCULAR; INTRAVENOUS; SUBCUTANEOUS
Status: COMPLETED
Start: 2024-07-14 | End: 2024-07-14

## 2024-07-14 RX ADMIN — GABAPENTIN 300 MG: 300 CAPSULE ORAL at 09:48

## 2024-07-14 RX ADMIN — ATORVASTATIN CALCIUM 40 MG: 40 TABLET, FILM COATED ORAL at 09:45

## 2024-07-14 RX ADMIN — SERTRALINE 200 MG: 100 TABLET, FILM COATED ORAL at 09:45

## 2024-07-14 RX ADMIN — BACLOFEN 10 MG: 10 TABLET ORAL at 09:48

## 2024-07-14 RX ADMIN — IPRATROPIUM BROMIDE AND ALBUTEROL SULFATE 1 DOSE: 2.5; .5 SOLUTION RESPIRATORY (INHALATION) at 15:35

## 2024-07-14 RX ADMIN — METFORMIN HYDROCHLORIDE 1000 MG: 500 TABLET ORAL at 20:35

## 2024-07-14 RX ADMIN — WATER 10 ML: 1 INJECTION INTRAMUSCULAR; INTRAVENOUS; SUBCUTANEOUS at 17:09

## 2024-07-14 RX ADMIN — IPRATROPIUM BROMIDE AND ALBUTEROL SULFATE 1 DOSE: 2.5; .5 SOLUTION RESPIRATORY (INHALATION) at 05:31

## 2024-07-14 RX ADMIN — SODIUM CHLORIDE, PRESERVATIVE FREE 10 ML: 5 INJECTION INTRAVENOUS at 09:49

## 2024-07-14 RX ADMIN — METFORMIN HYDROCHLORIDE 1000 MG: 500 TABLET ORAL at 09:47

## 2024-07-14 RX ADMIN — BACLOFEN 10 MG: 10 TABLET ORAL at 20:35

## 2024-07-14 RX ADMIN — ASPIRIN 81 MG: 81 TABLET, CHEWABLE ORAL at 09:45

## 2024-07-14 RX ADMIN — PIOGLITAZONE 15 MG: 15 TABLET ORAL at 09:48

## 2024-07-14 RX ADMIN — IPRATROPIUM BROMIDE AND ALBUTEROL SULFATE 1 DOSE: 2.5; .5 SOLUTION RESPIRATORY (INHALATION) at 20:15

## 2024-07-14 RX ADMIN — WATER 10 ML: 1 INJECTION INTRAMUSCULAR; INTRAVENOUS; SUBCUTANEOUS at 09:50

## 2024-07-14 RX ADMIN — BUDESONIDE 500 MCG: 0.5 SUSPENSION RESPIRATORY (INHALATION) at 09:58

## 2024-07-14 RX ADMIN — WATER 10 ML: 1 INJECTION INTRAMUSCULAR; INTRAVENOUS; SUBCUTANEOUS at 00:22

## 2024-07-14 RX ADMIN — SODIUM CHLORIDE, PRESERVATIVE FREE 10 ML: 5 INJECTION INTRAVENOUS at 20:35

## 2024-07-14 RX ADMIN — ENOXAPARIN SODIUM 40 MG: 100 INJECTION SUBCUTANEOUS at 09:49

## 2024-07-14 RX ADMIN — BUDESONIDE 500 MCG: 0.5 SUSPENSION RESPIRATORY (INHALATION) at 20:15

## 2024-07-14 RX ADMIN — IPRATROPIUM BROMIDE AND ALBUTEROL SULFATE 1 DOSE: 2.5; .5 SOLUTION RESPIRATORY (INHALATION) at 09:58

## 2024-07-14 RX ADMIN — FOLIC ACID 1000 MCG: 1 TABLET ORAL at 09:48

## 2024-07-14 RX ADMIN — PANTOPRAZOLE SODIUM 40 MG: 40 TABLET, DELAYED RELEASE ORAL at 09:59

## 2024-07-14 RX ADMIN — ACETAMINOPHEN 650 MG: 325 TABLET ORAL at 14:03

## 2024-07-14 RX ADMIN — CETIRIZINE HYDROCHLORIDE 10 MG: 10 TABLET, FILM COATED ORAL at 09:45

## 2024-07-14 RX ADMIN — FUROSEMIDE 40 MG: 10 INJECTION, SOLUTION INTRAMUSCULAR; INTRAVENOUS at 09:50

## 2024-07-14 RX ADMIN — GABAPENTIN 300 MG: 300 CAPSULE ORAL at 20:35

## 2024-07-14 RX ADMIN — INSULIN LISPRO 1 UNITS: 100 INJECTION, SOLUTION INTRAVENOUS; SUBCUTANEOUS at 17:32

## 2024-07-14 RX ADMIN — LEVETIRACETAM 500 MG: 500 TABLET, FILM COATED ORAL at 09:48

## 2024-07-14 RX ADMIN — LEVOFLOXACIN 250 MG: 250 TABLET, FILM COATED ORAL at 09:45

## 2024-07-14 RX ADMIN — METHYLPREDNISOLONE SODIUM SUCCINATE 60 MG: 125 INJECTION INTRAMUSCULAR; INTRAVENOUS at 17:09

## 2024-07-14 RX ADMIN — LEVETIRACETAM 500 MG: 500 TABLET, FILM COATED ORAL at 20:35

## 2024-07-14 RX ADMIN — METHYLPREDNISOLONE SODIUM SUCCINATE 60 MG: 125 INJECTION INTRAMUSCULAR; INTRAVENOUS at 00:22

## 2024-07-14 RX ADMIN — METHYLPREDNISOLONE SODIUM SUCCINATE 60 MG: 125 INJECTION INTRAMUSCULAR; INTRAVENOUS at 09:50

## 2024-07-14 ASSESSMENT — PAIN DESCRIPTION - DESCRIPTORS: DESCRIPTORS: ACHING

## 2024-07-14 ASSESSMENT — PAIN SCALES - GENERAL: PAINLEVEL_OUTOF10: 3

## 2024-07-14 ASSESSMENT — PAIN - FUNCTIONAL ASSESSMENT: PAIN_FUNCTIONAL_ASSESSMENT: ACTIVITIES ARE NOT PREVENTED

## 2024-07-14 ASSESSMENT — PAIN DESCRIPTION - ORIENTATION: ORIENTATION: LEFT

## 2024-07-14 ASSESSMENT — PAIN DESCRIPTION - LOCATION: LOCATION: LEG

## 2024-07-14 NOTE — PROGRESS NOTES
RESPIRATORY ASSESSMENT PROTOCOL                                                                                              Patient Name: Talon Walker Jr. Room#: 0325/0325-01 : 1960     Admitting diagnosis: Shortness of breath [R06.02]  COPD exacerbation (HCC) [J44.1]  Acute on chronic systolic congestive heart failure (HCC) [I50.23]  Acute respiratory failure with hypoxia (HCC) [J96.01]       Medical History:   Past Medical History:   Diagnosis Date    Acid reflux     Asthma     Cerebrovascular disease     Chronic cough     COPD (chronic obstructive pulmonary disease) ([facility])     Dysphagia     Dyspnea     Unspecified cerebral artery occlusion with cerebral infarction 2008    Wheelchair dependent        PATIENT ASSESSMENT    LABORATORY DATA  Hematology:   Lab Results   Component Value Date/Time    WBC 6.1 2024 06:35 AM    RBC 3.23 2024 06:35 AM    HGB 8.2 2024 06:35 AM    HCT 26.5 2024 06:35 AM     2024 06:35 AM     Chemistry:    Lab Results   Component Value Date/Time    PHART 7.414 2024 06:53 AM    VUU3LFX 37.5 2024 06:53 AM    PO2ART 55.1 2024 06:53 AM    D8DCKOVB 89.3 2024 06:53 AM    CQP0HUE 23.5 2024 06:53 AM    NBEA 0.7 2024 06:53 AM       VITALS  Pulse: 85   Respirations: 18  BP: (!) 107/44  SpO2: 95 % O2 Device: None (Room air)  Temp: 97.4 °F (36.3 °C)    SKIN COLOR  [x] Normal  [] Pale  [] Dusky  [] Cyanotic    RESPIRATORY PATTERN  [x] Normal  [] Dyspnea  [] Cheyne-Mares  [] Kussmaul  [] Biots    AMBULATORY  [] Yes  [x] No  [] With Assistance      Patient Acuity 0 1 2 3 4 Score   Level of Consciousness (LOC) [x]  Alert & Oriented or Pt normal LOC []  Confused;follows directions []  Confused & uncooper-ative []  Obtunded []  Comatose 0   Respiratory Rate  (RR) [x]  Reg. rate & pattern. 12 - 20 bpm  []  Increased RR. Greater than 20 bpm   []  SOB w/ exertion or RR greater than 24 bpm []  Access- ory muscle use at rest.

## 2024-07-14 NOTE — PROGRESS NOTES
Vitals and assessment complete at this time. See flowsheets for details. BP 98/50. Vitals otherwise WNL. SPO2 is 100% on 2L, turned down to 1L at this time. Writer will continue to monitor SPO2 and wean O2. Breathing is regular and unlabored. Dyspnea noted with exertion. Lung sounds are clear/diminished. Patient denies pain but states left leg is uncomfortable. Writer repositioned leg. Patient requests compression stocking be removed at this time. Patient reports left leg feeling better after these interventions. Patient denies further needs. Call light is within reach. Care ongoing.

## 2024-07-14 NOTE — PROGRESS NOTES
Writer to bedside with PT/OT. Offered to assist patient up to chair, patient declines at this time.

## 2024-07-14 NOTE — PROGRESS NOTES
Writer received call from Muriel GAONA at this time. While feeding patient, patient coughs and reports trouble swallowing sometimes. Speech eval order placed.

## 2024-07-14 NOTE — PROGRESS NOTES
Patient resting comfortably at this time. Patient denies any pain and denies any other needs at this time. Patient alert & oriented x4 and able to answer all questions appropriately and follow commands. Assessment and vitals as charted. Bed alarm on, bed locked, gripper socks on, call light within reach and able to use appropriately. Will continue to monitor this shift.

## 2024-07-14 NOTE — PROGRESS NOTES
Physical Therapy  Facility/Department: [facility] MED SURG  Daily Treatment Note  NAME: Talon Walker Jr.  : 1960  MRN: [id number]    Date of Service: 2024    Discharge Recommendations:  Continue to assess pending progress, Home with Home health PT     Patient Diagnosis(es): The primary encounter diagnosis was COPD exacerbation (HCC). Diagnoses of Acute on chronic systolic congestive heart failure (HCC) and Shortness of breath were also pertinent to this visit.    Assessment   Assessment: Pt. in bed upon arrival, agreeable to bed therex at this time--requesting to transfer later at this time. Supine PROM BLE therex x15 in all available planes of motion.  Activity Tolerance: Patient tolerated treatment well     Plan    Physical Therapy Plan  General Plan: 1 time a day 7 days a week  Specific Instructions for Next Treatment: 1 times on weekends and holidays  Current Treatment Recommendations: ROM;Functional mobility training;Safety education & training;Patient/Caregiver education & training;Home exercise program     Restrictions  Restrictions/Precautions  Restrictions/Precautions: Fall Risk, General Precautions  Required Braces or Orthoses?: No     Subjective    Subjective  Subjective: Pt. in bed upon arrival, agreeable to therex at this time.  Pain: denies  Orientation  Overall Orientation Status: Within Functional Limits     Objective   Bed Mobility Training  Bed Mobility Training: No  Transfer Training  Transfer Training: No  Gait  Gait Training: No  PT Exercises  Exercise Treatment: Supine PROM BLE therex x15 in all available planes of motion  Safety Devices  Type of Devices: All richa prominences offloaded;All fall risk precautions in place;Call light within reach;Heels elevated for pressure relief;Patient at risk for falls;Nurse notified;Bed alarm in place;Left in bed       Goals  Short Term Goals  Time Frame for Short Term Goals: 20 days  Short Term Goal 1: PT will perform PROM to BLE in order to reduce  contractures  Short Term Goal 2: Pt will perform bed mobility Max A/Mod A    Education  Patient Education  Education Given To: Patient  Education Provided: Role of Therapy;Plan of Care  Education Method: Demonstration  Education Outcome: Verbalized understanding;Demonstrated understanding    Therapy Time   Individual Concurrent Group Co-treatment   Time In 1017         Time Out 1035         Minutes 18           Haven Santos, PTA

## 2024-07-14 NOTE — PROGRESS NOTES
Occupational Therapy  Facility/Department: [facility] MED SURG  Daily Treatment Note  NAME: Talon Walker Jr.  : 1960  MRN: [id number]    Date of Service: 2024    Discharge Recommendations:  Continue to assess pending progress, Subacute/Skilled Nursing Facility         Patient Diagnosis(es): The primary encounter diagnosis was COPD exacerbation (HCC). Diagnoses of Acute on chronic systolic congestive heart failure (HCC) and Shortness of breath were also pertinent to this visit.     Assessment    Activity Tolerance: Patient tolerated treatment well  Discharge Recommendations: Continue to assess pending progress;Subacute/Skilled Nursing Facility      Plan   Occupational Therapy Plan  Times Per Day: Once a day  Days Per Week: 7 Days  Current Treatment Recommendations: Strengthening;ROM;Endurance training;Safety education & training;Patient/Caregiver education & training;Positioning;Modalities;Self-Care / ADL     Restrictions  Restrictions/Precautions  Restrictions/Precautions: Fall Risk;General Precautions    Subjective   Subjective  Subjective: Pt sitting up in bed upon arrival. Pt agreed to participate in therapy session.  Pain: Pt had no complaints of pain.             Objective    Vitals       OT Exercises  Exercise Treatment: Pt completed RUE AROM and LUE PROM x 5 planes x 10-15 reps x 1 set to increase UE strength and  endurance in order to ease completion of ADL tasks. pt required RBs as needed secondary to fatigue.     Safety Devices  Type of Devices: All richa prominences offloaded;All fall risk precautions in place;Call light within reach;Heels elevated for pressure relief;Patient at risk for falls;Nurse notified;Bed alarm in place;Left in bed     Patient Education  Education Given To: Patient  Education Provided: Role of Therapy;Plan of Care  Education Method: Verbal  Barriers to Learning: None  Education Outcome: Verbalized understanding;Continued education needed    Goals  Short Term Goals  Time

## 2024-07-14 NOTE — PROGRESS NOTES
Asad Arriaga M.D.  Internal Medicine Progress Note    Patient: Talon Walker Jr.  Date of Admission: 7/11/2024  9:50 PM  Date of Evaluation: 7/14/2024      SUBJECTIVE:    Talon Walker Jr. is a 63 y.o. male who was seen today for follow up of Multifocal pneumonia.  Wife , sister and his care giver are all at bedside.  All questions were answered. Pt states he is feeling better today.  He still has a cough and some SOB but is overall better than on admission.  Leg swelling has resolved  He denies fever or chills and has been afebrile.  He is tolerating diet without any nausea, vomiting or diarrhea.    ROS:   Constitutional: negative  for fevers, and positive for chills.  Respiratory: positive for shortness of breath, negative for cough, and negative for wheezing  Cardiovascular: negative for chest pain, and negative for palpitations  Gastrointestinal: negative for abdominal pain, negative for nausea,negative for vomiting, negative for diarrhea, and negative for constipation     All other systems were reviewed with the patient and are negative unless otherwise stated in HPI    -----------------------------------------------------------------  OBJECTIVE:  Vitals:   Temp: 97.4 °F (36.3 °C)  BP: (!) 98/50  Respirations: 18  Pulse: 85  SpO2: 100 % on supplemental O2    Weight  Wt Readings from Last 3 Encounters:   07/14/24 77.2 kg (170 lb 1.6 oz)   05/14/24 80.7 kg (178 lb)   08/03/23 81.6 kg (179 lb 14.4 oz)     Body mass index is 27.47 kg/m².    24HR INTAKE/OUTPUT:      Intake/Output Summary (Last 24 hours) at 7/14/2024 0828  Last data filed at 7/14/2024 0504  Gross per 24 hour   Intake 870 ml   Output 1925 ml   Net -1055 ml       Exam:  GEN:    Awake, alert and oriented x 3.  Right parietal concavity from previous Craniotomy  EYES:  EOMI, pupils equal   NECK: Supple. No lymphadenopathy.  No carotid bruit  CVS:    regular rate and rhythm, no audible murmur  PULM:  diminished with bilateral scattered rhonchi, no

## 2024-07-15 LAB
ANION GAP SERPL CALCULATED.3IONS-SCNC: 12 MMOL/L (ref 9–17)
BACTERIA URNS QL MICRO: ABNORMAL
BASOPHILS # BLD: <0.03 K/UL (ref 0–0.2)
BASOPHILS NFR BLD: 0 % (ref 0–2)
BILIRUB UR QL STRIP: NEGATIVE
BNP SERPL-MCNC: 3751 PG/ML
BUN SERPL-MCNC: 49 MG/DL (ref 8–23)
BUN/CREAT SERPL: 20 (ref 9–20)
CALCIUM SERPL-MCNC: 7.9 MG/DL (ref 8.6–10.4)
CHLORIDE SERPL-SCNC: 102 MMOL/L (ref 98–107)
CHLORIDE UR-SCNC: 30 MMOL/L
CLARITY UR: CLEAR
CO2 SERPL-SCNC: 25 MMOL/L (ref 20–31)
COLOR UR: YELLOW
CREAT SERPL-MCNC: 2.4 MG/DL (ref 0.7–1.2)
CRP SERPL HS-MCNC: 14 MG/L (ref 0–5)
EOSINOPHIL # BLD: <0.03 K/UL (ref 0–0.44)
EOSINOPHILS RELATIVE PERCENT: 0 % (ref 1–4)
EPI CELLS #/AREA URNS HPF: ABNORMAL /HPF (ref 0–5)
ERYTHROCYTE [DISTWIDTH] IN BLOOD BY AUTOMATED COUNT: 16.3 % (ref 11.8–14.4)
GFR, ESTIMATED: 30 ML/MIN/1.73M2
GLUCOSE BLD-MCNC: 156 MG/DL (ref 74–100)
GLUCOSE BLD-MCNC: 173 MG/DL (ref 74–100)
GLUCOSE BLD-MCNC: 218 MG/DL (ref 74–100)
GLUCOSE BLD-MCNC: 243 MG/DL (ref 74–100)
GLUCOSE SERPL-MCNC: 174 MG/DL (ref 70–99)
GLUCOSE UR STRIP-MCNC: NEGATIVE MG/DL
HCT VFR BLD AUTO: 25.8 % (ref 40.7–50.3)
HGB BLD-MCNC: 8.1 G/DL (ref 13–17)
HGB UR QL STRIP.AUTO: ABNORMAL
IMM GRANULOCYTES # BLD AUTO: 0.1 K/UL (ref 0–0.3)
IMM GRANULOCYTES NFR BLD: 2 %
KETONES UR STRIP-MCNC: NEGATIVE MG/DL
LEUKOCYTE ESTERASE UR QL STRIP: NEGATIVE
LYMPHOCYTES NFR BLD: 0.59 K/UL (ref 1.1–3.7)
LYMPHOCYTES RELATIVE PERCENT: 14 % (ref 24–43)
MCH RBC QN AUTO: 25.6 PG (ref 25.2–33.5)
MCHC RBC AUTO-ENTMCNC: 31.4 G/DL (ref 28.4–34.8)
MCV RBC AUTO: 81.6 FL (ref 82.6–102.9)
MONOCYTES NFR BLD: 0.17 K/UL (ref 0.1–1.2)
MONOCYTES NFR BLD: 4 % (ref 3–12)
NEUTROPHILS NFR BLD: 80 % (ref 36–65)
NEUTS SEG NFR BLD: 3.41 K/UL (ref 1.5–8.1)
NITRITE UR QL STRIP: NEGATIVE
NRBC BLD-RTO: 0 PER 100 WBC
PH UR STRIP: 6 [PH] (ref 5–9)
PLATELET # BLD AUTO: 187 K/UL (ref 138–453)
PMV BLD AUTO: 10.8 FL (ref 8.1–13.5)
POTASSIUM SERPL-SCNC: 4.7 MMOL/L (ref 3.7–5.3)
POTASSIUM, UR: 40.7 MMOL/L
PROCALCITONIN SERPL-MCNC: 0.23 NG/ML (ref 0–0.09)
PROT UR STRIP-MCNC: NEGATIVE MG/DL
RBC # BLD AUTO: 3.16 M/UL (ref 4.21–5.77)
RBC #/AREA URNS HPF: ABNORMAL /HPF (ref 0–2)
SODIUM SERPL-SCNC: 139 MMOL/L (ref 135–144)
SODIUM UR-SCNC: 36 MMOL/L
SP GR UR STRIP: 1.02 (ref 1.01–1.02)
UROBILINOGEN UR STRIP-ACNC: NORMAL EU/DL (ref 0–1)
WBC #/AREA URNS HPF: ABNORMAL /HPF (ref 0–5)
WBC OTHER # BLD: 4.3 K/UL (ref 3.5–11.3)

## 2024-07-15 PROCEDURE — 82436 ASSAY OF URINE CHLORIDE: CPT

## 2024-07-15 PROCEDURE — 84300 ASSAY OF URINE SODIUM: CPT

## 2024-07-15 PROCEDURE — 92610 EVALUATE SWALLOWING FUNCTION: CPT

## 2024-07-15 PROCEDURE — 84133 ASSAY OF URINE POTASSIUM: CPT

## 2024-07-15 PROCEDURE — 97110 THERAPEUTIC EXERCISES: CPT

## 2024-07-15 PROCEDURE — 94640 AIRWAY INHALATION TREATMENT: CPT

## 2024-07-15 PROCEDURE — 6370000000 HC RX 637 (ALT 250 FOR IP): Performed by: NURSE PRACTITIONER

## 2024-07-15 PROCEDURE — 86140 C-REACTIVE PROTEIN: CPT

## 2024-07-15 PROCEDURE — 82947 ASSAY GLUCOSE BLOOD QUANT: CPT

## 2024-07-15 PROCEDURE — 2580000003 HC RX 258

## 2024-07-15 PROCEDURE — 2580000003 HC RX 258: Performed by: STUDENT IN AN ORGANIZED HEALTH CARE EDUCATION/TRAINING PROGRAM

## 2024-07-15 PROCEDURE — 81001 URINALYSIS AUTO W/SCOPE: CPT

## 2024-07-15 PROCEDURE — 36415 COLL VENOUS BLD VENIPUNCTURE: CPT

## 2024-07-15 PROCEDURE — 84145 PROCALCITONIN (PCT): CPT

## 2024-07-15 PROCEDURE — 6370000000 HC RX 637 (ALT 250 FOR IP): Performed by: STUDENT IN AN ORGANIZED HEALTH CARE EDUCATION/TRAINING PROGRAM

## 2024-07-15 PROCEDURE — 80048 BASIC METABOLIC PNL TOTAL CA: CPT

## 2024-07-15 PROCEDURE — 51702 INSERT TEMP BLADDER CATH: CPT

## 2024-07-15 PROCEDURE — 6360000002 HC RX W HCPCS: Performed by: STUDENT IN AN ORGANIZED HEALTH CARE EDUCATION/TRAINING PROGRAM

## 2024-07-15 PROCEDURE — 85025 COMPLETE CBC W/AUTO DIFF WBC: CPT

## 2024-07-15 PROCEDURE — 83880 ASSAY OF NATRIURETIC PEPTIDE: CPT

## 2024-07-15 PROCEDURE — 94664 DEMO&/EVAL PT USE INHALER: CPT

## 2024-07-15 PROCEDURE — 94669 MECHANICAL CHEST WALL OSCILL: CPT

## 2024-07-15 PROCEDURE — 94761 N-INVAS EAR/PLS OXIMETRY MLT: CPT

## 2024-07-15 PROCEDURE — 99222 1ST HOSP IP/OBS MODERATE 55: CPT | Performed by: INTERNAL MEDICINE

## 2024-07-15 PROCEDURE — 1200000000 HC SEMI PRIVATE

## 2024-07-15 PROCEDURE — 2700000000 HC OXYGEN THERAPY PER DAY

## 2024-07-15 RX ORDER — WATER 10 ML/10ML
INJECTION INTRAMUSCULAR; INTRAVENOUS; SUBCUTANEOUS
Status: COMPLETED
Start: 2024-07-15 | End: 2024-07-15

## 2024-07-15 RX ADMIN — PIOGLITAZONE 15 MG: 15 TABLET ORAL at 08:28

## 2024-07-15 RX ADMIN — LEVOFLOXACIN 250 MG: 250 TABLET, FILM COATED ORAL at 08:28

## 2024-07-15 RX ADMIN — LEVETIRACETAM 500 MG: 500 TABLET, FILM COATED ORAL at 08:28

## 2024-07-15 RX ADMIN — WATER: 1 INJECTION INTRAMUSCULAR; INTRAVENOUS; SUBCUTANEOUS at 00:25

## 2024-07-15 RX ADMIN — IPRATROPIUM BROMIDE AND ALBUTEROL SULFATE 1 DOSE: 2.5; .5 SOLUTION RESPIRATORY (INHALATION) at 21:12

## 2024-07-15 RX ADMIN — ENOXAPARIN SODIUM 40 MG: 100 INJECTION SUBCUTANEOUS at 08:27

## 2024-07-15 RX ADMIN — BUDESONIDE 500 MCG: 0.5 SUSPENSION RESPIRATORY (INHALATION) at 21:12

## 2024-07-15 RX ADMIN — PANTOPRAZOLE SODIUM 40 MG: 40 TABLET, DELAYED RELEASE ORAL at 08:28

## 2024-07-15 RX ADMIN — METFORMIN HYDROCHLORIDE 1000 MG: 500 TABLET ORAL at 08:28

## 2024-07-15 RX ADMIN — FOLIC ACID 1000 MCG: 1 TABLET ORAL at 08:28

## 2024-07-15 RX ADMIN — IPRATROPIUM BROMIDE AND ALBUTEROL SULFATE 1 DOSE: 2.5; .5 SOLUTION RESPIRATORY (INHALATION) at 15:10

## 2024-07-15 RX ADMIN — GABAPENTIN 300 MG: 300 CAPSULE ORAL at 21:38

## 2024-07-15 RX ADMIN — BACLOFEN 10 MG: 10 TABLET ORAL at 08:28

## 2024-07-15 RX ADMIN — SODIUM CHLORIDE, PRESERVATIVE FREE 10 ML: 5 INJECTION INTRAVENOUS at 08:28

## 2024-07-15 RX ADMIN — LISINOPRIL 5 MG: 5 TABLET ORAL at 08:28

## 2024-07-15 RX ADMIN — SERTRALINE 200 MG: 100 TABLET, FILM COATED ORAL at 08:28

## 2024-07-15 RX ADMIN — SODIUM CHLORIDE, PRESERVATIVE FREE 10 ML: 5 INJECTION INTRAVENOUS at 21:39

## 2024-07-15 RX ADMIN — BACLOFEN 10 MG: 10 TABLET ORAL at 21:38

## 2024-07-15 RX ADMIN — ASPIRIN 81 MG: 81 TABLET, CHEWABLE ORAL at 08:28

## 2024-07-15 RX ADMIN — ATORVASTATIN CALCIUM 40 MG: 40 TABLET, FILM COATED ORAL at 08:28

## 2024-07-15 RX ADMIN — BUDESONIDE 500 MCG: 0.5 SUSPENSION RESPIRATORY (INHALATION) at 10:16

## 2024-07-15 RX ADMIN — CETIRIZINE HYDROCHLORIDE 10 MG: 10 TABLET, FILM COATED ORAL at 08:28

## 2024-07-15 RX ADMIN — METHYLPREDNISOLONE SODIUM SUCCINATE 60 MG: 125 INJECTION INTRAMUSCULAR; INTRAVENOUS at 00:15

## 2024-07-15 RX ADMIN — METHYLPREDNISOLONE SODIUM SUCCINATE 60 MG: 125 INJECTION INTRAMUSCULAR; INTRAVENOUS at 08:28

## 2024-07-15 RX ADMIN — IPRATROPIUM BROMIDE AND ALBUTEROL SULFATE 1 DOSE: 2.5; .5 SOLUTION RESPIRATORY (INHALATION) at 04:25

## 2024-07-15 RX ADMIN — IPRATROPIUM BROMIDE AND ALBUTEROL SULFATE 1 DOSE: 2.5; .5 SOLUTION RESPIRATORY (INHALATION) at 10:16

## 2024-07-15 RX ADMIN — METHYLPREDNISOLONE SODIUM SUCCINATE 60 MG: 125 INJECTION INTRAMUSCULAR; INTRAVENOUS at 16:50

## 2024-07-15 RX ADMIN — GABAPENTIN 300 MG: 300 CAPSULE ORAL at 08:29

## 2024-07-15 RX ADMIN — LEVETIRACETAM 500 MG: 500 TABLET, FILM COATED ORAL at 21:38

## 2024-07-15 RX ADMIN — INSULIN LISPRO 1 UNITS: 100 INJECTION, SOLUTION INTRAVENOUS; SUBCUTANEOUS at 11:37

## 2024-07-15 NOTE — PROGRESS NOTES
Occupational Therapy  Facility/Department: [facility] MED SURG  Daily Treatment Note  NAME: Talon Walker Jr.  : 1960  MRN: [id number]    Date of Service: 7/15/2024    Discharge Recommendations:  Continue to assess pending progress, Subacute/Skilled Nursing Facility         Patient Diagnosis(es): The primary encounter diagnosis was COPD exacerbation (HCC). Diagnoses of Acute on chronic systolic congestive heart failure (HCC) and Shortness of breath were also pertinent to this visit.     Assessment    Activity Tolerance: Patient tolerated treatment well  Discharge Recommendations: Continue to assess pending progress;Subacute/Skilled Nursing Facility      Plan   Occupational Therapy Plan  Times Per Day: Once a day  Days Per Week: 7 Days  Current Treatment Recommendations: Strengthening;ROM;Endurance training;Safety education & training;Patient/Caregiver education & training;Positioning;Modalities;Self-Care / ADL     Restrictions  Restrictions/Precautions  Restrictions/Precautions: Fall Risk;General Precautions    Subjective   Subjective  Subjective: Pt sitting up in bedside chair upon arrival. Pt agreed to participate in therapy session.  Pain: Pt had no complaints of pain.  Orientation  Overall Orientation Status: Within Functional Limits  Orientation Level: Oriented to place;Oriented to situation;Oriented to person           Objective    Vitals          ADL  Additional Comments: Pt declined self care reporting he already completed self care for the day.  OT Exercises  Exercise Treatment: Pt completed RUE AROM and LUE PROM x 7 planes x 10-15 reps x 1 set to increase UE strength and  endurance in order to ease completion of ADL tasks. pt required RBs as needed secondary to fatigue.     Safety Devices  Type of Devices: All richa prominences offloaded;All fall risk precautions in place;Call light within reach;Heels elevated for pressure relief;Patient at risk for falls;Nurse notified;Chair alarm in place;Left in

## 2024-07-15 NOTE — PLAN OF CARE
Problem: Discharge Planning  Goal: Discharge to home or other facility with appropriate resources  Outcome: Progressing     Problem: Safety - Adult  Goal: Free from fall injury  Outcome: Progressing     Problem: Skin/Tissue Integrity  Goal: Absence of new skin breakdown  Description: 1.  Monitor for areas of redness and/or skin breakdown  2.  Assess vascular access sites hourly  3.  Every 4-6 hours minimum:  Change oxygen saturation probe site  4.  Every 4-6 hours:  If on nasal continuous positive airway pressure, respiratory therapy assess nares and determine need for appliance change or resting period.  Outcome: Progressing     Problem: Nutrition Deficit:  Goal: Optimize nutritional status  Outcome: Progressing     Problem: Chronic Conditions and Co-morbidities  Goal: Patient's chronic conditions and co-morbidity symptoms are monitored and maintained or improved  Outcome: Progressing

## 2024-07-15 NOTE — PROGRESS NOTES
Progress Note    SUBJECTIVE:    Patient seen for f/u of Multifocal pneumonia.  He resting in bed no distress. Currently on 1L of oxygen. Feels better. NO complaints. Visitor at side     ROS:   Constitutional: negative  for fevers, and negative for chills.  Respiratory: negative for shortness of breath, negative for cough, and negative for wheezing  Cardiovascular: negative for chest pain, and negative for palpitations  Gastrointestinal: negative for abdominal pain, negative for nausea,negative for vomiting, negative for diarrhea, and negative for constipation     All other systems were reviewed with the patient and are negative unless otherwise stated in HPI      OBJECTIVE:      Vitals:   Vitals:    07/15/24 1016   BP:    Pulse: 84   Resp: 20   Temp:    SpO2: 98%     Weight - Scale: 76.7 kg (169 lb 3.2 oz)   Height: 167.6 cm (5' 5.98\")     Weight  Wt Readings from Last 3 Encounters:   07/15/24 76.7 kg (169 lb 3.2 oz)   05/14/24 80.7 kg (178 lb)   08/03/23 81.6 kg (179 lb 14.4 oz)     Body mass index is 27.32 kg/m².    24HR INTAKE/OUTPUT:      Intake/Output Summary (Last 24 hours) at 7/15/2024 1048  Last data filed at 7/15/2024 0353  Gross per 24 hour   Intake 830 ml   Output 1975 ml   Net -1145 ml     -----------------------------------------------------------------  Exam:    GEN:    Awake, alert and oriented x3.   EYES:  EOMI, pupils equal   NECK: Supple. No lymphadenopathy.  No carotid bruit  CVS:    regular rate and rhythm, no audible murmur  PULM:   scattered rhonchi but clear overall , no acute respiratory distress  ABD:    Bowels sounds normal.  Abdomen is soft.  No distention.  no tenderness to palpation.   EXT:   no edema bilaterally .  No calf tenderness. Bilateral foot drop  NEURO: Moves all extremities.  Motor and sensory are grossly intact  SKIN:  No rashes.  No skin lesions.    -----------------------------------------------------------------    Diagnostic Data:      Complete Blood Count:   Recent Labs

## 2024-07-15 NOTE — PLAN OF CARE
Problem: Safety - Adult  Goal: Free from fall injury  Outcome: Progressing  Note: Bed alarm on, bed locked, side rails up, gripper socks on, call light within reach and able to use appropriately. Will continue to monitor this shift.     Problem: Skin/Tissue Integrity  Goal: Absence of new skin breakdown  Description: 1.  Monitor for areas of redness and/or skin breakdown  2.  Assess vascular access sites hourly  3.  Every 4-6 hours minimum:  Change oxygen saturation probe site  4.  Every 4-6 hours:  If on nasal continuous positive airway pressure, respiratory therapy assess nares and determine need for appliance change or resting period.  Outcome: Progressing  Note: Patient repositioned frequently and use call light appropriately. Will continue to monitor this shift     Problem: Nutrition Deficit:  Goal: Optimize nutritional status  Outcome: Progressing  Flowsheets (Taken 7/14/2024 2110)  Nutrient intake appropriate for improving, restoring, or maintaining nutritional needs: Assess nutritional status and recommend course of action  Note: Encouraging foods and liquids as tolerated. Will continue to monitor this shift.     Problem: Chronic Conditions and Co-morbidities  Goal: Patient's chronic conditions and co-morbidity symptoms are monitored and maintained or improved  Outcome: Progressing  Flowsheets  Taken 7/14/2024 2110  Care Plan - Patient's Chronic Conditions and Co-Morbidity Symptoms are Monitored and Maintained or Improved: Monitor and assess patient's chronic conditions and comorbid symptoms for stability, deterioration, or improvement  Taken 7/14/2024 1845  Care Plan - Patient's Chronic Conditions and Co-Morbidity Symptoms are Monitored and Maintained or Improved: Monitor and assess patient's chronic conditions and comorbid symptoms for stability, deterioration, or improvement

## 2024-07-15 NOTE — PROGRESS NOTES
[facility]    Facility/Department: [facility] MED SURG    Speech Language Pathology    Clinical Bedside Swallow Evaluation    NAME:Talon Walker Jr.    : 1960 (63 y.o.)    MRN: [id number]    ROOM: 0325/0325-01    ADMISSION DATE: 2024    PATIENT DIAGNOSIS(ES): Shortness of breath [R06.02]  COPD exacerbation (HCC) [J44.1]  Acute on chronic systolic congestive heart failure (HCC) [I50.23]  Acute respiratory failure with hypoxia ([facility]) [J96.01]    Chief Complaint   Patient presents with    Shortness of Breath     Onset PTA, EMS states his O2 was in the 60's on arrival       Patient Active Problem List    Diagnosis Date Noted    Multifocal pneumonia 2024    Acute respiratory failure with hypoxia ([facility]) 2024    Lower leg edema 10/27/2021    Dysthymia 10/17/2019    Cerebral infarction ([facility]) 10/17/2019    Left-sided weakness 10/17/2019    COPD, severe ([facility]) 2018    Controlled type 2 diabetes mellitus with stage 3 chronic kidney disease, without long-term current use of insulin ([facility]) 2018    Gastroesophageal reflux disease 10/03/2017    Allergic rhinitis 10/03/2017       Past Medical History:   Diagnosis Date    Acid reflux     Asthma     Cerebrovascular disease     Chronic cough     COPD (chronic obstructive pulmonary disease) ([facility])     Dysphagia     Dyspnea     Unspecified cerebral artery occlusion with cerebral infarction 2008    Wheelchair dependent        Past Surgical History:   Procedure Laterality Date    BRAIN SURGERY      CRANIOTOMY      GASTROSTOMY TUBE PLACEMENT      removed    KNEE SURGERY         Allergies   Allergen Reactions    Pcn [Penicillins] Anaphylaxis    Morphine Rash    Sulfa Antibiotics Rash       DATE ONSET: 2024    Date of Evaluation: 7/15/2024    Evaluating Therapist: Edna Dave, YOUSIF    Dysphagia Diagnosis    Dysphagia Diagnosis: Mild to moderate oral stage dysphagia;Suspected needs further assessment;Mild pharyngeal stage

## 2024-07-15 NOTE — PROGRESS NOTES
Facsimile Transmission Cover Sheet    Information contained in this transmission is for the sole use of the intended recipients and may contain confidential and privileged information. Any unauthorized review, use, disclosure or distribution is prohibited. If you are not the intended recipient, please contact the sender and destroy all copies of the original message.  Disclosure is made for the purpose of healthcare operations and continuity of care.      To: __Quinton Victor MD________________________    From: Speech Therapy       Sender:_Edna Dave M.A., CCC-SLP_________ Phone Number: [phone] ([facility])    Talon Walker Jr. current unit  [x][facility] [phone]  [][phone]    Your bedside evaluation order for Talon Walker Jr. has been completed. Based on the results speech therapy recommends:     Easy to chew solids and thin liquids. MBSS be completed after discharge to objectively assess safest, least restrictive diet.     If you agree please enter the new diet order in [facility] or telephone the nursing unit. Diet will not change without your order.   Thank you,  Electronically signed by: Edna Dave M.A., CCC-SLP

## 2024-07-15 NOTE — PROGRESS NOTES
Physical Therapy  Facility/Department: [facility] MED SURG  Daily Treatment Note  NAME: Talon Walker Jr.  : 1960  MRN: [id number]    Date of Service: 7/15/2024    Discharge Recommendations:  Continue to assess pending progress, Home with Home health PT        Patient Diagnosis(es): The primary encounter diagnosis was COPD exacerbation (HCC). Diagnoses of Acute on chronic systolic congestive heart failure (HCC) and Shortness of breath were also pertinent to this visit.    Assessment   Assessment: Pt. in bed upon arrival, agreeable to bed therex at this time. Pt's family requesting transfer eval. Supine PROM BLE therex x15 in all available planes of motion.  Activity Tolerance: Patient tolerated treatment well     Plan    Physical Therapy Plan  General Plan: 2 times a day 7 days a week  Specific Instructions for Next Treatment: 1 times on weekends and holidays  Current Treatment Recommendations: ROM;Functional mobility training;Safety education & training;Patient/Caregiver education & training;Home exercise program     Restrictions  Restrictions/Precautions  Restrictions/Precautions: Fall Risk, General Precautions  Required Braces or Orthoses?: No     Subjective    Subjective  Subjective: Pt. in bed upon arrival, agreeable to therex at this time.  Orientation  Overall Orientation Status: Within Functional Limits  Orientation Level: Oriented to place;Oriented to situation;Oriented to person     Objective   Vitals     Bed Mobility Training  Bed Mobility Training: No  Transfer Training  Overall Level of Assistance: Adaptive equipment;Other (comment)     PT Exercises  Exercise Treatment: Supine PROM BLE therex x15 in all available planes of motion     Safety Devices  Type of Devices: All richa prominences offloaded;All fall risk precautions in place;Call light within reach;Heels elevated for pressure relief;Patient at risk for falls;Nurse notified;Bed alarm in place;Left in bed  Restraints  Restraints Initially in

## 2024-07-15 NOTE — CONSULTS
[facility]    [address], [place] [address]  [phone]     Hospital Consult  7/15/2024 11:50 AM      TELEHEALTH EVALUATION -- Audio/Visual (During COVID-19 public health emergency)     Telehealth service was provided with the patient at his room in [place] [address] and myself the physician in my office in [place], [place] and the patient's RN, Cassidy who has initiated the visit.        Pursuant to the emergency declaration under the [place] Act and the National Emergencies Act, 1135 waiver authority and the Coronavirus Preparedness and Response Supplemental Appropriations Act, this Virtual  Visit was conducted, with patient's consent, to reduce the patient's risk of exposure to COVID-19 and provide continuity of care for an established patient.     Services were provided through a video synchronous discussion virtually to substitute for in-person clinic visit.    Pt Name:    Talon Walker Jr.  MRN:     [id number]   [id number]  YOB: 1960  Admit Date:    7/11/2024  9:50 PM  Primary Care Physician:  Adrian Adkins, CARO - CNP        Reason for Consult:  GILMER/CKD    History:   The patient is a 63 y.o. male seen in renal consult for GILMER on CKD III. He follows with Dr. Ash, baseline creatinine around 1.7-2.0 mg/dL.  He has hx of CVA and is wheelchair bound, DM, solitary kidney, COPD, diastolic CHF, and as below. He presented with SOB and is being treated for pneumonia and also some CHF and COPD. Was on IV lasix which was stopped yesterday. Respiratory status is improved and reportedly leg edema much better.  Currently on 1 L NC.  Creatinine on admission was 1.9. went up to 2.6 yesterday and today is 2.4 mg/dL.    Past Medical History:  Past Medical History:   Diagnosis Date    Acid reflux     Asthma     Cerebrovascular disease     Chronic cough     COPD (chronic obstructive pulmonary disease) (HCC)     Dysphagia     Dyspnea     Unspecified cerebral artery occlusion with cerebral    Wt Readings from Last 3 Encounters:   07/15/24 76.7 kg (169 lb 3.2 oz)   05/14/24 80.7 kg (178 lb)   08/03/23 81.6 kg (179 lb 14.4 oz)     General -- no distress, frail appearing  Oral Mucosa -- moist  Neck --  JVD - no  Extremities -- no edema, moves all extremeties  CNS - awake and alert   Pschy - not agitated, mood and memory normal    Due to this being a TeleHealth encounter, evaluation of the following organ systems is limited: Vitals/EENT/Resp/CV/GI//MS/Neuro/Skin/Heme-Lymph-Imm.    Lab Data  CBC:   Recent Labs     07/13/24 0620 07/14/24  0635 07/15/24  0620   WBC 7.2 6.1 4.3   HGB 8.8* 8.2* 8.1*   HCT 28.4* 26.5* 25.8*    190 187     BMP:  Recent Labs     07/13/24 0620 07/14/24 0635 07/15/24  0620    138 139   K 5.0 4.8 4.7    100 102   CO2 23 25 25   BUN 40* 48* 49*   CREATININE 2.5* 2.6* 2.4*   GLUCOSE 176* 178* 174*   CALCIUM 8.1* 7.7* 7.9*     PTH: @PTH@  TSH: No results for input(s): \"TSH\" in the last 72 hours.  HgBa1c: No results for input(s): \"LABA1C\" in the last 72 hours.  Hepatic: No results for input(s): \"LABALBU\", \"AST\", \"ALT\", \"BILITOT\", \"ALKPHOS\" in the last 72 hours.    Invalid input(s): \"ALB\"  ABGs:   Lab Results   Component Value Date/Time    PHART 7.414 07/12/2024 06:53 AM    PO2ART 55.1 07/12/2024 06:53 AM    FPO6HIF 37.5 07/12/2024 06:53 AM     Troponin: No results for input(s): \"TROPONINI\" in the last 72 hours.  BNP: No results for input(s): \"BNP\" in the last 72 hours.    Old labs reviewed.       Impression and Plan:  GILMER on CKD III likely from diuretics  -volume status appears better. Agree with holding lasix for now.   -might need low dose lasix 20 mg QOD on discharge. Would repeat a bmp 1-2 weeks after to make sure renal function staying ok  -check urine lytes  -ok to continue lisinopril for now but will need to hold if renal function worsens    2. Lytes: stable, recent hyperkalemia  3. Solitary functioning kidney  4. Acute hypoxic resp failure: improved  5.

## 2024-07-15 NOTE — PROGRESS NOTES
RESPIRATORY ASSESSMENT PROTOCOL                                                                                              Patient Name: Talon Walker Jr. Room#: 0325/0325-01 : 1960     Admitting diagnosis: Shortness of breath [R06.02]  COPD exacerbation ([facility]) [J44.1]  Acute on chronic systolic congestive heart failure (HCC) [I50.23]  Acute respiratory failure with hypoxia ([facility]) [J96.01]       Medical History:   Past Medical History:   Diagnosis Date    Acid reflux     Asthma     Cerebrovascular disease     Chronic cough     COPD (chronic obstructive pulmonary disease) ([facility])     Dysphagia     Dyspnea     Unspecified cerebral artery occlusion with cerebral infarction 2008    Wheelchair dependent        PATIENT ASSESSMENT    LABORATORY DATA  Hematology:   Lab Results   Component Value Date/Time    WBC 4.3 07/15/2024 06:20 AM    RBC 3.16 07/15/2024 06:20 AM    HGB 8.1 07/15/2024 06:20 AM    HCT 25.8 07/15/2024 06:20 AM     07/15/2024 06:20 AM     Chemistry:    Lab Results   Component Value Date/Time    PHART 7.414 2024 06:53 AM    VDP3MAR 37.5 2024 06:53 AM    PO2ART 55.1 2024 06:53 AM    X5OCCRDL 89.3 2024 06:53 AM    MVF2ZVS 23.5 2024 06:53 AM    NBEA 0.7 2024 06:53 AM       VITALS  Pulse: 84   Respirations: 20  BP: 112/60  SpO2: 94 % O2 Device: Nasal cannula  Temp: 97.5 °F (36.4 °C)    SKIN COLOR  [x] Normal  [] Pale  [] Dusky  [] Cyanotic    RESPIRATORY PATTERN  [x] Normal  [] Dyspnea  [] Cheyne-Mares  [] Kussmaul  [] Biots    AMBULATORY  [] Yes  [x] No  [] With Assistance      Patient Acuity 0 1 2 3 4 Score   Level of Consciousness (LOC) [x]  Alert & Oriented or Pt normal LOC []  Confused;follows directions []  Confused & uncooper-ative []  Obtunded []  Comatose 0   Respiratory Rate  (RR) [x]  Reg. rate & pattern. 12 - 20 bpm  []  Increased RR. Greater than 20 bpm   []  SOB w/ exertion or RR greater than 24 bpm []  Access- ory muscle use at rest. Abn.

## 2024-07-16 LAB
ANION GAP SERPL CALCULATED.3IONS-SCNC: 13 MMOL/L (ref 9–17)
BASOPHILS # BLD: <0.03 K/UL (ref 0–0.2)
BASOPHILS NFR BLD: 0 % (ref 0–2)
BNP SERPL-MCNC: 3538 PG/ML
BUN SERPL-MCNC: 53 MG/DL (ref 8–23)
BUN/CREAT SERPL: 22 (ref 9–20)
CALCIUM SERPL-MCNC: 7.7 MG/DL (ref 8.6–10.4)
CHLORIDE SERPL-SCNC: 102 MMOL/L (ref 98–107)
CO2 SERPL-SCNC: 25 MMOL/L (ref 20–31)
CREAT SERPL-MCNC: 2.4 MG/DL (ref 0.7–1.2)
EOSINOPHIL # BLD: <0.03 K/UL (ref 0–0.44)
EOSINOPHILS RELATIVE PERCENT: 0 % (ref 1–4)
ERYTHROCYTE [DISTWIDTH] IN BLOOD BY AUTOMATED COUNT: 16.2 % (ref 11.8–14.4)
GFR, ESTIMATED: 30 ML/MIN/1.73M2
GLUCOSE BLD-MCNC: 209 MG/DL (ref 74–100)
GLUCOSE BLD-MCNC: 232 MG/DL (ref 74–100)
GLUCOSE BLD-MCNC: 254 MG/DL (ref 74–100)
GLUCOSE BLD-MCNC: 269 MG/DL (ref 74–100)
GLUCOSE SERPL-MCNC: 217 MG/DL (ref 70–99)
HCT VFR BLD AUTO: 26.7 % (ref 40.7–50.3)
HGB BLD-MCNC: 8.2 G/DL (ref 13–17)
IMM GRANULOCYTES # BLD AUTO: 0.16 K/UL (ref 0–0.3)
IMM GRANULOCYTES NFR BLD: 3 %
LYMPHOCYTES NFR BLD: 0.66 K/UL (ref 1.1–3.7)
LYMPHOCYTES RELATIVE PERCENT: 13 % (ref 24–43)
MCH RBC QN AUTO: 25.5 PG (ref 25.2–33.5)
MCHC RBC AUTO-ENTMCNC: 30.7 G/DL (ref 28.4–34.8)
MCV RBC AUTO: 82.9 FL (ref 82.6–102.9)
MONOCYTES NFR BLD: 0.18 K/UL (ref 0.1–1.2)
MONOCYTES NFR BLD: 4 % (ref 3–12)
NEUTROPHILS NFR BLD: 80 % (ref 36–65)
NEUTS SEG NFR BLD: 3.98 K/UL (ref 1.5–8.1)
NRBC BLD-RTO: 0 PER 100 WBC
PLATELET # BLD AUTO: 181 K/UL (ref 138–453)
PMV BLD AUTO: 10.8 FL (ref 8.1–13.5)
POTASSIUM SERPL-SCNC: 5 MMOL/L (ref 3.7–5.3)
PROCALCITONIN SERPL-MCNC: 0.16 NG/ML (ref 0–0.09)
RBC # BLD AUTO: 3.22 M/UL (ref 4.21–5.77)
SODIUM SERPL-SCNC: 140 MMOL/L (ref 135–144)
WBC OTHER # BLD: 5 K/UL (ref 3.5–11.3)

## 2024-07-16 PROCEDURE — 97110 THERAPEUTIC EXERCISES: CPT

## 2024-07-16 PROCEDURE — 2580000003 HC RX 258: Performed by: STUDENT IN AN ORGANIZED HEALTH CARE EDUCATION/TRAINING PROGRAM

## 2024-07-16 PROCEDURE — 6360000002 HC RX W HCPCS: Performed by: STUDENT IN AN ORGANIZED HEALTH CARE EDUCATION/TRAINING PROGRAM

## 2024-07-16 PROCEDURE — 2580000003 HC RX 258

## 2024-07-16 PROCEDURE — 82947 ASSAY GLUCOSE BLOOD QUANT: CPT

## 2024-07-16 PROCEDURE — 97535 SELF CARE MNGMENT TRAINING: CPT

## 2024-07-16 PROCEDURE — 84145 PROCALCITONIN (PCT): CPT

## 2024-07-16 PROCEDURE — 80048 BASIC METABOLIC PNL TOTAL CA: CPT

## 2024-07-16 PROCEDURE — 51702 INSERT TEMP BLADDER CATH: CPT

## 2024-07-16 PROCEDURE — 1200000000 HC SEMI PRIVATE

## 2024-07-16 PROCEDURE — 94640 AIRWAY INHALATION TREATMENT: CPT

## 2024-07-16 PROCEDURE — 99232 SBSQ HOSP IP/OBS MODERATE 35: CPT | Performed by: INTERNAL MEDICINE

## 2024-07-16 PROCEDURE — 6370000000 HC RX 637 (ALT 250 FOR IP): Performed by: STUDENT IN AN ORGANIZED HEALTH CARE EDUCATION/TRAINING PROGRAM

## 2024-07-16 PROCEDURE — 94669 MECHANICAL CHEST WALL OSCILL: CPT

## 2024-07-16 PROCEDURE — 92526 ORAL FUNCTION THERAPY: CPT

## 2024-07-16 PROCEDURE — 6370000000 HC RX 637 (ALT 250 FOR IP): Performed by: NURSE PRACTITIONER

## 2024-07-16 PROCEDURE — 94664 DEMO&/EVAL PT USE INHALER: CPT

## 2024-07-16 PROCEDURE — 94761 N-INVAS EAR/PLS OXIMETRY MLT: CPT

## 2024-07-16 PROCEDURE — 85025 COMPLETE CBC W/AUTO DIFF WBC: CPT

## 2024-07-16 PROCEDURE — 2500000003 HC RX 250 WO HCPCS: Performed by: NURSE PRACTITIONER

## 2024-07-16 PROCEDURE — 83880 ASSAY OF NATRIURETIC PEPTIDE: CPT

## 2024-07-16 PROCEDURE — 36415 COLL VENOUS BLD VENIPUNCTURE: CPT

## 2024-07-16 RX ORDER — WATER 10 ML/10ML
INJECTION INTRAMUSCULAR; INTRAVENOUS; SUBCUTANEOUS
Status: DISPENSED
Start: 2024-07-16 | End: 2024-07-16

## 2024-07-16 RX ORDER — WATER 10 ML/10ML
INJECTION INTRAMUSCULAR; INTRAVENOUS; SUBCUTANEOUS
Status: COMPLETED
Start: 2024-07-16 | End: 2024-07-16

## 2024-07-16 RX ORDER — CALCIUM GLUCONATE 20 MG/ML
2000 INJECTION, SOLUTION INTRAVENOUS ONCE
Status: COMPLETED | OUTPATIENT
Start: 2024-07-16 | End: 2024-07-16

## 2024-07-16 RX ADMIN — ATORVASTATIN CALCIUM 40 MG: 40 TABLET, FILM COATED ORAL at 07:36

## 2024-07-16 RX ADMIN — LEVETIRACETAM 500 MG: 500 TABLET, FILM COATED ORAL at 07:36

## 2024-07-16 RX ADMIN — GABAPENTIN 300 MG: 300 CAPSULE ORAL at 21:05

## 2024-07-16 RX ADMIN — PIOGLITAZONE 15 MG: 15 TABLET ORAL at 07:35

## 2024-07-16 RX ADMIN — CETIRIZINE HYDROCHLORIDE 10 MG: 10 TABLET, FILM COATED ORAL at 07:37

## 2024-07-16 RX ADMIN — LEVETIRACETAM 500 MG: 500 TABLET, FILM COATED ORAL at 21:05

## 2024-07-16 RX ADMIN — WATER 10 ML: 1 INJECTION INTRAMUSCULAR; INTRAVENOUS; SUBCUTANEOUS at 21:05

## 2024-07-16 RX ADMIN — INSULIN LISPRO 1 UNITS: 100 INJECTION, SOLUTION INTRAVENOUS; SUBCUTANEOUS at 12:06

## 2024-07-16 RX ADMIN — IPRATROPIUM BROMIDE AND ALBUTEROL SULFATE 1 DOSE: 2.5; .5 SOLUTION RESPIRATORY (INHALATION) at 15:48

## 2024-07-16 RX ADMIN — WATER 2 ML: 1 INJECTION INTRAMUSCULAR; INTRAVENOUS; SUBCUTANEOUS at 12:12

## 2024-07-16 RX ADMIN — INSULIN LISPRO 2 UNITS: 100 INJECTION, SOLUTION INTRAVENOUS; SUBCUTANEOUS at 17:02

## 2024-07-16 RX ADMIN — BACLOFEN 10 MG: 10 TABLET ORAL at 21:05

## 2024-07-16 RX ADMIN — ENOXAPARIN SODIUM 40 MG: 100 INJECTION SUBCUTANEOUS at 07:42

## 2024-07-16 RX ADMIN — WATER 10 ML: 1 INJECTION INTRAMUSCULAR; INTRAVENOUS; SUBCUTANEOUS at 00:53

## 2024-07-16 RX ADMIN — IPRATROPIUM BROMIDE AND ALBUTEROL SULFATE 1 DOSE: 2.5; .5 SOLUTION RESPIRATORY (INHALATION) at 20:18

## 2024-07-16 RX ADMIN — BUDESONIDE 500 MCG: 0.5 SUSPENSION RESPIRATORY (INHALATION) at 10:54

## 2024-07-16 RX ADMIN — SODIUM CHLORIDE, PRESERVATIVE FREE 10 ML: 5 INJECTION INTRAVENOUS at 12:11

## 2024-07-16 RX ADMIN — METHYLPREDNISOLONE SODIUM SUCCINATE 60 MG: 125 INJECTION INTRAMUSCULAR; INTRAVENOUS at 12:11

## 2024-07-16 RX ADMIN — FOLIC ACID 1000 MCG: 1 TABLET ORAL at 07:37

## 2024-07-16 RX ADMIN — METHYLPREDNISOLONE SODIUM SUCCINATE 60 MG: 125 INJECTION INTRAMUSCULAR; INTRAVENOUS at 00:53

## 2024-07-16 RX ADMIN — SERTRALINE 200 MG: 100 TABLET, FILM COATED ORAL at 07:35

## 2024-07-16 RX ADMIN — INSULIN LISPRO 1 UNITS: 100 INJECTION, SOLUTION INTRAVENOUS; SUBCUTANEOUS at 07:41

## 2024-07-16 RX ADMIN — BUDESONIDE 500 MCG: 0.5 SUSPENSION RESPIRATORY (INHALATION) at 20:18

## 2024-07-16 RX ADMIN — PANTOPRAZOLE SODIUM 40 MG: 40 TABLET, DELAYED RELEASE ORAL at 07:37

## 2024-07-16 RX ADMIN — SODIUM CHLORIDE, PRESERVATIVE FREE 10 ML: 5 INJECTION INTRAVENOUS at 07:45

## 2024-07-16 RX ADMIN — IPRATROPIUM BROMIDE AND ALBUTEROL SULFATE 1 DOSE: 2.5; .5 SOLUTION RESPIRATORY (INHALATION) at 05:17

## 2024-07-16 RX ADMIN — BACLOFEN 10 MG: 10 TABLET ORAL at 07:36

## 2024-07-16 RX ADMIN — LEVOFLOXACIN 250 MG: 250 TABLET, FILM COATED ORAL at 07:37

## 2024-07-16 RX ADMIN — IPRATROPIUM BROMIDE AND ALBUTEROL SULFATE 1 DOSE: 2.5; .5 SOLUTION RESPIRATORY (INHALATION) at 10:54

## 2024-07-16 RX ADMIN — GABAPENTIN 300 MG: 300 CAPSULE ORAL at 07:37

## 2024-07-16 RX ADMIN — METHYLPREDNISOLONE SODIUM SUCCINATE 60 MG: 125 INJECTION INTRAMUSCULAR; INTRAVENOUS at 21:05

## 2024-07-16 RX ADMIN — SODIUM CHLORIDE, PRESERVATIVE FREE 10 ML: 5 INJECTION INTRAVENOUS at 21:06

## 2024-07-16 RX ADMIN — CALCIUM GLUCONATE 2000 MG: 20 INJECTION, SOLUTION INTRAVENOUS at 12:17

## 2024-07-16 RX ADMIN — ASPIRIN 81 MG: 81 TABLET, CHEWABLE ORAL at 07:36

## 2024-07-16 ASSESSMENT — PAIN DESCRIPTION - LOCATION: LOCATION: KNEE

## 2024-07-16 ASSESSMENT — PAIN DESCRIPTION - ORIENTATION: ORIENTATION: LEFT

## 2024-07-16 ASSESSMENT — PAIN DESCRIPTION - DESCRIPTORS: DESCRIPTORS: ACHING

## 2024-07-16 ASSESSMENT — PAIN SCALES - GENERAL: PAINLEVEL_OUTOF10: 3

## 2024-07-16 NOTE — PROGRESS NOTES
[facility]   Nephrology progress note  7/16/2024, 12:27 PM      TELEHEALTH EVALUATION -- Audio/Visual (During COVID-19 public health emergency)     Telehealth service was provided with the patient at his room in [place] [address] and myself the physician in my office in [place], [place] and the patient's RN, Margarette, who has initiated the visit.        Pursuant to the emergency declaration under the [place] Act and the National Emergencies Act, 1135 waiver authority and the Coronavirus Preparedness and Response Supplemental Appropriations Act, this Virtual  Visit was conducted, with patient's consent, to reduce the patient's risk of exposure to COVID-19 and provide continuity of care for an established patient.     Services were provided through a video synchronous discussion virtually to substitute for in-person clinic visit.      Pt Name:    Talon Walker Jr.  MRN:     [id number]     YOB: 1960  Admit Date:    7/11/2024  9:50 PM    Chief Complaint: Nephrology following for AKICKD.     Subjective:  Patient was seen and examined.  Doing ok. On room air.    Objective:  24HR INTAKE/OUTPUT:    Intake/Output Summary (Last 24 hours) at 7/16/2024 1227  Last data filed at 7/16/2024 1211  Gross per 24 hour   Intake 700 ml   Output 900 ml   Net -200 ml         I/O last 3 completed shifts:  In: 720 [P.O.:710; I.V.:10]  Out: 2175 [Urine:2175]  I/O this shift:  In: 260 [P.O.:240; I.V.:20]  Out: -    Admission weight: 77.6 kg (171 lb 1.6 oz)  Wt Readings from Last 3 Encounters:   07/16/24 76.7 kg (169 lb)   05/14/24 80.7 kg (178 lb)   08/03/23 81.6 kg (179 lb 14.4 oz)        Vitals :   Vitals:    07/16/24 0518 07/16/24 0730 07/16/24 0915 07/16/24 1101   BP:  105/85     Pulse: 71 75     Resp: 18 16     Temp:  97.1 °F (36.2 °C)     TempSrc:  Temporal     SpO2: 96% 91% 95% 94%   Weight:       Height:           General -- no distress  Oral Mucosa -- moist  Neck --  JVD - no  Extremities -- no edema, moves all

## 2024-07-16 NOTE — PROGRESS NOTES
Assessment and vitals obtained at this time as charted. Pt is A&O x4 and denies pain. Power is patent and draining. Pt is resting in bed watching TV and denies any further needs at this time. Call light within reach, care ongoing.

## 2024-07-16 NOTE — PROGRESS NOTES
Occupational Therapy  Facility/Department: [facility] MED SURG  Daily Treatment Note  NAME: Talon Walker Jr.  : 1960  MRN: [id number]    Date of Service: 2024    Discharge Recommendations:  Continue to assess pending progress, Subacute/Skilled Nursing Facility         Patient Diagnosis(es): The primary encounter diagnosis was COPD exacerbation (HCC). Diagnoses of Acute on chronic systolic congestive heart failure (HCC), Shortness of breath, and Paraplegia (HCC) were also pertinent to this visit.     Assessment    Activity Tolerance: Patient tolerated treatment well  Discharge Recommendations: Continue to assess pending progress;Subacute/Skilled Nursing Facility      Plan   Occupational Therapy Plan  Times Per Day: Once a day  Days Per Week: 7 Days  Current Treatment Recommendations: Strengthening;ROM;Endurance training;Safety education & training;Patient/Caregiver education & training;Positioning;Modalities;Self-Care / ADL     Restrictions  Restrictions/Precautions  Restrictions/Precautions: Fall Risk;General Precautions    Subjective   Subjective  Subjective: Pt lying in bed upon arrival. pt agreed to participate in therapy session.  Pain: Pt had no complaints of pain.           Objective    Vitals           ADL  UE Bathing: Dependent/Total  LE Bathing: Dependent/Total  UE Dressing: Maximum assistance  UE Dressing Skilled Clinical Factors: Max A to [facility]/don hospital go  LE Dressing: Dependent/Total  LE Dressing Skilled Clinical Factors: Dep to complete brief change  Additional Comments: Max A x 2 to complete bed mob of rolling.  Skin Care: Bath wipes;Chlorhexidine solution        Safety Devices  Type of Devices: All richa prominences offloaded;All fall risk precautions in place;Call light within reach;Heels elevated for pressure relief;Patient at risk for falls;Nurse notified;Bed alarm in place;Left in bed     Patient Education  Education Given To: Patient  Education Provided: Role of Therapy;Plan of

## 2024-07-16 NOTE — PROGRESS NOTES
Report given to Latoya for transfer of care. Denies needs or concerns at this time. Patient resting with eyes closed at this time. Denies needs or concerns, call light within reach.

## 2024-07-16 NOTE — PROGRESS NOTES
RN in to assess patient. Patient resting in bed, A/Ox4, Respirations Easy and Nonlabored. Patient denies pain. Lung sounds clear but diminished throughout. Patient on continuous SPO2. Power catheter in place and patent. No needs or concerns voiced at this time. While giving patient morning medications it is noted that his IV is infiltrated. IV removed and No IV medications given at this time. Assessment and vital signs completed as charted. Courtney DUBOIS aware of patient's loss of IV access.  Care ongoing.

## 2024-07-16 NOTE — PROGRESS NOTES
RESPIRATORY ASSESSMENT PROTOCOL                                                                                              Patient Name: Talon Walker Jr. Room#: 0325/0325-01 : 1960     Admitting diagnosis: Shortness of breath [R06.02]  COPD exacerbation (HCC) [J44.1]  Acute on chronic systolic congestive heart failure (HCC) [I50.23]  Acute respiratory failure with hypoxia (HCC) [J96.01]       Medical History:   Past Medical History:   Diagnosis Date    Acid reflux     Asthma     Cerebrovascular disease     Chronic cough     COPD (chronic obstructive pulmonary disease) ([facility])     Dysphagia     Dyspnea     Unspecified cerebral artery occlusion with cerebral infarction 2008    Wheelchair dependent        PATIENT ASSESSMENT    LABORATORY DATA  Hematology:   Lab Results   Component Value Date/Time    WBC 5.0 2024 05:45 AM    RBC 3.22 2024 05:45 AM    HGB 8.2 2024 05:45 AM    HCT 26.7 2024 05:45 AM     2024 05:45 AM     Chemistry:    Lab Results   Component Value Date/Time    PHART 7.414 2024 06:53 AM    EBY8GQI 37.5 2024 06:53 AM    PO2ART 55.1 2024 06:53 AM    H2TQBNCW 89.3 2024 06:53 AM    YSK9THS 23.5 2024 06:53 AM    NBEA 0.7 2024 06:53 AM       VITALS  Pulse: 79   Respirations: 17  BP: (!) 124/55  SpO2: 93 % O2 Device: None (Room air)  Temp: 98.1 °F (36.7 °C)    SKIN COLOR  [x] Normal  [] Pale  [] Dusky  [] Cyanotic    RESPIRATORY PATTERN  [x] Normal  [] Dyspnea  [] Cheyne-Mares  [] Kussmaul  [] Biots    AMBULATORY  [] Yes  [x] No  [] With Assistance    Patient Acuity 0 1 2 3 4 Score   Level of Consciousness (LOC) [x]  Alert & Oriented or Pt normal LOC []  Confused;follows directions []  Confused & uncooper-ative []  Obtunded []  Comatose 0   Respiratory Rate  (RR) [x]  Reg. rate & pattern. 12 - 20 bpm  []  Increased RR. Greater than 20 bpm   []  SOB w/ exertion or RR greater than 24 bpm []  Access- ory muscle use at rest. Abn.   resp. []  SOB at rest.   0   Bilateral Breath Sounds (BBS) []  Clear []  Diminish-ed bases  []  Diminish-ed t/o, or rales   [x]  Sporadic, scattered wheezes or rhonchi []  Persistentwheezes and, or absent BBS 3   Cough [x]  Strong, effective, & non-prod. []  Effective & prod. Less than 25 ml (2 TBSP) over past 24 hrs []  Ineffective & non-prod to less than 25 ML over past 24 hrs []  Ineffective and, or greater than 25 ml sputum prod. past 24 hrs. []  Nonspon- taneous; Requires suctioning 0   Pulmonary History  (PULM HX) []  No smoking and no chronic pulmonary history []  Former smoker. Quit over 12 mos. ago []  Current smoker or quit w/ in 12 mos []  Pulm. History and, or 20 pk/yr smoking hx [x]  Admitted w/ acute pulm. dx and, or has been admitted w/ pulm. dx 2 or more times over past 12 mos 4   Surgical History this Admit  (SURG HX) [x]  No surgery []  General surgery []  Lower abdominal []  Thoracic or upper abdominal   []  Thoracic w/ pulm. disease 0   Chest X-Ray (CXR)/CT Scan []  Clear or not applicable []  Not available []  Atelectasis or pleural effusions []  Localized infiltrate or pulm. edema [x]  Con-solidated Infiltrates, bilateral, or in more than 1 lobe 4   TOTAL ACUITY: 11       CARE PLAN    If Acuity Level is 2, 3, or 4 in any of the following:    [x] BILATERAL BREATH SOUNDS (BBS)     [x] PULMONARY HISTORY (PULM HX)  [] Respiratory Rate  (RR)    Goal: Improve respiratory functions in patients with airway disease and decrease WOB    [x] AEROSOL PROTOCOL    Total Acuity:   14-28  []  Secondary Assessment in 24 hrs Total Acuity:  9-13  [x]  Secondary Assessment in 24 hrs Total Acuity:  4-8  []  Secondary Assessment in 24 hrs Total Acuity:  0-3  []  Secondary Assessment in 48 hrs   [facility] AEROSOL THERAPY with  [physician-ordered bronchodilator(s)] q 4 & Albuterol PRN q2 hrs.   Breath-Actuated Neb if BBS Acuity = 4, and pt. can use MP. Notify physician if condition deteriorates.  [facility] AEROSOL THERAPY

## 2024-07-16 NOTE — PLAN OF CARE
Problem: Discharge Planning  Goal: Discharge to home or other facility with appropriate resources  7/16/2024 0250 by Anitha Olvera RN  Outcome: Progressing  7/15/2024 1613 by Cassidy Mc RN  Outcome: Progressing     Problem: Safety - Adult  Goal: Free from fall injury  7/16/2024 0250 by Anitha Olvera RN  Outcome: Progressing  7/15/2024 1613 by Cassidy Mc RN  Outcome: Progressing     Problem: Skin/Tissue Integrity  Goal: Absence of new skin breakdown  Description: 1.  Monitor for areas of redness and/or skin breakdown  2.  Assess vascular access sites hourly  3.  Every 4-6 hours minimum:  Change oxygen saturation probe site  4.  Every 4-6 hours:  If on nasal continuous positive airway pressure, respiratory therapy assess nares and determine need for appliance change or resting period.  7/16/2024 0250 by Anitha Olvera RN  Outcome: Progressing  7/15/2024 1613 by Cassidy Mc RN  Outcome: Progressing     Problem: Nutrition Deficit:  Goal: Optimize nutritional status  7/16/2024 0250 by Anitha Olvera RN  Outcome: Progressing  7/15/2024 1613 by Cassidy Mc RN  Outcome: Progressing     Problem: Chronic Conditions and Co-morbidities  Goal: Patient's chronic conditions and co-morbidity symptoms are monitored and maintained or improved  7/16/2024 0250 by Anitha Olvera RN  Outcome: Progressing  7/15/2024 1613 by Cassidy Mc RN  Outcome: Progressing

## 2024-07-16 NOTE — PROGRESS NOTES
This RN in to attempt to place new IV after IV infiltrated. RN unable to find vein to replace IV. Called house supervisor to attempt. Care ongoing.

## 2024-07-16 NOTE — PROGRESS NOTES
[facility]    Facility/Department: [facility] MED SURG    Speech Language Pathology    Clinical Bedside Swallow Evaluation    NAME:Talon Walker Jr.    : 1960 (63 y.o.)    MRN: [id number]    ROOM: 0325/0325-01    ADMISSION DATE: 2024    PATIENT DIAGNOSIS(ES): Shortness of breath [R06.02]  COPD exacerbation (HCC) [J44.1]  Acute on chronic systolic congestive heart failure (HCC) [I50.23]  Acute respiratory failure with hypoxia (HCC) [J96.01]    Chief Complaint   Patient presents with    Shortness of Breath     Onset PTA, EMS states his O2 was in the 60's on arrival       Patient Active Problem List    Diagnosis Date Noted    Multifocal pneumonia 2024    Acute respiratory failure with hypoxia (HCC) 2024    Lower leg edema 10/27/2021    Dysthymia 10/17/2019    Cerebral infarction (HCC) 10/17/2019    Left-sided weakness 10/17/2019    COPD, severe (HCC) 2018    Controlled type 2 diabetes mellitus with stage 3 chronic kidney disease, without long-term current use of insulin (HCC) 2018    Gastroesophageal reflux disease 10/03/2017    Allergic rhinitis 10/03/2017       Past Medical History:   Diagnosis Date    Acid reflux     Asthma     Cerebrovascular disease     Chronic cough     COPD (chronic obstructive pulmonary disease) (HCC)     Dysphagia     Dyspnea     Unspecified cerebral artery occlusion with cerebral infarction 2008    Wheelchair dependent        Past Surgical History:   Procedure Laterality Date    BRAIN SURGERY      CRANIOTOMY      GASTROSTOMY TUBE PLACEMENT      removed    KNEE SURGERY         Allergies   Allergen Reactions    Pcn [Penicillins] Anaphylaxis    Morphine Rash    Sulfa Antibiotics Rash       DATE ONSET: 2024    Date of Evaluation: 2024    Evaluating Therapist: YOUSIF Mclean    Dysphagia Diagnosis    Dysphagia Diagnosis: Mild to moderate oral stage dysphagia;Suspected needs further assessment;Mild pharyngeal stage dysphagia  Dysphagia

## 2024-07-16 NOTE — PROGRESS NOTES
Physical Therapy  Facility/Department: [facility] MED SURG  Daily Treatment Note  NAME: Talon Walker Jr.  : 1960  MRN: [id number]    Date of Service: 2024    Discharge Recommendations:  Continue to assess pending progress, Home with Home health PT     Patient Diagnosis(es): The primary encounter diagnosis was COPD exacerbation (HCC). Diagnoses of Acute on chronic systolic congestive heart failure (HCC), Shortness of breath, and Paraplegia (HCC) were also pertinent to this visit.    Assessment   Assessment: Pt. in bed upon arrival, agreeable to bed therex at this time. Supine PROM BLE therex x15 in all available planes of motion. Pt. performed rolling x2 with MaxAx2 for cecil care.  Activity Tolerance: Patient tolerated treatment well     Plan    Physical Therapy Plan  General Plan: 1 time a day 7 days a week  Specific Instructions for Next Treatment: 1 times on weekends and holidays  Current Treatment Recommendations: ROM;Functional mobility training;Safety education & training;Patient/Caregiver education & training;Home exercise program     Restrictions  Restrictions/Precautions  Restrictions/Precautions: Fall Risk, General Precautions  Required Braces or Orthoses?: No     Subjective    Subjective  Subjective: Pt. in bed upon arrival, agreeable to therex at this time.  Pain: denies  Orientation  Overall Orientation Status: Within Functional Limits     Objective   Bed Mobility Training  Bed Mobility Training: Yes  Overall Level of Assistance: Maximum assistance;Assist X2  Interventions: Tactile cues;Verbal cues;Safety awareness training  Rolling: Maximum assistance;Assist X2  Transfer Training  Transfer Training: No  Gait  Gait Training: No  PT Exercises  Exercise Treatment: Supine PROM BLE therex x15 in all available planes of motion  Safety Devices  Type of Devices: All richa prominences offloaded;All fall risk precautions in place;Call light within reach;Heels elevated for pressure relief;Patient at risk  for falls;Nurse notified;Bed alarm in place;Left in bed       Goals  Short Term Goals  Time Frame for Short Term Goals: 20 days  Short Term Goal 1: PT will perform PROM to BLE in order to reduce contractures  Short Term Goal 2: Pt will perform bed mobility Max A/Mod A    Education  Patient Education  Education Given To: Patient  Education Provided: Role of Therapy;Plan of Care  Education Method: Demonstration  Education Outcome: Verbalized understanding;Demonstrated understanding    Therapy Time   Individual Concurrent Group Co-treatment   Time In 1006         Time Out 1025         Minutes 19           Haven Santos, PTA

## 2024-07-16 NOTE — PROGRESS NOTES
Nephrologist Dr Suggs speaking to patient and family at [facility] via tele visit. No needs or concerns voiced at this time per family and patient. Caregiver assisted patient to eat lunch. Calcium Gluconate infusing via IV with no s/s of infiltration at this time. Care ongoing.

## 2024-07-16 NOTE — PROGRESS NOTES
Courtney DUBOIS aware of patient BP and ordered lisinopril. V/O to hold this at this time. Care ongoing.

## 2024-07-16 NOTE — PROGRESS NOTES
Progress Note    SUBJECTIVE:    Patient seen for f/u of Multifocal pneumonia.  He resting in bed no distress.On room air.  No complaints    ROS:   Constitutional: negative  for fevers, and negative for chills.  Respiratory: negative for shortness of breath, negative for cough, and negative for wheezing  Cardiovascular: negative for chest pain, and negative for palpitations  Gastrointestinal: negative for abdominal pain, negative for nausea,negative for vomiting, negative for diarrhea, and negative for constipation     All other systems were reviewed with the patient and are negative unless otherwise stated in HPI      OBJECTIVE:      Vitals:   Vitals:    07/16/24 0518   BP:    Pulse: 71   Resp: 18   Temp:    SpO2: 96%     Weight - Scale: 76.7 kg (169 lb)   Height: 167.6 cm (5' 5.98\")     Weight  Wt Readings from Last 3 Encounters:   07/16/24 76.7 kg (169 lb)   05/14/24 80.7 kg (178 lb)   08/03/23 81.6 kg (179 lb 14.4 oz)     Body mass index is 27.29 kg/m².    24HR INTAKE/OUTPUT:      Intake/Output Summary (Last 24 hours) at 7/16/2024 0708  Last data filed at 7/16/2024 0427  Gross per 24 hour   Intake 710 ml   Output 1400 ml   Net -690 ml     -----------------------------------------------------------------  Exam:    GEN:    Awake, alert and oriented x3.   EYES:  EOMI, pupils equal   NECK: Supple. No lymphadenopathy.  No carotid bruit  CVS:    regular rate and rhythm, no audible murmur  PULM:   clear , no acute respiratory distress  ABD:    Bowels sounds normal.  Abdomen is soft.  No distention.  no tenderness to palpation.   EXT:   no edema bilaterally .  No calf tenderness. Bilateral foot drop  NEURO: Moves all extremities.  Motor and sensory are grossly intact  SKIN:  No rashes.  No skin lesions.    -----------------------------------------------------------------    Diagnostic Data:      Complete Blood Count:   Recent Labs     07/14/24  0635 07/15/24  0620 07/16/24  0545   WBC 6.1 4.3 5.0   RBC 3.23* 3.16* 3.22*  lower lobe, which may also   represent atelectasis or pneumonia.   3. Emphysema.   4. Mildly enlarged subcarinal lymph node, which is nonspecific but may be   reactive.   5. Chronic appearing compression fracture of the L1 vertebral body with   moderate anterior height loss.         XR CHEST PORTABLE   Final Result   1. Bilateral interstitial and airspace opacities with a more focal somewhat   nodular opacity in the left perihilar region measuring 3.2 cm. Findings may   represent pulmonary edema versus multifocal pneumonia.  Underlying   interstitial lung disease is suspected.  Neoplastic process in the left   perihilar region cannot be excluded.  Recommend radiographic follow-up or   follow-up with CT as clinically indicated.   2. Questionable small pleural effusions.               ASSESSMENT / PLAN:    MEDICAL DECISION MAKING:    Primary Problem(s): Multifocal pneumonia  Differential diagnoses: CHF, multifocal pneumonia, aspiration pneumonia, COPD exacerbation  Condition is an undiagnosed new problem with uncertain prognosis  Condition is stable  Treatment plan:   Supplemental oxygen  Monitor labs replace electrolytes  Trend CRP  Blood cultures x 2-no growth  Viral panel-negative  Up with assistance  PT/OT  Imaging: no further imaging studies ordered today  Medications:   Stop IV cefepime  Continue DuoNeb, albuterol  Oral Levaquin  IV Solu-Medrol  Medication Monitoring / High Risk Medications: none      Acute Respiratory Failure with Hypoxia/concern for acute CHF in addition to pneumonia  Condition is stable  Treatment plan:   Viral panel-negative  Supplemental oxygen to maintain SpO2 greater than 90%  Place Power catheter  Trend CRP  Imaging: Echo ordered  Medications:   Stop IV Lasix  Continue DuoNeb  Oral Levaquin     DM    Condition is a chronic stable condition  Treatment plan:   POCT before meals and at bedtime  Imaging: no further imaging studies ordered today  Medications:   Continue Glucophage,

## 2024-07-16 NOTE — PLAN OF CARE
Problem: Discharge Planning  Goal: Discharge to home or other facility with appropriate resources  7/16/2024 1443 by Latoya Pineda RN  Outcome: Progressing     Problem: Safety - Adult  Goal: Free from fall injury  7/16/2024 1443 by Latoya Pineda RN  Outcome: Progressing     Problem: Skin/Tissue Integrity  Goal: Absence of new skin breakdown  Description: 1.  Monitor for areas of redness and/or skin breakdown  2.  Assess vascular access sites hourly  3.  Every 4-6 hours minimum:  Change oxygen saturation probe site  4.  Every 4-6 hours:  If on nasal continuous positive airway pressure, respiratory therapy assess nares and determine need for appliance change or resting period.  7/16/2024 1443 by Latoya Pineda RN  Outcome: Progressing     Problem: Nutrition Deficit:  Goal: Optimize nutritional status  7/16/2024 1443 by Latoya Pineda RN  Outcome: Progressing     Problem: Chronic Conditions and Co-morbidities  Goal: Patient's chronic conditions and co-morbidity symptoms are monitored and maintained or improved  7/16/2024 1443 by Latoya Pineda RN  Outcome: Progressing     Problem: Neurosensory - Adult  Goal: Achieves stable or improved neurological status  Outcome: Progressing     Problem: Respiratory - Adult  Goal: Achieves optimal ventilation and oxygenation  Outcome: Progressing     Problem: Skin/Tissue Integrity - Adult  Goal: Skin integrity remains intact  Outcome: Progressing     Problem: Musculoskeletal - Adult  Goal: Return mobility to safest level of function  Outcome: Progressing     Problem: Musculoskeletal - Adult  Goal: Return ADL status to a safe level of function  Outcome: Progressing     Problem: Genitourinary - Adult  Goal: Urinary catheter remains patent  Outcome: Progressing     Problem: Metabolic/Fluid and Electrolytes - Adult  Goal: Electrolytes maintained within normal limits  Outcome: Progressing

## 2024-07-17 VITALS
TEMPERATURE: 97 F | SYSTOLIC BLOOD PRESSURE: 124 MMHG | WEIGHT: 169 LBS | BODY MASS INDEX: 27.16 KG/M2 | OXYGEN SATURATION: 94 % | HEART RATE: 76 BPM | RESPIRATION RATE: 18 BRPM | DIASTOLIC BLOOD PRESSURE: 58 MMHG | HEIGHT: 66 IN

## 2024-07-17 LAB
ANION GAP SERPL CALCULATED.3IONS-SCNC: 12 MMOL/L (ref 9–17)
BUN SERPL-MCNC: 52 MG/DL (ref 8–23)
BUN/CREAT SERPL: 23 (ref 9–20)
CALCIUM SERPL-MCNC: 8.4 MG/DL (ref 8.6–10.4)
CHLORIDE SERPL-SCNC: 98 MMOL/L (ref 98–107)
CO2 SERPL-SCNC: 26 MMOL/L (ref 20–31)
CREAT SERPL-MCNC: 2.3 MG/DL (ref 0.7–1.2)
GFR, ESTIMATED: 31 ML/MIN/1.73M2
GLUCOSE BLD-MCNC: 208 MG/DL (ref 74–100)
GLUCOSE BLD-MCNC: 253 MG/DL (ref 74–100)
GLUCOSE SERPL-MCNC: 242 MG/DL (ref 70–99)
POTASSIUM SERPL-SCNC: 4.8 MMOL/L (ref 3.7–5.3)
PROCALCITONIN SERPL-MCNC: 0.13 NG/ML (ref 0–0.09)
SODIUM SERPL-SCNC: 136 MMOL/L (ref 135–144)

## 2024-07-17 PROCEDURE — 80048 BASIC METABOLIC PNL TOTAL CA: CPT

## 2024-07-17 PROCEDURE — 84145 PROCALCITONIN (PCT): CPT

## 2024-07-17 PROCEDURE — 94761 N-INVAS EAR/PLS OXIMETRY MLT: CPT

## 2024-07-17 PROCEDURE — 6370000000 HC RX 637 (ALT 250 FOR IP): Performed by: STUDENT IN AN ORGANIZED HEALTH CARE EDUCATION/TRAINING PROGRAM

## 2024-07-17 PROCEDURE — 82947 ASSAY GLUCOSE BLOOD QUANT: CPT

## 2024-07-17 PROCEDURE — 97110 THERAPEUTIC EXERCISES: CPT

## 2024-07-17 PROCEDURE — 97530 THERAPEUTIC ACTIVITIES: CPT

## 2024-07-17 PROCEDURE — 6360000002 HC RX W HCPCS: Performed by: STUDENT IN AN ORGANIZED HEALTH CARE EDUCATION/TRAINING PROGRAM

## 2024-07-17 PROCEDURE — 36415 COLL VENOUS BLD VENIPUNCTURE: CPT

## 2024-07-17 PROCEDURE — 94640 AIRWAY INHALATION TREATMENT: CPT

## 2024-07-17 PROCEDURE — 94669 MECHANICAL CHEST WALL OSCILL: CPT

## 2024-07-17 PROCEDURE — 92526 ORAL FUNCTION THERAPY: CPT

## 2024-07-17 PROCEDURE — 2580000003 HC RX 258: Performed by: STUDENT IN AN ORGANIZED HEALTH CARE EDUCATION/TRAINING PROGRAM

## 2024-07-17 PROCEDURE — 6370000000 HC RX 637 (ALT 250 FOR IP): Performed by: NURSE PRACTITIONER

## 2024-07-17 RX ORDER — FUROSEMIDE 20 MG/1
20 TABLET ORAL EVERY OTHER DAY
Qty: 180 TABLET | Refills: 3 | Status: SHIPPED | OUTPATIENT
Start: 2024-07-17

## 2024-07-17 RX ADMIN — ATORVASTATIN CALCIUM 40 MG: 40 TABLET, FILM COATED ORAL at 08:16

## 2024-07-17 RX ADMIN — PREDNISONE 10 MG: 10 TABLET ORAL at 08:17

## 2024-07-17 RX ADMIN — INSULIN LISPRO 2 UNITS: 100 INJECTION, SOLUTION INTRAVENOUS; SUBCUTANEOUS at 12:49

## 2024-07-17 RX ADMIN — INSULIN LISPRO 1 UNITS: 100 INJECTION, SOLUTION INTRAVENOUS; SUBCUTANEOUS at 08:16

## 2024-07-17 RX ADMIN — BACLOFEN 10 MG: 10 TABLET ORAL at 08:16

## 2024-07-17 RX ADMIN — GABAPENTIN 300 MG: 300 CAPSULE ORAL at 08:16

## 2024-07-17 RX ADMIN — IPRATROPIUM BROMIDE AND ALBUTEROL SULFATE 1 DOSE: 2.5; .5 SOLUTION RESPIRATORY (INHALATION) at 04:59

## 2024-07-17 RX ADMIN — CETIRIZINE HYDROCHLORIDE 10 MG: 10 TABLET, FILM COATED ORAL at 08:16

## 2024-07-17 RX ADMIN — PIOGLITAZONE 15 MG: 15 TABLET ORAL at 08:16

## 2024-07-17 RX ADMIN — SODIUM CHLORIDE, PRESERVATIVE FREE 10 ML: 5 INJECTION INTRAVENOUS at 08:17

## 2024-07-17 RX ADMIN — ENOXAPARIN SODIUM 40 MG: 100 INJECTION SUBCUTANEOUS at 08:17

## 2024-07-17 RX ADMIN — BUDESONIDE 500 MCG: 0.5 SUSPENSION RESPIRATORY (INHALATION) at 09:49

## 2024-07-17 RX ADMIN — IPRATROPIUM BROMIDE AND ALBUTEROL SULFATE 1 DOSE: 2.5; .5 SOLUTION RESPIRATORY (INHALATION) at 09:49

## 2024-07-17 RX ADMIN — SERTRALINE 200 MG: 100 TABLET, FILM COATED ORAL at 08:16

## 2024-07-17 RX ADMIN — PANTOPRAZOLE SODIUM 40 MG: 40 TABLET, DELAYED RELEASE ORAL at 08:16

## 2024-07-17 RX ADMIN — LEVETIRACETAM 500 MG: 500 TABLET, FILM COATED ORAL at 08:16

## 2024-07-17 RX ADMIN — ASPIRIN 81 MG: 81 TABLET, CHEWABLE ORAL at 08:16

## 2024-07-17 RX ADMIN — FOLIC ACID 1000 MCG: 1 TABLET ORAL at 08:16

## 2024-07-17 NOTE — PROGRESS NOTES
Physical Therapy  Facility/Department: [facility] MED SURG  Daily Treatment Note  NAME: Talon Walker Jr.  : 1960  MRN: [id number]    Date of Service: 2024    Discharge Recommendations:  Continue to assess pending progress, Home with Home health PT     Patient Diagnosis(es): The primary encounter diagnosis was COPD exacerbation (HCC). Diagnoses of Acute on chronic systolic congestive heart failure (HCC), Shortness of breath, Paraplegia (HCC), and Lower leg edema were also pertinent to this visit.    Assessment   Assessment: Performed aelf-care and ADL tsks with RN with pt. in bed. Bed mobility: Max x2. Transfers: Gali lift x2.  Activity Tolerance: Patient tolerated treatment well  Equipment Needed: Yes  Other: Gali lift     Plan    Physical Therapy Plan  General Plan: 1 time a day 7 days a week  Specific Instructions for Next Treatment: 1 times on weekends and holidays  Current Treatment Recommendations: ROM;Functional mobility training;Safety education & training;Patient/Caregiver education & training;Home exercise program;Balance training;Transfer training;Wheelchair mobility training;Strengthening;Therapeutic activities;Manual;Endurance training     Restrictions  Restrictions/Precautions  Restrictions/Precautions: Fall Risk, General Precautions  Required Braces or Orthoses?: No     Subjective    Subjective  Subjective: Pt. in bed upon arrival, agreeable to therex at this time.  Pain: denies  Orientation  Overall Orientation Status: Within Functional Limits  Orientation Level: Oriented to place;Oriented to situation;Oriented to person  Cognition  Overall Cognitive Status: WFL     Objective   Bed Mobility Training  Bed Mobility Training: Yes  Overall Level of Assistance: Maximum assistance;Assist X2  Interventions: Tactile cues;Verbal cues;Safety awareness training  Rolling: Maximum assistance;Assist X2  Transfer Training  Transfer Training: Yes  Overall Level of Assistance: Adaptive equipment;Total

## 2024-07-17 NOTE — PROGRESS NOTES
Patients IV removed, dressed and placed in wheelchair. Discharge paperwork gone over with patient and his family/caregivers. All questions answered. Supplies given to patient for home use. Denies any questions. Patient taken out via wheelchair and placed in the care.

## 2024-07-17 NOTE — PROGRESS NOTES
Patient awake and in bed. VS and assessment completed. Patient ate breakfast. Medications given. Denies pain. Plan of are gone over with patient.

## 2024-07-17 NOTE — PROGRESS NOTES
Comprehensive Nutrition Assessment    Type and Reason for Visit:  Reassess    Nutrition Recommendations/Plan:   Continue current diet.      Malnutrition Assessment:  Malnutrition Status:  No malnutrition (07/17/24 0916)    Context:  Acute Illness     Findings of the 6 clinical characteristics of malnutrition:  Energy Intake:  No significant decrease in energy intake  Weight Loss:  1% to 2% over 1 week (on diuretics)     Body Fat Loss:  No significant body fat loss     Muscle Mass Loss:  No significant muscle mass loss    Fluid Accumulation:  Mild Extremities   Strength:  Not Performed    Nutrition Assessment:    Continued altered nutrition related labs r/t cardiac/endocrine dysfunction AEB RYD=1575, glucose=242. BNP still elevated but improving from 9772. Hx T2DM and on steroid therapy, A1C=5.2. Creatinine=2.3 (H) and BUN=52 (H) with hx CKD. +1 pitting BLE edema. Pt reported difficulty swallowing and SLP eval results showed mild-mod. oral stage/mild pharyngeal stage dysphagia with recommendation for easy to chew foods and thin liquids. States he has not had any problems swallowing since and reports good appetite with PO intakes %. Denies questions/concerns. Recommend monitoring glucose levels and need for CC diet with steroid therapy.    Nutrition Related Findings:    +1 pitting BLE edema Wound Type: None       Current Nutrition Intake & Therapies:    Average Meal Intake: %  Average Supplements Intake: None Ordered  ADULT DIET; Regular; Low Sodium (2 gm); 1800 ml    Anthropometric Measures:  Height: 167.6 cm (5' 5.98\")  Ideal Body Weight (IBW): 142 lbs (65 kg)    Admission Body Weight: 77.6 kg (171 lb 2 oz)  Current Body Weight: 76.7 kg (169 lb 1.5 oz), 119.1 % IBW. Weight Source: Bed Scale  Current BMI (kg/m2): 27.3  Usual Body Weight: 80.7 kg (178 lb)  % Weight Change (Calculated): -3.9  Weight Adjustment For: No Adjustment                 BMI Categories: Overweight (BMI 25.0-29.9)    Estimated

## 2024-07-17 NOTE — DISCHARGE SUMMARY
Where to Get Your Medications        These medications were sent to [facility] [id number] - [place], [place] - [address] - P [phone] - F [phone]  [address] [address]      Phone: [phone]   furosemide 20 MG tablet         Patient Instructions:   Activity: activity as tolerated  Diet: encourage fluids  Wound Care: none needed  Other: an     Disposition:   Discharge to Home    Follow up:  Patient will be followed by Adrian Adkins APRN - CNP in 1-2 weeks    CORE MEASURES on Discharge (if applicable)  ACE/ARB in CHF: NA  Statin in MI: NA  ASA in MI: NA  Statin in CVA: NA  Antiplatelet in CVA: NA    Total time spent on discharge services: 45 minutes    Including the following activities:  Evaluation and Management of patient  Discussion with patient and/or surrogate about current care plan  Coordination with Case Management and/or   Coordination of care with Consultants (if applicable)   Coordination of care with Receiving Facility Physician (if applicable)  Completion of DME forms (if applicable)  Preparation of Discharge Summary  Preparation of Medication Reconciliation  Preparation of Discharge Prescriptions    Signed:  CARO Gamez CNP, CARO, NP-C  7/17/2024, 9:45 AM

## 2024-07-17 NOTE — PROGRESS NOTES
Physical Therapy  Facility/Department: [facility] MED SURG  Physical Therapy Re-Assessment    Name: Talon Walker Jr.  : 1960  MRN: [id number]  Date of Service: 2024    Discharge Recommendations:  Continue to assess pending progress, Home with Home health PT   PT Equipment Recommendations  Equipment Needed: Yes  Other: Agli lift      Patient Diagnosis(es): The primary encounter diagnosis was COPD exacerbation (HCC). Diagnoses of Acute on chronic systolic congestive heart failure (HCC), Shortness of breath, and Paraplegia (HCC) were also pertinent to this visit.  Past Medical History:  has a past medical history of Acid reflux, Asthma, Cerebrovascular disease, Chronic cough, COPD (chronic obstructive pulmonary disease) (HCC), Dysphagia, Dyspnea, Unspecified cerebral artery occlusion with cerebral infarction, and Wheelchair dependent.  Past Surgical History:  has a past surgical history that includes craniotomy; brain surgery; knee surgery; and Gastrostomy tube placement.    Assessment   Assessment: Pt re-assessed by PT for transfers and will require gali lift with assist x2 for bed<->chair transfers for improved safety. Pt would also benefit from home gali lift. Will continue to see patient for ROM and gali transfers to maximize mobility.  Treatment Diagnosis: decreased BLE AROM  Therapy Prognosis: Fair  Decision Making: High Complexity  Requires PT Follow-Up: Yes  Activity Tolerance  Activity Tolerance: Patient tolerated treatment well     Plan   Physical Therapy Plan  General Plan: 1 time a day 7 days a week  Specific Instructions for Next Treatment: 1 times on weekends and holidays  Current Treatment Recommendations: ROM, Functional mobility training, Safety education & training, Patient/Caregiver education & training, Home exercise program, Balance training, Transfer training, Wheelchair mobility training, Strengthening, Therapeutic activities, Manual, Endurance training  Safety Devices  Type of

## 2024-07-17 NOTE — PLAN OF CARE
Problem: Discharge Planning  Goal: Discharge to home or other facility with appropriate resources  7/16/2024 2243 by Anisha Carrillo RN  Outcome: Progressing  Flowsheets  Taken 7/16/2024 2235 by Anisha Carrillo RN  Discharge to home or other facility with appropriate resources:   Identify barriers to discharge with patient and caregiver   Arrange for needed discharge resources and transportation as appropriate   Identify discharge learning needs (meds, wound care, etc)   Refer to discharge planning if patient needs post-hospital services based on physician order or complex needs related to functional status, cognitive ability or social support system   Arrange for interpreters to assist at discharge as needed  Taken 7/16/2024 2025 by Veronika Turner RN  Discharge to home or other facility with appropriate resources: Identify barriers to discharge with patient and caregiver  7/16/2024 1443 by Latoya Pineda RN  Outcome: Progressing     Problem: Safety - Adult  Goal: Free from fall injury  7/16/2024 2243 by Anisha Carrillo RN  Outcome: Progressing  7/16/2024 1443 by Latoya Pineda RN  Outcome: Progressing     Problem: Skin/Tissue Integrity  Goal: Absence of new skin breakdown  Description: 1.  Monitor for areas of redness and/or skin breakdown  2.  Assess vascular access sites hourly  3.  Every 4-6 hours minimum:  Change oxygen saturation probe site  4.  Every 4-6 hours:  If on nasal continuous positive airway pressure, respiratory therapy assess nares and determine need for appliance change or resting period.  7/16/2024 2243 by Anisha Carrillo RN  Outcome: Progressing  7/16/2024 1443 by Latoya Pineda RN  Outcome: Progressing     Problem: Nutrition Deficit:  Goal: Optimize nutritional status  7/16/2024 2243 by Anisha Carrillo RN  Outcome: Progressing  7/16/2024 1443 by Latoya Pineda RN  Outcome: Progressing     Problem: Chronic Conditions and Co-morbidities  Goal: Patient's chronic conditions and  co-morbidity symptoms are monitored and maintained or improved  7/16/2024 2243 by Anisha Carrillo RN  Outcome: Progressing  Flowsheets  Taken 7/16/2024 2235 by Anisha Carrillo RN  Care Plan - Patient's Chronic Conditions and Co-Morbidity Symptoms are Monitored and Maintained or Improved:   Collaborate with multidisciplinary team to address chronic and comorbid conditions and prevent exacerbation or deterioration   Monitor and assess patient's chronic conditions and comorbid symptoms for stability, deterioration, or improvement   Update acute care plan with appropriate goals if chronic or comorbid symptoms are exacerbated and prevent overall improvement and discharge  Taken 7/16/2024 2025 by Veronika Turner RN  Care Plan - Patient's Chronic Conditions and Co-Morbidity Symptoms are Monitored and Maintained or Improved: Monitor and assess patient's chronic conditions and comorbid symptoms for stability, deterioration, or improvement  7/16/2024 1443 by Latoya Pineda RN  Outcome: Progressing     Problem: Neurosensory - Adult  Goal: Achieves stable or improved neurological status  7/16/2024 2243 by Anisha Carrillo RN  Outcome: Progressing  Flowsheets  Taken 7/16/2024 2235 by Anisha Carrillo RN  Achieves stable or improved neurological status:   Assess for and report changes in neurological status   Initiate measures to prevent increased intracranial pressure   Maintain blood pressure and fluid volume within ordered parameters to optimize cerebral perfusion and minimize risk of hemorrhage   Monitor temperature, glucose, and sodium. Initiate appropriate interventions as ordered  Taken 7/16/2024 2025 by Veronika Turner RN  Achieves stable or improved neurological status: Assess for and report changes in neurological status  7/16/2024 1443 by Latoya Pineda RN  Outcome: Progressing     Problem: Respiratory - Adult  Goal: Achieves optimal ventilation and oxygenation  7/16/2024 2243 by Anisha Carrillo RN  Outcome:

## 2024-07-17 NOTE — PLAN OF CARE
Problem: Discharge Planning  Goal: Discharge to home or other facility with appropriate resources  7/17/2024 1235 by Kortney Badillo  Outcome: Completed  7/16/2024 2243 by Anisha Carrillo, RN  Outcome: Progressing  Flowsheets  Taken 7/16/2024 2235 by Anisha Carrillo, RN  Discharge to home or other facility with appropriate resources:   Identify barriers to discharge with patient and caregiver   Arrange for needed discharge resources and transportation as appropriate   Identify discharge learning needs (meds, wound care, etc)   Refer to discharge planning if patient needs post-hospital services based on physician order or complex needs related to functional status, cognitive ability or social support system   Arrange for interpreters to assist at discharge as needed  Taken 7/16/2024 2025 by Veronika Turner RN  Discharge to home or other facility with appropriate resources: Identify barriers to discharge with patient and caregiver     Problem: Safety - Adult  Goal: Free from fall injury  7/17/2024 1235 by Kortney Badillo  Outcome: Completed  7/16/2024 2243 by Anisha Carrillo, RN  Outcome: Progressing     Problem: Skin/Tissue Integrity  Goal: Absence of new skin breakdown  Description: 1.  Monitor for areas of redness and/or skin breakdown  2.  Assess vascular access sites hourly  3.  Every 4-6 hours minimum:  Change oxygen saturation probe site  4.  Every 4-6 hours:  If on nasal continuous positive airway pressure, respiratory therapy assess nares and determine need for appliance change or resting period.  7/17/2024 1235 by Kortney Badillo  Outcome: Completed  7/16/2024 2243 by Anisha Carrillo, RN  Outcome: Progressing     Problem: Nutrition Deficit:  Goal: Optimize nutritional status  7/17/2024 1235 by Kortney Badillo  Outcome: Completed  7/17/2024 0923 by Latoya Sanabria  Outcome: Progressing  Flowsheets (Taken 7/17/2024 0923)  Nutrient intake appropriate for improving, restoring, or maintaining nutritional needs:   Monitor oral  risk of hemorrhage   Monitor temperature, glucose, and sodium. Initiate appropriate interventions as ordered  Taken 7/16/2024 2025 by Veronika Turner RN  Achieves stable or improved neurological status: Assess for and report changes in neurological status     Problem: Respiratory - Adult  Goal: Achieves optimal ventilation and oxygenation  7/17/2024 1235 by Kortney Badillo  Outcome: Completed  7/16/2024 2243 by Anisha Carrillo RN  Outcome: Progressing  Flowsheets  Taken 7/16/2024 2235 by Anisha Carrillo RN  Achieves optimal ventilation and oxygenation:   Assess for changes in respiratory status   Assess for changes in mentation and behavior   Position to facilitate oxygenation and minimize respiratory effort   Oxygen supplementation based on oxygen saturation or arterial blood gases   Assess the need for suctioning and aspirate as needed   Respiratory therapy support as indicated  Taken 7/16/2024 2025 by Veronika Turner RN  Achieves optimal ventilation and oxygenation: Assess for changes in respiratory status     Problem: Skin/Tissue Integrity - Adult  Goal: Skin integrity remains intact  7/17/2024 1235 by Kortney Badillo  Outcome: Completed  7/16/2024 2243 by Anisha Carrillo RN  Outcome: Progressing  Flowsheets (Taken 7/16/2024 2025 by Veronika Turner RN)  Skin Integrity Remains Intact: Monitor for areas of redness and/or skin breakdown     Problem: Musculoskeletal - Adult  Goal: Return mobility to safest level of function  7/17/2024 1235 by Kortney Badillo  Outcome: Completed  7/16/2024 2243 by Anisha Carrillo RN  Outcome: Progressing  Flowsheets  Taken 7/16/2024 2235 by Anisha Carrillo RN  Return Mobility to Safest Level of Function:   Assess patient stability and activity tolerance for standing, transferring and ambulating with or without assistive devices   Assist with transfers and ambulation using safe patient handling equipment as needed   Ensure adequate protection for wounds/incisions during mobilization   Obtain

## 2024-07-17 NOTE — PROGRESS NOTES
3. Emphysema.   4. Mildly enlarged subcarinal lymph node, which is nonspecific but may be   reactive.   5. Chronic appearing compression fracture of the L1 vertebral body with   moderate anterior height loss.         XR CHEST PORTABLE   Final Result   1. Bilateral interstitial and airspace opacities with a more focal somewhat   nodular opacity in the left perihilar region measuring 3.2 cm. Findings may   represent pulmonary edema versus multifocal pneumonia.  Underlying   interstitial lung disease is suspected.  Neoplastic process in the left   perihilar region cannot be excluded.  Recommend radiographic follow-up or   follow-up with CT as clinically indicated.   2. Questionable small pleural effusions.               ASSESSMENT / PLAN:    MEDICAL DECISION MAKING:    Primary Problem(s): Multifocal pneumonia  Differential diagnoses: CHF, multifocal pneumonia, aspiration pneumonia, COPD exacerbation  Condition is an undiagnosed new problem with uncertain prognosis  Condition is stable  Treatment plan:   Supplemental oxygen  Monitor labs replace electrolytes  Trend CRP  Blood cultures x 2-no growth  Viral panel-negative  Up with assistance  PT/OT  Imaging: no further imaging studies ordered today  Medications:   Stop IV cefepime  Continue DuoNeb, albuterol  Completed Levaquin  IV Solu-Medrol  Medication Monitoring / High Risk Medications: none      Acute Respiratory Failure with Hypoxia/concern for acute CHF in addition to pneumonia  Condition is resolved  Treatment plan:   Viral panel-negative  Supplemental oxygen to maintain SpO2 greater than 90%  Place Power catheter  Trend CRP  Imaging:None  ECHO-echocardiogram showed EF 55 to 60%.  Grade 1 diastolic dysfunction with normal LAP.  Right ventricle moderately dilated with moderately increased wall thickness.  Moderate tricuspid regurgitation.  Evidence of epicardial fat no pericardial effusion with bilateral pleural effusion  Medications:   Stop IV Lasix  Continue

## 2024-07-17 NOTE — PROGRESS NOTES
Occupational Therapy  Facility/Department: [facility] MED SURG  Daily Treatment Note  NAME: Talon Walker Jr.  : 1960  MRN: [id number]    Date of Service: 2024    Discharge Recommendations:  Continue to assess pending progress, Subacute/Skilled Nursing Facility         Patient Diagnosis(es): The primary encounter diagnosis was COPD exacerbation (HCC). Diagnoses of Acute on chronic systolic congestive heart failure (HCC), Shortness of breath, Paraplegia (HCC), and Lower leg edema were also pertinent to this visit.     Assessment    Activity Tolerance: Patient tolerated treatment well  Discharge Recommendations: Continue to assess pending progress;Subacute/Skilled Nursing Facility      Plan   Occupational Therapy Plan  Times Per Day: Once a day  Days Per Week: 7 Days  Current Treatment Recommendations: Strengthening;ROM;Endurance training;Safety education & training;Patient/Caregiver education & training;Positioning;Modalities;Self-Care / ADL     Restrictions  Restrictions/Precautions  Restrictions/Precautions: Fall Risk;General Precautions  Required Braces or Orthoses?: No    Subjective   Subjective  Subjective: Pt reported he is \"not good\" this date, agreed to seated BUE therex.  Pain: denied        Objective    Vitals    OT Exercises  Exercise Treatment: RUE therex to increase strength/endurance for ease of fxl tasks. Red digiflex x 20 RUE only,  and 1# free weight x 20 reps x all planes RUE only while seated. Pt required MOD RBs for a total of ~ 15 min therex.     Safety Devices  Type of Devices: All fall risk precautions in place;Call light within reach;Chair alarm in place;Left in chair     Patient Education  Education Given To: Patient  Education Provided: Role of Therapy;Plan of Care;Home Exercise Program  Education Method: Verbal;Demonstration  Barriers to Learning: None  Education Outcome: Demonstrated understanding    Goals  Short Term Goals  Time Frame for Short Term Goals: 21 visits  Short Term  Goal 1: Pt will safely complete RUE PROM/AROM exercises to increase mobility for ADL/feeding tasks  Short Term Goal 2: Pt will safely complete UB ADL tasks with Mod(A) to increase engagement in ADL tasks.  Short Term Goal 3: Pt will safely complete EC/WS strategies with Min(A) during ADL tasks.  Short Term Goal 4: Pt will demo good positioning for left side to increase tolerance for seated ADL/feeding tasks.  Short Term Goal 5: Pt will complete RUE exercises to increase strength/endurance during functional mobility/transfers and ADL tasks.    AM-PAC - ADL       Therapy Time   Individual Concurrent Group Co-treatment   Time In 1127         Time Out 1142         Minutes 15                 ROSANA Gamez

## 2024-07-17 NOTE — PROGRESS NOTES
Patient bathed, brief changed d/t stool incontinence and then up to the chair with help from PT. Tolerated isis lift well. Denies need to urinate after christensen removal.

## 2024-07-17 NOTE — PROGRESS NOTES
[facility]    Facility/Department: [facility] MED SURG    Speech Language Pathology    Clinical Bedside Swallow Evaluation    NAME:Talon Walker Jr.    : 1960 (63 y.o.)    MRN: [id number]    ROOM: 0325/0325-01    ADMISSION DATE: 2024    PATIENT DIAGNOSIS(ES): Shortness of breath [R06.02]  COPD exacerbation (HCC) [J44.1]  Acute on chronic systolic congestive heart failure (HCC) [I50.23]  Acute respiratory failure with hypoxia ([facility]) [J96.01]    Chief Complaint   Patient presents with    Shortness of Breath     Onset PTA, EMS states his O2 was in the 60's on arrival       Patient Active Problem List    Diagnosis Date Noted    Multifocal pneumonia 2024    Acute respiratory failure with hypoxia ([facility]) 2024    Lower leg edema 10/27/2021    Dysthymia 10/17/2019    Cerebral infarction ([facility]) 10/17/2019    Left-sided weakness 10/17/2019    COPD, severe ([facility]) 2018    Controlled type 2 diabetes mellitus with stage 3 chronic kidney disease, without long-term current use of insulin ([facility]) 2018    Gastroesophageal reflux disease 10/03/2017    Allergic rhinitis 10/03/2017       Past Medical History:   Diagnosis Date    Acid reflux     Asthma     Cerebrovascular disease     Chronic cough     COPD (chronic obstructive pulmonary disease) ([facility])     Dysphagia     Dyspnea     Unspecified cerebral artery occlusion with cerebral infarction 2008    Wheelchair dependent        Past Surgical History:   Procedure Laterality Date    BRAIN SURGERY      CRANIOTOMY      GASTROSTOMY TUBE PLACEMENT      removed    KNEE SURGERY         Allergies   Allergen Reactions    Pcn [Penicillins] Anaphylaxis    Morphine Rash    Sulfa Antibiotics Rash       DATE ONSET: 24    Date of Evaluation: 2024    Evaluating Therapist: YOUSIF Lange    Dysphagia Diagnosis    Dysphagia Diagnosis: Mild to moderate oral stage dysphagia;Suspected needs further assessment;Mild pharyngeal stage dysphagia  Dysphagia  Impression : R13.12 oropharyngeal dysphagia    Recommended Diet    Recommendations: Assistance with meals;Dysphagia treatment;Modified barium swallow study    Diet Solids Recommendation: Regular    Liquid Consistency Recommendation: Thin    Recommended Form of Meds: PO    Compensatory Swallowing Strategies : Alternate solids and liquids;Small bites/sips;Check for pocketing of food on the Left;Check for pocketing of food on the Right;Remain upright for 30-45 minutes after meals;Upright as possible for all oral intake    Reason for Referral    Talon Walker Jr. was referred for a bedside swallow evaluation to assess the efficiency of his swallow function, identify signs and symptoms of aspiration, identify risk factors, and make recommendations regarding safe dietary consistencies, effective compensatory strategies, and safe eating environment.    Prior Dysphagia History  Prior Dysphagia History: Pt's wife and caregiver report pt has a history of dysphagia. Pt had a stroke in 2008 and had a trach as a result. Pt's wife reports pt ripped out trach. Pt reports he occasionally has difficulty with eating and drinking and may cough. Pt's caregiver reported pt is paralyzed on L side of body as a result of stroke. Pt's caregiver reports pt had MBSS completed, however no records are on file.    Patient Complaint  Patient Complaint: Pt complains of occasional difficulty eating and drinking.    General    Chart Reviewed: Yes  Behavior/Cognition: Alert;Cooperative;Pleasant mood  Temperature Spikes Noted: No  Respiratory Status: Room air  O2 Device: None (Room air)  Communication Observation: Functional  Follows Directions: Simple  Dentition: Some missing teeth;Adequate  Patient Positioning: Upright in chair  Baseline Vocal Quality: Weak  Prior Dysphagia History: Pt's wife and caregiver report pt has a history of dysphagia. Pt had a stroke in 2008 and had a trach as a result. Pt's wife reports pt ripped out trach. Pt reports he

## 2024-07-17 NOTE — PROGRESS NOTES
Faxed order and information to [facility] to order a Gali lift.    SOPHIA Chaney

## 2024-07-18 ENCOUNTER — OFFICE VISIT (OUTPATIENT)
Dept: PRIMARY CARE CLINIC | Age: 64
End: 2024-07-18
Payer: MEDICAID

## 2024-07-18 VITALS
TEMPERATURE: 97.8 F | DIASTOLIC BLOOD PRESSURE: 62 MMHG | SYSTOLIC BLOOD PRESSURE: 120 MMHG | HEART RATE: 96 BPM | WEIGHT: 169 LBS | BODY MASS INDEX: 27.29 KG/M2 | OXYGEN SATURATION: 95 %

## 2024-07-18 DIAGNOSIS — I50.41 ACUTE COMBINED SYSTOLIC AND DIASTOLIC CONGESTIVE HEART FAILURE (HCC): ICD-10-CM

## 2024-07-18 DIAGNOSIS — J44.1 COPD EXACERBATION (HCC): Primary | ICD-10-CM

## 2024-07-18 DIAGNOSIS — Z09 HOSPITAL DISCHARGE FOLLOW-UP: ICD-10-CM

## 2024-07-18 DIAGNOSIS — Z12.11 COLON CANCER SCREENING: ICD-10-CM

## 2024-07-18 PROBLEM — J96.01 ACUTE RESPIRATORY FAILURE WITH HYPOXIA (HCC): Status: RESOLVED | Noted: 2024-07-11 | Resolved: 2024-07-18

## 2024-07-18 LAB
MICROORGANISM SPEC CULT: NORMAL
MICROORGANISM SPEC CULT: NORMAL
SERVICE CMNT-IMP: NORMAL
SERVICE CMNT-IMP: NORMAL
SPECIMEN DESCRIPTION: NORMAL
SPECIMEN DESCRIPTION: NORMAL

## 2024-07-18 PROCEDURE — 1111F DSCHRG MED/CURRENT MED MERGE: CPT | Performed by: NURSE PRACTITIONER

## 2024-07-18 PROCEDURE — 99214 OFFICE O/P EST MOD 30 MIN: CPT | Performed by: NURSE PRACTITIONER

## 2024-07-18 NOTE — PATIENT INSTRUCTIONS
SURVEY:     You may be receiving a survey from [facility] [employer] regarding your visit today.     Please complete the survey to enable us to provide the highest quality of care to you and your family.     If you cannot score us a very good on any question, please call the office to discuss how we could have made your experience a very good one.     Thank you,    Adrian Adkins, APRN-CNP  Eugenia Boucher, APRN-CNP  Muriel, LPN  Jane, CMA  Drew, CMA  Sadie, CMA  Priscilla, PCA  Ludmila, CMA  Jessica, PM

## 2024-07-18 NOTE — PROGRESS NOTES
Post-Discharge Transitional Care  Follow Up      Talon Walker Jr.   YOB: 1960    Date of Office Visit:  7/18/2024  Date of Hospital Admission: 7/11/24  Date of Hospital Discharge: 7/17/24  Risk of hospital readmission (high >=14%. Medium >=10%) :Readmission Risk Score: 18.8      Care management risk score Rising risk (score 2-5) and Complex Care (Scores >=6): No Risk Score On File     Non face to face  following discharge, date last encounter closed (first attempt may have been earlier): *No documented post hospital discharge outreach found in the last 14 days    Call initiated 2 business days of discharge: *No response recorded in the last 14 days    ASSESSMENT/PLAN:   COPD exacerbation (HCC)  Acute combined systolic and diastolic congestive heart failure (HCC)  Hospital discharge follow-up  -     MO DISCHARGE MEDS RECONCILED W/ CURRENT OUTPATIENT MED LIST  Colon cancer screening  -     Fecal DNA Colorectal cancer screening (Cologuard)      Medical Decision Making: moderate complexity  Return if symptoms worsen or fail to improve.           Subjective:   HPI:  Follow up of Hospital problems/diagnosis(es):     Hospital Course:   Talon Walker Jr. is a 63 y.o. male admitted with multifocal pneumonia.  He  presented to the emergency room with complaints of shortness of breath.  He was transported via EMS from his home.  SpO2 was as low was in the 60s.  He was given nebulizer treatment and placed on a ventilation mask which improved his SpO2 to 93% upon arrival to the emergency room.  Patient does have extensive history including severe COPD, CHF, emphysema.  Upon arrival patient was having respiratory distress and was difficulty to speak full sentences and had severe and audible wheezes that were heard from the door initially.  Patient is also wheelchair dependent with history of cerebral artery occlusion with cerebral infarction.  He does have history of dysphagia.  During patient's evaluation renal

## 2024-07-22 ENCOUNTER — HOSPITAL ENCOUNTER (OUTPATIENT)
Dept: GENERAL RADIOLOGY | Age: 64
Discharge: HOME | End: 2024-07-24
Payer: MEDICAID

## 2024-07-22 ENCOUNTER — TELEPHONE (OUTPATIENT)
Dept: PRIMARY CARE CLINIC | Age: 64
End: 2024-07-22

## 2024-07-22 ENCOUNTER — HOSPITAL ENCOUNTER (OUTPATIENT)
Age: 64
Discharge: HOME | End: 2024-07-24
Payer: MEDICAID

## 2024-07-22 ENCOUNTER — HOSPITAL ENCOUNTER (OUTPATIENT)
Age: 64
Discharge: HOME | End: 2024-07-22
Payer: MEDICAID

## 2024-07-22 DIAGNOSIS — M25.512 LEFT SHOULDER PAIN, UNSPECIFIED CHRONICITY: Primary | ICD-10-CM

## 2024-07-22 DIAGNOSIS — M25.512 LEFT SHOULDER PAIN, UNSPECIFIED CHRONICITY: ICD-10-CM

## 2024-07-22 DIAGNOSIS — E11.21 CONTROLLED TYPE 2 DIABETES MELLITUS WITH DIABETIC NEPHROPATHY, WITHOUT LONG-TERM CURRENT USE OF INSULIN (HCC): ICD-10-CM

## 2024-07-22 DIAGNOSIS — J44.1 COPD EXACERBATION (HCC): ICD-10-CM

## 2024-07-22 LAB
ANION GAP SERPL CALCULATED.3IONS-SCNC: 12 MMOL/L (ref 9–17)
BASOPHILS # BLD: <0.03 K/UL (ref 0–0.2)
BASOPHILS NFR BLD: 0 % (ref 0–2)
BUN SERPL-MCNC: 22 MG/DL (ref 8–23)
BUN/CREAT SERPL: 11 (ref 9–20)
CALCIUM SERPL-MCNC: 8.7 MG/DL (ref 8.6–10.4)
CHLORIDE SERPL-SCNC: 102 MMOL/L (ref 98–107)
CO2 SERPL-SCNC: 25 MMOL/L (ref 20–31)
CREAT SERPL-MCNC: 2 MG/DL (ref 0.7–1.2)
EOSINOPHIL # BLD: 0.14 K/UL (ref 0–0.44)
EOSINOPHILS RELATIVE PERCENT: 2 % (ref 1–4)
ERYTHROCYTE [DISTWIDTH] IN BLOOD BY AUTOMATED COUNT: 16.2 % (ref 11.8–14.4)
GFR, ESTIMATED: 37 ML/MIN/1.73M2
GLUCOSE SERPL-MCNC: 168 MG/DL (ref 70–99)
HCT VFR BLD AUTO: 25.9 % (ref 40.7–50.3)
HGB BLD-MCNC: 8.1 G/DL (ref 13–17)
IMM GRANULOCYTES # BLD AUTO: 0.09 K/UL (ref 0–0.3)
IMM GRANULOCYTES NFR BLD: 1 %
LYMPHOCYTES NFR BLD: 1.23 K/UL (ref 1.1–3.7)
LYMPHOCYTES RELATIVE PERCENT: 14 % (ref 24–43)
MCH RBC QN AUTO: 25.6 PG (ref 25.2–33.5)
MCHC RBC AUTO-ENTMCNC: 31.3 G/DL (ref 28.4–34.8)
MCV RBC AUTO: 81.7 FL (ref 82.6–102.9)
MONOCYTES NFR BLD: 0.61 K/UL (ref 0.1–1.2)
MONOCYTES NFR BLD: 7 % (ref 3–12)
NEUTROPHILS NFR BLD: 76 % (ref 36–65)
NEUTS SEG NFR BLD: 6.51 K/UL (ref 1.5–8.1)
NRBC BLD-RTO: 0 PER 100 WBC
PLATELET # BLD AUTO: 255 K/UL (ref 138–453)
PMV BLD AUTO: 10.4 FL (ref 8.1–13.5)
POTASSIUM SERPL-SCNC: 4.4 MMOL/L (ref 3.7–5.3)
RBC # BLD AUTO: 3.17 M/UL (ref 4.21–5.77)
SODIUM SERPL-SCNC: 139 MMOL/L (ref 135–144)
WBC OTHER # BLD: 8.6 K/UL (ref 3.5–11.3)

## 2024-07-22 PROCEDURE — 36415 COLL VENOUS BLD VENIPUNCTURE: CPT

## 2024-07-22 PROCEDURE — 83036 HEMOGLOBIN GLYCOSYLATED A1C: CPT

## 2024-07-22 PROCEDURE — 73030 X-RAY EXAM OF SHOULDER: CPT

## 2024-07-22 PROCEDURE — 80061 LIPID PANEL: CPT

## 2024-07-22 PROCEDURE — 85025 COMPLETE CBC W/AUTO DIFF WBC: CPT

## 2024-07-22 PROCEDURE — 80048 BASIC METABOLIC PNL TOTAL CA: CPT

## 2024-07-22 NOTE — TELEPHONE ENCOUNTER
Patients wife contacted the office and stated they were helping him from the toilet back to this wheelchair and they think they broke his left shoulder. He is having pain, swelling, and its black and blue down to the elbow. He has to go get blood work this morning at 9 and wanted to know if an xray could be ordered? I have pended will you sign?  Thank you

## 2024-07-22 NOTE — TELEPHONE ENCOUNTER
----- Message from CARO Melton CNP sent at 7/22/2024 11:20 AM EDT -----  His xray was positive for a fracture.  He will need to see orthopedics as soon as possible.  Ask them if they have a preference for Orthopedics and I will send a referral.    ----- Message -----  From: Flor Loomis Incoming Radiant Results From [facility]  Sent: 7/22/2024  10:46 AM EDT  To: CARO Melton CNP

## 2024-07-22 NOTE — TELEPHONE ENCOUNTER
Patients wife called and I let her know that the order has been placed. She voiced understanding.

## 2024-07-23 ENCOUNTER — TELEPHONE (OUTPATIENT)
Dept: PRIMARY CARE CLINIC | Age: 64
End: 2024-07-23

## 2024-07-23 LAB
CHOLEST SERPL-MCNC: 100 MG/DL (ref 0–199)
CHOLESTEROL/HDL RATIO: 3
EST. AVERAGE GLUCOSE BLD GHB EST-MCNC: 123 MG/DL
HBA1C MFR BLD: 5.9 % (ref 4–6)
HDLC SERPL-MCNC: 39 MG/DL
LDLC SERPL CALC-MCNC: 38 MG/DL (ref 0–100)
TRIGL SERPL-MCNC: 115 MG/DL
VLDLC SERPL CALC-MCNC: 23 MG/DL

## 2024-07-23 NOTE — TELEPHONE ENCOUNTER
----- Message from CARO Mcnamara CNP sent at 7/23/2024  9:27 AM EDT -----  Labs are stable.  Continue all current medications.  Thank you.

## 2024-07-29 ENCOUNTER — TELEPHONE (OUTPATIENT)
Dept: PRIMARY CARE CLINIC | Age: 64
End: 2024-07-29

## 2024-07-29 DIAGNOSIS — R19.5 POSITIVE COLORECTAL CANCER SCREENING USING COLOGUARD TEST: Primary | ICD-10-CM

## 2024-07-29 LAB — NONINV COLON CA DNA+OCC BLD SCRN STL QL: POSITIVE

## 2024-07-29 NOTE — TELEPHONE ENCOUNTER
----- Message from CARO Mcnamara CNP sent at 7/29/2024  8:59 AM EDT -----  Positive, GI consult ordered. Thank you.

## 2024-07-30 ENCOUNTER — TELEPHONE (OUTPATIENT)
Dept: GASTROENTEROLOGY | Age: 64
End: 2024-07-30

## 2024-07-30 DIAGNOSIS — N18.30 CONTROLLED TYPE 2 DIABETES MELLITUS WITH STAGE 3 CHRONIC KIDNEY DISEASE, WITHOUT LONG-TERM CURRENT USE OF INSULIN (HCC): ICD-10-CM

## 2024-07-30 DIAGNOSIS — E11.22 CONTROLLED TYPE 2 DIABETES MELLITUS WITH STAGE 3 CHRONIC KIDNEY DISEASE, WITHOUT LONG-TERM CURRENT USE OF INSULIN (HCC): ICD-10-CM

## 2024-07-30 RX ORDER — LANCETS 30 GAUGE
EACH MISCELLANEOUS
Qty: 100 EACH | Refills: 3 | Status: SHIPPED | OUTPATIENT
Start: 2024-07-30

## 2024-07-30 RX ORDER — CALCIUM CITRATE/VITAMIN D3 200MG-6.25
TABLET ORAL
Qty: 100 STRIP | Refills: 3 | Status: SHIPPED | OUTPATIENT
Start: 2024-07-30

## 2024-07-30 NOTE — TELEPHONE ENCOUNTER
Spoke with spouse, Jannie regarding referral from Talon [facility]. She states that she is unsure that he is a candidate for a colonoscopy. He has severe COPD and is unsure that he would want to be put under anesthesia for a scope. The patient's spouse wants to discuss with patient as well as his PCP and will return call if they wish to schedule. Referral good for 1year.

## 2024-07-30 NOTE — TELEPHONE ENCOUNTER
Another test is not necessary, will get the same results. I would keep the consultation with GI to discuss options for monitoring. Thank you.

## 2024-07-30 NOTE — TELEPHONE ENCOUNTER
Xenia, caregiver, called as Talon is refusing a colonoscopy to be scheduled.  Due to increased breathing issues with COPD, he does not want to be \"put under\"  Xenia asked if Talon could have another Cologuard instead?    Please advise, thank you!

## 2024-07-30 NOTE — TELEPHONE ENCOUNTER
Health Maintenance   Topic Date Due    Shingles vaccine (1 of 2) 08/03/2024 (Originally 9/6/2011)    COVID-19 Vaccine (1) 08/03/2024 (Originally [birth year]1962)    Hepatitis C screen  08/03/2024 (Originally 9/6/1979)    HIV screen  08/03/2024 (Originally 9/6/1976)    DTaP/Tdap/Td vaccine (1 - Tdap) 02/06/2025 (Originally 9/6/1980)    Pneumococcal 0-64 years Vaccine (2 of 2 - PCV) 02/06/2025 (Originally 10/22/2016)    Respiratory Syncytial Virus (RSV) Pregnant or age 60 yrs+ (1 - 1-dose 60+ series) 02/06/2025 (Originally 9/6/2021)    Diabetic retinal exam  02/15/2025 (Originally 7/25/2020)    Flu vaccine (1) 08/01/2024    Diabetic Alb to Cr ratio (uACR) test  02/02/2025    Diabetic foot exam  02/06/2025    Depression Monitoring  02/06/2025    A1C test (Diabetic or Prediabetic)  07/22/2025    Lipids  07/22/2025    GFR test (Diabetes, CKD 3-4, OR last GFR 15-59)  07/22/2025    Colorectal Cancer Screen  07/24/2027    Hepatitis A vaccine  Aged Out    Hepatitis B vaccine  Aged Out    Hib vaccine  Aged Out    Polio vaccine  Aged Out    Meningococcal (ACWY) vaccine  Aged Out    Lung Cancer Screening &/or Counseling  Discontinued    Prostate Specific Antigen (PSA) Screening or Monitoring  Discontinued             (applicable per patient's age: Cancer Screenings, Depression Screening, Fall Risk Screening, Immunizations)    Hemoglobin A1C (%)   Date Value   07/22/2024 5.9   07/12/2024 5.2   02/02/2024 5.0     AST (U/L)   Date Value   02/02/2024 11     ALT (U/L)   Date Value   02/02/2024 11     BUN (mg/dL)   Date Value   07/22/2024 22      (goal A1C is < 7)   (goal LDL is <100) need 30-50% reduction from baseline     BP Readings from Last 3 Encounters:   07/18/24 120/62   07/17/24 (!) 124/58   05/14/24 138/85    (goal /80)      All Future Testing planned in [facility]:  Lab Frequency Next Occurrence   CT lung screen [Initial/Annual] Once 09/01/2023   Basic Metabolic Panel Once 10/21/2024   CBC with Auto Differential Once

## 2024-07-31 NOTE — TELEPHONE ENCOUNTER
Patients caregiver contacted the office back and stated Talon is scheduled with GI in October to discuss.

## 2024-08-03 ENCOUNTER — APPOINTMENT (OUTPATIENT)
Dept: GENERAL RADIOLOGY | Age: 64
End: 2024-08-03
Payer: MEDICAID

## 2024-08-03 ENCOUNTER — HOSPITAL ENCOUNTER (EMERGENCY)
Age: 64
Discharge: HOME | End: 2024-08-03
Attending: EMERGENCY MEDICINE
Payer: MEDICAID

## 2024-08-03 VITALS
TEMPERATURE: 99.7 F | SYSTOLIC BLOOD PRESSURE: 144 MMHG | RESPIRATION RATE: 18 BRPM | OXYGEN SATURATION: 93 % | HEART RATE: 110 BPM | DIASTOLIC BLOOD PRESSURE: 62 MMHG

## 2024-08-03 DIAGNOSIS — M79.89 LEFT LEG SWELLING: ICD-10-CM

## 2024-08-03 DIAGNOSIS — S82.132A CLOSED FRACTURE OF MEDIAL PORTION OF LEFT TIBIAL PLATEAU, INITIAL ENCOUNTER: Primary | ICD-10-CM

## 2024-08-03 DIAGNOSIS — D64.9 CHRONIC ANEMIA: ICD-10-CM

## 2024-08-03 LAB
ANION GAP SERPL CALCULATED.3IONS-SCNC: 13 MMOL/L (ref 9–17)
BASOPHILS # BLD: <0.03 K/UL (ref 0–0.2)
BASOPHILS NFR BLD: 0 % (ref 0–2)
BNP SERPL-MCNC: 407 PG/ML
BUN SERPL-MCNC: 18 MG/DL (ref 8–23)
BUN/CREAT SERPL: 10 (ref 9–20)
CALCIUM SERPL-MCNC: 8.4 MG/DL (ref 8.6–10.4)
CHLORIDE SERPL-SCNC: 101 MMOL/L (ref 98–107)
CO2 SERPL-SCNC: 26 MMOL/L (ref 20–31)
CREAT SERPL-MCNC: 1.8 MG/DL (ref 0.7–1.2)
CRP SERPL HS-MCNC: 28.4 MG/L (ref 0–5)
D DIMER PPP FEU-MCNC: 4.75 UG/ML FEU (ref 0–0.59)
EOSINOPHIL # BLD: 0.19 K/UL (ref 0–0.44)
EOSINOPHILS RELATIVE PERCENT: 3 % (ref 1–4)
ERYTHROCYTE [DISTWIDTH] IN BLOOD BY AUTOMATED COUNT: 17.5 % (ref 11.8–14.4)
ERYTHROCYTE [SEDIMENTATION RATE] IN BLOOD BY PHOTOMETRIC METHOD: 56 MM/HR (ref 0–20)
GFR, ESTIMATED: 42 ML/MIN/1.73M2
GLUCOSE SERPL-MCNC: 122 MG/DL (ref 70–99)
HCT VFR BLD AUTO: 24.7 % (ref 40.7–50.3)
HGB BLD-MCNC: 7.5 G/DL (ref 13–17)
IMM GRANULOCYTES # BLD AUTO: 0.03 K/UL (ref 0–0.3)
IMM GRANULOCYTES NFR BLD: 1 %
LYMPHOCYTES NFR BLD: 1.44 K/UL (ref 1.1–3.7)
LYMPHOCYTES RELATIVE PERCENT: 25 % (ref 24–43)
MCH RBC QN AUTO: 25.3 PG (ref 25.2–33.5)
MCHC RBC AUTO-ENTMCNC: 30.4 G/DL (ref 28.4–34.8)
MCV RBC AUTO: 83.2 FL (ref 82.6–102.9)
MONOCYTES NFR BLD: 0.45 K/UL (ref 0.1–1.2)
MONOCYTES NFR BLD: 8 % (ref 3–12)
NEUTROPHILS NFR BLD: 63 % (ref 36–65)
NEUTS SEG NFR BLD: 3.67 K/UL (ref 1.5–8.1)
NRBC BLD-RTO: 0 PER 100 WBC
PLATELET # BLD AUTO: 301 K/UL (ref 138–453)
PMV BLD AUTO: 9.8 FL (ref 8.1–13.5)
POTASSIUM SERPL-SCNC: 3.4 MMOL/L (ref 3.7–5.3)
RBC # BLD AUTO: 2.97 M/UL (ref 4.21–5.77)
SODIUM SERPL-SCNC: 140 MMOL/L (ref 135–144)
WBC OTHER # BLD: 5.8 K/UL (ref 3.5–11.3)

## 2024-08-03 PROCEDURE — 80048 BASIC METABOLIC PNL TOTAL CA: CPT

## 2024-08-03 PROCEDURE — 73560 X-RAY EXAM OF KNEE 1 OR 2: CPT

## 2024-08-03 PROCEDURE — 85379 FIBRIN DEGRADATION QUANT: CPT

## 2024-08-03 PROCEDURE — 83880 ASSAY OF NATRIURETIC PEPTIDE: CPT

## 2024-08-03 PROCEDURE — 6370000000 HC RX 637 (ALT 250 FOR IP): Performed by: EMERGENCY MEDICINE

## 2024-08-03 PROCEDURE — 71045 X-RAY EXAM CHEST 1 VIEW: CPT

## 2024-08-03 PROCEDURE — 85025 COMPLETE CBC W/AUTO DIFF WBC: CPT

## 2024-08-03 PROCEDURE — 85652 RBC SED RATE AUTOMATED: CPT

## 2024-08-03 PROCEDURE — 86140 C-REACTIVE PROTEIN: CPT

## 2024-08-03 PROCEDURE — 99284 EMERGENCY DEPT VISIT MOD MDM: CPT

## 2024-08-03 RX ORDER — ACETAMINOPHEN 325 MG/1
650 TABLET ORAL ONCE
Status: COMPLETED | OUTPATIENT
Start: 2024-08-03 | End: 2024-08-03

## 2024-08-03 RX ADMIN — ACETAMINOPHEN 650 MG: 325 TABLET ORAL at 16:51

## 2024-08-03 ASSESSMENT — PAIN - FUNCTIONAL ASSESSMENT: PAIN_FUNCTIONAL_ASSESSMENT: 0-10

## 2024-08-03 ASSESSMENT — PAIN DESCRIPTION - LOCATION: LOCATION: KNEE

## 2024-08-03 ASSESSMENT — ENCOUNTER SYMPTOMS
ABDOMINAL DISTENTION: 0
BACK PAIN: 0
SHORTNESS OF BREATH: 0
SORE THROAT: 0

## 2024-08-03 ASSESSMENT — PAIN SCALES - GENERAL
PAINLEVEL_OUTOF10: 10
PAINLEVEL_OUTOF10: 10

## 2024-08-03 ASSESSMENT — PAIN DESCRIPTION - ORIENTATION: ORIENTATION: LEFT

## 2024-08-03 NOTE — ED NOTES
Knee immobilizer placed. DME signed. Discharge instructions discussed and reviewed. Questions answered.

## 2024-08-03 NOTE — ED PROVIDER NOTES
[facility] ED  EMERGENCY DEPARTMENT ENCOUNTER      Pt Name: Talon Walker Jr.  MRN: [id number]  Birthdate 1960  Date of evaluation: 8/3/2024  Provider: Leeann Salcido DO    CHIEF COMPLAINT       Chief Complaint   Patient presents with    Knee Pain     Patient brought in by family for increased swelling in the left knee and lower leg.          HISTORY OF PRESENT ILLNESS   (Location/Symptom, Timing/Onset, Context/Setting, Quality, Duration, Modifying Factors, Severity)  Note limiting factors.   Talon Walker Jr. is a 63 y.o. male who presents to the emergency department with left leg and knee pain and swelling for the past 2 days.  Patient has a history of left hemiplegia about 16 years ago due to a stroke and since has been wheelchair-bound.  Patient was here in the hospital 3 weeks ago for pneumonia.  His wife tells me that couple days ago he started complaining of some pain to his left knee and left leg.  Today they went to go visit their mom who noticed the swelling had gotten worse.  His wife was unable to put the compression stockings on his left foot because of how swollen his left foot was.  His wife tells me that she is not sure if he is fluid overloaded or if there is something else going on.  Patient is on Lasix 20 mg every day.  Wife denies any fever or chills.    REVIEW OF SYSTEMS    (2-9 systems for level 4, 10 or more for level 5)     Review of Systems   Constitutional:  Negative for fatigue and fever.   HENT:  Negative for congestion and sore throat.    Eyes:  Negative for visual disturbance.   Respiratory:  Negative for shortness of breath.    Cardiovascular:  Negative for chest pain and palpitations.   Gastrointestinal:  Negative for abdominal distention.   Genitourinary:  Negative for dysuria.   Musculoskeletal:  Negative for back pain.        Left knee and lower extremity swelling and pain   Skin:  Negative for rash.   Psychiatric/Behavioral:  Negative for suicidal ideas.        Except  discharge.  I do not suspect any infection in his knee joint.  He does have intact pulses to his lower extremities. [JI]   1804 I ran this by Dr. Arriaga as well and he is in agreement with the plan of outpatient ultrasound follow-up and a knee brace to his left knee and outpatient orthopedic follow-up.  Patient is a very poor surgical candidate for any sort of surgery or procedure due to his poor lungs.  Patient is also high risk for anticoagulation due to being chronically anemic and high risk for GI bleeding.  Patient has a history of brain aneurysm in the past which is what led to him being hemiplegic on the left side. [JI]      ED Course User Index  [JI] Leeann Salcido DO         FINAL IMPRESSION      1. Closed fracture of medial portion of left tibial plateau, initial encounter    2. Left leg swelling    3. Chronic anemia          DISPOSITION/PLAN   DISPOSITION Decision To Discharge 08/03/2024 06:10:01 PM      PATIENT REFERRED TO:  Damaso Rice MD  [address] [place] [address] Dr. Mathis  [facility] [id number]  [phone]    In 1 week      Adrian Adkins APRN - CNP  [address] [facility] [address]  [phone]    In 3 days        DISCHARGE MEDICATIONS:  New Prescriptions    No medications on file     Controlled Substances Monitoring:     RX Monitoring Attestation   6/13/2018   8:30 AM The Prescription Monitoring Report for this patient was reviewed today.       (Please note that portions of this note were completed with a voice recognition program.  Efforts were made to edit the dictations but occasionally words are mis-transcribed.)    Leeann Salcido DO (electronically signed)  Attending Emergency Physician            Leeann Salcido DO  08/03/24 [id number]

## 2024-08-03 NOTE — DISCHARGE INSTRUCTIONS
Please keep the knee brace on at all times except for when showering.  Try to keep your left leg elevated is much as possible to help with the swelling.  Follow-up with orthopedics in 1 week.  Also follow-up with your primary care doctor next week.  I did order an outpatient ultrasound for you this coming Monday on August 5th.  Please expect a call from the ultrasound department to know what time to come for the ultrasound.  Return to the emergency department if symptoms get worse.

## 2024-08-13 ENCOUNTER — OFFICE VISIT (OUTPATIENT)
Dept: NEPHROLOGY | Age: 64
End: 2024-08-13
Payer: MEDICAID

## 2024-08-13 ENCOUNTER — HOSPITAL ENCOUNTER (OUTPATIENT)
Age: 64
Discharge: HOME | End: 2024-08-13
Attending: EMERGENCY MEDICINE
Payer: MEDICAID

## 2024-08-13 ENCOUNTER — HOSPITAL ENCOUNTER (OUTPATIENT)
Dept: VASCULAR LAB | Age: 64
Discharge: HOME | End: 2024-08-15
Attending: EMERGENCY MEDICINE
Payer: MEDICAID

## 2024-08-13 VITALS
HEART RATE: 103 BPM | RESPIRATION RATE: 18 BRPM | TEMPERATURE: 98.6 F | DIASTOLIC BLOOD PRESSURE: 73 MMHG | HEIGHT: 69 IN | SYSTOLIC BLOOD PRESSURE: 123 MMHG | BODY MASS INDEX: 25.03 KG/M2 | WEIGHT: 169 LBS

## 2024-08-13 DIAGNOSIS — I10 HTN (HYPERTENSION), BENIGN: ICD-10-CM

## 2024-08-13 DIAGNOSIS — N18.32 STAGE 3B CHRONIC KIDNEY DISEASE (HCC): ICD-10-CM

## 2024-08-13 DIAGNOSIS — M79.89 LEFT LEG SWELLING: ICD-10-CM

## 2024-08-13 DIAGNOSIS — N18.32 STAGE 3B CHRONIC KIDNEY DISEASE (HCC): Primary | ICD-10-CM

## 2024-08-13 LAB — ECHO BSA: 1.93 M2

## 2024-08-13 PROCEDURE — 82728 ASSAY OF FERRITIN: CPT

## 2024-08-13 PROCEDURE — 83550 IRON BINDING TEST: CPT

## 2024-08-13 PROCEDURE — 36415 COLL VENOUS BLD VENIPUNCTURE: CPT

## 2024-08-13 PROCEDURE — 83540 ASSAY OF IRON: CPT

## 2024-08-13 PROCEDURE — 99214 OFFICE O/P EST MOD 30 MIN: CPT | Performed by: INTERNAL MEDICINE

## 2024-08-13 PROCEDURE — G2211 COMPLEX E/M VISIT ADD ON: HCPCS | Performed by: INTERNAL MEDICINE

## 2024-08-13 PROCEDURE — 99213 OFFICE O/P EST LOW 20 MIN: CPT | Performed by: INTERNAL MEDICINE

## 2024-08-13 PROCEDURE — 3078F DIAST BP <80 MM HG: CPT | Performed by: INTERNAL MEDICINE

## 2024-08-13 PROCEDURE — 93971 EXTREMITY STUDY: CPT

## 2024-08-13 PROCEDURE — 93971 EXTREMITY STUDY: CPT | Performed by: SURGERY

## 2024-08-13 PROCEDURE — 3074F SYST BP LT 130 MM HG: CPT | Performed by: INTERNAL MEDICINE

## 2024-08-13 NOTE — PROGRESS NOTES
[facility]        Outpatient Follow-Up note         8/13/2024 1:19 PM    Patient Name:   Talon Walker Jr.  YOB: 1960  Primary Care Physician:  Adrian Adkins, CARO - CNP   [facility]  [address] [place] [id number]  Dept: [phone]     Chief Complaint / Reason for follow-up : Follow Up of CKD     Interval History :  Patient seen and examined by me.   Patient had sustained a fall and injured his left knee and the left shoulder     Past History :  Past Medical History:   Diagnosis Date    Acid reflux     Asthma     Cerebrovascular disease     Chronic cough     COPD (chronic obstructive pulmonary disease) (HCC)     Dysphagia     Dyspnea     Unspecified cerebral artery occlusion with cerebral infarction 11/05/2008    Wheelchair dependent      Past Surgical History:   Procedure Laterality Date    BRAIN SURGERY      CRANIOTOMY      GASTROSTOMY TUBE PLACEMENT      removed    KNEE SURGERY          Medications :     Outpatient Medications Marked as Taking for the 8/13/24 encounter (Office Visit) with Deion Ash MD   Medication Sig Dispense Refill    Lancets MISC Test one times a day & as needed for symptoms of irregular blood glucose. DX: E11.41 Dispense brand covered by insurance. Note to Pharmacy: Brand per patient preference. May round up to next available package size. 100 each 3    blood glucose test strips (TRUE METRIX BLOOD GLUCOSE TEST) strip Test one times a day & as needed for symptoms of irregular blood glucose. DX: E11.41 Dispense brand covered by insurance. Note to Pharmacy: Brand per patient preference. May round up to next available package size. 100 strip 3    furosemide (LASIX) 20 MG tablet Take 1 tablet by mouth every other day 180 tablet 3    ipratropium 0.5 mg-albuterol 2.5 mg (DUONEB) 0.5-2.5 (3) MG/3ML SOLN nebulizer solution INHALE THREE

## 2024-08-13 NOTE — PATIENT INSTRUCTIONS
SURVEY:    Thank you for allowing us to care for you today.    You may be receiving a survey from [facility] regarding your visit today- electronically or via mail.      Please help us by completing the survey as this will provide the needed feedback to ensure we are providing the very best care for you and your family.    If you cannot score us a very good on any question, please call the office to discuss how we could have made your experience a very good one.    Thank you.       STAFF:    Ericka Yen, Analilia Méndez, Leydi Klein      CLINICAL STAFF:    Araseli Garcia LPN, Sisi Vo LPN, Kathy Santana LPN, Rox Escobar CMA

## 2024-08-14 ENCOUNTER — TELEPHONE (OUTPATIENT)
Dept: NEPHROLOGY | Age: 64
End: 2024-08-14

## 2024-08-14 LAB
FERRITIN SERPL-MCNC: 103 NG/ML (ref 30–400)
IRON SATN MFR SERPL: 12 % (ref 20–55)
IRON SERPL-MCNC: 29 UG/DL (ref 61–157)
TIBC SERPL-MCNC: 246 UG/DL (ref 250–450)
UNSATURATED IRON BINDING CAPACITY: 217 UG/DL (ref 112–347)

## 2024-08-14 RX ORDER — GABAPENTIN 300 MG/1
CAPSULE ORAL
Qty: 60 CAPSULE | Refills: 5 | Status: SHIPPED | OUTPATIENT
Start: 2024-08-14 | End: 2025-02-10

## 2024-08-29 ENCOUNTER — OFFICE VISIT (OUTPATIENT)
Dept: PULMONOLOGY | Age: 64
End: 2024-08-29
Payer: MEDICAID

## 2024-08-29 ENCOUNTER — TELEPHONE (OUTPATIENT)
Dept: NEPHROLOGY | Age: 64
End: 2024-08-29

## 2024-08-29 VITALS
DIASTOLIC BLOOD PRESSURE: 78 MMHG | HEIGHT: 69 IN | WEIGHT: 169 LBS | RESPIRATION RATE: 18 BRPM | BODY MASS INDEX: 25.03 KG/M2 | TEMPERATURE: 97.9 F | HEART RATE: 102 BPM | OXYGEN SATURATION: 94 % | SYSTOLIC BLOOD PRESSURE: 139 MMHG

## 2024-08-29 DIAGNOSIS — Z87.01 HISTORY OF RECENT PNEUMONIA: Primary | ICD-10-CM

## 2024-08-29 DIAGNOSIS — J44.9 COPD, SEVERE (HCC): ICD-10-CM

## 2024-08-29 DIAGNOSIS — G47.33 OSA ON CPAP: ICD-10-CM

## 2024-08-29 PROCEDURE — 99213 OFFICE O/P EST LOW 20 MIN: CPT | Performed by: INTERNAL MEDICINE

## 2024-08-29 PROCEDURE — 99214 OFFICE O/P EST MOD 30 MIN: CPT | Performed by: INTERNAL MEDICINE

## 2024-08-29 RX ORDER — IPRATROPIUM BROMIDE AND ALBUTEROL SULFATE 2.5; .5 MG/3ML; MG/3ML
SOLUTION RESPIRATORY (INHALATION)
Qty: 1080 ML | Refills: 3 | Status: SHIPPED | OUTPATIENT
Start: 2024-08-29

## 2024-08-29 RX ORDER — BUDESONIDE 0.5 MG/2ML
INHALANT ORAL
Qty: 60 ML | Refills: 11 | Status: SHIPPED | OUTPATIENT
Start: 2024-08-29 | End: 2025-08-30

## 2024-08-29 NOTE — PATIENT INSTRUCTIONS
SURVEY:    Thank you for allowing us to care for you today.    You may be receiving a survey from [facility] regarding your visit today- electronically or via mail.      Please help us by completing the survey as this will provide the needed feedback to ensure we are providing the very best care for you and your family.    If you cannot score us a very good on any question, please call the office to discuss how we could have made your experience a very good one.    Thank you.       STAFF:    Ericka Yen, Analilia Méndez, Leydi Klein      CLINICAL STAFF:    Araseli Garcia LPN, Sisi Vo LPN, Kathy Santana LPN, Rxo Escobar CMA      COPD Zones education sheet was provided at discharge.  It is important to know your zones to manage your disease.  Review your zone sheet daily to determine which zone you are in each day.

## 2024-08-29 NOTE — TELEPHONE ENCOUNTER
Left a message for [facility] to make sure patient was scheduled for the injectafer. Order and insurance approval was faxed on 8-26-24

## 2024-08-29 NOTE — PROGRESS NOTES
PULMONARY MEDICINE OUTPATIENT NOTE                                                                       PATIENT:  Talon Walker Jr.  YOB: 1960  MRN: [id number]     REFERRED BY: Adrian Adkins, CARO - SILVINO   CHIEF COMPLIANT: COPD (Follow up COPD-complains of shortness of breath and wheezing with transfers and activity.)       HISTORY     Talon Walker Jr. is a 63 y.o. years old male is here for a follow-up evaluation in the pulmonary clinic.  Patient was previously following with Dr. Rodriguez and Tan Javier    INITIAL VISIT (with me):8/29/2024    PULMONARY PROBLEM LIST:  1. History of recent pneumonia    2. COPD, severe (HCC)    3. MARTIN on CPAP         HISTORY OF PRESENT ILLNESS/CURRENT SYMPTOMS:   Patient has a history of left hemiparesis due to stroke.  He is wheelchair-bound.  He is following in pulmonary clinic for COPD.  He is accompanied by daughter who reports that patient was hospitalized at [facility] in mid July 2024 with pneumonia.  He was also reported to have \"fluid buildup\".  He remained in the hospital for 6 days and was treated with IV antibiotic, steroids.  His respiratory symptoms seems to have gradually improved.  He was seen in the ER more recently with left tibial plateau fracture, which is being managed conservatively.    CT chest (7/11/2024) shows bilateral small pleural effusions with dependent consolidation, groundglass opacities in the left upper and lower lobe and to a lesser extent on the right side, emphysematous changes, mildly enlarged subcarinal lymph node thought to be \"nonspecific\"  PFT (3/14/2023) spirometry showed severe obstructive impairment with significant bronchospastic component    CURRENT BRONCHODILATORS/OTHER PULMONARY MEDS:  Pulmicort aerosols  DuoNeb aerosols  Lasix 20 mg every other day    TOBACCO HISTORY:  He  reports that he quit smoking about 16 years ago. His smoking use included cigarettes. He started smoking about 46 years ago. He has a 45 pack-year smoking  annually.  Recommendations were given regarding pneumococcal, RSV and COVID-19 vaccination.      All the questions that the patient had were answered to his satisfaction.  We'll see the patient back in 12 months.  Thank you for having us involved in the care of your patient.  Please call us if you have any questions or concerns.    Kostas Brunson MD  Pulmonary and Critical Care Medicine               This note is created with the assistance of a speech recognition program.  While intent was to generate a document that actually reflects the content of the visit, the document can still have some errors including those of syntax and sound-alike substitutions which may escape proof reading.  It such instances, actual meaning can be extrapolated by contextual diversion.

## 2024-09-05 DIAGNOSIS — N18.32 ANEMIA IN STAGE 3B CHRONIC KIDNEY DISEASE (HCC): Primary | ICD-10-CM

## 2024-09-05 DIAGNOSIS — D63.1 ANEMIA IN STAGE 3B CHRONIC KIDNEY DISEASE (HCC): Primary | ICD-10-CM

## 2024-09-05 PROBLEM — N18.9 ANEMIA IN CHRONIC RENAL DISEASE: Status: ACTIVE | Noted: 2024-09-05

## 2024-09-05 RX ORDER — SODIUM CHLORIDE 9 MG/ML
INJECTION, SOLUTION INTRAVENOUS CONTINUOUS
Start: 2024-09-05

## 2024-09-05 RX ORDER — SODIUM CHLORIDE 9 MG/ML
5-250 INJECTION, SOLUTION INTRAVENOUS PRN
OUTPATIENT
Start: 2024-09-05

## 2024-09-05 RX ORDER — HEPARIN 100 UNIT/ML
500 SYRINGE INTRAVENOUS PRN
OUTPATIENT
Start: 2024-09-05

## 2024-09-05 RX ORDER — ONDANSETRON 2 MG/ML
8 INJECTION INTRAMUSCULAR; INTRAVENOUS
OUTPATIENT
Start: 2024-09-05

## 2024-09-05 RX ORDER — SODIUM CHLORIDE 9 MG/ML
INJECTION, SOLUTION INTRAVENOUS CONTINUOUS
OUTPATIENT
Start: 2024-09-05

## 2024-09-05 RX ORDER — SODIUM CHLORIDE 0.9 % (FLUSH) 0.9 %
5-40 SYRINGE (ML) INJECTION PRN
OUTPATIENT
Start: 2024-09-05

## 2024-09-05 RX ORDER — ALBUTEROL SULFATE 90 UG/1
4 AEROSOL, METERED RESPIRATORY (INHALATION) PRN
OUTPATIENT
Start: 2024-09-05

## 2024-09-05 RX ORDER — EPINEPHRINE 1 MG/ML
0.3 INJECTION, SOLUTION, CONCENTRATE INTRAVENOUS PRN
OUTPATIENT
Start: 2024-09-05

## 2024-09-05 RX ORDER — DIPHENHYDRAMINE HYDROCHLORIDE 50 MG/ML
50 INJECTION INTRAMUSCULAR; INTRAVENOUS
OUTPATIENT
Start: 2024-09-05

## 2024-09-05 RX ORDER — ACETAMINOPHEN 325 MG/1
650 TABLET ORAL
OUTPATIENT
Start: 2024-09-05

## 2024-09-06 ENCOUNTER — CLINICAL DOCUMENTATION (OUTPATIENT)
Facility: HOSPITAL | Age: 64
End: 2024-09-06

## 2024-09-06 NOTE — PROGRESS NOTES
Patient Assistance                   Additional notes: Reviewed chart and no assistance is needed. Patient has [place] Medicaid.

## 2024-09-11 ENCOUNTER — HOSPITAL ENCOUNTER (OUTPATIENT)
Dept: INFUSION THERAPY | Age: 64
Discharge: HOME | End: 2024-09-11
Payer: MEDICAID

## 2024-09-11 VITALS — RESPIRATION RATE: 16 BRPM | DIASTOLIC BLOOD PRESSURE: 93 MMHG | SYSTOLIC BLOOD PRESSURE: 142 MMHG | HEART RATE: 96 BPM

## 2024-09-11 DIAGNOSIS — N18.32 ANEMIA IN STAGE 3B CHRONIC KIDNEY DISEASE (HCC): Primary | ICD-10-CM

## 2024-09-11 DIAGNOSIS — D63.1 ANEMIA IN STAGE 3B CHRONIC KIDNEY DISEASE (HCC): Primary | ICD-10-CM

## 2024-09-11 PROCEDURE — 6360000002 HC RX W HCPCS: Performed by: ORTHOPAEDIC SURGERY

## 2024-09-11 PROCEDURE — 96365 THER/PROPH/DIAG IV INF INIT: CPT

## 2024-09-11 PROCEDURE — 2580000003 HC RX 258: Performed by: ORTHOPAEDIC SURGERY

## 2024-09-11 RX ORDER — ONDANSETRON 2 MG/ML
8 INJECTION INTRAMUSCULAR; INTRAVENOUS
OUTPATIENT
Start: 2024-09-18

## 2024-09-11 RX ORDER — HEPARIN 100 UNIT/ML
500 SYRINGE INTRAVENOUS PRN
OUTPATIENT
Start: 2024-09-18

## 2024-09-11 RX ORDER — SODIUM CHLORIDE 0.9 % (FLUSH) 0.9 %
5-40 SYRINGE (ML) INJECTION PRN
Status: CANCELLED | OUTPATIENT
Start: 2024-09-18

## 2024-09-11 RX ORDER — SODIUM CHLORIDE 9 MG/ML
5-250 INJECTION, SOLUTION INTRAVENOUS PRN
OUTPATIENT
Start: 2024-09-18

## 2024-09-11 RX ORDER — EPINEPHRINE 1 MG/ML
0.3 INJECTION, SOLUTION, CONCENTRATE INTRAVENOUS PRN
OUTPATIENT
Start: 2024-09-18

## 2024-09-11 RX ORDER — ALBUTEROL SULFATE 90 UG/1
4 INHALANT RESPIRATORY (INHALATION) PRN
OUTPATIENT
Start: 2024-09-18

## 2024-09-11 RX ORDER — SODIUM CHLORIDE 0.9 % (FLUSH) 0.9 %
5-40 SYRINGE (ML) INJECTION PRN
Status: DISCONTINUED | OUTPATIENT
Start: 2024-09-11 | End: 2024-09-12 | Stop reason: HOSPADM

## 2024-09-11 RX ORDER — SODIUM CHLORIDE 9 MG/ML
INJECTION, SOLUTION INTRAVENOUS CONTINUOUS
OUTPATIENT
Start: 2024-09-18

## 2024-09-11 RX ORDER — SODIUM CHLORIDE 9 MG/ML
5-250 INJECTION, SOLUTION INTRAVENOUS PRN
Status: DISCONTINUED | OUTPATIENT
Start: 2024-09-11 | End: 2024-09-12 | Stop reason: HOSPADM

## 2024-09-11 RX ORDER — ACETAMINOPHEN 325 MG/1
650 TABLET ORAL
OUTPATIENT
Start: 2024-09-18

## 2024-09-11 RX ORDER — SODIUM CHLORIDE 9 MG/ML
INJECTION, SOLUTION INTRAVENOUS CONTINUOUS
Status: DISCONTINUED | OUTPATIENT
Start: 2024-09-11 | End: 2024-09-12 | Stop reason: HOSPADM

## 2024-09-11 RX ORDER — SODIUM CHLORIDE 9 MG/ML
INJECTION, SOLUTION INTRAVENOUS CONTINUOUS
Status: CANCELLED
Start: 2024-09-18

## 2024-09-11 RX ORDER — DIPHENHYDRAMINE HYDROCHLORIDE 50 MG/ML
50 INJECTION INTRAMUSCULAR; INTRAVENOUS
OUTPATIENT
Start: 2024-09-18

## 2024-09-11 RX ADMIN — SODIUM CHLORIDE: 9 INJECTION, SOLUTION INTRAVENOUS at 09:18

## 2024-09-11 RX ADMIN — SODIUM CHLORIDE, PRESERVATIVE FREE 10 ML: 5 INJECTION INTRAVENOUS at 09:14

## 2024-09-11 RX ADMIN — FERRIC CARBOXYMALTOSE INJECTION 750 MG: 50 INJECTION, SOLUTION INTRAVENOUS at 09:19

## 2024-09-18 ENCOUNTER — HOSPITAL ENCOUNTER (OUTPATIENT)
Dept: INFUSION THERAPY | Age: 64
Discharge: HOME | End: 2024-09-18
Payer: MEDICAID

## 2024-09-18 ENCOUNTER — OFFICE VISIT (OUTPATIENT)
Dept: PRIMARY CARE CLINIC | Age: 64
End: 2024-09-18

## 2024-09-18 VITALS
TEMPERATURE: 99 F | HEART RATE: 94 BPM | BODY MASS INDEX: 25.32 KG/M2 | DIASTOLIC BLOOD PRESSURE: 68 MMHG | RESPIRATION RATE: 18 BRPM | SYSTOLIC BLOOD PRESSURE: 116 MMHG | OXYGEN SATURATION: 94 % | WEIGHT: 169 LBS

## 2024-09-18 VITALS
SYSTOLIC BLOOD PRESSURE: 137 MMHG | DIASTOLIC BLOOD PRESSURE: 69 MMHG | HEART RATE: 83 BPM | RESPIRATION RATE: 18 BRPM | TEMPERATURE: 98.3 F

## 2024-09-18 DIAGNOSIS — N18.30 CONTROLLED TYPE 2 DIABETES MELLITUS WITH STAGE 3 CHRONIC KIDNEY DISEASE, WITHOUT LONG-TERM CURRENT USE OF INSULIN (HCC): Primary | ICD-10-CM

## 2024-09-18 DIAGNOSIS — Z12.5 SCREENING PSA (PROSTATE SPECIFIC ANTIGEN): ICD-10-CM

## 2024-09-18 DIAGNOSIS — E11.22 CONTROLLED TYPE 2 DIABETES MELLITUS WITH STAGE 3 CHRONIC KIDNEY DISEASE, WITHOUT LONG-TERM CURRENT USE OF INSULIN (HCC): Primary | ICD-10-CM

## 2024-09-18 DIAGNOSIS — D63.1 ANEMIA IN STAGE 3B CHRONIC KIDNEY DISEASE (HCC): Primary | ICD-10-CM

## 2024-09-18 DIAGNOSIS — Z23 NEED FOR INFLUENZA VACCINATION: ICD-10-CM

## 2024-09-18 DIAGNOSIS — N18.32 ANEMIA IN STAGE 3B CHRONIC KIDNEY DISEASE (HCC): Primary | ICD-10-CM

## 2024-09-18 DIAGNOSIS — J44.9 COPD, SEVERE (HCC): ICD-10-CM

## 2024-09-18 PROCEDURE — 96365 THER/PROPH/DIAG IV INF INIT: CPT

## 2024-09-18 PROCEDURE — 6360000002 HC RX W HCPCS: Performed by: ORTHOPAEDIC SURGERY

## 2024-09-18 PROCEDURE — 2580000003 HC RX 258: Performed by: ORTHOPAEDIC SURGERY

## 2024-09-18 RX ORDER — SODIUM CHLORIDE 9 MG/ML
INJECTION, SOLUTION INTRAVENOUS CONTINUOUS
Start: 2024-09-18

## 2024-09-18 RX ORDER — SODIUM CHLORIDE 9 MG/ML
INJECTION, SOLUTION INTRAVENOUS CONTINUOUS
OUTPATIENT
Start: 2024-09-18

## 2024-09-18 RX ORDER — DIPHENHYDRAMINE HYDROCHLORIDE 50 MG/ML
50 INJECTION INTRAMUSCULAR; INTRAVENOUS
OUTPATIENT
Start: 2024-09-18

## 2024-09-18 RX ORDER — ALBUTEROL SULFATE 90 UG/1
4 INHALANT RESPIRATORY (INHALATION) PRN
OUTPATIENT
Start: 2024-09-18

## 2024-09-18 RX ORDER — SODIUM CHLORIDE 9 MG/ML
5-250 INJECTION, SOLUTION INTRAVENOUS PRN
OUTPATIENT
Start: 2024-09-18

## 2024-09-18 RX ORDER — SODIUM CHLORIDE 9 MG/ML
INJECTION, SOLUTION INTRAVENOUS CONTINUOUS
Status: DISCONTINUED | OUTPATIENT
Start: 2024-09-18 | End: 2024-09-19 | Stop reason: HOSPADM

## 2024-09-18 RX ORDER — SODIUM CHLORIDE 0.9 % (FLUSH) 0.9 %
5-40 SYRINGE (ML) INJECTION PRN
OUTPATIENT
Start: 2024-09-18

## 2024-09-18 RX ORDER — EPINEPHRINE 1 MG/ML
0.3 INJECTION, SOLUTION, CONCENTRATE INTRAVENOUS PRN
OUTPATIENT
Start: 2024-09-18

## 2024-09-18 RX ORDER — SODIUM CHLORIDE 0.9 % (FLUSH) 0.9 %
5-40 SYRINGE (ML) INJECTION PRN
Status: DISCONTINUED | OUTPATIENT
Start: 2024-09-18 | End: 2024-09-19 | Stop reason: HOSPADM

## 2024-09-18 RX ORDER — ACETAMINOPHEN 325 MG/1
650 TABLET ORAL
OUTPATIENT
Start: 2024-09-18

## 2024-09-18 RX ORDER — HEPARIN 100 UNIT/ML
500 SYRINGE INTRAVENOUS PRN
OUTPATIENT
Start: 2024-09-18

## 2024-09-18 RX ORDER — ONDANSETRON 2 MG/ML
8 INJECTION INTRAMUSCULAR; INTRAVENOUS
OUTPATIENT
Start: 2024-09-18

## 2024-09-18 RX ADMIN — FERRIC CARBOXYMALTOSE INJECTION 750 MG: 50 INJECTION, SOLUTION INTRAVENOUS at 10:53

## 2024-09-18 RX ADMIN — SODIUM CHLORIDE: 9 INJECTION, SOLUTION INTRAVENOUS at 10:53

## 2024-09-18 RX ADMIN — SODIUM CHLORIDE, PRESERVATIVE FREE 10 ML: 5 INJECTION INTRAVENOUS at 10:50

## 2024-09-19 ASSESSMENT — ENCOUNTER SYMPTOMS
DIFFICULTY BREATHING: 0
DIARRHEA: 0
HEMOPTYSIS: 0
RHINORRHEA: 0
SHORTNESS OF BREATH: 1
CONSTIPATION: 0
COUGH: 1
CHEST TIGHTNESS: 0
WHEEZING: 0
VOMITING: 0
SPUTUM PRODUCTION: 0
FREQUENT THROAT CLEARING: 0

## 2024-09-19 ASSESSMENT — COPD QUESTIONNAIRES: COPD: 1

## 2024-11-20 RX ORDER — BACLOFEN 10 MG/1
10 TABLET ORAL 2 TIMES DAILY
Qty: 60 TABLET | Refills: 5 | Status: SHIPPED | OUTPATIENT
Start: 2024-11-20

## 2024-12-18 DIAGNOSIS — K21.9 GASTROESOPHAGEAL REFLUX DISEASE WITHOUT ESOPHAGITIS: ICD-10-CM

## 2024-12-18 DIAGNOSIS — Z87.898 HISTORY OF SEIZURES: ICD-10-CM

## 2024-12-18 RX ORDER — LEVETIRACETAM 500 MG/1
TABLET ORAL
Qty: 90 TABLET | Refills: 3 | Status: SHIPPED | OUTPATIENT
Start: 2024-12-18

## 2024-12-18 RX ORDER — PIOGLITAZONE 30 MG/1
TABLET ORAL
COMMUNITY
Start: 2024-12-18

## 2024-12-18 RX ORDER — FOLIC ACID 1 MG/1
1000 TABLET ORAL DAILY
Qty: 90 TABLET | Refills: 3 | Status: SHIPPED | OUTPATIENT
Start: 2024-12-18

## 2024-12-18 RX ORDER — OMEPRAZOLE 40 MG/1
CAPSULE, DELAYED RELEASE ORAL
Qty: 90 CAPSULE | Refills: 3 | Status: SHIPPED | OUTPATIENT
Start: 2024-12-18

## 2024-12-18 NOTE — TELEPHONE ENCOUNTER
Health Maintenance   Topic Date Due    DTaP/Tdap/Td vaccine (1 - Tdap) 02/06/2025 (Originally 9/6/1980)    Pneumococcal 0-64 years Vaccine (2 of 2 - PCV) 02/06/2025 (Originally 10/22/2016)    Respiratory Syncytial Virus (RSV) Pregnant or age 60 yrs+ (1 - Risk 60-74 years 1-dose series) 02/06/2025 (Originally 9/6/2021)    Diabetic retinal exam  02/15/2025 (Originally 7/25/2020)    Shingles vaccine (1 of 2) 09/18/2025 (Originally 9/6/2011)    COVID-19 Vaccine (1 - 2023-24 season) 09/18/2025 (Originally 9/1/2024)    Hepatitis C screen  09/18/2025 (Originally 9/6/1979)    HIV screen  09/18/2025 (Originally 9/6/1976)    Diabetic Alb to Cr ratio (uACR) test  02/02/2025    Diabetic foot exam  02/06/2025    Depression Monitoring  02/06/2025    A1C test (Diabetic or Prediabetic)  07/22/2025    Lipids  07/22/2025    GFR test (Diabetes, CKD 3-4, OR last GFR 15-59)  08/03/2025    Colorectal Cancer Screen  07/24/2027    Flu vaccine  Completed    Hepatitis A vaccine  Aged Out    Hepatitis B vaccine  Aged Out    Hib vaccine  Aged Out    Polio vaccine  Aged Out    Meningococcal (ACWY) vaccine  Aged Out    Lung Cancer Screening &/or Counseling  Discontinued    Prostate Specific Antigen (PSA) Screening or Monitoring  Discontinued             (applicable per patient's age: Cancer Screenings, Depression Screening, Fall Risk Screening, Immunizations)    Hemoglobin A1C (%)   Date Value   07/22/2024 5.9   07/12/2024 5.2   02/02/2024 5.0     AST (U/L)   Date Value   02/02/2024 11     ALT (U/L)   Date Value   02/02/2024 11     BUN (mg/dL)   Date Value   08/03/2024 18      (goal A1C is < 7)   (goal LDL is <100) need 30-50% reduction from baseline     BP Readings from Last 3 Encounters:   09/18/24 137/69   09/18/24 116/68   09/11/24 (!) 142/93    (goal /80)      All Future Testing planned in [facility]:  Lab Frequency Next Occurrence   Basic Metabolic Panel Once 10/21/2024   CBC with Auto Differential Once 07/22/2024   CBC Once 01/20/2025

## 2025-01-08 RX ORDER — SERTRALINE HYDROCHLORIDE 100 MG/1
200 TABLET, FILM COATED ORAL DAILY
Qty: 180 TABLET | Refills: 3 | Status: SHIPPED | OUTPATIENT
Start: 2025-01-08

## 2025-01-08 NOTE — TELEPHONE ENCOUNTER
Health Maintenance   Topic Date Due    Diabetic Alb to Cr ratio (uACR) test  02/02/2025    Diabetic foot exam  02/06/2025    Depression Monitoring  02/06/2025    DTaP/Tdap/Td vaccine (1 - Tdap) 02/06/2025 (Originally 9/6/1980)    Pneumococcal 0-64 years Vaccine (2 of 2 - PCV) 02/06/2025 (Originally 10/22/2016)    Respiratory Syncytial Virus (RSV) Pregnant or age 60 yrs+ (1 - Risk 60-74 years 1-dose series) 02/06/2025 (Originally 9/6/2021)    Diabetic retinal exam  02/15/2025 (Originally 7/25/2020)    Shingles vaccine (1 of 2) 09/18/2025 (Originally 9/6/2011)    COVID-19 Vaccine (1 - 2023-24 season) 09/18/2025 (Originally 9/1/2024)    Hepatitis C screen  09/18/2025 (Originally 9/6/1979)    HIV screen  09/18/2025 (Originally 9/6/1976)    A1C test (Diabetic or Prediabetic)  07/22/2025    Lipids  07/22/2025    GFR test (Diabetes, CKD 3-4, OR last GFR 15-59)  08/03/2025    Colorectal Cancer Screen  07/24/2027    Flu vaccine  Completed    Hepatitis A vaccine  Aged Out    Hepatitis B vaccine  Aged Out    Hib vaccine  Aged Out    Polio vaccine  Aged Out    Meningococcal (ACWY) vaccine  Aged Out    Lung Cancer Screening &/or Counseling  Discontinued    Prostate Specific Antigen (PSA) Screening or Monitoring  Discontinued             (applicable per patient's age: Cancer Screenings, Depression Screening, Fall Risk Screening, Immunizations)    Hemoglobin A1C (%)   Date Value   07/22/2024 5.9   07/12/2024 5.2   02/02/2024 5.0     AST (U/L)   Date Value   02/02/2024 11     ALT (U/L)   Date Value   02/02/2024 11     BUN (mg/dL)   Date Value   08/03/2024 18      (goal A1C is < 7)   (goal LDL is <100) need 30-50% reduction from baseline     BP Readings from Last 3 Encounters:   09/18/24 137/69   09/18/24 116/68   09/11/24 (!) 142/93    (goal /80)      All Future Testing planned in [facility]:  Lab Frequency Next Occurrence   Basic Metabolic Panel Once 10/21/2024   CBC with Auto Differential Once 07/22/2024   CBC Once 01/20/2025

## 2025-01-23 ENCOUNTER — APPOINTMENT (OUTPATIENT)
Dept: CT IMAGING | Age: 65
End: 2025-01-23
Payer: MEDICAID

## 2025-01-23 ENCOUNTER — APPOINTMENT (OUTPATIENT)
Dept: GENERAL RADIOLOGY | Age: 65
DRG: 720 | End: 2025-01-23
Payer: MEDICAID

## 2025-01-23 ENCOUNTER — APPOINTMENT (OUTPATIENT)
Dept: CT IMAGING | Age: 65
DRG: 720 | End: 2025-01-23
Payer: MEDICAID

## 2025-01-23 ENCOUNTER — APPOINTMENT (OUTPATIENT)
Dept: GENERAL RADIOLOGY | Age: 65
End: 2025-01-23
Payer: MEDICAID

## 2025-01-23 ENCOUNTER — HOSPITAL ENCOUNTER (INPATIENT)
Age: 65
LOS: 14 days | Discharge: HOME HEALTH | DRG: 720 | End: 2025-02-06
Attending: EMERGENCY MEDICINE | Admitting: INTERNAL MEDICINE
Payer: MEDICAID

## 2025-01-23 ENCOUNTER — HOSPITAL ENCOUNTER (EMERGENCY)
Age: 65
Discharge: ANOTHER HOSPITAL | End: 2025-01-23
Attending: STUDENT IN AN ORGANIZED HEALTH CARE EDUCATION/TRAINING PROGRAM
Payer: MEDICAID

## 2025-01-23 VITALS
RESPIRATION RATE: 16 BRPM | SYSTOLIC BLOOD PRESSURE: 100 MMHG | WEIGHT: 169 LBS | TEMPERATURE: 97.7 F | OXYGEN SATURATION: 100 % | HEART RATE: 100 BPM | BODY MASS INDEX: 25.32 KG/M2 | DIASTOLIC BLOOD PRESSURE: 55 MMHG

## 2025-01-23 DIAGNOSIS — J69.0 ASPIRATION PNEUMONIA OF RIGHT LUNG DUE TO GASTRIC SECRETIONS, UNSPECIFIED PART OF LUNG (HCC): ICD-10-CM

## 2025-01-23 DIAGNOSIS — I21.4 NSTEMI (NON-ST ELEVATION MYOCARDIAL INFARCTION) (HCC): ICD-10-CM

## 2025-01-23 DIAGNOSIS — I63.9 CEREBROVASCULAR ACCIDENT (CVA), UNSPECIFIED MECHANISM (HCC): Primary | ICD-10-CM

## 2025-01-23 DIAGNOSIS — E11.22 CONTROLLED TYPE 2 DIABETES MELLITUS WITH STAGE 3 CHRONIC KIDNEY DISEASE, WITHOUT LONG-TERM CURRENT USE OF INSULIN (HCC): ICD-10-CM

## 2025-01-23 DIAGNOSIS — J96.01 ACUTE RESPIRATORY FAILURE WITH HYPOXIA AND HYPERCAPNIA: ICD-10-CM

## 2025-01-23 DIAGNOSIS — J69.0 ASPIRATION PNEUMONIA OF RIGHT LOWER LOBE, UNSPECIFIED ASPIRATION PNEUMONIA TYPE (HCC): ICD-10-CM

## 2025-01-23 DIAGNOSIS — N18.30 CONTROLLED TYPE 2 DIABETES MELLITUS WITH STAGE 3 CHRONIC KIDNEY DISEASE, WITHOUT LONG-TERM CURRENT USE OF INSULIN (HCC): ICD-10-CM

## 2025-01-23 DIAGNOSIS — J96.02 ACUTE RESPIRATORY FAILURE WITH HYPOXIA AND HYPERCAPNIA: ICD-10-CM

## 2025-01-23 DIAGNOSIS — I63.9 ACUTE CEREBROVASCULAR ACCIDENT (HCC): Primary | ICD-10-CM

## 2025-01-23 DIAGNOSIS — Z87.898 HISTORY OF SEIZURES: ICD-10-CM

## 2025-01-23 PROBLEM — R94.01 ABNORMAL EEG: Status: ACTIVE | Noted: 2025-01-23

## 2025-01-23 LAB
ALBUMIN SERPL-MCNC: 3.6 G/DL (ref 3.5–5.2)
ALBUMIN/GLOB SERPL: 1.1 {RATIO} (ref 1–2.5)
ALLEN TEST: POSITIVE
ALP SERPL-CCNC: 128 U/L (ref 40–129)
ALT SERPL-CCNC: 46 U/L (ref 10–50)
AMMONIA PLAS-SCNC: 42 UMOL/L (ref 16–60)
AMPHET UR QL SCN: NEGATIVE
ANION GAP SERPL CALCULATED.3IONS-SCNC: 16 MMOL/L (ref 9–16)
ARTERIAL PATENCY WRIST A: ABNORMAL
ARTERIAL PATENCY WRIST A: YES
AST SERPL-CCNC: 49 U/L (ref 10–50)
B-OH-BUTYR SERPL-MCNC: 0.06 MMOL/L (ref 0.02–0.27)
BACTERIA URNS QL MICRO: ABNORMAL
BARBITURATES UR QL SCN: NEGATIVE
BASOPHILS # BLD: 0.07 K/UL (ref 0–0.2)
BASOPHILS NFR BLD: 0 % (ref 0–2)
BDY SITE: ABNORMAL
BENZODIAZ UR QL: NEGATIVE
BILIRUB DIRECT SERPL-MCNC: <0.2 MG/DL (ref 0–0.3)
BILIRUB INDIRECT SERPL-MCNC: NORMAL MG/DL (ref 0–1)
BILIRUB SERPL-MCNC: 0.3 MG/DL (ref 0–1.2)
BILIRUB UR QL STRIP: NEGATIVE
BNP SERPL-MCNC: 1130 PG/ML (ref 0–125)
BODY TEMPERATURE: 37
BODY TEMPERATURE: 37
BUN SERPL-MCNC: 19 MG/DL (ref 8–23)
BUN/CREAT SERPL: 10 (ref 9–20)
BUPRENORPHINE UR QL: NEGATIVE
CALCIUM SERPL-MCNC: 8.8 MG/DL (ref 8.6–10.4)
CANNABINOIDS UR QL SCN: NEGATIVE
CASTS #/AREA URNS LPF: ABNORMAL /LPF
CHLORIDE SERPL-SCNC: 103 MMOL/L (ref 98–107)
CK SERPL-CCNC: 69 U/L (ref 39–308)
CLARITY UR: CLEAR
CO2 SERPL-SCNC: 20 MMOL/L (ref 20–31)
COCAINE UR QL SCN: NEGATIVE
COLOR UR: YELLOW
CREAT SERPL-MCNC: 2 MG/DL (ref 0.7–1.2)
EKG ATRIAL RATE: 121 BPM
EKG P AXIS: 71 DEGREES
EKG P-R INTERVAL: 166 MS
EKG Q-T INTERVAL: 396 MS
EKG QRS DURATION: 78 MS
EKG QTC CALCULATION (BAZETT): 562 MS
EKG R AXIS: -74 DEGREES
EKG T AXIS: 74 DEGREES
EKG VENTRICULAR RATE: 121 BPM
EOSINOPHIL # BLD: 0.03 K/UL (ref 0–0.44)
EOSINOPHILS RELATIVE PERCENT: 0 % (ref 1–4)
EPI CELLS #/AREA URNS HPF: ABNORMAL /HPF (ref 0–5)
ERYTHROCYTE [DISTWIDTH] IN BLOOD BY AUTOMATED COUNT: 15.8 % (ref 11.8–14.4)
ETHANOL PERCENT: 0 %
ETHANOLAMINE SERPL-MCNC: <10 MG/DL (ref 0–0.08)
FENTANYL UR QL: NEGATIVE
FIO2 ON VENT: 100 %
FIO2 ON VENT: 80 %
FIO2: 40
GAS FLOW.O2 O2 DELIVERY SYS: ABNORMAL L/MIN
GAS FLOW.O2 O2 DELIVERY SYS: ABNORMAL L/MIN
GAS FLOW.O2 SETTING OXYMISER: 14 L/MIN
GFR, ESTIMATED: 38 ML/MIN/1.73M2
GLUCOSE BLD-MCNC: 133 MG/DL (ref 75–110)
GLUCOSE BLD-MCNC: 137 MG/DL (ref 75–110)
GLUCOSE BLD-MCNC: 310 MG/DL (ref 74–100)
GLUCOSE SERPL-MCNC: 308 MG/DL (ref 74–99)
GLUCOSE UR STRIP-MCNC: NEGATIVE MG/DL
HCO3 ARTERIAL: 17.8 MMOL/L (ref 22–26)
HCO3 VENOUS: 17 MMOL/L (ref 24–30)
HCT VFR BLD AUTO: 42.1 % (ref 40.7–50.3)
HGB BLD-MCNC: 13.2 G/DL (ref 13–17)
HGB UR QL STRIP.AUTO: NEGATIVE
IMM GRANULOCYTES # BLD AUTO: 0.16 K/UL (ref 0–0.3)
IMM GRANULOCYTES NFR BLD: 1 %
INR PPP: 1.1
KETONES UR STRIP-MCNC: NEGATIVE MG/DL
LACTATE BLDV-SCNC: 4 MMOL/L (ref 0.5–2.2)
LACTATE BLDV-SCNC: 6 MMOL/L (ref 0.5–2.2)
LACTIC ACID, WHOLE BLOOD: 3 MMOL/L (ref 0.7–2.1)
LACTIC ACID, WHOLE BLOOD: 5.3 MMOL/L (ref 0.7–2.1)
LEUKOCYTE ESTERASE UR QL STRIP: NEGATIVE
LIPASE SERPL-CCNC: 78 U/L (ref 13–60)
LYMPHOCYTES NFR BLD: 1.89 K/UL (ref 1.1–3.7)
LYMPHOCYTES RELATIVE PERCENT: 8 % (ref 24–43)
MAGNESIUM SERPL-MCNC: 1.8 MG/DL (ref 1.6–2.4)
MCH RBC QN AUTO: 28.4 PG (ref 25.2–33.5)
MCHC RBC AUTO-ENTMCNC: 31.4 G/DL (ref 28.4–34.8)
MCV RBC AUTO: 90.7 FL (ref 82.6–102.9)
METHADONE UR QL: NEGATIVE
MONOCYTES NFR BLD: 1.4 K/UL (ref 0.1–1.2)
MONOCYTES NFR BLD: 6 % (ref 3–12)
MUCOUS THREADS URNS QL MICRO: ABNORMAL
NEGATIVE BASE EXCESS, ART: 12.4 MMOL/L (ref 0–2)
NEGATIVE BASE EXCESS, ART: 5 MMOL/L (ref 0–2)
NEGATIVE BASE EXCESS, VEN: 13.4 MMOL/L (ref 0–2)
NEUTROPHILS NFR BLD: 85 % (ref 36–65)
NEUTS SEG NFR BLD: 19.15 K/UL (ref 1.5–8.1)
NITRITE UR QL STRIP: NEGATIVE
NRBC BLD-RTO: 0 PER 100 WBC
O2 DELIVERY DEVICE: ABNORMAL
O2 SAT, ARTERIAL: 94.8 % (ref 95–98)
O2 SAT, VEN: 95 % (ref 60–85)
OPIATES UR QL SCN: NEGATIVE
OXYCODONE UR QL SCN: NEGATIVE
PARTIAL THROMBOPLASTIN TIME: 31.1 SEC (ref 26.8–34.8)
PCO2 ARTERIAL: 58.6 MMHG (ref 35–45)
PCO2 VENOUS: 58.2 MM HG (ref 39–55)
PCO2, ART, TEMP ADJ: 58.6 (ref 35–45)
PCO2, VEN, TEMP ADJ: 58.2 MMHG (ref 39–55)
PCP UR QL SCN: NEGATIVE
PEEP RESPIRATORY: 5 CM[H2O]
PH ARTERIAL: 7.1 (ref 7.35–7.45)
PH UR STRIP: 6 [PH] (ref 5–9)
PH VENOUS: 7.08 (ref 7.32–7.42)
PH, ART, TEMP ADJ: 7.1 (ref 7.35–7.45)
PH, VEN, TEMP ADJ: 7.08 (ref 7.32–7.42)
PLATELET # BLD AUTO: 321 K/UL (ref 138–453)
PMV BLD AUTO: 10.1 FL (ref 8.1–13.5)
PO2 ARTERIAL: 98.8 MMHG (ref 80–100)
PO2 VENOUS: 101.2 MM HG (ref 30–50)
PO2, ART, TEMP ADJ: 98.8 MMHG (ref 80–100)
PO2, VEN, TEMP ADJ: 101.2 MMHG (ref 30–50)
POC HCO3: 21.6 MMOL/L (ref 21–28)
POC O2 SATURATION: 90 % (ref 94–98)
POC PCO2: 45.4 MM HG (ref 35–48)
POC PH: 7.28 (ref 7.35–7.45)
POC PO2: 65.9 MM HG (ref 83–108)
POTASSIUM SERPL-SCNC: 4.7 MMOL/L (ref 3.7–5.3)
PROT SERPL-MCNC: 6.9 G/DL (ref 6.6–8.7)
PROT UR STRIP-MCNC: ABNORMAL MG/DL
PROTHROMBIN TIME: 14.2 SEC (ref 11.7–14.1)
PT. POSITION: ABNORMAL
RBC # BLD AUTO: 4.64 M/UL (ref 4.21–5.77)
RBC #/AREA URNS HPF: ABNORMAL /HPF (ref 0–2)
SAMPLE SITE: ABNORMAL
SODIUM SERPL-SCNC: 139 MMOL/L (ref 136–145)
SP GR UR STRIP: 1.02 (ref 1.01–1.02)
TEST INFORMATION: NORMAL
TEXT FOR RESPIRATORY: ABNORMAL
TEXT FOR RESPIRATORY: ABNORMAL
TROPONIN I SERPL HS-MCNC: 59 NG/L (ref 0–22)
TROPONIN I SERPL HS-MCNC: 75 NG/L (ref 0–22)
TROPONIN I SERPL HS-MCNC: 81 NG/L (ref 0–22)
TROPONIN I SERPL HS-MCNC: 83 NG/L (ref 0–22)
TSH SERPL DL<=0.05 MIU/L-ACNC: 4.78 UIU/ML (ref 0.27–4.2)
UROBILINOGEN UR STRIP-ACNC: NORMAL EU/DL (ref 0–1)
VT: 500
WBC #/AREA URNS HPF: ABNORMAL /HPF (ref 0–5)
WBC OTHER # BLD: 22.7 K/UL (ref 3.5–11.3)

## 2025-01-23 PROCEDURE — 72125 CT NECK SPINE W/O DYE: CPT

## 2025-01-23 PROCEDURE — 6360000004 HC RX CONTRAST MEDICATION: Performed by: PSYCHIATRY & NEUROLOGY

## 2025-01-23 PROCEDURE — 6360000002 HC RX W HCPCS: Performed by: STUDENT IN AN ORGANIZED HEALTH CARE EDUCATION/TRAINING PROGRAM

## 2025-01-23 PROCEDURE — 6370000000 HC RX 637 (ALT 250 FOR IP)

## 2025-01-23 PROCEDURE — 83690 ASSAY OF LIPASE: CPT

## 2025-01-23 PROCEDURE — 96361 HYDRATE IV INFUSION ADD-ON: CPT

## 2025-01-23 PROCEDURE — 71260 CT THORAX DX C+: CPT

## 2025-01-23 PROCEDURE — 96367 TX/PROPH/DG ADDL SEQ IV INF: CPT

## 2025-01-23 PROCEDURE — 0042T CT BRAIN PERFUSION: CPT

## 2025-01-23 PROCEDURE — 99285 EMERGENCY DEPT VISIT HI MDM: CPT

## 2025-01-23 PROCEDURE — 70498 CT ANGIOGRAPHY NECK: CPT

## 2025-01-23 PROCEDURE — 83605 ASSAY OF LACTIC ACID: CPT

## 2025-01-23 PROCEDURE — 80307 DRUG TEST PRSMV CHEM ANLYZR: CPT

## 2025-01-23 PROCEDURE — 2580000003 HC RX 258

## 2025-01-23 PROCEDURE — 36415 COLL VENOUS BLD VENIPUNCTURE: CPT

## 2025-01-23 PROCEDURE — 85730 THROMBOPLASTIN TIME PARTIAL: CPT

## 2025-01-23 PROCEDURE — 82947 ASSAY GLUCOSE BLOOD QUANT: CPT

## 2025-01-23 PROCEDURE — 83880 ASSAY OF NATRIURETIC PEPTIDE: CPT

## 2025-01-23 PROCEDURE — 87641 MR-STAPH DNA AMP PROBE: CPT

## 2025-01-23 PROCEDURE — 84443 ASSAY THYROID STIM HORMONE: CPT

## 2025-01-23 PROCEDURE — 81001 URINALYSIS AUTO W/SCOPE: CPT

## 2025-01-23 PROCEDURE — 87070 CULTURE OTHR SPECIMN AEROBIC: CPT

## 2025-01-23 PROCEDURE — G0480 DRUG TEST DEF 1-7 CLASSES: HCPCS

## 2025-01-23 PROCEDURE — 82805 BLOOD GASES W/O2 SATURATION: CPT

## 2025-01-23 PROCEDURE — 71045 X-RAY EXAM CHEST 1 VIEW: CPT

## 2025-01-23 PROCEDURE — 6360000002 HC RX W HCPCS

## 2025-01-23 PROCEDURE — 87205 SMEAR GRAM STAIN: CPT

## 2025-01-23 PROCEDURE — 83735 ASSAY OF MAGNESIUM: CPT

## 2025-01-23 PROCEDURE — 99291 CRITICAL CARE FIRST HOUR: CPT

## 2025-01-23 PROCEDURE — 70450 CT HEAD/BRAIN W/O DYE: CPT

## 2025-01-23 PROCEDURE — 95700 EEG CONT REC W/VID EEG TECH: CPT

## 2025-01-23 PROCEDURE — 93005 ELECTROCARDIOGRAM TRACING: CPT

## 2025-01-23 PROCEDURE — 80076 HEPATIC FUNCTION PANEL: CPT

## 2025-01-23 PROCEDURE — 36600 WITHDRAWAL OF ARTERIAL BLOOD: CPT

## 2025-01-23 PROCEDURE — 85025 COMPLETE CBC W/AUTO DIFF WBC: CPT

## 2025-01-23 PROCEDURE — 87040 BLOOD CULTURE FOR BACTERIA: CPT

## 2025-01-23 PROCEDURE — 6360000004 HC RX CONTRAST MEDICATION: Performed by: STUDENT IN AN ORGANIZED HEALTH CARE EDUCATION/TRAINING PROGRAM

## 2025-01-23 PROCEDURE — 93005 ELECTROCARDIOGRAM TRACING: CPT | Performed by: INTERNAL MEDICINE

## 2025-01-23 PROCEDURE — 5A1935Z RESPIRATORY VENTILATION, LESS THAN 24 CONSECUTIVE HOURS: ICD-10-PCS

## 2025-01-23 PROCEDURE — 2580000003 HC RX 258: Performed by: STUDENT IN AN ORGANIZED HEALTH CARE EDUCATION/TRAINING PROGRAM

## 2025-01-23 PROCEDURE — 99448 NTRPROF PH1/NTRNET/EHR 21-30: CPT | Performed by: PSYCHIATRY & NEUROLOGY

## 2025-01-23 PROCEDURE — 94761 N-INVAS EAR/PLS OXIMETRY MLT: CPT

## 2025-01-23 PROCEDURE — 84484 ASSAY OF TROPONIN QUANT: CPT

## 2025-01-23 PROCEDURE — 82140 ASSAY OF AMMONIA: CPT

## 2025-01-23 PROCEDURE — 82803 BLOOD GASES ANY COMBINATION: CPT

## 2025-01-23 PROCEDURE — 82550 ASSAY OF CK (CPK): CPT

## 2025-01-23 PROCEDURE — 99291 CRITICAL CARE FIRST HOUR: CPT | Performed by: PSYCHIATRY & NEUROLOGY

## 2025-01-23 PROCEDURE — 2700000000 HC OXYGEN THERAPY PER DAY

## 2025-01-23 PROCEDURE — 94002 VENT MGMT INPAT INIT DAY: CPT

## 2025-01-23 PROCEDURE — 82010 KETONE BODYS QUAN: CPT

## 2025-01-23 PROCEDURE — 96365 THER/PROPH/DIAG IV INF INIT: CPT

## 2025-01-23 PROCEDURE — 0BH17EZ INSERTION OF ENDOTRACHEAL AIRWAY INTO TRACHEA, VIA NATURAL OR ARTIFICIAL OPENING: ICD-10-PCS

## 2025-01-23 PROCEDURE — 2000000000 HC ICU R&B

## 2025-01-23 PROCEDURE — 94640 AIRWAY INHALATION TREATMENT: CPT

## 2025-01-23 PROCEDURE — 85610 PROTHROMBIN TIME: CPT

## 2025-01-23 PROCEDURE — 2500000003 HC RX 250 WO HCPCS: Performed by: STUDENT IN AN ORGANIZED HEALTH CARE EDUCATION/TRAINING PROGRAM

## 2025-01-23 PROCEDURE — 31500 INSERT EMERGENCY AIRWAY: CPT

## 2025-01-23 PROCEDURE — 80048 BASIC METABOLIC PNL TOTAL CA: CPT

## 2025-01-23 PROCEDURE — 96375 TX/PRO/DX INJ NEW DRUG ADDON: CPT

## 2025-01-23 PROCEDURE — 74018 RADEX ABDOMEN 1 VIEW: CPT

## 2025-01-23 PROCEDURE — 95712 VEEG 2-12 HR INTMT MNTR: CPT

## 2025-01-23 PROCEDURE — 99291 CRITICAL CARE FIRST HOUR: CPT | Performed by: RADIOLOGY

## 2025-01-23 PROCEDURE — 95718 EEG PHYS/QHP 2-12 HR W/VEEG: CPT | Performed by: PSYCHIATRY & NEUROLOGY

## 2025-01-23 PROCEDURE — 2500000003 HC RX 250 WO HCPCS

## 2025-01-23 RX ORDER — IOPAMIDOL 755 MG/ML
75 INJECTION, SOLUTION INTRAVASCULAR
Status: COMPLETED | OUTPATIENT
Start: 2025-01-23 | End: 2025-01-23

## 2025-01-23 RX ORDER — ONDANSETRON 4 MG/1
4 TABLET, ORALLY DISINTEGRATING ORAL EVERY 8 HOURS PRN
Status: DISCONTINUED | OUTPATIENT
Start: 2025-01-23 | End: 2025-02-06 | Stop reason: HOSPADM

## 2025-01-23 RX ORDER — KETAMINE HYDROCHLORIDE 100 MG/ML
INJECTION, SOLUTION INTRAMUSCULAR; INTRAVENOUS
Status: DISCONTINUED
Start: 2025-01-23 | End: 2025-01-23 | Stop reason: WASHOUT

## 2025-01-23 RX ORDER — IPRATROPIUM BROMIDE AND ALBUTEROL SULFATE 2.5; .5 MG/3ML; MG/3ML
1 SOLUTION RESPIRATORY (INHALATION)
Status: DISCONTINUED | OUTPATIENT
Start: 2025-01-23 | End: 2025-02-05

## 2025-01-23 RX ORDER — ATORVASTATIN CALCIUM 40 MG/1
40 TABLET, FILM COATED ORAL DAILY
Status: DISCONTINUED | OUTPATIENT
Start: 2025-01-23 | End: 2025-02-06 | Stop reason: HOSPADM

## 2025-01-23 RX ORDER — CHLORHEXIDINE GLUCONATE ORAL RINSE 1.2 MG/ML
15 SOLUTION DENTAL 2 TIMES DAILY
Status: DISCONTINUED | OUTPATIENT
Start: 2025-01-23 | End: 2025-01-24

## 2025-01-23 RX ORDER — METRONIDAZOLE 500 MG/100ML
500 INJECTION, SOLUTION INTRAVENOUS ONCE
Status: COMPLETED | OUTPATIENT
Start: 2025-01-23 | End: 2025-01-23

## 2025-01-23 RX ORDER — ACETAMINOPHEN 650 MG/1
650 SUPPOSITORY RECTAL EVERY 6 HOURS PRN
Status: DISCONTINUED | OUTPATIENT
Start: 2025-01-23 | End: 2025-02-06 | Stop reason: HOSPADM

## 2025-01-23 RX ORDER — MAGNESIUM SULFATE IN WATER 40 MG/ML
2000 INJECTION, SOLUTION INTRAVENOUS PRN
Status: DISCONTINUED | OUTPATIENT
Start: 2025-01-23 | End: 2025-02-06 | Stop reason: HOSPADM

## 2025-01-23 RX ORDER — SODIUM CHLORIDE 9 MG/ML
INJECTION, SOLUTION INTRAVENOUS CONTINUOUS
Status: DISCONTINUED | OUTPATIENT
Start: 2025-01-23 | End: 2025-01-23

## 2025-01-23 RX ORDER — ACETAMINOPHEN 325 MG/1
650 TABLET ORAL EVERY 6 HOURS PRN
Status: DISCONTINUED | OUTPATIENT
Start: 2025-01-23 | End: 2025-02-06 | Stop reason: HOSPADM

## 2025-01-23 RX ORDER — ETOMIDATE 2 MG/ML
20 INJECTION INTRAVENOUS ONCE
Status: COMPLETED | OUTPATIENT
Start: 2025-01-23 | End: 2025-01-23

## 2025-01-23 RX ORDER — ASPIRIN 81 MG/1
81 TABLET, CHEWABLE ORAL DAILY
Status: DISCONTINUED | OUTPATIENT
Start: 2025-01-23 | End: 2025-01-23

## 2025-01-23 RX ORDER — SUCCINYLCHOLINE CHLORIDE 20 MG/ML
100 INJECTION INTRAMUSCULAR; INTRAVENOUS ONCE
Status: COMPLETED | OUTPATIENT
Start: 2025-01-23 | End: 2025-01-23

## 2025-01-23 RX ORDER — GLUCAGON 1 MG/ML
1 KIT INJECTION PRN
Status: DISCONTINUED | OUTPATIENT
Start: 2025-01-23 | End: 2025-02-06 | Stop reason: HOSPADM

## 2025-01-23 RX ORDER — LEVOFLOXACIN 5 MG/ML
750 INJECTION, SOLUTION INTRAVENOUS ONCE
Status: COMPLETED | OUTPATIENT
Start: 2025-01-23 | End: 2025-01-23

## 2025-01-23 RX ORDER — POLYETHYLENE GLYCOL 3350 17 G/17G
17 POWDER, FOR SOLUTION ORAL DAILY PRN
Status: DISCONTINUED | OUTPATIENT
Start: 2025-01-23 | End: 2025-01-31

## 2025-01-23 RX ORDER — MANNITOL 20 G/100ML
1 INJECTION, SOLUTION INTRAVENOUS ONCE
Status: COMPLETED | OUTPATIENT
Start: 2025-01-23 | End: 2025-01-23

## 2025-01-23 RX ORDER — BUDESONIDE 0.5 MG/2ML
0.5 INHALANT ORAL
Status: DISCONTINUED | OUTPATIENT
Start: 2025-01-23 | End: 2025-01-31

## 2025-01-23 RX ORDER — ALBUTEROL SULFATE 5 MG/ML
10 SOLUTION RESPIRATORY (INHALATION) ONCE
Status: COMPLETED | OUTPATIENT
Start: 2025-01-23 | End: 2025-01-23

## 2025-01-23 RX ORDER — IPRATROPIUM BROMIDE AND ALBUTEROL SULFATE 2.5; .5 MG/3ML; MG/3ML
1 SOLUTION RESPIRATORY (INHALATION)
Status: DISCONTINUED | OUTPATIENT
Start: 2025-01-23 | End: 2025-01-23

## 2025-01-23 RX ORDER — 0.9 % SODIUM CHLORIDE 0.9 %
30 INTRAVENOUS SOLUTION INTRAVENOUS ONCE
Status: COMPLETED | OUTPATIENT
Start: 2025-01-23 | End: 2025-01-23

## 2025-01-23 RX ORDER — FENTANYL CITRATE 50 UG/ML
INJECTION, SOLUTION INTRAMUSCULAR; INTRAVENOUS
Status: DISCONTINUED
Start: 2025-01-23 | End: 2025-01-23 | Stop reason: WASHOUT

## 2025-01-23 RX ORDER — INSULIN LISPRO 100 [IU]/ML
0-4 INJECTION, SOLUTION INTRAVENOUS; SUBCUTANEOUS EVERY 6 HOURS
Status: DISCONTINUED | OUTPATIENT
Start: 2025-01-23 | End: 2025-02-06 | Stop reason: HOSPADM

## 2025-01-23 RX ORDER — LEVOFLOXACIN 5 MG/ML
250 INJECTION, SOLUTION INTRAVENOUS EVERY 24 HOURS
Status: DISCONTINUED | OUTPATIENT
Start: 2025-01-24 | End: 2025-01-23

## 2025-01-23 RX ORDER — ONDANSETRON 2 MG/ML
4 INJECTION INTRAMUSCULAR; INTRAVENOUS EVERY 6 HOURS PRN
Status: DISCONTINUED | OUTPATIENT
Start: 2025-01-23 | End: 2025-02-06 | Stop reason: HOSPADM

## 2025-01-23 RX ORDER — HEPARIN SODIUM 5000 [USP'U]/ML
5000 INJECTION, SOLUTION INTRAVENOUS; SUBCUTANEOUS EVERY 8 HOURS SCHEDULED
Status: DISCONTINUED | OUTPATIENT
Start: 2025-01-23 | End: 2025-02-06 | Stop reason: HOSPADM

## 2025-01-23 RX ORDER — ASPIRIN 81 MG/1
81 TABLET, CHEWABLE ORAL DAILY
Status: DISCONTINUED | OUTPATIENT
Start: 2025-01-23 | End: 2025-02-06 | Stop reason: HOSPADM

## 2025-01-23 RX ORDER — SODIUM CHLORIDE, SODIUM LACTATE, POTASSIUM CHLORIDE, CALCIUM CHLORIDE 600; 310; 30; 20 MG/100ML; MG/100ML; MG/100ML; MG/100ML
INJECTION, SOLUTION INTRAVENOUS CONTINUOUS
Status: DISCONTINUED | OUTPATIENT
Start: 2025-01-23 | End: 2025-01-26

## 2025-01-23 RX ORDER — SODIUM CHLORIDE 0.9 % (FLUSH) 0.9 %
5-40 SYRINGE (ML) INJECTION EVERY 12 HOURS SCHEDULED
Status: DISCONTINUED | OUTPATIENT
Start: 2025-01-23 | End: 2025-02-06 | Stop reason: HOSPADM

## 2025-01-23 RX ORDER — SODIUM CHLORIDE 9 MG/ML
INJECTION, SOLUTION INTRAVENOUS PRN
Status: DISCONTINUED | OUTPATIENT
Start: 2025-01-23 | End: 2025-02-06 | Stop reason: HOSPADM

## 2025-01-23 RX ORDER — SODIUM CHLORIDE 0.9 % (FLUSH) 0.9 %
5-40 SYRINGE (ML) INJECTION PRN
Status: DISCONTINUED | OUTPATIENT
Start: 2025-01-23 | End: 2025-02-06 | Stop reason: HOSPADM

## 2025-01-23 RX ORDER — IOPAMIDOL 755 MG/ML
50 INJECTION, SOLUTION INTRAVASCULAR
Status: COMPLETED | OUTPATIENT
Start: 2025-01-23 | End: 2025-01-23

## 2025-01-23 RX ORDER — DEXTROSE MONOHYDRATE 100 MG/ML
INJECTION, SOLUTION INTRAVENOUS CONTINUOUS PRN
Status: DISCONTINUED | OUTPATIENT
Start: 2025-01-23 | End: 2025-02-06 | Stop reason: HOSPADM

## 2025-01-23 RX ADMIN — MANNITOL 76.7 G: 20 INJECTION, SOLUTION INTRAVENOUS at 06:57

## 2025-01-23 RX ADMIN — IOPAMIDOL 50 ML: 755 INJECTION, SOLUTION INTRAVENOUS at 10:02

## 2025-01-23 RX ADMIN — METRONIDAZOLE 500 MG: 500 INJECTION, SOLUTION INTRAVENOUS at 07:04

## 2025-01-23 RX ADMIN — ETOMIDATE 20 MG: 2 INJECTION INTRAVENOUS at 06:30

## 2025-01-23 RX ADMIN — HEPARIN SODIUM 5000 UNITS: 5000 INJECTION INTRAVENOUS; SUBCUTANEOUS at 21:01

## 2025-01-23 RX ADMIN — ASPIRIN 81 MG 81 MG: 81 TABLET ORAL at 14:43

## 2025-01-23 RX ADMIN — SUCCINYLCHOLINE CHLORIDE 100 MG: 20 INJECTION, SOLUTION INTRAMUSCULAR; INTRAVENOUS at 06:31

## 2025-01-23 RX ADMIN — LEVOFLOXACIN 750 MG: 750 INJECTION, SOLUTION INTRAVENOUS at 07:34

## 2025-01-23 RX ADMIN — IOPAMIDOL 75 ML: 755 INJECTION, SOLUTION INTRAVENOUS at 05:52

## 2025-01-23 RX ADMIN — SODIUM CHLORIDE 2301 ML: 9 INJECTION, SOLUTION INTRAVENOUS at 06:30

## 2025-01-23 RX ADMIN — SODIUM CHLORIDE 1500 MG: 9 INJECTION, SOLUTION INTRAVENOUS at 13:42

## 2025-01-23 RX ADMIN — CHLORHEXIDINE GLUCONATE 15 ML: 1.2 SOLUTION ORAL at 13:17

## 2025-01-23 RX ADMIN — SODIUM CHLORIDE, PRESERVATIVE FREE 10 ML: 5 INJECTION INTRAVENOUS at 21:01

## 2025-01-23 RX ADMIN — CHLORHEXIDINE GLUCONATE 15 ML: 1.2 SOLUTION ORAL at 21:01

## 2025-01-23 RX ADMIN — SODIUM CHLORIDE, PRESERVATIVE FREE 40 MG: 5 INJECTION INTRAVENOUS at 13:17

## 2025-01-23 RX ADMIN — ATORVASTATIN CALCIUM 40 MG: 10 TABLET, FILM COATED ORAL at 14:43

## 2025-01-23 RX ADMIN — IPRATROPIUM BROMIDE AND ALBUTEROL SULFATE 1 DOSE: 2.5; .5 SOLUTION RESPIRATORY (INHALATION) at 19:43

## 2025-01-23 RX ADMIN — ALBUTEROL SULFATE 10 MG: 5 SOLUTION RESPIRATORY (INHALATION) at 09:05

## 2025-01-23 RX ADMIN — SODIUM CHLORIDE, POTASSIUM CHLORIDE, SODIUM LACTATE AND CALCIUM CHLORIDE: 600; 310; 30; 20 INJECTION, SOLUTION INTRAVENOUS at 13:41

## 2025-01-23 RX ADMIN — HEPARIN SODIUM 5000 UNITS: 5000 INJECTION INTRAVENOUS; SUBCUTANEOUS at 14:43

## 2025-01-23 RX ADMIN — IPRATROPIUM BROMIDE AND ALBUTEROL SULFATE 1 DOSE: 2.5; .5 SOLUTION RESPIRATORY (INHALATION) at 11:27

## 2025-01-23 RX ADMIN — BUDESONIDE INHALATION 500 MCG: 0.5 SUSPENSION RESPIRATORY (INHALATION) at 19:43

## 2025-01-23 RX ADMIN — IOPAMIDOL 75 ML: 755 INJECTION, SOLUTION INTRAVENOUS at 06:08

## 2025-01-23 RX ADMIN — IPRATROPIUM BROMIDE AND ALBUTEROL SULFATE 1 DOSE: 2.5; .5 SOLUTION RESPIRATORY (INHALATION) at 15:45

## 2025-01-23 RX ADMIN — CEFEPIME 2000 MG: 2 INJECTION, POWDER, FOR SOLUTION INTRAVENOUS at 14:48

## 2025-01-23 ASSESSMENT — LIFESTYLE VARIABLES
HOW OFTEN DO YOU HAVE A DRINK CONTAINING ALCOHOL: PATIENT UNABLE TO ANSWER
HOW MANY STANDARD DRINKS CONTAINING ALCOHOL DO YOU HAVE ON A TYPICAL DAY: PATIENT UNABLE TO ANSWER

## 2025-01-23 ASSESSMENT — PULMONARY FUNCTION TESTS
PIF_VALUE: 26
PIF_VALUE: 14
PIF_VALUE: 25
PIF_VALUE: 27
PIF_VALUE: 25
PIF_VALUE: 29
PIF_VALUE: 25

## 2025-01-23 ASSESSMENT — PAIN SCALES - GENERAL
PAINLEVEL_OUTOF10: 0
PAINLEVEL_OUTOF10: 0

## 2025-01-23 NOTE — ED PROVIDER NOTES
[facility]     Emergency Department     Faculty Attestation    I performed a history and physical examination of the patient and discussed management with the resident. I have reviewed and agree with the resident’s findings including all diagnostic interpretations, and treatment plans as written at the time of my review. Any areas of disagreement are noted on the chart. I was personally present for the key portions of any procedures. I have documented in the chart those procedures where I was not present during the key portions. For Physician Assistant/ Nurse Practitioner cases/documentation I have personally evaluated this patient and have completed at least one if not all key elements of the E/M (history, physical exam, and MDM). Additional findings are as noted.    PtName: Tlaon Walker Jr.  MRN: [id number]  Birthdate 1960  Date of evaluation: 1/23/25  Note Started: 9:15 AM EST    Primary Care Physician: Adrian Adkins, CARO - CNP        History: This is a 64 y.o. male who presents to the Emergency Department with complaint of found down transported to [facility].  CTA shows multiple occlusions.  The patient was intubated by the [facility] emergency department transferred for neuro interventional and neurocritical care.  Patient has had a prior craniotomy.    Physical:   axillary temperature is 98.9 °F (37.2 °C). His blood pressure is 99/57 (abnormal) and his pulse is 100. His respiration is 19 and oxygen saturation is 100%.  Intubated.  Diminished breath sounds and wheezing, heart regular rate, abdomen soft    Impression: CVA    Plan: Review of the imaging and lab studies noted that the CTA of the chest shows concerns for groundglass opacity.  Antibiotics have been given at the [facility] facility.  Patient also received a 30 mL/kg fluid bolus at the [facility] facility.  Troponin is uptrending now at 81 at the prior facility.  CTA of the head and neck

## 2025-01-23 NOTE — CONSULTS
Clinical Ethics Consultation Note      Ethics consulted for clarity in patient Code Status. Mr Talon Walker is a 63y/o transported from [place]. On July 12, 2024, the patient verbally requested a DNR-CCA (No Intubation) Order. That DNR-CCA order was signed and is still in effect. No Guardian or POA can override the first-person wishes of a patient. Of note: this patient is not under guardianship.    Because the patient was intubated and sent to [place] for care, Ethics recommends allowing a reasonable time before re-enacting that DNR-CCA order, based on patient wishes.     Please PerfectServe or call my cell at [phone] if there are any questions.    Leeroy Puga  Ethics Services

## 2025-01-23 NOTE — PLAN OF CARE
Problem: Chronic Conditions and Co-morbidities  Goal: Patient's chronic conditions and co-morbidity symptoms are monitored and maintained or improved  Outcome: Progressing  Flowsheets (Taken 1/23/2025 1233)  Care Plan - Patient's Chronic Conditions and Co-Morbidity Symptoms are Monitored and Maintained or Improved: Monitor and assess patient's chronic conditions and comorbid symptoms for stability, deterioration, or improvement     Problem: Discharge Planning  Goal: Discharge to home or other facility with appropriate resources  Outcome: Progressing  Flowsheets (Taken 1/23/2025 1233)  Discharge to home or other facility with appropriate resources: Identify barriers to discharge with patient and caregiver     Problem: Safety - Adult  Goal: Free from fall injury  Outcome: Progressing  Flowsheets (Taken 1/23/2025 4043)  Free From Fall Injury: Instruct family/caregiver on patient safety

## 2025-01-23 NOTE — PROCEDURES
LONG-TERM EEG-VIDEO MONITORING   CLINICAL NEUROPHYSIOLOGY LABORATORY  DEPARTMENT OF NEUROLOGY  [facility]    Patient: Talon Walker Jr.  Age: 64 y.o.  MRN: [id number]    Referring Physician: No ref. provider found  History: The patient is a 64 y.o. male with hx of right MCA stroke s/p craniotomy who presented with AMS. This long-term video-EEG monitoring study was performed to evaluate for seizures. The patient is not on neuroactive medications.   Talon Walker Jr.   Current Facility-Administered Medications   Medication Dose Route Frequency Provider Last Rate Last Admin    ipratropium 0.5 mg-albuterol 2.5 mg (DUONEB) nebulizer solution 1 Dose  1 Dose Inhalation Q4H WA RT Chao Sue MD   1 Dose at 01/23/25 1545    atorvastatin (LIPITOR) tablet 40 mg  40 mg Orogastric Daily Nima Gotti APRN - CNP   40 mg at 01/23/25 1443    budesonide (PULMICORT) nebulizer suspension 500 mcg  0.5 mg Nebulization BID RT Nima Gotti APRN - CNP        sodium chloride flush 0.9 % injection 5-40 mL  5-40 mL IntraVENous 2 times per day Nima Gotti APRN - CNP        sodium chloride flush 0.9 % injection 5-40 mL  5-40 mL IntraVENous PRN Nima Gotti APRN - CNP        0.9 % sodium chloride infusion   IntraVENous PRN Nima Gotti APRN - CNP        magnesium sulfate 2000 mg in 50 mL IVPB premix  2,000 mg IntraVENous PRN Nima Gotti APRN - CNP        ondansetron (ZOFRAN-ODT) disintegrating tablet 4 mg  4 mg Oral Q8H PRN Nima Gotti APRN - CNP        Or    ondansetron (ZOFRAN) injection 4 mg  4 mg IntraVENous Q6H PRN Nima Gotti APRN - CNP        polyethylene glycol (GLYCOLAX) packet 17 g  17 g Oral Daily PRN Nima Gotti APRN - CNP        acetaminophen (TYLENOL) tablet 650 mg  650 mg Oral Q6H PRN Nima Gotti APRN - CNP        Or    acetaminophen (TYLENOL) suppository 650 mg  650 mg Rectal Q6H PRN Nima Gotti APRN - CNP        chlorhexidine (PERIDEX) 0.12 % solution 15 mL  15 mL Mouth/Throat BID

## 2025-01-23 NOTE — ED NOTES
Pt to room from CT.   Neuro critical care at bedside, Dr. Sue at bedside  Pt is alert and following commands at this time.   Pt able to open eyes and move the right side, Left side remains unable to move.

## 2025-01-23 NOTE — ED NOTES
Pt accepted at [facility].  [facility] unable to fly due to weather, waiting for ground eta

## 2025-01-23 NOTE — PROGRESS NOTES
[facility]   Pharmacy Pharmacokinetic Monitoring Service - Vancomycin     Talon Walker Jr. is a 64 y.o. male starting on vancomycin therapy for pneumonia. Pharmacy consulted by  Nima Gotti APRN-CNP for monitoring and adjustment.    Target Concentration: Goal AUC/MARIS 400-600 mg*hr/L    Additional Antimicrobials: cefepime    Pertinent Laboratory Values:   Wt Readings from Last 1 Encounters:   01/23/25 76.7 kg (169 lb)     Temp Readings from Last 1 Encounters:   01/23/25 98.9 °F (37.2 °C) (Axillary)     Estimated Creatinine Clearance: 37 mL/min (A) (based on SCr of 2 mg/dL (H)).  Recent Labs     01/23/25  0541   CREATININE 2.0*   BUN 19   WBC 22.7*       MRSA Nasal Swab: was ordered by provider, awaiting results.    Plan:  Patient with Stage 3 CKD. Scr appears close to baseline (previous admission last year showing Scr ~1.7-1.9) Dosing recommendations based on Bayesian software  Start vancomycin 1500 mg once, followed by 1000 mg Q24H   Anticipated AUC of 530 and trough concentration of 15.6 at steady state  Renal labs as indicated   Vancomycin concentration ordered for TBD @ TBD   Pharmacy will continue to monitor patient and adjust therapy as indicated    Thank you for the consult,  Yasmani Live RPH  1/23/2025 1:04 PM

## 2025-01-23 NOTE — ED NOTES
64-year-old male Talon Walker coming by [facility], patient intubated, massive CVA, full code, neurocritical care Dr. Barros aware of the patient

## 2025-01-23 NOTE — ED TRIAGE NOTES
Pt arrived to ED12 [facility] from [facility].   Per report, Pt was found down at home.  CTA showed multiple occlusion. Pt had a elevated lactic acid of 6 and received 1900 of the total 2300ml needed per protocol. Pt had 2 trops of 59 then 81. PTA pt was intubated with a 7.5 ETT secured at 24 at the lip, Succs and etomidate to intubate. Pt had christensen and OG in place. PT had received mannitol, flagyl, and Levaquin PTA.   Pt is not receiving any sedation medication.  Pt was changed from DNRCCA to Full code.   Pt has a HX of CVA with right side paralysis and Rt Craniotomy.

## 2025-01-23 NOTE — CONSULTS
Nutrition Note    Automatic consult for TF and ordering management as pt is ventilated. Spoke to RN. Plans to extubate within 24 hrs. TF recommendations not needed at this time. Will assess at LOS. Please consult as needed.     Electronically signed by Jessy Braxton MS, RDN, LDN on 1/23/25 at 2:19 PM EST    Contact: [phone]

## 2025-01-23 NOTE — ED NOTES
Pt arrives via EMS from home with agonal breathing, non responsive. Unknown downtime. BS on arrival is 310. Pt placed on stretcher. Non-re breather placed. Dr. Meza at bedside. Dr. Meza to make contact with wife to confirm code status.

## 2025-01-23 NOTE — PLAN OF CARE
Problem: Safety - Medical Restraint  Goal: Remains free of injury from restraints (Restraint for Interference with Medical Device)  Description: INTERVENTIONS:  1. Determine that other, less restrictive measures have been tried or would not be effective before applying the restraint  2. Evaluate the patient's condition at the time of restraint application  3. Inform patient/family regarding the reason for restraint  4. Q2H: Monitor safety, psychosocial status, comfort, nutrition and hydration  Outcome: Progressing  Flowsheets (Taken 1/23/2025 2427)  Remains free of injury from restraints (restraint for interference with medical device):   Determine that other, less restrictive measures have been tried or would not be effective before applying the restraint   Evaluate the patient's condition at the time of restraint application

## 2025-01-23 NOTE — ED NOTES
Chantel Umaña spoke with writer regarding their consult and Pt Code status.   Leeroy to speak with admitting team, Dr. Barros, about pt wishes and code status.

## 2025-01-23 NOTE — H&P
Neuro ICU History & Physical    Patient Name: Talon Walker Jr.  Patient : 1960  Room/Bed: 0121/0121-01  Code Status: Full Code  Allergies:   Allergies   Allergen Reactions    Pcn [Penicillins] Anaphylaxis    Morphine Rash    Sulfa Antibiotics Rash       CHIEF COMPLAINT     Found down     HPI    History Obtained From: EMR, family     The patient is a 64 y.o. male presented with PMH COPD, T2DM, CHF, prior CVA s/p craniotomy and residual left-sided deficits who presented with altered mental status and respiratory distress.     He initially presented to the outside hospital after being found down by his wife for an unknown amount of time. Last known well night prior to admission. EMS reports he was agonal breathing and not responsive. Blood sugar at that time 310. 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 Unresponsive for ED staff, GCS 3,tachypneic, pupils fixed and dilated.     Patient has signed DNR CCA paperwork. ER physician at outside hospital spoke with patient's wife and updated on severity of condition and wife asked that his wishes be honored. Nursing then spoke w/ patient's son, who is documented POA. Son asked that the patient's DNR CCA be reversed and have him intubated. He would like the patient to be full code but \"does not want CPR to extend beyond 10 minutes\".     He received Mannitol, Flagyl, and Levaquin prior to arrival. He has received 3L NS total. He is not on any sedation. On evaluation in the ED patient is awake, alert, following commands on the right, unable to move left at baseline. Out of window for thrombolytics. Evaluated by neuro endovascular, no plans for intervention at this time. Admitted to the neuro ICU for close monitoring of neurologic status, management of acute respiratory failure.     Admitted to ICU From: ED   Reason for ICU Admission: Intubated, hx CVA and craniotomy       MD Olu    [x] Leeroy Barros MD  --[] there are no new interval images to review.     ASSESSMENT AND PLAN:         ASSESSMENT:     This is a 64 y.o. male with PMH COPD, T2DM, CHF, CVA s/p craniotomy and left-sided deficits transferred from [facility] for AMS. Exam at OSH with fixed and dilated pupils, GCS 3, intubated d/t mental status. CT perfusion w/ right encephalomalacia, hypoperfusion in left MCA distribution. Admitted to neuro ICU for close neurologic monitoring and ventilator management.     Patient care will be discussed with attending, will reevaluate patient along with attending.     PLAN/MEDICAL DECISION MAKING:    NEUROLOGIC:  CVA s/p right craniotomy  Residual left-sided deficits, dysphagia, wheelchair bound  Right encephalomalacia  CT perfusion w/ large area of hypoperfusion in left MCA distribution  - C spine negative   - Elevate HOB  - Neuro checks per protocol   - LTME   - Continue asa  - MRI pending    - Goal SBP <140    CARDIOVASCULAR:  - Uptrending troponins, continue to follow, likely type II  - EKG w/ tachycardia, LAD, low voltage, similar to previous   - Lipitor 40 qd  - Goal SBP <140    BP Range: Systolic (24hrs), Av , Min:84 , Max:187     Diastolic (24hrs), Av, Min:47, Max:91    Pulse Range: Pulse  Av.7  Min: 91  Max: 143    PULMONARY:  Hx COPD  RLL infiltrate, likely aspiration pneumonia  - Duonebs   - Abx as below   - Intubated at outside hospital  - Wean     Respiration Range: Resp  Av.6  Min: 14  Max: 28  Current Pulse Ox: SpO2: 99 %  24HR Pulse Ox Range: SpO2  Av.4 %  Min: 93 %  Max: 100 %    Recent Labs     25  1123   POCPH 7.284*   POCPCO2 45.4   POCHCO3 21.6   DMHO9VJO 90.0*       VBG :  Recent Labs     25  0757   PHVEN 7.084*   MVR2GDQ 58.2*   PO2VEN 101.2*   Q1XQJHNX 95.0*       RENAL/FLUID/ELECTROLYTE:  CKD 3b, baseline Cr 2-2.5  - CK normal   - 3L total NS prior to arrival   - LR at 75 / hr     Recent Labs     25  0541   BUN 19

## 2025-01-23 NOTE — ED NOTES
[facility] called back with Dr Barros from [facility]'s, connected call to Dr Meza

## 2025-01-23 NOTE — ED NOTES
2300ml NS bolus completed. Per Dr. Toni gates to give the pt the remaining 700ml to make 3L total, due to hypotension.   Fluids restarted at this time.

## 2025-01-23 NOTE — PROGRESS NOTES
[facility]     [place]gency/Trauma Note    PATIENT NAME: Talon Walker Jr.    Shift date: 01/23/2025  Shift day: Thursday   Shift # 1    Room # 12/12     Name: Talon Walker Jr.            Age: 64 y.o.  Gender: male          Adventist: Baptism   Place of Yazdanism:     Trauma/Incident type: Patient was found down unresponsive  Admit Date & Time: 1/23/2025  9:02 AM  TRAUMA NAME: None    PATIENT/EVENT DESCRIPTION:  Talon Walker Jr. is a 64 y.o. male who arrived via ground ambulance from [facility]. Per report, patient was found down unresponsive. Patient to be admitted to 12/12.       SPIRITUAL ASSESSMENT-INTERVENTION-OUTCOME:  No spiritual assessment was carried out because patient was intubated and unresponsive. Per report, patient was raised Baptism. Family was present and receptive to spiritual care.  provided ministry of presence, offered support, prayed with family and reassured them that patient was in good hands. Family was very grateful and thankful for the spiritual and emotional support they received.       PATIENT BELONGINGS:  No belongings noted    ANY BELONGINGS OF SIGNIFICANT VALUE NOTED:  Unknown    REGISTRATION STAFF NOTIFIED?  Yes      WHAT IS YOUR SPIRITUAL CARE PLAN FOR THIS PATIENT?:  Follow up visits recommended for ongoing assessment of patient's  condition and for more prayers and support.    Electronically signed by Chaplain Robert, on 1/23/2025 at 10:01 AM.  [facility]  [phone]

## 2025-01-23 NOTE — ED PROVIDER NOTES
[facility] EMERGENCY DEPARTMENT     Pt Name: Talon Walker Jr.  MRN: [id number]  Birthdate 1960  Date of evaluation: 1/23/2025  Physician: Joseph Meza MD      Note to patient: The 21st Century Cures Act requires that medical notes like this one to be available to patients in the interest of transparency. Please be advised, this is a medical document. It is intended as ymqg-ao-kkyu communication. It is written in medical language and may contain abbreviations and verbiage that may be unfamiliar. It may appear to read blunt or direct. Medical documents are intended to carry relevant medical information, facts as evident and the clinical opinion of the practitioner.     CHIEF COMPLAINT       Chief Complaint   Patient presents with    Altered Mental Status     Patient was found down by family at home unknown downtime, patient noted to have gurgling respirations on EMS arrival.      HISTORY OF PRESENT ILLNESS   Talon Walker Jr. is a 64 y.o. male with PMHx of COPD, CHF prior CVA/craniotomy with residual left-sided deficits  who presents to the emergency department for evaluation of altered mental status.  History obtained from EMS.  Received phone call approximately 30 minutes prior to ED arrival.  Per EMS patient's wife called as she found the patient unresponsive on the ground.  Unknown downtime.    The patient has no other acute complaints at this time.    A 10-point ROS was performed and negative unless otherwise stated in HPI  PASTMEDICAL HISTORY     Past Medical History:   Diagnosis Date    Acid reflux     Asthma     Cerebrovascular disease     Chronic cough     COPD (chronic obstructive pulmonary disease) (HCC)     Dysphagia     Dyspnea     Unspecified cerebral artery occlusion with cerebral infarction 11/05/2008    Wheelchair dependent        Patient Active Problem List   Diagnosis Code    Gastroesophageal reflux disease K21.9    Allergic rhinitis J30.9    Controlled type 2 diabetes mellitus with stage 3

## 2025-01-23 NOTE — PROGRESS NOTES
Spiritual Services Interventions  01A/01A 1/23/2025  Santo Walker Jr.  64 y.o. year old male    Encounter Summary  Encounter Overview/Reason: Crisis  Service Provided For: Family  Referral/Consult From: Nurse  Support System: Family members  Last Encounter : 01/23/25  Complexity of Encounter: High  Begin Time: 0700  End Time : 0755  Total Time Calculated: 55 min     Spiritual/Emotional needs  Type: (P) Spiritual Support                    Assessment/Intervention/Outcome  Assessment: (P) Calm  Intervention: (P) Prayer (assurance of)/[place], Discussed belief system/Episcopalian practices/bayron, Discussed illness injury and it’s impact, Active listening  Outcome: (P) Encouraged, Engaged in conversation, Comfort

## 2025-01-23 NOTE — PROGRESS NOTES
Occupational Therapy      [facility]  Occupational Therapy Not Seen Note    DATE: 2025    NAME: Talon Walker Jr.  MRN: [id number]   : 1960      Patient not seen this date for Occupational Therapy due to:    Strict Bedrest      Electronically signed by Miryam Mayer OT on 2025 at 4:03 PM

## 2025-01-23 NOTE — ED NOTES
Writer called to bedside, pt had hand on ETT tube as if he was trying to extubate. Writer verbalized to pt need to leave the tube alone at this time, and plan to go to the floor.   RT notified on ready to transport.

## 2025-01-23 NOTE — VIRTUAL HEALTH
[facility] Stroke and Telestroke Consult for  [place] Stroke Alert through [facility] @ 06:11  1/23/2025 12:09 PM    Pt Name: Talon Walker Jr.  MRN: [id number]  YOB: 1960  Date of evaluation: 1/23/2025  Primary Care Physician: Adrian Adkins APRN - CNP  Reason for Evaluation: Stroke evaluation with Phone Consult, Discussion and Review of imaging    Talon Walker Jr. is a 64 y.o. male with prior right MCA stroke presenting unresponsive.  Patient was last known well last night.  He was found unresponsive this morning by family members.  Patient was taken to the ED for evaluation.  Patient was found to have non-reactive pupils.  Stroke alert called.       LKW: Lauren of January 22nd  NIH:  24    Allergies  is allergic to pcn [penicillins], morphine, and sulfa antibiotics.  Medications  Prior to Admission medications    Medication Sig Start Date End Date Taking? Authorizing Provider   sertraline (ZOLOFT) 100 MG tablet Take 2 tablets by mouth daily 1/8/25   Adrian Adkins APRN - CNP   omeprazole (PRILOSEC) 40 MG delayed release capsule TAKE 1 CAPSULE BY MOUTH DAILY 12/18/24   Adrian Adkins APRN - CNP   folic acid (FOLVITE) 1 MG tablet TAKE 1 TABLET BY MOUTH DAILY 12/18/24   Adrian Adkins APRN - CNP   levETIRAcetam (KEPPRA) 500 MG tablet TAKE 1/2 TABLET BY MOUTH TWICE A DAY 12/18/24   Adrian Adkins APRN - CNP   pioglitazone (ACTOS) 30 MG tablet  12/18/24   Provider, MD Amanda   baclofen (LIORESAL) 10 MG tablet TAKE 1 TABLET BY MOUTH 2 TIMES A DAY 11/20/24   Adrian Adkins APRN - CNP   budesonide (PULMICORT) 0.5 MG/2ML nebulizer suspension USE ONE VIAL VIA NEBULIZER MACHINE TWO TIMES DAILY 8/29/24 8/30/25  Kostas Brunson MD   ipratropium 0.5 mg-albuterol 2.5 mg (DUONEB) 0.5-2.5 (3) MG/3ML SOLN nebulizer solution INHALE THREE MILLILITERS VIA NEBULIZER BY MOUTH EVERY 6 HOURS 8/29/24   Kostas Brunson MD   gabapentin (NEURONTIN) 300 MG capsule TAKE 1 CAPSULE BY MOUTH TWICE A DAY 8/14/24  2/10/25  Adrian Adkins APRN - CNP   Lancets MISC Test one times a day & as needed for symptoms of irregular blood glucose. DX: E11.41 Dispense brand covered by insurance. Note to Pharmacy: Brand per patient preference. May round up to next available package size. 7/30/24   Adrian Adkins APRN - CNP   blood glucose test strips (TRUE METRIX BLOOD GLUCOSE TEST) strip Test one times a day & as needed for symptoms of irregular blood glucose. DX: E11.41 Dispense brand covered by insurance. Note to Pharmacy: Brand per patient preference. May round up to next available package size. 7/30/24   Adrian Adkins APRN - CNP   furosemide (LASIX) 20 MG tablet Take 1 tablet by mouth every other day 7/17/24   Courtney Sanchez APRN - CNP   atorvastatin (LIPITOR) 40 MG tablet Take 1 tablet by mouth daily 2/6/24   Adrian Adkins APRN - CNP   cetirizine (ZYRTEC) 10 MG tablet TAKE ONE TABLET BY MOUTH DAILY 11/30/22   MightAdrian APRN - CNP   Misc. Devices (WHEELCHAIR) MISC 1 each by Does not apply route daily WITH ELEVATING LEG RESTS 7/18/22   Adrian Adkins APRN - CNP   Multiple Vitamins-Minerals ([employer]S SPECTRAVITE ADVANCED) TABS TAKE 1 TABLET BY MOUTH EVERY DAY 10/27/21   Adrian Adkins APRN - CNP   fluticasone (FLONASE) 50 MCG/ACT nasal spray INSTILL 2 SPRAYS INTO EACH NOSTRIL EVERY DAY 9/15/21   Riki Perkins MD   Misc. Devices (ADJUST BATH/SHOWER SEAT/BACK) MISC 1 each by Does not apply route daily 10/17/19   Adrian Adkins APRN - CNP   Misc. Devices (COMMODE BEDSIDE) MISC 1 each by Does not apply route daily 10/17/19   Adrian Adkins APRN - CNP Misc. Devices (NOVA CUSHION GEL SEAT PAD) MISC 1 each by Does not apply route daily 3/28/19   Adrian Adkins APRN - CNP   blood glucose monitor kit and supplies 1 kit by Other route daily Test 1 times a day & as needed for symptoms of irregular blood glucose. 8/6/18   Omid Clemente, APRN - CNP   CVS ASPIRIN CHILD 81 MG chewable tablet TAKE 1 TABLET BY MOUTH  [phone]  The provider was located at Home (City/State): [place]  Confirm you are appropriately licensed, registered, or certified to deliver care in the state where the patient is located as indicated above. If you are not or unsure, please re-schedule the visit: Yes, I confirm.   Consults     Total time spent on this encounter:  30 minutes    --Leeroy Barros MD on 1/23/2025 at 12:09 PM    An electronic signature was used to authenticate this note.

## 2025-01-23 NOTE — ED NOTES
Wife called in to the ER with writer and states ok to intubate patient if needed.  Wife states will be here in 10 minutes.  Dr. Meza aware

## 2025-01-23 NOTE — ED PROVIDER NOTES
[facility] EMERGENCY DEPARTMENT  Emergency Department Encounter  Emergency Medicine Resident     Pt Name:Talon Walker Jr.  MRN: [id number]  Birthdate 1960  Date of evaluation: 1/23/25  PCP:  Adrian Adkins APRN - CNP  Note Started: 10:59 AM EST      CHIEF COMPLAINT       Chief Complaint   Patient presents with    Cerebrovascular Accident       HISTORY OF PRESENT ILLNESS  (Location/Symptom, Timing/Onset, Context/Setting, Quality, Duration, Modifying Factors, Severity.)      Talon Walker Jr. is a 64 y.o. male with a past medical history of COPD, CHF, previous CVA/craniotomy with residual left-sided deficits who presents as a transfer from [facility] after being diagnosed with multiple occlusions involving the M1 segment of right and left MCA's, chronic occlusion of the A1 segments of the right and left MARCUS's concerning for CVA.  Patient was found down with a last well-known of around 8 PM.  Patient was subsequently intubated for airway protection.  Patient was found to have large amount of vomitus and subsequent chest x-ray status post intubation showed opacification of the right lower lobe concerning for aspiration.  Patient was administered Levaquin and Flagyl due to penicillin allergy.  Patient had a lactate of 4, received 30 cc/kg fluid bolus was currently running.  Initial Trope found to be 59 which went up to 81.  BMP significant for 1130, TSH of 4.70, pro time of 14.2 with a WBC of 22.7.  As per chart review it was noted that patient was not on any anticoagulation therapy at this time.    PAST MEDICAL / SURGICAL / SOCIAL / FAMILY HISTORY      has a past medical history of Acid reflux, Asthma, Cerebrovascular disease, Chronic cough, COPD (chronic obstructive pulmonary disease) (HCC), Dysphagia, Dyspnea, Unspecified cerebral artery occlusion with cerebral infarction, and Wheelchair dependent.       has a past surgical history that includes craniotomy; brain surgery; knee surgery; and Gastrostomy  tube placement.      Social History     Socioeconomic History    Marital status:      Spouse name: Not on file    Number of children: Not on file    Years of education: Not on file    Highest education level: Not on file   Occupational History    Not on file   Tobacco Use    Smoking status: Former     Current packs/day: 0.00     Average packs/day: 1.5 packs/day for 30.0 years (45.0 ttl pk-yrs)     Types: Cigarettes     Start date: 1978     Quit date: 2008     Years since quittin.0    Smokeless tobacco: Never   Vaping Use    Vaping status: Never Used   Substance and Sexual Activity    Alcohol use: No    Drug use: No    Sexual activity: Not on file   Other Topics Concern    Not on file   Social History Narrative    Not on file     Social Determinants of Health     Financial Resource Strain: Patient Declined (2024)    Overall Financial Resource Strain (CARDIA)     Difficulty of Paying Living Expenses: Patient declined   Food Insecurity: No Food Insecurity (2024)    Hunger Vital Sign     Worried About Running Out of Food in the Last Year: Never true     Ran Out of Food in the Last Year: Never true   Transportation Needs: No Transportation Needs (2024)    PRAPARE - Transportation     Lack of Transportation (Medical): No     Lack of Transportation (Non-Medical): No   Physical Activity: Not on file   Stress: Not on file   Social Connections: Not on file   Intimate Partner Violence: Not on file   Housing Stability: Low Risk  (2024)    Housing Stability Vital Sign     Unable to Pay for Housing in the Last Year: No     Number of Places Lived in the Last Year: 1     Unstable Housing in the Last Year: No       Family History   Problem Relation Age of Onset    Cancer Mother     Diabetes Mother     Heart Failure Father     Diabetes Father     Diabetes Sister        Allergies:  Pcn [penicillins], Morphine, and Sulfa antibiotics    Home Medications:  Prior to Admission medications

## 2025-01-24 ENCOUNTER — APPOINTMENT (OUTPATIENT)
Dept: GENERAL RADIOLOGY | Age: 65
DRG: 720 | End: 2025-01-24
Payer: MEDICAID

## 2025-01-24 ENCOUNTER — APPOINTMENT (OUTPATIENT)
Dept: MRI IMAGING | Age: 65
DRG: 720 | End: 2025-01-24
Payer: MEDICAID

## 2025-01-24 LAB
ALBUMIN SERPL-MCNC: 2.8 G/DL (ref 3.5–5.2)
ALBUMIN/GLOB SERPL: 1 {RATIO} (ref 1–2.5)
ALLEN TEST: POSITIVE
ALP SERPL-CCNC: 96 U/L (ref 40–129)
ALT SERPL-CCNC: 165 U/L (ref 10–50)
ANION GAP SERPL CALCULATED.3IONS-SCNC: 13 MMOL/L (ref 9–16)
AST SERPL-CCNC: 189 U/L (ref 10–50)
BASOPHILS # BLD: 0.04 K/UL (ref 0–0.2)
BASOPHILS NFR BLD: 0 % (ref 0–2)
BILIRUB SERPL-MCNC: 0.2 MG/DL (ref 0–1.2)
BUN SERPL-MCNC: 23 MG/DL (ref 8–23)
CALCIUM SERPL-MCNC: 8.2 MG/DL (ref 8.6–10.4)
CHLORIDE SERPL-SCNC: 113 MMOL/L (ref 98–107)
CO2 SERPL-SCNC: 18 MMOL/L (ref 20–31)
CREAT SERPL-MCNC: 2.2 MG/DL (ref 0.7–1.2)
EOSINOPHIL # BLD: 0.06 K/UL (ref 0–0.44)
EOSINOPHILS RELATIVE PERCENT: 1 % (ref 1–4)
ERYTHROCYTE [DISTWIDTH] IN BLOOD BY AUTOMATED COUNT: 16.5 % (ref 11.8–14.4)
FIO2: 40
GFR, ESTIMATED: 33 ML/MIN/1.73M2
GLUCOSE BLD-MCNC: 101 MG/DL (ref 75–110)
GLUCOSE BLD-MCNC: 122 MG/DL (ref 75–110)
GLUCOSE BLD-MCNC: 124 MG/DL (ref 75–110)
GLUCOSE BLD-MCNC: 125 MG/DL (ref 75–110)
GLUCOSE BLD-MCNC: 89 MG/DL (ref 75–110)
GLUCOSE SERPL-MCNC: 130 MG/DL (ref 74–99)
HCT VFR BLD AUTO: 31.2 % (ref 40.7–50.3)
HGB BLD-MCNC: 9.7 G/DL (ref 13–17)
IMM GRANULOCYTES # BLD AUTO: 0.09 K/UL (ref 0–0.3)
IMM GRANULOCYTES NFR BLD: 1 %
LACTIC ACID, WHOLE BLOOD: 1.1 MMOL/L (ref 0.7–2.1)
LACTIC ACID, WHOLE BLOOD: 2 MMOL/L (ref 0.7–2.1)
LACTIC ACID, WHOLE BLOOD: 2.3 MMOL/L (ref 0.7–2.1)
LACTIC ACID, WHOLE BLOOD: 3.4 MMOL/L (ref 0.7–2.1)
LYMPHOCYTES NFR BLD: 1.52 K/UL (ref 1.1–3.7)
LYMPHOCYTES RELATIVE PERCENT: 12 % (ref 24–43)
MCH RBC QN AUTO: 28.4 PG (ref 25.2–33.5)
MCHC RBC AUTO-ENTMCNC: 31.1 G/DL (ref 28.4–34.8)
MCV RBC AUTO: 91.2 FL (ref 82.6–102.9)
MONOCYTES NFR BLD: 1.05 K/UL (ref 0.1–1.2)
MONOCYTES NFR BLD: 8 % (ref 3–12)
MRSA, DNA, NASAL: NEGATIVE
NEGATIVE BASE EXCESS, ART: 1.5 MMOL/L (ref 0–2)
NEUTROPHILS NFR BLD: 78 % (ref 36–65)
NEUTS SEG NFR BLD: 10.39 K/UL (ref 1.5–8.1)
NRBC BLD-RTO: 0 PER 100 WBC
O2 DELIVERY DEVICE: ABNORMAL
PLATELET # BLD AUTO: 159 K/UL (ref 138–453)
PMV BLD AUTO: 10.5 FL (ref 8.1–13.5)
POC HCO3: 22.6 MMOL/L (ref 21–28)
POC O2 SATURATION: 97.4 % (ref 94–98)
POC PCO2: 34.7 MM HG (ref 35–48)
POC PH: 7.42 (ref 7.35–7.45)
POC PO2: 92.8 MM HG (ref 83–108)
POTASSIUM SERPL-SCNC: 4.5 MMOL/L (ref 3.7–5.3)
PROT SERPL-MCNC: 5.5 G/DL (ref 6.6–8.7)
RBC # BLD AUTO: 3.42 M/UL (ref 4.21–5.77)
RBC # BLD: ABNORMAL 10*6/UL
SAMPLE SITE: ABNORMAL
SODIUM SERPL-SCNC: 144 MMOL/L (ref 136–145)
SPECIMEN DESCRIPTION: NORMAL
WBC OTHER # BLD: 13.2 K/UL (ref 3.5–11.3)

## 2025-01-24 PROCEDURE — 6370000000 HC RX 637 (ALT 250 FOR IP)

## 2025-01-24 PROCEDURE — 94761 N-INVAS EAR/PLS OXIMETRY MLT: CPT

## 2025-01-24 PROCEDURE — 94003 VENT MGMT INPAT SUBQ DAY: CPT

## 2025-01-24 PROCEDURE — 94640 AIRWAY INHALATION TREATMENT: CPT

## 2025-01-24 PROCEDURE — 95720 EEG PHY/QHP EA INCR W/VEEG: CPT | Performed by: PSYCHIATRY & NEUROLOGY

## 2025-01-24 PROCEDURE — 92610 EVALUATE SWALLOWING FUNCTION: CPT

## 2025-01-24 PROCEDURE — 97530 THERAPEUTIC ACTIVITIES: CPT

## 2025-01-24 PROCEDURE — 6360000002 HC RX W HCPCS

## 2025-01-24 PROCEDURE — 70551 MRI BRAIN STEM W/O DYE: CPT

## 2025-01-24 PROCEDURE — 92523 SPEECH SOUND LANG COMPREHEN: CPT

## 2025-01-24 PROCEDURE — 71045 X-RAY EXAM CHEST 1 VIEW: CPT

## 2025-01-24 PROCEDURE — 2700000000 HC OXYGEN THERAPY PER DAY

## 2025-01-24 PROCEDURE — 83605 ASSAY OF LACTIC ACID: CPT

## 2025-01-24 PROCEDURE — 97110 THERAPEUTIC EXERCISES: CPT

## 2025-01-24 PROCEDURE — 85025 COMPLETE CBC W/AUTO DIFF WBC: CPT

## 2025-01-24 PROCEDURE — 95715 VEEG EA 12-26HR INTMT MNTR: CPT

## 2025-01-24 PROCEDURE — 82803 BLOOD GASES ANY COMBINATION: CPT

## 2025-01-24 PROCEDURE — 80053 COMPREHEN METABOLIC PANEL: CPT

## 2025-01-24 PROCEDURE — 99291 CRITICAL CARE FIRST HOUR: CPT | Performed by: PSYCHIATRY & NEUROLOGY

## 2025-01-24 PROCEDURE — 36415 COLL VENOUS BLD VENIPUNCTURE: CPT

## 2025-01-24 PROCEDURE — 97162 PT EVAL MOD COMPLEX 30 MIN: CPT

## 2025-01-24 PROCEDURE — 2500000003 HC RX 250 WO HCPCS

## 2025-01-24 PROCEDURE — 2000000000 HC ICU R&B

## 2025-01-24 PROCEDURE — 82947 ASSAY GLUCOSE BLOOD QUANT: CPT

## 2025-01-24 PROCEDURE — 36600 WITHDRAWAL OF ARTERIAL BLOOD: CPT

## 2025-01-24 PROCEDURE — 2580000003 HC RX 258

## 2025-01-24 RX ADMIN — SODIUM CHLORIDE, PRESERVATIVE FREE 40 MG: 5 INJECTION INTRAVENOUS at 08:00

## 2025-01-24 RX ADMIN — CEFEPIME 2000 MG: 2 INJECTION, POWDER, FOR SOLUTION INTRAVENOUS at 01:06

## 2025-01-24 RX ADMIN — IPRATROPIUM BROMIDE AND ALBUTEROL SULFATE 1 DOSE: 2.5; .5 SOLUTION RESPIRATORY (INHALATION) at 07:31

## 2025-01-24 RX ADMIN — CEFEPIME 2000 MG: 2 INJECTION, POWDER, FOR SOLUTION INTRAVENOUS at 14:07

## 2025-01-24 RX ADMIN — ASPIRIN 81 MG 81 MG: 81 TABLET ORAL at 07:59

## 2025-01-24 RX ADMIN — SODIUM CHLORIDE, PRESERVATIVE FREE 10 ML: 5 INJECTION INTRAVENOUS at 22:27

## 2025-01-24 RX ADMIN — SODIUM CHLORIDE, POTASSIUM CHLORIDE, SODIUM LACTATE AND CALCIUM CHLORIDE: 600; 310; 30; 20 INJECTION, SOLUTION INTRAVENOUS at 20:32

## 2025-01-24 RX ADMIN — BUDESONIDE INHALATION 500 MCG: 0.5 SUSPENSION RESPIRATORY (INHALATION) at 20:16

## 2025-01-24 RX ADMIN — IPRATROPIUM BROMIDE AND ALBUTEROL SULFATE 1 DOSE: 2.5; .5 SOLUTION RESPIRATORY (INHALATION) at 20:16

## 2025-01-24 RX ADMIN — IPRATROPIUM BROMIDE AND ALBUTEROL SULFATE 1 DOSE: 2.5; .5 SOLUTION RESPIRATORY (INHALATION) at 15:32

## 2025-01-24 RX ADMIN — HEPARIN SODIUM 5000 UNITS: 5000 INJECTION INTRAVENOUS; SUBCUTANEOUS at 06:29

## 2025-01-24 RX ADMIN — SODIUM CHLORIDE, POTASSIUM CHLORIDE, SODIUM LACTATE AND CALCIUM CHLORIDE: 600; 310; 30; 20 INJECTION, SOLUTION INTRAVENOUS at 03:30

## 2025-01-24 RX ADMIN — HEPARIN SODIUM 5000 UNITS: 5000 INJECTION INTRAVENOUS; SUBCUTANEOUS at 15:03

## 2025-01-24 RX ADMIN — CHLORHEXIDINE GLUCONATE 15 ML: 1.2 SOLUTION ORAL at 08:00

## 2025-01-24 RX ADMIN — SODIUM CHLORIDE, PRESERVATIVE FREE 10 ML: 5 INJECTION INTRAVENOUS at 08:00

## 2025-01-24 RX ADMIN — HEPARIN SODIUM 5000 UNITS: 5000 INJECTION INTRAVENOUS; SUBCUTANEOUS at 22:27

## 2025-01-24 RX ADMIN — ATORVASTATIN CALCIUM 40 MG: 10 TABLET, FILM COATED ORAL at 07:59

## 2025-01-24 ASSESSMENT — PULMONARY FUNCTION TESTS
PIF_VALUE: 25
PIF_VALUE: 27

## 2025-01-24 NOTE — PROCEDURES
LONG-TERM EEG-VIDEO MONITORING   CLINICAL NEUROPHYSIOLOGY LABORATORY  DEPARTMENT OF NEUROLOGY  [facility]    Patient: Talon Walker Jr.  Age: 64 y.o.  MRN: [id number]    Referring Physician: No ref. provider found  History: The patient is a 64 y.o. male with hx of right MCA stroke s/p craniotomy who presented with AMS. This long-term video-EEG monitoring study was performed to evaluate for seizures. The patient is not on neuroactive medications.   Talon Walker Jr.   Current Facility-Administered Medications   Medication Dose Route Frequency Provider Last Rate Last Admin    ipratropium 0.5 mg-albuterol 2.5 mg (DUONEB) nebulizer solution 1 Dose  1 Dose Inhalation Q4H WA RT Chao Sue MD   1 Dose at 01/24/25 0731    atorvastatin (LIPITOR) tablet 40 mg  40 mg Orogastric Daily Nima Gotti APRN - CNP   40 mg at 01/24/25 0759    budesonide (PULMICORT) nebulizer suspension 500 mcg  0.5 mg Nebulization BID RT Nima Gotti APRN - CNP   500 mcg at 01/23/25 1943    sodium chloride flush 0.9 % injection 5-40 mL  5-40 mL IntraVENous 2 times per day Nima Gotti APRN - CNP   10 mL at 01/24/25 0800    sodium chloride flush 0.9 % injection 5-40 mL  5-40 mL IntraVENous PRN Nima Gotti APRN - CNP        0.9 % sodium chloride infusion   IntraVENous PRN Nima Gotti APRN - CNP        magnesium sulfate 2000 mg in 50 mL IVPB premix  2,000 mg IntraVENous PRN Nima Gotti APRN - CNP        ondansetron (ZOFRAN-ODT) disintegrating tablet 4 mg  4 mg Oral Q8H PRN Nima Gotti APRN - CNP        Or    ondansetron (ZOFRAN) injection 4 mg  4 mg IntraVENous Q6H PRN Nima Gotti APRN - CNP        polyethylene glycol (GLYCOLAX) packet 17 g  17 g Oral Daily PRN Nima Gotti APRN - CNP        acetaminophen (TYLENOL) tablet 650 mg  650 mg Oral Q6H PRN Nima Gotti APRN - CNP        Or    acetaminophen (TYLENOL) suppository 650 mg  650 mg Rectal Q6H PRN Nima Gotti, APRN - CNP        pantoprazole (PROTONIX) 40 mg

## 2025-01-24 NOTE — PROGRESS NOTES
Daily Progress Note  Neuro Critical Care    Patient Name: Talon Walker Jr.  Patient : 1960  Room/Bed: 0121/0121-01  Code Status: DNR-CCA  Allergies:   Allergies   Allergen Reactions    Pcn [Penicillins] Anaphylaxis    Morphine Rash    Sulfa Antibiotics Rash       CHIEF COMPLAINT:        Found down     INTERVAL HISTORY    Initial Presentation (Admitted 2025):  The patient is a 64 y.o. male presented with PMH COPD, T2DM, CHF, prior CVA s/p craniotomy and residual left-sided deficits who presented with altered mental status and respiratory distress.      He initially presented to the outside hospital after being found down by his wife for an unknown amount of time. Last known well night prior to admission. EMS reports he was agonal breathing and not responsive. Blood sugar at that time 310. 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 Unresponsive for ED staff, GCS 3,tachypneic, pupils fixed and dilated.      Patient has signed DNR CCA paperwork. ER physician at outside hospital spoke with patient's wife and updated on severity of condition and wife asked that his wishes be honored. Nursing then spoke w/ patient's son, who is documented POA. Son asked that the patient's DNR CCA be reversed and have him intubated. He would like the patient to be full code but \"does not want CPR to extend beyond 10 minutes\".      He received Mannitol, Flagyl, and Levaquin prior to arrival. He has received 3L NS total. He is not on any sedation. On evaluation in the ED patient is awake, alert, following commands on the right, unable to move left at baseline. Out of window for thrombolytics. Evaluated by neuro endovascular, no plans for intervention at this time. Admitted to the neuro ICU for close monitoring of neurologic status, management of acute respiratory failure.     Hospital Course:   : Denies any pain. CPAPed well.  Extubated to NC. Code status changed back to DNR CCA no intubation per previous documentation and patient wishes.     Last 24h:   EEG tech called with concerns for seizure. Patient evaluated immediately and at baseline, awake and following commands.     CURRENT MEDICATIONS:  SCHEDULED MEDICATIONS:   ipratropium 0.5 mg-albuterol 2.5 mg  1 Dose Inhalation Q4H WA RT    atorvastatin  40 mg Orogastric Daily    budesonide  0.5 mg Nebulization BID RT    sodium chloride flush  5-40 mL IntraVENous 2 times per day    pantoprazole (PROTONIX) 40 mg in sodium chloride (PF) 0.9 % 10 mL injection  40 mg IntraVENous Daily    insulin lispro  0-4 Units SubCUTAneous Q6H    vancomycin (VANCOCIN) intermittent dosing (placeholder)   Other RX Placeholder    vancomycin  1,000 mg IntraVENous Q24H    cefepime  2,000 mg IntraVENous Q12H    heparin (porcine)  5,000 Units SubCUTAneous 3 times per day    aspirin  81 mg Orogastric Daily     CONTINUOUS INFUSIONS:   sodium chloride      dextrose      lactated ringers 75 mL/hr at 25 0330     PRN MEDICATIONS:   sodium chloride flush, sodium chloride, magnesium sulfate, ondansetron **OR** ondansetron, polyethylene glycol, acetaminophen **OR** acetaminophen, glucose, dextrose bolus **OR** dextrose bolus, glucagon (rDNA), dextrose    VITALS:  Temperature Range: Temp: 98.8 °F (37.1 °C) Temp  Av.8 °F (37.1 °C)  Min: 98 °F (36.7 °C)  Max: 100.1 °F (37.8 °C)  BP Range: Systolic (24hrs), Av , Min:86 , Max:138     Diastolic (24hrs), Av, Min:47, Max:68    Pulse Range: Pulse  Av  Min: 91  Max: 106  Respiration Range: Resp  Av.3  Min: 14  Max: 28  Current Pulse Ox: SpO2: 99 %  24HR Pulse Ox Range: SpO2  Av %  Min: 93 %  Max: 100 %  Patient Vitals for the past 12 hrs:   BP Temp Temp src Pulse Resp SpO2   25 0900 132/60 -- -- 100 14 99 %   25 0727 -- -- -- 93 17 100 %   25 0700 (!) 120/47 -- -- 97 18 100 %   25 0600 (!) 138/59 -- -- (!) 104 25 100 %

## 2025-01-24 NOTE — PROGRESS NOTES
Facility/Department: [address] NEURO ICU   Physical Therapy Initial Evaluation    Patient Name: Talon Walker Jr.        MRN: [id number]    : 1960    Date of Service: 2025    Chief Complaint   Patient presents with    Cerebrovascular Accident     Past Medical History:  has a past medical history of Acid reflux, Asthma, Cerebrovascular disease, Chronic cough, COPD (chronic obstructive pulmonary disease) (HCC), Dysphagia, Dyspnea, Unspecified cerebral artery occlusion with cerebral infarction, and Wheelchair dependent.  Past Surgical History:  has a past surgical history that includes craniotomy; brain surgery; knee surgery; and Gastrostomy tube placement.    Discharge Recommendations  No further therapy required at discharge.      PT Equipment Recommendations  Equipment Needed: Yes  Other: isis lift to assist with dependent transfers, which wife currently does by herself    Assessment  Pt alert, follows commands with RUE and as able with RLE; no active movement noted SHANNON/LEs with contractures noted, christine L ankle.  Pt is completely dependent at baseline for at least 8-10 years per pt's sister and son--per family, the wife has been trying to get a isis lift which would be very helpful and improve safety for both the pt and the wife during transfers.  Pt exhibits poor rehab potential, will DC PT.   Body Structures, Functions, Activity Limitations Requiring Skilled Therapeutic Intervention: Decreased functional mobility , Decreased tolerance to work activity, Decreased strength, Decreased safe awareness, Decreased endurance, Decreased balance, Decreased vision/visual deficit, Decreased ROM, Decreased fine motor control, Decreased coordination, Decreased posture     Therapy Prognosis: Poor  Decision Making: Medium Complexity  Requires PT Follow-Up: No  Activity Tolerance  Activity Tolerance: Patient limited by endurance  Safety Devices  Type of Devices: Call light within reach, Heels elevated for pressure relief,  within the past year and broke his L shoulder and leg)  Receives Help From: Family, Friend(s), Home health  Prior Level of Assist for ADLs: Needs assistance  Prior Level of Assist for Ambulation:  (pt is non-ambulatory, dependent transfer to , needs to be pushed in )  Prior Level of Assist for Transfers: Needs assistance (dependent)  Active : No  Additional Comments: per pt's sister and son, he's been dependent ~8 years--wife trying to get isis lift which would be very helpful since he's a dependent transfer bed to  and wife does the transfer, per pt and pt's family    Vision/Hearing  Vision  Vision: Impaired (R gaze preference--able to look L beyond midline with cues)  Hearing  Hearing: Within functional limits    Objective  Orientation  Overall Orientation Status: Within Functional Limits  Orientation Level: Oriented X4  Cognition  Overall Cognitive Status: Exceptions  Arousal/Alertness: Appears intact  Following Commands: Appears intact  Attention Span: Attends with cues to redirect  Memory: Decreased recall of recent events  Safety Judgement: Decreased awareness of need for assistance, Decreased awareness of need for safety  Problem Solving: Assistance required to generate solutions, Assistance required to implement solutions, Assistance required to identify errors made, Assistance required to correct errors made, Decreased awareness of errors  Insights: Impaired  Initiation: Requires cues for some  Sequencing: Requires cues for some    Observation/Palpation  Posture: Poor    PROM RLE (degrees)  RLE PROM: Exceptions--hip/knee WFL; dorsiflexion lacks ~10 degrees from neutral  PROM LLE (degrees)  LLE PROM: Exceptions--hip/knee decreased flexion/painful; plantarflexion contracture at 30 degrees  PROM RUE (degrees)  RUE PROM: Exceptions--shoulder elevation ~100 degrees and painful; elbow distal WFL  PROM LUE (degrees)  LUE PROM: Exceptions--shoulder elevation ~70 degrees and painful elbow flexion 90

## 2025-01-24 NOTE — PROGRESS NOTES
Physician Progress Note      PATIENT:               ADONAY VAUGHAN  [facility] #:                  [id number]  :                       1960  ADMIT DATE:       2025 9:02 AM  DISCH DATE:  RESPONDING  PROVIDER #:        HAWA MAX          QUERY TEXT:    Patient admitted with altered mental status and respiratory distress   transferred from OSH.  Noted documentation of likely type II MI in [facility]   . In order to support the diagnosis of type II MI, please refer to 4th   universal definition of MI below and include additional clinical indicators in   your documentation.  Or please document if the diagnosis of type II MI has   been ruled out after study.  ?  The medical record reflects the following:  Risk Factors: COPD, T2DM, CHF, Hx of CVA s/p craniotomy and left-sided   deficits  Clinical Indicators: Trop 59>81> 83>75,  EKG; Sinus tachycardia, Left axis   deviation, Low voltage QRS, Inferior infarct (cited on or before   2024), Abnormal ECG, CT perfusion w/ large area of hypoperfusion in   left MCA distribution. Lactic acid 5.3>3.4,  ABG; pH 7.284/pCO2 45.4/   BiCarb 21.6/O2sat 90.0/ neg base ex 5.0/FiO2 40  Treatment: Received 3L NS PTA, admitted to [facility] ICU intubated on vent for   respiratory failure    Fourth Universal Definition of Myocardial Infarction:  Clearly separates MI   from myocardial injury. Patients with elevated blood troponin levels but   without clinical evidence of ischemia are said to have had a myocardial   injury.? To have a myocardial infarction requires both an elevated troponin   blood test along with at least one of the following:  - Symptoms of acute myocardial ischemia (Types 1 - 5 MI)  - Clinical evidence of ischemia, as evidenced in an electrocardiogram (EKG)   showing new ischemic changes (Type 1, Type 2, Type 3, or Type 4a MI)  - Development of pathological Q waves (Types 1 - 5 MI)  - Imaging evidence of new loss of viable myocardium or new regional wall

## 2025-01-24 NOTE — PROGRESS NOTES
Order obtained for extubation.  SpO2 of 99 on 40% FiO2.   Patient extubated and placed on 4 liters/min via nasal cannula.   Post extubation SpO2 is 99% with HR  106 bpm and RR 20 breaths/min.    Patient had moderate cough that was productive of yellow sputum.  Extubation Well tolerated by patient..   Breath Sounds: clear    Candelaria Washburn RCP  9:18 AM

## 2025-01-24 NOTE — PLAN OF CARE
Problem: Respiratory - Adult  Goal: Achieves optimal ventilation and oxygenation  1/24/2025 0736 by Candelaria Washburn RCP  Outcome: Progressing  Flowsheets  Taken 1/24/2025 0727 by Candelaria Washburn RCP  Achieves optimal ventilation and oxygenation:   Assess for changes in respiratory status   Assess for changes in mentation and behavior   Position to facilitate oxygenation and minimize respiratory effort   Oxygen supplementation based on oxygen saturation or arterial blood gases   Encourage broncho-pulmonary hygiene including cough, deep breathe, incentive spirometry   Assess the need for suctioning and aspirate as needed   Assess and instruct to report shortness of breath or any respiratory difficulty   Respiratory therapy support as indicated

## 2025-01-24 NOTE — PLAN OF CARE
Problem: Chronic Conditions and Co-morbidities  Goal: Patient's chronic conditions and co-morbidity symptoms are monitored and maintained or improved  Outcome: Progressing  Flowsheets (Taken 1/24/2025 0400 by Kaylin Sherman RN)  Care Plan - Patient's Chronic Conditions and Co-Morbidity Symptoms are Monitored and Maintained or Improved: Monitor and assess patient's chronic conditions and comorbid symptoms for stability, deterioration, or improvement     Problem: Discharge Planning  Goal: Discharge to home or other facility with appropriate resources  Outcome: Progressing  Flowsheets (Taken 1/24/2025 0400 by Kaylin Sherman RN)  Discharge to home or other facility with appropriate resources: Identify barriers to discharge with patient and caregiver     Problem: Safety - Adult  Goal: Free from fall injury  Outcome: Progressing     Problem: Respiratory - Adult  Goal: Achieves optimal ventilation and oxygenation  1/24/2025 1541 by Rashel Seaman RN  Outcome: Progressing  1/24/2025 0736 by Candelaria Washburn RCP  Outcome: Progressing  Flowsheets  Taken 1/24/2025 0727 by Candelaria Washburn RCP  Achieves optimal ventilation and oxygenation:   Assess for changes in respiratory status   Assess for changes in mentation and behavior   Position to facilitate oxygenation and minimize respiratory effort   Oxygen supplementation based on oxygen saturation or arterial blood gases   Encourage broncho-pulmonary hygiene including cough, deep breathe, incentive spirometry   Assess the need for suctioning and aspirate as needed   Assess and instruct to report shortness of breath or any respiratory difficulty   Respiratory therapy support as indicated  Taken 1/24/2025 0400 by Kaylin Sherman RN  Achieves optimal ventilation and oxygenation: Assess for changes in respiratory status  Taken 1/24/2025 0000 by Kaylin Sherman RN  Achieves optimal ventilation and oxygenation: Assess for changes in respiratory status

## 2025-01-24 NOTE — PROGRESS NOTES
Facility/Department: [address] NEURO ICU   CLINICAL BEDSIDE SWALLOW EVALUATION    NAME: Talon Walker Jr.  : 1960  MRN: [id number]    ADMISSION DATE: 2025  ADMITTING DIAGNOSIS: has Gastroesophageal reflux disease; Allergic rhinitis; Controlled type 2 diabetes mellitus with stage 3 chronic kidney disease, without long-term current use of insulin (HCC); COPD, severe (HCC); Dysthymia; Acute cerebrovascular accident (HCC); Left-sided weakness; Lower leg edema; Multifocal pneumonia; History of recent pneumonia; Anemia in chronic renal disease; Acute respiratory failure with hypercapnia; and Abnormal EEG on their problem list.    Recent Chest Xray/CT of Chest:   25 IMPRESSION:  No significant interval change.    Date of Eval: 2025  Evaluating Therapist: YOUSIF Davalos    Current Diet level:   NPO    Primary Complaint   The patient is a 64 y.o. male presented with PMH COPD, T2DM, CHF, prior CVA s/p craniotomy and residual left-sided deficits who presented with altered mental status and respiratory distress.      He initially presented to the outside hospital after being found down by his wife for an unknown amount of time. Last known well night prior to admission. EMS reports he was agonal breathing and not responsive. Blood sugar at that time 310. 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 Unresponsive for ED staff, GCS 3,tachypneic, pupils fixed and dilated.      Patient has signed DNR CCA paperwork. ER physician at outside hospital spoke with patient's wife and updated on severity of condition and wife asked that his wishes be honored. Nursing then spoke w/ patient's son, who is documented POA. Son asked that the patient's DNR CCA be reversed and have him intubated. He would like the patient to be full code but \"does not want CPR to extend beyond 10 minutes\".      He received Mannitol, Flagyl, and Levaquin

## 2025-01-24 NOTE — PROGRESS NOTES
Facility/Department: [address] NEURO ICU  Initial Speech/Language/Cognitive Assessment    NAME: Talon Walker Jr.  : 1960   MRN: [id number]  ADMISSION DATE: 2025  ADMITTING DIAGNOSIS: has Gastroesophageal reflux disease; Allergic rhinitis; Controlled type 2 diabetes mellitus with stage 3 chronic kidney disease, without long-term current use of insulin (HCC); COPD, severe (HCC); Dysthymia; Acute cerebrovascular accident (HCC); Left-sided weakness; Lower leg edema; Multifocal pneumonia; History of recent pneumonia; Anemia in chronic renal disease; Acute respiratory failure with hypercapnia; and Abnormal EEG on their problem list.    Date of Eval: 2025   Evaluating Therapist: YOUSIF Davalos    RECENT RESULTS  CT OF HEAD/MRI: MRI pending    Primary Complaint:   The patient is a 64 y.o. male presented with PMH COPD, T2DM, CHF, prior CVA s/p craniotomy and residual left-sided deficits who presented with altered mental status and respiratory distress.      He initially presented to the outside hospital after being found down by his wife for an unknown amount of time. Last known well night prior to admission. EMS reports he was agonal breathing and not responsive. Blood sugar at that time 310. 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 Unresponsive for ED staff, GCS 3,tachypneic, pupils fixed and dilated.      Patient has signed DNR CCA paperwork. ER physician at outside hospital spoke with patient's wife and updated on severity of condition and wife asked that his wishes be honored. Nursing then spoke w/ patient's son, who is documented POA. Son asked that the patient's DNR CCA be reversed and have him intubated. He would like the patient to be full code but \"does not want CPR to extend beyond 10 minutes\".      He received Mannitol, Flagyl, and Levaquin prior to arrival. He has received 3L NS total. He is not on any

## 2025-01-24 NOTE — PROGRESS NOTES
Physician Progress Note      PATIENT:               ADONAY VAUGHAN  CSN #:                  [id number]  :                       1960  ADMIT DATE:       2025 9:02 AM  DISCH DATE:  RESPONDING  PROVIDER #:        HAWA MAX          QUERY TEXT:    Pt admitted with CVA s/p right craniotomy /respiratory failure. Pt noted to   have RLL aspiration pneumonia and WBC 22.7,Lactic acid 5.3>3.4,TMax 100.1, HR   105>114, RR 25>28>31, BP 90/59.  . If possible, please document in the progress notes and discharge summary if   you are evaluating and /or treating any of the following:  The medical record reflects the following:  Risk Factors: CVA s/p right craniotomy /PNA  Clinical Indicators: CXR; Airspace infiltrates throughout the right lung most   prevalent in the right lower lobe compatible with pneumonia.  WBC 22.7>13.2   Abs. Neutrophil 10.39, Neutrophil (Seg) 78, Lactic acid 5.3>3.4,TMax 100.1, HR   105>114, RR 25>28>31, BP 90/59. , , CO2 18,  ABG; pH   7.284/pCO2 45.4/ BiCarb 21.6/O2sat 90.0/ neg base ex 5.0/FiO2 40.  Trop 59>81>   83>75.  Treatment: Cefepime IV,  Vancomycin IV, lactated ringers infusion 75ml/hr,   Blood cultures pending, albuterol neb, Duoneb, intubated on vent FiO2 40.    Thank you, Please epic in box message if questions  Melissa Alston RN CDS  Options provided:  -- Sepsis due to PNA present on admission  -- Sepsis due to PNA present on admission, now resolved  -- pneumonia without Sepsis  -- Other - I will add my own diagnosis  -- Disagree - Not applicable / Not valid  -- Disagree - Clinically unable to determine / Unknown  -- Refer to Clinical Documentation Reviewer    PROVIDER RESPONSE TEXT:    This patient has Sepsis due to PNA which was present on admission.    Query created by: Melissa Iqbal on 2025 12:12 PM      Electronically signed by:  HAWA MAX 2025 12:16 PM

## 2025-01-25 ENCOUNTER — APPOINTMENT (OUTPATIENT)
Dept: GENERAL RADIOLOGY | Age: 65
DRG: 720 | End: 2025-01-25
Payer: MEDICAID

## 2025-01-25 LAB
ANION GAP SERPL CALCULATED.3IONS-SCNC: 11 MMOL/L (ref 9–16)
BASOPHILS # BLD: <0.03 K/UL (ref 0–0.2)
BASOPHILS NFR BLD: 0 % (ref 0–2)
BUN SERPL-MCNC: 22 MG/DL (ref 8–23)
CALCIUM SERPL-MCNC: 8.9 MG/DL (ref 8.6–10.4)
CHLORIDE SERPL-SCNC: 113 MMOL/L (ref 98–107)
CO2 SERPL-SCNC: 21 MMOL/L (ref 20–31)
CREAT SERPL-MCNC: 2.1 MG/DL (ref 0.7–1.2)
EOSINOPHIL # BLD: 0.06 K/UL (ref 0–0.44)
EOSINOPHILS RELATIVE PERCENT: 1 % (ref 1–4)
ERYTHROCYTE [DISTWIDTH] IN BLOOD BY AUTOMATED COUNT: 16.2 % (ref 11.8–14.4)
GFR, ESTIMATED: 35 ML/MIN/1.73M2
GLUCOSE BLD-MCNC: 90 MG/DL (ref 75–110)
GLUCOSE BLD-MCNC: 98 MG/DL (ref 75–110)
GLUCOSE SERPL-MCNC: 96 MG/DL (ref 74–99)
HCT VFR BLD AUTO: 31.3 % (ref 40.7–50.3)
HGB BLD-MCNC: 9.5 G/DL (ref 13–17)
IMM GRANULOCYTES # BLD AUTO: 0.06 K/UL (ref 0–0.3)
IMM GRANULOCYTES NFR BLD: 1 %
LACTIC ACID, WHOLE BLOOD: 0.8 MMOL/L (ref 0.7–2.1)
LACTIC ACID, WHOLE BLOOD: 0.9 MMOL/L (ref 0.7–2.1)
LACTIC ACID, WHOLE BLOOD: 1.3 MMOL/L (ref 0.7–2.1)
LACTIC ACID, WHOLE BLOOD: 1.4 MMOL/L (ref 0.7–2.1)
LYMPHOCYTES NFR BLD: 1 K/UL (ref 1.1–3.7)
LYMPHOCYTES RELATIVE PERCENT: 12 % (ref 24–43)
MCH RBC QN AUTO: 27.8 PG (ref 25.2–33.5)
MCHC RBC AUTO-ENTMCNC: 30.4 G/DL (ref 28.4–34.8)
MCV RBC AUTO: 91.5 FL (ref 82.6–102.9)
MICROORGANISM SPEC CULT: NORMAL
MICROORGANISM/AGENT SPEC: NORMAL
MONOCYTES NFR BLD: 0.35 K/UL (ref 0.1–1.2)
MONOCYTES NFR BLD: 4 % (ref 3–12)
NEUTROPHILS NFR BLD: 82 % (ref 36–65)
NEUTS SEG NFR BLD: 6.91 K/UL (ref 1.5–8.1)
NRBC BLD-RTO: 0 PER 100 WBC
PLATELET # BLD AUTO: 152 K/UL (ref 138–453)
PMV BLD AUTO: 10.6 FL (ref 8.1–13.5)
POTASSIUM SERPL-SCNC: 3.8 MMOL/L (ref 3.7–5.3)
RBC # BLD AUTO: 3.42 M/UL (ref 4.21–5.77)
RBC # BLD: ABNORMAL 10*6/UL
SERVICE CMNT-IMP: NORMAL
SODIUM SERPL-SCNC: 145 MMOL/L (ref 136–145)
SPECIMEN DESCRIPTION: NORMAL
WBC OTHER # BLD: 8.4 K/UL (ref 3.5–11.3)

## 2025-01-25 PROCEDURE — 99223 1ST HOSP IP/OBS HIGH 75: CPT | Performed by: INTERNAL MEDICINE

## 2025-01-25 PROCEDURE — 2500000003 HC RX 250 WO HCPCS

## 2025-01-25 PROCEDURE — 6370000000 HC RX 637 (ALT 250 FOR IP)

## 2025-01-25 PROCEDURE — 2700000000 HC OXYGEN THERAPY PER DAY

## 2025-01-25 PROCEDURE — 80048 BASIC METABOLIC PNL TOTAL CA: CPT

## 2025-01-25 PROCEDURE — 6360000002 HC RX W HCPCS

## 2025-01-25 PROCEDURE — 94761 N-INVAS EAR/PLS OXIMETRY MLT: CPT

## 2025-01-25 PROCEDURE — 83605 ASSAY OF LACTIC ACID: CPT

## 2025-01-25 PROCEDURE — 82947 ASSAY GLUCOSE BLOOD QUANT: CPT

## 2025-01-25 PROCEDURE — 2060000000 HC ICU INTERMEDIATE R&B

## 2025-01-25 PROCEDURE — 92611 MOTION FLUOROSCOPY/SWALLOW: CPT

## 2025-01-25 PROCEDURE — 74018 RADEX ABDOMEN 1 VIEW: CPT

## 2025-01-25 PROCEDURE — 36415 COLL VENOUS BLD VENIPUNCTURE: CPT

## 2025-01-25 PROCEDURE — 74230 X-RAY XM SWLNG FUNCJ C+: CPT

## 2025-01-25 PROCEDURE — 2580000003 HC RX 258

## 2025-01-25 PROCEDURE — 94640 AIRWAY INHALATION TREATMENT: CPT

## 2025-01-25 PROCEDURE — 85025 COMPLETE CBC W/AUTO DIFF WBC: CPT

## 2025-01-25 PROCEDURE — 95718 EEG PHYS/QHP 2-12 HR W/VEEG: CPT | Performed by: PSYCHIATRY & NEUROLOGY

## 2025-01-25 PROCEDURE — 95714 VEEG EA 12-26 HR UNMNTR: CPT

## 2025-01-25 RX ADMIN — HEPARIN SODIUM 5000 UNITS: 5000 INJECTION INTRAVENOUS; SUBCUTANEOUS at 22:10

## 2025-01-25 RX ADMIN — IPRATROPIUM BROMIDE AND ALBUTEROL SULFATE 1 DOSE: 2.5; .5 SOLUTION RESPIRATORY (INHALATION) at 20:30

## 2025-01-25 RX ADMIN — IPRATROPIUM BROMIDE AND ALBUTEROL SULFATE 1 DOSE: 2.5; .5 SOLUTION RESPIRATORY (INHALATION) at 16:09

## 2025-01-25 RX ADMIN — ATORVASTATIN CALCIUM 40 MG: 10 TABLET, FILM COATED ORAL at 09:41

## 2025-01-25 RX ADMIN — SODIUM CHLORIDE, PRESERVATIVE FREE 40 MG: 5 INJECTION INTRAVENOUS at 09:42

## 2025-01-25 RX ADMIN — SODIUM CHLORIDE, PRESERVATIVE FREE 10 ML: 5 INJECTION INTRAVENOUS at 22:10

## 2025-01-25 RX ADMIN — CEFEPIME 2000 MG: 2 INJECTION, POWDER, FOR SOLUTION INTRAVENOUS at 13:33

## 2025-01-25 RX ADMIN — CEFEPIME 2000 MG: 2 INJECTION, POWDER, FOR SOLUTION INTRAVENOUS at 02:53

## 2025-01-25 RX ADMIN — BUDESONIDE INHALATION 500 MCG: 0.5 SUSPENSION RESPIRATORY (INHALATION) at 20:30

## 2025-01-25 RX ADMIN — SODIUM CHLORIDE, PRESERVATIVE FREE 5 ML: 5 INJECTION INTRAVENOUS at 09:30

## 2025-01-25 RX ADMIN — ASPIRIN 81 MG 81 MG: 81 TABLET ORAL at 09:42

## 2025-01-25 RX ADMIN — IPRATROPIUM BROMIDE AND ALBUTEROL SULFATE 1 DOSE: 2.5; .5 SOLUTION RESPIRATORY (INHALATION) at 11:53

## 2025-01-25 RX ADMIN — IPRATROPIUM BROMIDE AND ALBUTEROL SULFATE 1 DOSE: 2.5; .5 SOLUTION RESPIRATORY (INHALATION) at 09:00

## 2025-01-25 RX ADMIN — HEPARIN SODIUM 5000 UNITS: 5000 INJECTION INTRAVENOUS; SUBCUTANEOUS at 05:47

## 2025-01-25 RX ADMIN — BUDESONIDE INHALATION 500 MCG: 0.5 SUSPENSION RESPIRATORY (INHALATION) at 09:00

## 2025-01-25 RX ADMIN — HEPARIN SODIUM 5000 UNITS: 5000 INJECTION INTRAVENOUS; SUBCUTANEOUS at 14:22

## 2025-01-25 ASSESSMENT — PAIN SCALES - GENERAL
PAINLEVEL_OUTOF10: 0
PAINLEVEL_OUTOF10: 0

## 2025-01-25 NOTE — PLAN OF CARE
Problem: Respiratory - Adult  Goal: Achieves optimal ventilation and oxygenation  1/24/2025 2024 by Aliya Stuart RCP  Outcome: Progressing

## 2025-01-25 NOTE — PLAN OF CARE
Problem: Chronic Conditions and Co-morbidities  Goal: Patient's chronic conditions and co-morbidity symptoms are monitored and maintained or improved  1/25/2025 1843 by Olivia Woods, RN  Outcome: Progressing     Problem: Discharge Planning  Goal: Discharge to home or other facility with appropriate resources  1/25/2025 1843 by Olivia Woods, RN  Outcome: Progressing

## 2025-01-25 NOTE — PROGRESS NOTES
Daily Progress Note  Neuro Critical Care    Patient Name: Talon Walker Jr.  Patient : 1960  Room/Bed: 0121/0121-01  Code Status: DNR-CCA  Allergies:   Allergies   Allergen Reactions    Pcn [Penicillins] Anaphylaxis    Morphine Rash    Sulfa Antibiotics Rash       CHIEF COMPLAINT:     Unresponsive     INTERVAL HISTORY    Initial Presentation (Admitted 2025):  The patient is a 64 y.o. male presented with PMH COPD, T2DM, CHF, prior CVA s/p craniotomy and residual left-sided deficits who presented with altered mental status and respiratory distress.      He initially presented to the outside hospital after being found down by his wife for an unknown amount of time. Last known well night prior to admission. EMS reports he was agonal breathing and not responsive. Blood sugar at that time 310. 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 Unresponsive for ED staff, GCS 3,tachypneic, pupils fixed and dilated.      Patient has signed DNR CCA paperwork. ER physician at outside hospital spoke with patient's wife and updated on severity of condition and wife asked that his wishes be honored. Nursing then spoke w/ patient's son, who is documented POA. Son asked that the patient's DNR CCA be reversed and have him intubated. He would like the patient to be full code but \"does not want CPR to extend beyond 10 minutes\".      He received Mannitol, Flagyl, and Levaquin prior to arrival. He has received 3L NS total. He is not on any sedation. On evaluation in the ED patient is awake, alert, following commands on the right, unable to move left at baseline. Out of window for thrombolytics. Evaluated by neuro endovascular, no plans for intervention at this time. Admitted to the neuro ICU for close monitoring of neurologic status, management of acute respiratory failure.      Hospital Course:   : Denies any pain. CPAPed well.    01/24/25 2300 (!) 131/49 -- -- 99 25 95 %   01/24/25 2200 (!) 128/94 -- -- 100 23 96 %   01/24/25 2100 108/69 -- -- (!) 102 23 98 %   01/24/25 2016 -- -- -- 100 26 97 %   01/24/25 2000 134/66 97.5 °F (36.4 °C) Oral 98 25 97 %   01/24/25 1900 (!) 131/53 -- -- (!) 102 24 96 %     Estimated body mass index is 24.96 kg/m² as calculated from the following:    Height as of this encounter: 1.753 m (5' 9\").    Weight as of this encounter: 76.7 kg (169 lb).  []<16 Severe malnutrition  []16-16.99 Moderate malnutrition  []17-18.49 Mild malnutrition  [x]18.5-24.9 Normal  []25-29.9 Overweight (not obese)  []30-34.9 Obese class 1 (Low Risk)  []35-39.9 Obese class 2 (Moderate Risk)  []>=40 Obese class 3 (High Risk)    RECENT LABS:   Lab Results   Component Value Date    WBC 8.4 01/25/2025    HGB 9.5 (L) 01/25/2025    HCT 31.3 (L) 01/25/2025     01/25/2025    CHOL 100 07/22/2024    TRIG 115 07/22/2024    HDL 39 (L) 07/22/2024    LDLDIRECT 134 (H) 05/23/2014     (H) 01/24/2025     (H) 01/24/2025     01/25/2025    K 3.8 01/25/2025     (H) 01/25/2025    CREATININE 2.1 (H) 01/25/2025    BUN 22 01/25/2025    CO2 21 01/25/2025    TSH 4.78 (H) 01/23/2025    PSA 1.40 02/02/2024    INR 1.1 01/23/2025    LABA1C 5.9 07/22/2024     24 HOUR INTAKE/OUTPUT:  Intake/Output Summary (Last 24 hours) at 1/25/2025 0631  Last data filed at 1/24/2025 1800  Gross per 24 hour   Intake 879 ml   Output 525 ml   Net 354 ml       IMAGING:   MRI brain without contrast   Final Result   1. No convincing acute intracranial abnormality.  No acute infarct.   2. Extensive chronic infarct involving the majority of the right cerebral   hemisphere.   3. Mild to moderate global parenchymal volume loss with chronic microvascular   ischemic changes.         XR CHEST PORTABLE   Final Result   No significant interval change.         CT BRAIN PERFUSION   Final Result   Large area of hypoperfusion in the left MCA distribution.         CT

## 2025-01-25 NOTE — PROCEDURES
LONG-TERM EEG-VIDEO MONITORING   CLINICAL NEUROPHYSIOLOGY LABORATORY  DEPARTMENT OF NEUROLOGY  [facility]    Patient: Talon Walker Jr.  Age: 64 y.o.  MRN: [id number]    Referring Physician: No ref. provider found  History: The patient is a 64 y.o. male with hx of right MCA stroke s/p craniotomy who presented with AMS. This long-term video-EEG monitoring study was performed to evaluate for seizures. The patient is not on neuroactive medications.   Talon Walker Jr.   Current Facility-Administered Medications   Medication Dose Route Frequency Provider Last Rate Last Admin    ipratropium 0.5 mg-albuterol 2.5 mg (DUONEB) nebulizer solution 1 Dose  1 Dose Inhalation Q4H WA RT Chao Sue MD   1 Dose at 01/25/25 0900    atorvastatin (LIPITOR) tablet 40 mg  40 mg Orogastric Daily Nima Gotti APRN - CNP   40 mg at 01/24/25 0759    budesonide (PULMICORT) nebulizer suspension 500 mcg  0.5 mg Nebulization BID RT Nima Gotti APRN - CNP   500 mcg at 01/25/25 0900    sodium chloride flush 0.9 % injection 5-40 mL  5-40 mL IntraVENous 2 times per day Nima Gotti APRN - CNP   10 mL at 01/24/25 2227    sodium chloride flush 0.9 % injection 5-40 mL  5-40 mL IntraVENous PRN Nima Gotti APRN - CNP        0.9 % sodium chloride infusion   IntraVENous PRN Nima Gotti APRN - CNP        magnesium sulfate 2000 mg in 50 mL IVPB premix  2,000 mg IntraVENous PRN Nima Gotti APRN - CNP        ondansetron (ZOFRAN-ODT) disintegrating tablet 4 mg  4 mg Oral Q8H PRN Nima Gotti APRN - CNP        Or    ondansetron (ZOFRAN) injection 4 mg  4 mg IntraVENous Q6H PRN Nima Gotti APRN - CNP        polyethylene glycol (GLYCOLAX) packet 17 g  17 g Oral Daily PRN Nima Gotti APRN - CNP        acetaminophen (TYLENOL) tablet 650 mg  650 mg Oral Q6H PRN Nima Gotti APRN - CNP        Or    acetaminophen (TYLENOL) suppository 650 mg  650 mg Rectal Q6H PRN Nima Gotti, APRN - CNP        pantoprazole (PROTONIX) 40 mg  in sodium chloride (PF) 0.9 % 10 mL injection  40 mg IntraVENous Daily Nima Gotti APRN - CNP   40 mg at 01/24/25 0800    insulin lispro (HUMALOG,ADMELOG) injection vial 0-4 Units  0-4 Units SubCUTAneous Q6H Nima Gotti APRN - CNP        glucose chewable tablet 16 g  4 tablet Oral PRN Nima Gotti APRN - CNP        dextrose bolus 10% 125 mL  125 mL IntraVENous PRN Nima Gotti APRN - CNP        Or    dextrose bolus 10% 250 mL  250 mL IntraVENous PRN Nima Gotti APRN - CNP        glucagon injection 1 mg  1 mg SubCUTAneous PRN Nima Gotti APRN - CNP        dextrose 10 % infusion   IntraVENous Continuous PRN Nima Gotti APRN - CNP        ceFEPIme (MAXIPIME) 2,000 mg in sodium chloride 0.9 % 100 mL IVPB (mini-bag)  2,000 mg IntraVENous Q12H Nima Gotti APRN - CNP   Stopped at 01/25/25 0400    heparin (porcine) injection 5,000 Units  5,000 Units SubCUTAneous 3 times per day Nima Gotti APRN - CNP   5,000 Units at 01/25/25 0547    lactated ringers infusion   IntraVENous Continuous Nima Gotti APRN - CNP 75 mL/hr at 01/24/25 2032 New Bag at 01/24/25 2032    aspirin chewable tablet 81 mg  81 mg Orogastric Daily Nima Gotti APRN - CNP   81 mg at 01/24/25 0759     Technical Description: This is a 21-channel digital EEG recording with time-locked video. Electrodes were placed in accordance with the 10-20 International System of Electrode Placement. Single lead EKG monitoring was included.    Baseline EEG Recording:  A formal baseline EEG recording was not obtained.     Day 1 - 1/23/25, starting at 12:58    Interictal EEG Samples:  The background was continuous. There was hemispheric asymmetry:    The left hemisphere was mildly disorganized without a well-sustained AP gradient, and mildly slow with excess theta and occasional delta during wakefulness.  There was a poorly modulated 7-8 Hz PDR.    The right hemisphere demonstrated continuous asymmetric slowing and attenuation of faster frequencies.  A PDR was  waning of the posterior rhythm and periods of diffuse slowing.  During stage II sleep, sleep architecture was better formed over the left hemisphere.    There were continuous higher amplitude, sharply contoured, faster frequencies over the right frontotemporal region, consistent with a breach rhythm.    Ictal EEG Recording / Patient Events: During this period the patient had no events or seizures.    Summary: During this day of recording no events were recorded. The interictal EEG was abnormal due to:  -Continuous right hemispheric asymmetric slowing  -Right frontotemporal breach rhythm    The right hemispheric slowing and right frontotemporal breach indicated an underlying structural lesion and an overlying skull defect, respectively.  No epileptiform discharges or seizures were recorded.    Monitoring was discontinued at 10:04. The EKG channel revealed significant artifact, limiting interpretation.    Melly Deleon,   Neurology/Epilepsy       Summary of EEG and Behavior: The interictal EEG was abnormal due to:  -Diffuse background slowing and disorganization, resolved as the recording progressed  -Continuous right hemispheric asymmetric slowing  -Right frontotemporal breach rhythm    Clinical Correlation: This 3 day EEG recording was abnormal. The background abnormalities indicated an initial mild encephalopathy, nonspecific to etiology, that resolved as the recording progressed.  The right hemispheric slowing and right frontotemporal breach indicated an underlying structural lesion and an overlying skull defect, respectively.  No epileptiform discharges or seizures were recorded.    Melly Deleon,   Neurology/Epilepsy

## 2025-01-25 NOTE — PROCEDURES
SLP ALL NOTES  Facility/Department: [address] NEURO ICU     MODIFIED BARIUM SWALLOW STUDY (MBSS)    NAME: Talon Walker Jr.  : 1960  MRN: [id number]    ADMISSION DATE: 2025    FINDINGS & RECOMMENDATIONS  Severe Oral-Pharyngeal Dysphagia  Recommend NPO   Alternate means of nutrition/hydration  Recommend skilled swallowing intervention with ST      ADMITTING DIAGNOSIS: has Gastroesophageal reflux disease; Allergic rhinitis; Controlled type 2 diabetes mellitus with stage 3 chronic kidney disease, without long-term current use of insulin (HCC); COPD, severe (HCC); Dysthymia; Acute cerebrovascular accident (HCC); Left-sided weakness; Lower leg edema; Multifocal pneumonia; History of recent pneumonia; Anemia in chronic renal disease; Acute respiratory failure with hypercapnia; and Abnormal EEG on their problem list.    ONSET DATE: 2025        Recent Chest Xray/CT of Chest:   XR Chest - 2025   No significant interval change in right-sided airspace opacities predominately within the right mid to lower lung.  No obvious pneumothorax or pleural effusion. Stable cardiomediastinal silhouette.       MRI Brain 2025  1. No convincing acute intracranial abnormality.  No acute infarct.  2. Extensive chronic infarct involving the majority of the right cerebral  hemisphere.  3. Mild to moderate global parenchymal volume loss with chronic microvascular ischemic changes      Date of Eval: 2025  Evaluating Therapist: Violet Abdalla, SLP    Current Diet level:   NPO      Primary Complaint   Talon Walker Jr. is a 64 y.o. male with prior CVA/craniotomy () with residual left-sided deficits  who presents to the emergency department for evaluation of altered mental status.  Per EMS patient's wife called as she found the patient unresponsive on the ground.     Patient has signed DNR CCA paperwork. ER physician at outside hospital spoke with patient's wife and updated on severity of condition and wife asked that

## 2025-01-25 NOTE — PROGRESS NOTES
Occupational Therapy      [facility]  Occupational Therapy Not Seen Note    DATE: 2025    NAME: Talon Walker Jr.  MRN: [id number]   : 1960      Patient not seen this date for Occupational Therapy due to:    Patient at baseline functional level with no acute OT needs. Will defer OT evaluation at this time. Please reorder OT if future needs arise.       Electronically signed by Miryam Mayer OT on 2025 at 8:07 AM

## 2025-01-26 PROBLEM — Z86.73 HISTORY OF STROKE: Status: ACTIVE | Noted: 2025-01-26

## 2025-01-26 PROBLEM — R93.0 ABNORMAL COMPUTED TOMOGRAPHY ANGIOGRAPHY OF HEAD: Status: ACTIVE | Noted: 2025-01-26

## 2025-01-26 PROBLEM — J69.0 ASPIRATION PNEUMONIA OF RIGHT LOWER LOBE (HCC): Status: ACTIVE | Noted: 2024-07-12

## 2025-01-26 LAB
ANION GAP SERPL CALCULATED.3IONS-SCNC: 18 MMOL/L (ref 9–16)
BASOPHILS # BLD: 0.03 K/UL (ref 0–0.2)
BASOPHILS NFR BLD: 1 % (ref 0–2)
BUN SERPL-MCNC: 24 MG/DL (ref 8–23)
CALCIUM SERPL-MCNC: 9.1 MG/DL (ref 8.6–10.4)
CHLORIDE SERPL-SCNC: 114 MMOL/L (ref 98–107)
CO2 SERPL-SCNC: 18 MMOL/L (ref 20–31)
CREAT SERPL-MCNC: 2.1 MG/DL (ref 0.7–1.2)
EOSINOPHIL # BLD: 0.08 K/UL (ref 0–0.44)
EOSINOPHILS RELATIVE PERCENT: 1 % (ref 1–4)
ERYTHROCYTE [DISTWIDTH] IN BLOOD BY AUTOMATED COUNT: 16.3 % (ref 11.8–14.4)
GFR, ESTIMATED: 35 ML/MIN/1.73M2
GLUCOSE BLD-MCNC: 88 MG/DL (ref 75–110)
GLUCOSE BLD-MCNC: 90 MG/DL (ref 75–110)
GLUCOSE BLD-MCNC: 96 MG/DL (ref 75–110)
GLUCOSE BLD-MCNC: 99 MG/DL (ref 75–110)
GLUCOSE SERPL-MCNC: 96 MG/DL (ref 74–99)
HCT VFR BLD AUTO: 32.5 % (ref 40.7–50.3)
HGB BLD-MCNC: 9.6 G/DL (ref 13–17)
IMM GRANULOCYTES # BLD AUTO: 0.04 K/UL (ref 0–0.3)
IMM GRANULOCYTES NFR BLD: 1 %
LACTIC ACID, WHOLE BLOOD: 1.5 MMOL/L (ref 0.7–2.1)
LYMPHOCYTES NFR BLD: 0.86 K/UL (ref 1.1–3.7)
LYMPHOCYTES RELATIVE PERCENT: 13 % (ref 24–43)
MCH RBC QN AUTO: 27.6 PG (ref 25.2–33.5)
MCHC RBC AUTO-ENTMCNC: 29.5 G/DL (ref 28.4–34.8)
MCV RBC AUTO: 93.4 FL (ref 82.6–102.9)
MONOCYTES NFR BLD: 0.25 K/UL (ref 0.1–1.2)
MONOCYTES NFR BLD: 4 % (ref 3–12)
NEUTROPHILS NFR BLD: 80 % (ref 36–65)
NEUTS SEG NFR BLD: 5.22 K/UL (ref 1.5–8.1)
NRBC BLD-RTO: 0 PER 100 WBC
PLATELET # BLD AUTO: 167 K/UL (ref 138–453)
PMV BLD AUTO: 10.4 FL (ref 8.1–13.5)
POTASSIUM SERPL-SCNC: 3.9 MMOL/L (ref 3.7–5.3)
RBC # BLD AUTO: 3.48 M/UL (ref 4.21–5.77)
RBC # BLD: ABNORMAL 10*6/UL
SODIUM SERPL-SCNC: 150 MMOL/L (ref 136–145)
WBC OTHER # BLD: 6.5 K/UL (ref 3.5–11.3)

## 2025-01-26 PROCEDURE — 6360000002 HC RX W HCPCS

## 2025-01-26 PROCEDURE — 2060000000 HC ICU INTERMEDIATE R&B

## 2025-01-26 PROCEDURE — 2500000003 HC RX 250 WO HCPCS

## 2025-01-26 PROCEDURE — 94761 N-INVAS EAR/PLS OXIMETRY MLT: CPT

## 2025-01-26 PROCEDURE — 6370000000 HC RX 637 (ALT 250 FOR IP)

## 2025-01-26 PROCEDURE — 36415 COLL VENOUS BLD VENIPUNCTURE: CPT

## 2025-01-26 PROCEDURE — 83605 ASSAY OF LACTIC ACID: CPT

## 2025-01-26 PROCEDURE — 2700000000 HC OXYGEN THERAPY PER DAY

## 2025-01-26 PROCEDURE — 2580000003 HC RX 258

## 2025-01-26 PROCEDURE — 85025 COMPLETE CBC W/AUTO DIFF WBC: CPT

## 2025-01-26 PROCEDURE — 99233 SBSQ HOSP IP/OBS HIGH 50: CPT | Performed by: INTERNAL MEDICINE

## 2025-01-26 PROCEDURE — 94640 AIRWAY INHALATION TREATMENT: CPT

## 2025-01-26 PROCEDURE — 80048 BASIC METABOLIC PNL TOTAL CA: CPT

## 2025-01-26 PROCEDURE — 95714 VEEG EA 12-26 HR UNMNTR: CPT

## 2025-01-26 PROCEDURE — 82947 ASSAY GLUCOSE BLOOD QUANT: CPT

## 2025-01-26 PROCEDURE — 99254 IP/OBS CNSLTJ NEW/EST MOD 60: CPT | Performed by: NURSE PRACTITIONER

## 2025-01-26 RX ORDER — DIPHENHYDRAMINE HYDROCHLORIDE 50 MG/ML
10 INJECTION INTRAMUSCULAR; INTRAVENOUS ONCE
Status: COMPLETED | OUTPATIENT
Start: 2025-01-26 | End: 2025-01-26

## 2025-01-26 RX ORDER — DEXTROSE MONOHYDRATE AND SODIUM CHLORIDE 5; .45 G/100ML; G/100ML
INJECTION, SOLUTION INTRAVENOUS CONTINUOUS
Status: ACTIVE | OUTPATIENT
Start: 2025-01-26 | End: 2025-01-27

## 2025-01-26 RX ORDER — HALOPERIDOL 5 MG/ML
1 INJECTION INTRAMUSCULAR
Status: DISCONTINUED | OUTPATIENT
Start: 2025-01-26 | End: 2025-01-26

## 2025-01-26 RX ORDER — METRONIDAZOLE 500 MG/100ML
500 INJECTION, SOLUTION INTRAVENOUS EVERY 8 HOURS
Status: DISCONTINUED | OUTPATIENT
Start: 2025-01-26 | End: 2025-01-30

## 2025-01-26 RX ADMIN — SODIUM CHLORIDE, POTASSIUM CHLORIDE, SODIUM LACTATE AND CALCIUM CHLORIDE: 600; 310; 30; 20 INJECTION, SOLUTION INTRAVENOUS at 01:55

## 2025-01-26 RX ADMIN — CEFEPIME 2000 MG: 2 INJECTION, POWDER, FOR SOLUTION INTRAVENOUS at 01:59

## 2025-01-26 RX ADMIN — HEPARIN SODIUM 5000 UNITS: 5000 INJECTION INTRAVENOUS; SUBCUTANEOUS at 22:00

## 2025-01-26 RX ADMIN — SODIUM CHLORIDE, PRESERVATIVE FREE 40 MG: 5 INJECTION INTRAVENOUS at 08:19

## 2025-01-26 RX ADMIN — SODIUM CHLORIDE, PRESERVATIVE FREE 10 ML: 5 INJECTION INTRAVENOUS at 22:01

## 2025-01-26 RX ADMIN — CEFEPIME 2000 MG: 2 INJECTION, POWDER, FOR SOLUTION INTRAVENOUS at 12:21

## 2025-01-26 RX ADMIN — HEPARIN SODIUM 5000 UNITS: 5000 INJECTION INTRAVENOUS; SUBCUTANEOUS at 06:21

## 2025-01-26 RX ADMIN — METRONIDAZOLE 500 MG: 500 INJECTION, SOLUTION INTRAVENOUS at 11:14

## 2025-01-26 RX ADMIN — IPRATROPIUM BROMIDE AND ALBUTEROL SULFATE 1 DOSE: 2.5; .5 SOLUTION RESPIRATORY (INHALATION) at 19:35

## 2025-01-26 RX ADMIN — IPRATROPIUM BROMIDE AND ALBUTEROL SULFATE 1 DOSE: 2.5; .5 SOLUTION RESPIRATORY (INHALATION) at 12:14

## 2025-01-26 RX ADMIN — DEXTROSE AND SODIUM CHLORIDE: 5; 450 INJECTION, SOLUTION INTRAVENOUS at 13:59

## 2025-01-26 RX ADMIN — IPRATROPIUM BROMIDE AND ALBUTEROL SULFATE 1 DOSE: 2.5; .5 SOLUTION RESPIRATORY (INHALATION) at 08:50

## 2025-01-26 RX ADMIN — METRONIDAZOLE 500 MG: 500 INJECTION, SOLUTION INTRAVENOUS at 17:46

## 2025-01-26 RX ADMIN — HEPARIN SODIUM 5000 UNITS: 5000 INJECTION INTRAVENOUS; SUBCUTANEOUS at 14:00

## 2025-01-26 RX ADMIN — DIPHENHYDRAMINE HYDROCHLORIDE 10 MG: 50 INJECTION INTRAMUSCULAR; INTRAVENOUS at 22:00

## 2025-01-26 RX ADMIN — IPRATROPIUM BROMIDE AND ALBUTEROL SULFATE 1 DOSE: 2.5; .5 SOLUTION RESPIRATORY (INHALATION) at 16:24

## 2025-01-26 RX ADMIN — SODIUM CHLORIDE, PRESERVATIVE FREE 10 ML: 5 INJECTION INTRAVENOUS at 08:21

## 2025-01-26 ASSESSMENT — PAIN SCALES - GENERAL
PAINLEVEL_OUTOF10: 0
PAINLEVEL_OUTOF10: 0

## 2025-01-26 NOTE — PLAN OF CARE
Problem: Chronic Conditions and Co-morbidities  Goal: Patient's chronic conditions and co-morbidity symptoms are monitored and maintained or improved  1/26/2025 0126 by Rossy Garcia RN  Outcome: Progressing  1/26/2025 0125 by Rossy Garcia RN  Outcome: Progressing  1/25/2025 1843 by Olivia Woods RN  Outcome: Progressing     Problem: Discharge Planning  Goal: Discharge to home or other facility with appropriate resources  1/26/2025 0126 by Rossy Garcia RN  Outcome: Progressing  1/26/2025 0125 by Rossy Garcia RN  Outcome: Progressing  1/25/2025 1843 by Olivia Woods RN  Outcome: Progressing     Problem: Safety - Adult  Goal: Free from fall injury  1/26/2025 0126 by Rossy Garcia RN  Outcome: Progressing  1/26/2025 0125 by Rossy Garcia RN  Outcome: Progressing     Problem: Respiratory - Adult  Goal: Achieves optimal ventilation and oxygenation  1/26/2025 0126 by Rossy Garcia RN  Outcome: Progressing  1/26/2025 0125 by Rossy Garcia RN  Outcome: Progressing     Problem: Pain  Goal: Verbalizes/displays adequate comfort level or baseline comfort level  1/26/2025 0126 by Rossy Garcia RN  Outcome: Progressing  1/26/2025 0125 by Rossy Garcia RN  Outcome: Progressing     Problem: Skin/Tissue Integrity  Goal: Skin integrity remains intact  Description: 1.  Monitor for areas of redness and/or skin breakdown  2.  Assess vascular access sites hourly  3.  Every 4-6 hours minimum:  Change oxygen saturation probe site  4.  Every 4-6 hours:  If on nasal continuous positive airway pressure, respiratory therapy assess nares and determine need for appliance change or resting period  1/26/2025 0126 by Rossy Garcia RN  Outcome: Progressing  1/26/2025 0125 by Rossy Garcia RN  Outcome: Progressing     Problem: ABCDS Injury Assessment  Goal: Absence of physical injury  1/26/2025 0126 by Rossy Garcia RN  Outcome: Progressing  1/26/2025 0125 by Rossy Garcia  RN  Outcome: Progressing

## 2025-01-26 NOTE — PROGRESS NOTES
[facility]  Internal Medicine Teaching Residency Program  Inpatient Daily Progress Note  ______________________________________________________________________________    Patient: Talon Walker Jr.  YOB: 1960   MRN:[id number]    Acct: [id number]     Room: 0141/0141-01  Admit date: 1/23/2025  Today's date: 01/26/25  Number of days in the hospital: 3    SUBJECTIVE   Admitting Diagnosis: Acute respiratory failure with hypercapnia  CC: Loss of consciousness  Pt examined at bedside. Chart & results reviewed.   Patient remained afebrile and was maintaining MAP above 80.  He is maintaining saturation of 97% on 3 L oxygen via nasal cannula.His blood sugar is well-controlled and does not require any insulin.  Patient was coughing but was unable to bring up the sputum.  MRSA DNA probe: Negative  Respiratory culture: No growth  Blood culture: No growth so far  Review of Systems   Constitutional:  Negative for chills and fever.   HENT:  Positive for trouble swallowing. Negative for sore throat.    Respiratory:  Positive for cough and shortness of breath. Negative for chest tightness, wheezing and stridor.    Cardiovascular:  Negative for chest pain, palpitations and leg swelling.   Gastrointestinal:  Negative for constipation, diarrhea, nausea and vomiting.   Genitourinary:  Negative for dysuria.   Musculoskeletal:  Positive for gait problem.   Neurological:  Positive for weakness. Negative for seizures and numbness.   Psychiatric/Behavioral:  Negative for confusion.        BRIEF HISTORY     The patient is a 64 y.o. male with past medical history of  COPD  CHF  Right CVA s/p craniotomy(residual left-sided deficit)  Patient was initially admitted under neurocritical care.  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  CTA head and neck  to dissection or vasculitis.     CT CHEST PULMONARY EMBOLISM W CONTRAST    Result Date: 1/23/2025  1. No evidence of pulmonary embolism. 2. Extensive ground-glass opacities within the posterior aspect of the right lung and minimally within the left lung base, which may be secondary to infection, aspiration, and less likely atelectasis.  Recommend critical correlation. 3. Prominent mediastinal lymph nodes, similar to prior, and likely reactive.     CT Head WO Contrast    Result Date: 1/23/2025  1. No acute intracranial abnormality. 2. Extensive right cerebral encephalomalacia. 3. Previous right craniotomy.       ASSESSMENT & PLAN     ASSESSMENT / PLAN:     Principal Problem:    Acute respiratory failure with hypercapnia  Active Problems:    Acute cerebrovascular accident (HCC)    Abnormal EEG  Resolved Problems:    * No resolved hospital problems. *    IMPRESSION  This is a 64 y.o. male who presented with loss of consciousness and was admitted for acute CVA     Concern for CVA:  -Ruled out  -CT head and neck showed multisegmental intracranial vessel occlusion consistent with vasculitis.  Endovascular plan no intervention  -CT head without contrast showed no acute intracranial abnormality.  It only demonstrated extensive right cerebral and [place]  -MRI brain showed no evidence acute intracranial abnormality, no acute infarct  -LTME was unremarkable for any seizures  -Continue aspirin 81 mg and Lipitor 40 mg for secondary stroke prevention     Acute hypoxic and hypercapnic respiratory failure:  COPD  - patient was intubated on 1/23 and extubated on 1/24  -Continue Pulmicort nebulization twice daily and DuoNeb every 4 hours  -Wean down supplemental oxygen  -Maintain saturation up to 92%     Concern of aspiration pneumonia:  -CT chest showed groundglass opacities within the posterior aspect of right lung and minimally within the left lung base  -MRSA DNA probe negative  -Respiratory culture showed no growth  -Patient on

## 2025-01-26 NOTE — PLAN OF CARE
Problem: Chronic Conditions and Co-morbidities  Goal: Patient's chronic conditions and co-morbidity symptoms are monitored and maintained or improved  Outcome: Progressing     Problem: Discharge Planning  Goal: Discharge to home or other facility with appropriate resources  Outcome: Progressing     Problem: Safety - Adult  Goal: Free from fall injury  Outcome: Progressing     Problem: Respiratory - Adult  Goal: Achieves optimal ventilation and oxygenation  Outcome: Progressing     Problem: Pain  Goal: Verbalizes/displays adequate comfort level or baseline comfort level  Outcome: Progressing     Problem: Skin/Tissue Integrity  Goal: Skin integrity remains intact  Description: 1.  Monitor for areas of redness and/or skin breakdown  2.  Assess vascular access sites hourly  3.  Every 4-6 hours minimum:  Change oxygen saturation probe site  4.  Every 4-6 hours:  If on nasal continuous positive airway pressure, respiratory therapy assess nares and determine need for appliance change or resting period  Outcome: Progressing     Problem: ABCDS Injury Assessment  Goal: Absence of physical injury  Outcome: Progressing

## 2025-01-26 NOTE — CONSULTS
NEUROLOGY INPATIENT CONSULT NOTE      1/26/2025     Reason for Consult: multisegmental intracranial occlusion    Requesting Provider: Ivan Plaza        I had the pleasure of seeing your patient as a neurology consultation. As you would recall Talon Walker Jr. is a  64 y.o. male with H/O COPD, DM, CHF, prior stroke status post craniotomy with residual left-sided deficits, who was admitted on 1/23/2025 with Acute cerebrovascular accident (HCC) [I63.9]  NSTEMI (non-ST elevation myocardial infarction) (HCC) [I21.4]  Acute respiratory failure with hypercapnia [J96.02]  Aspiration pneumonia of right lower lobe, unspecified aspiration pneumonia type ([facility]) [J69.0].      The patient presented to OSH after being found down by his wife for an unknown amount of time.  He had agonal breathing and was less responsive than normal, blood sugar 310.  CT head was done showing extensive right cerebral encephalomalacia, CTA head and neck with multisegmental intracranial vessel occlusion, findings concerning for vasculitis/nicholas nicholas.  He was a DNR CCA but the POA reversed this decision and chose to have the patient intubated.  He received mannitol, Flagyl, and Levaquin prior to arrival.  He was outside of the window for any thrombolytics. He was admitted to the neuro ICU for close monitoring of neurologic status and management of acute respiratory failure.  He was ultimately extubated on 1/24 and CODE STATUS was changed back to DNR CCA with no intubation.  MRI brain was done showing no acute stroke.  EEG with no seizure events recorded.  He was transferred out of the ICU under the care of medicine on 1/25 and neurology is asked to consult for continuation of care regarding patient's multisegmental intracranial stenosis/occlusion.  He was continued on aspirin and statin daily.      No current facility-administered medications on file prior to encounter.     Current Outpatient Medications on File Prior to Encounter   Medication

## 2025-01-27 ENCOUNTER — APPOINTMENT (OUTPATIENT)
Dept: GENERAL RADIOLOGY | Age: 65
DRG: 720 | End: 2025-01-27
Payer: MEDICAID

## 2025-01-27 LAB
ANION GAP SERPL CALCULATED.3IONS-SCNC: 13 MMOL/L (ref 9–16)
BASOPHILS # BLD: <0.03 K/UL (ref 0–0.2)
BASOPHILS NFR BLD: 0 % (ref 0–2)
BUN SERPL-MCNC: 26 MG/DL (ref 8–23)
CALCIUM SERPL-MCNC: 8.3 MG/DL (ref 8.6–10.4)
CHLORIDE SERPL-SCNC: 114 MMOL/L (ref 98–107)
CO2 SERPL-SCNC: 21 MMOL/L (ref 20–31)
CREAT SERPL-MCNC: 2 MG/DL (ref 0.7–1.2)
EKG ATRIAL RATE: 101 BPM
EKG ATRIAL RATE: 102 BPM
EKG P-R INTERVAL: 144 MS
EKG P-R INTERVAL: 152 MS
EKG Q-T INTERVAL: 388 MS
EKG Q-T INTERVAL: 400 MS
EKG QRS DURATION: 74 MS
EKG QRS DURATION: 76 MS
EKG QTC CALCULATION (BAZETT): 503 MS
EKG QTC CALCULATION (BAZETT): 521 MS
EKG R AXIS: -54 DEGREES
EKG R AXIS: -55 DEGREES
EKG T AXIS: 65 DEGREES
EKG T AXIS: 69 DEGREES
EKG VENTRICULAR RATE: 101 BPM
EKG VENTRICULAR RATE: 102 BPM
EOSINOPHIL # BLD: 0.11 K/UL (ref 0–0.44)
EOSINOPHILS RELATIVE PERCENT: 2 % (ref 1–4)
ERYTHROCYTE [DISTWIDTH] IN BLOOD BY AUTOMATED COUNT: 16.2 % (ref 11.8–14.4)
GFR, ESTIMATED: 37 ML/MIN/1.73M2
GLUCOSE BLD-MCNC: 105 MG/DL (ref 75–110)
GLUCOSE BLD-MCNC: 111 MG/DL (ref 75–110)
GLUCOSE BLD-MCNC: 112 MG/DL (ref 75–110)
GLUCOSE BLD-MCNC: 113 MG/DL (ref 75–110)
GLUCOSE BLD-MCNC: 99 MG/DL (ref 75–110)
GLUCOSE SERPL-MCNC: 123 MG/DL (ref 74–99)
HCT VFR BLD AUTO: 30.4 % (ref 40.7–50.3)
HGB BLD-MCNC: 8.9 G/DL (ref 13–17)
IMM GRANULOCYTES # BLD AUTO: 0.03 K/UL (ref 0–0.3)
IMM GRANULOCYTES NFR BLD: 1 %
LYMPHOCYTES NFR BLD: 0.87 K/UL (ref 1.1–3.7)
LYMPHOCYTES RELATIVE PERCENT: 17 % (ref 24–43)
MCH RBC QN AUTO: 27.2 PG (ref 25.2–33.5)
MCHC RBC AUTO-ENTMCNC: 29.3 G/DL (ref 28.4–34.8)
MCV RBC AUTO: 93 FL (ref 82.6–102.9)
MONOCYTES NFR BLD: 0.3 K/UL (ref 0.1–1.2)
MONOCYTES NFR BLD: 6 % (ref 3–12)
NEUTROPHILS NFR BLD: 74 % (ref 36–65)
NEUTS SEG NFR BLD: 3.92 K/UL (ref 1.5–8.1)
NRBC BLD-RTO: 0 PER 100 WBC
PLATELET # BLD AUTO: 145 K/UL (ref 138–453)
PMV BLD AUTO: 10.5 FL (ref 8.1–13.5)
POTASSIUM SERPL-SCNC: 3.3 MMOL/L (ref 3.7–5.3)
RBC # BLD AUTO: 3.27 M/UL (ref 4.21–5.77)
RBC # BLD: ABNORMAL 10*6/UL
SODIUM SERPL-SCNC: 148 MMOL/L (ref 136–145)
WBC OTHER # BLD: 5.2 K/UL (ref 3.5–11.3)

## 2025-01-27 PROCEDURE — 2500000003 HC RX 250 WO HCPCS

## 2025-01-27 PROCEDURE — 94640 AIRWAY INHALATION TREATMENT: CPT

## 2025-01-27 PROCEDURE — 6360000002 HC RX W HCPCS

## 2025-01-27 PROCEDURE — 82947 ASSAY GLUCOSE BLOOD QUANT: CPT

## 2025-01-27 PROCEDURE — 93010 ELECTROCARDIOGRAM REPORT: CPT | Performed by: INTERNAL MEDICINE

## 2025-01-27 PROCEDURE — 94761 N-INVAS EAR/PLS OXIMETRY MLT: CPT

## 2025-01-27 PROCEDURE — 36415 COLL VENOUS BLD VENIPUNCTURE: CPT

## 2025-01-27 PROCEDURE — 6370000000 HC RX 637 (ALT 250 FOR IP)

## 2025-01-27 PROCEDURE — 2700000000 HC OXYGEN THERAPY PER DAY

## 2025-01-27 PROCEDURE — 85025 COMPLETE CBC W/AUTO DIFF WBC: CPT

## 2025-01-27 PROCEDURE — 71045 X-RAY EXAM CHEST 1 VIEW: CPT

## 2025-01-27 PROCEDURE — 2580000003 HC RX 258

## 2025-01-27 PROCEDURE — 99232 SBSQ HOSP IP/OBS MODERATE 35: CPT | Performed by: INTERNAL MEDICINE

## 2025-01-27 PROCEDURE — 2060000000 HC ICU INTERMEDIATE R&B

## 2025-01-27 PROCEDURE — 80048 BASIC METABOLIC PNL TOTAL CA: CPT

## 2025-01-27 RX ORDER — POTASSIUM CHLORIDE 7.45 MG/ML
10 INJECTION INTRAVENOUS
Status: DISCONTINUED | OUTPATIENT
Start: 2025-01-27 | End: 2025-01-27

## 2025-01-27 RX ORDER — POTASSIUM CHLORIDE 7.45 MG/ML
10 INJECTION INTRAVENOUS ONCE
Status: COMPLETED | OUTPATIENT
Start: 2025-01-27 | End: 2025-01-27

## 2025-01-27 RX ADMIN — IPRATROPIUM BROMIDE AND ALBUTEROL SULFATE 1 DOSE: 2.5; .5 SOLUTION RESPIRATORY (INHALATION) at 07:52

## 2025-01-27 RX ADMIN — POTASSIUM CHLORIDE 10 MEQ: 7.46 INJECTION, SOLUTION INTRAVENOUS at 08:28

## 2025-01-27 RX ADMIN — POTASSIUM CHLORIDE 10 MEQ: 7.46 INJECTION, SOLUTION INTRAVENOUS at 11:10

## 2025-01-27 RX ADMIN — I.V. FAT EMULSION 250 ML: 20 EMULSION INTRAVENOUS at 18:49

## 2025-01-27 RX ADMIN — SODIUM CHLORIDE: 9 INJECTION, SOLUTION INTRAVENOUS at 08:26

## 2025-01-27 RX ADMIN — CEFEPIME 2000 MG: 2 INJECTION, POWDER, FOR SOLUTION INTRAVENOUS at 01:37

## 2025-01-27 RX ADMIN — BUDESONIDE INHALATION 500 MCG: 0.5 SUSPENSION RESPIRATORY (INHALATION) at 11:20

## 2025-01-27 RX ADMIN — SODIUM CHLORIDE, PRESERVATIVE FREE 10 ML: 5 INJECTION INTRAVENOUS at 20:39

## 2025-01-27 RX ADMIN — METRONIDAZOLE 500 MG: 500 INJECTION, SOLUTION INTRAVENOUS at 02:37

## 2025-01-27 RX ADMIN — SODIUM CHLORIDE: 9 INJECTION, SOLUTION INTRAVENOUS at 18:06

## 2025-01-27 RX ADMIN — IPRATROPIUM BROMIDE AND ALBUTEROL SULFATE 1 DOSE: 2.5; .5 SOLUTION RESPIRATORY (INHALATION) at 20:29

## 2025-01-27 RX ADMIN — POTASSIUM CHLORIDE 10 MEQ: 7.46 INJECTION, SOLUTION INTRAVENOUS at 12:38

## 2025-01-27 RX ADMIN — METRONIDAZOLE 500 MG: 500 INJECTION, SOLUTION INTRAVENOUS at 18:07

## 2025-01-27 RX ADMIN — HEPARIN SODIUM 5000 UNITS: 5000 INJECTION INTRAVENOUS; SUBCUTANEOUS at 06:15

## 2025-01-27 RX ADMIN — HEPARIN SODIUM 5000 UNITS: 5000 INJECTION INTRAVENOUS; SUBCUTANEOUS at 15:16

## 2025-01-27 RX ADMIN — CEFEPIME 2000 MG: 2 INJECTION, POWDER, FOR SOLUTION INTRAVENOUS at 14:16

## 2025-01-27 RX ADMIN — HEPARIN SODIUM 5000 UNITS: 5000 INJECTION INTRAVENOUS; SUBCUTANEOUS at 21:36

## 2025-01-27 RX ADMIN — POTASSIUM CHLORIDE 10 MEQ: 7.46 INJECTION, SOLUTION INTRAVENOUS at 09:52

## 2025-01-27 RX ADMIN — ASCORBIC ACID, VITAMIN A PALMITATE, CHOLECALCIFEROL, THIAMINE HYDROCHLORIDE, RIBOFLAVIN-5 PHOSPHATE SODIUM, PYRIDOXINE HYDROCHLORIDE, NIACINAMIDE, DEXPANTHENOL, ALPHA-TOCOPHEROL ACETATE, VITAMIN K1, FOLIC ACID, BIOTIN, CYANOCOBALAMIN: 200; 3300; 200; 6; 3.6; 6; 40; 15; 10; 150; 600; 60; 5 INJECTION, SOLUTION INTRAVENOUS at 18:48

## 2025-01-27 RX ADMIN — IPRATROPIUM BROMIDE AND ALBUTEROL SULFATE 1 DOSE: 2.5; .5 SOLUTION RESPIRATORY (INHALATION) at 15:08

## 2025-01-27 RX ADMIN — SODIUM CHLORIDE, PRESERVATIVE FREE 40 MG: 5 INJECTION INTRAVENOUS at 08:33

## 2025-01-27 RX ADMIN — METRONIDAZOLE 500 MG: 500 INJECTION, SOLUTION INTRAVENOUS at 11:17

## 2025-01-27 RX ADMIN — IPRATROPIUM BROMIDE AND ALBUTEROL SULFATE 1 DOSE: 2.5; .5 SOLUTION RESPIRATORY (INHALATION) at 11:20

## 2025-01-27 ASSESSMENT — PAIN SCALES - GENERAL
PAINLEVEL_OUTOF10: 0
PAINLEVEL_OUTOF10: 0

## 2025-01-27 NOTE — PLAN OF CARE
Problem: Respiratory - Adult  Goal: Achieves optimal ventilation and oxygenation  1/27/2025 0759 by Marli Dan, RCSUDHA  Outcome: Progressing

## 2025-01-27 NOTE — PLAN OF CARE
Problem: Chronic Conditions and Co-morbidities  Goal: Patient's chronic conditions and co-morbidity symptoms are monitored and maintained or improved  1/26/2025 2125 by Rossy Garcia RN  Outcome: Progressing  Flowsheets (Taken 1/24/2025 0400 by Kaylin Sherman, RN)  Care Plan - Patient's Chronic Conditions and Co-Morbidity Symptoms are Monitored and Maintained or Improved: Monitor and assess patient's chronic conditions and comorbid symptoms for stability, deterioration, or improvement  1/26/2025 1626 by Natalie Brown RN  Outcome: Progressing     Problem: Discharge Planning  Goal: Discharge to home or other facility with appropriate resources  1/26/2025 2125 by Rossy Garcia RN  Outcome: Progressing  Flowsheets (Taken 1/24/2025 0400 by Kaylin Sherman, RN)  Discharge to home or other facility with appropriate resources: Identify barriers to discharge with patient and caregiver  1/26/2025 1626 by Natalie Brown RN  Outcome: Progressing     Problem: Safety - Adult  Goal: Free from fall injury  1/26/2025 2125 by Rossy Garcia RN  Outcome: Progressing  Flowsheets (Taken 1/23/2025 2000 by Kaylin Sherman, RN)  Free From Fall Injury: Instruct family/caregiver on patient safety  1/26/2025 1626 by Natalie Brown RN  Outcome: Progressing     Problem: Respiratory - Adult  Goal: Achieves optimal ventilation and oxygenation  1/26/2025 2125 by Rossy Garcia RN  Outcome: Progressing  Flowsheets (Taken 1/24/2025 0727 by Candelaria Washburn RCP)  Achieves optimal ventilation and oxygenation:   Assess for changes in respiratory status   Assess for changes in mentation and behavior   Position to facilitate oxygenation and minimize respiratory effort   Oxygen supplementation based on oxygen saturation or arterial blood gases   Encourage broncho-pulmonary hygiene including cough, deep breathe, incentive spirometry   Assess the need for suctioning and aspirate as needed   Assess and

## 2025-01-27 NOTE — PROGRESS NOTES
Comprehensive Nutrition Assessment    Type and Reason for Visit:  Initial, Consult    Nutrition Recommendations/Plan:   Continue NPO as medically necessary  Provide PPN @ 50 mL/hr providing 2,000 mL/d, 406 kcals, 50 gm AA/d, 250 mL IL/d.   When able to start TF, recommendations provided below  Monitor POC, PPN, ability to start TF, meds, labs, wt     Malnutrition Assessment:  Malnutrition Status:  Moderate malnutrition (01/27/25 1140)    Context:  Acute Illness     Findings of the 6 clinical characteristics of malnutrition:  Energy Intake:  50% or less of estimated energy requirements for 5 or more days (NPO)  Weight Loss:  Unable to assess     Body Fat Loss:  Unable to assess     Muscle Mass Loss:  Unable to assess    Fluid Accumulation:  Moderate to Severe Extremities   Strength:  Not Performed    Nutrition Assessment:    Consulted for PPN. Transfer from ICU. Pt had NG in place when in ICU - RN in stepdown unsure what happened to original tube, but new NG to be placed by IR. Per RN, plan to start TF via NG. When able to start TF, recommend standard with fiber (Jevity 1.5). Initiate @ 10 mL/hr and advance 10 mL q6h to goal of 55 mL/hr to provide 1980 kcals and 84 gm PRO/d. For PPN, writer ordered 50 mL/hr to provide 2000 mL, 406 kcals/d, 50 gm AA/d, and 250 mL IL/d. +2 pitting RUE, +1 LUE and BLE edema. Labs: Na 148 mmol/L, K 3.3 mmol/L, Ca 8.3 mg/dL.    Nutrition Related Findings:    Meds/labs reviewed Wound Type: None       Current Nutrition Intake & Therapies:    Average Meal Intake: NPO  Average Supplements Intake: NPO  Diet NPO    Anthropometric Measures:  Height: 175.3 cm (5' 9\")  Ideal Body Weight (IBW): 160 lbs (73 kg)    Admission Body Weight: 76.7 kg (169 lb 1.5 oz)  Current Body Weight: 76.7 kg (169 lb 1.5 oz),   IBW. Weight Source: Not specified  Current BMI (kg/m2): 25    Estimated Daily Nutrient Needs:  Energy Requirements Based On: Kcal/kg  Weight Used for Energy Requirements: Ideal  Energy

## 2025-01-27 NOTE — PROGRESS NOTES
[facility] [place]  Internal Medicine Teaching Residency Program  Inpatient Daily Progress Note  ______________________________________________________________________________    Patient: Talon Walker Jr.  YOB: 1960   MRN:[id number]    Acct: [id number]     Room: 0141/0141-01  Admit date: 1/23/2025  Today's date: 01/27/25  Number of days in the hospital: 4    SUBJECTIVE   Admitting Diagnosis: Acute respiratory failure with hypercapnia  CC: Loss of consciousness  Pt examined at bedside. Chart & results reviewed.   Patient had 1 fever spike of 100.2 yesterday and was maintaining MAP above 80.  He is maintaining saturation of 97% on 2 L oxygen via nasal cannula.His blood sugar is well-controlled and does not require any insulin. Patient states coughing is better. Patient is on cefepime(fifth day) and metronidazole(second day)  Patient has urine output of 2100 mL / 24 hours with 1 unmeasured occurrence and no bowel movement in 2 days.Will optimize the bowel regimen  Labs reviewed: Hyponatremia 148, hypokalemia 3.3, hyperchloremia 114, BUNs/creatinine 26/2, hemoglobin 8.9(previously 9.6), WBC 5.2    MRSA DNA probe: Negative  Respiratory culture: No growth  Blood culture: No growth so far    Plan:  -IR guided NG tube placement  -Peripheral TPN mean while  -Replace electrolytes  -Switch LR to D5 half-normal saline    Review of Systems   Constitutional:  Negative for chills and fever.   HENT:  Positive for trouble swallowing. Negative for sore throat.    Respiratory:  Positive for cough and shortness of breath. Negative for chest tightness, wheezing and stridor.    Cardiovascular:  Negative for chest pain, palpitations and leg swelling.   Gastrointestinal:  Negative for constipation, diarrhea, nausea and vomiting.   Genitourinary:  Negative for dysuria.   Musculoskeletal:  Positive for gait problem.   Neurological:  Positive for weakness. Negative for seizures and numbness.  PLACEMENT    Result Date: 1/23/2025  Nasogastric tube with its tip in the fundus of the stomach. The side port is at the level of the gastroesophageal junction.     XR CHEST PORTABLE    Result Date: 1/23/2025  Satisfactory ET and NG tube placement. Patchy airspace opacity stable compared to the prior CT scan.     CTA HEAD NECK W CONTRAST    Result Date: 1/23/2025  1. 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 2. 50% stenosis of the upper cervical right ICA by NASCET criteria possibly due to dissection or vasculitis.     CT CHEST PULMONARY EMBOLISM W CONTRAST    Result Date: 1/23/2025  1. No evidence of pulmonary embolism. 2. Extensive ground-glass opacities within the posterior aspect of the right lung and minimally within the left lung base, which may be secondary to infection, aspiration, and less likely atelectasis.  Recommend critical correlation. 3. Prominent mediastinal lymph nodes, similar to prior, and likely reactive.     CT Head WO Contrast    Result Date: 1/23/2025  1. No acute intracranial abnormality. 2. Extensive right cerebral encephalomalacia. 3. Previous right craniotomy.       ASSESSMENT & PLAN     ASSESSMENT / PLAN:     Principal Problem:    Acute respiratory failure with hypercapnia  Active Problems:    Chronic obstructive pulmonary disease (HCC)    Acute cerebrovascular accident (HCC)    Aspiration pneumonia of right lower lobe (HCC)    Abnormal EEG    History of stroke    Abnormal computed tomography angiography of head  Resolved Problems:    * No resolved hospital problems. *    IMPRESSION  This is a 64 y.o. 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 Endovascular plan no intervention  -CT head without contrast showed no acute intracranial abnormality.  It only demonstrated extensive  right cerebral encephalomalacia  -MRI brain showed no evidence acute intracranial abnormality, no acute infarct  -LTME for 3 days showed mild diffuse encephalopathy, right right frontotemporal breach and no epileptiform discharges or seizures.  -Continue aspirin 81 mg and Lipitor 40 mg for secondary stroke prevention.  -Neurology signed off     Acute hypoxic and hypercapnic respiratory failure:  COPD  - patient was intubated on 1/23 and extubated on 1/24  -Continue Pulmicort nebulization twice daily and DuoNeb every 4 hours  -Wean down supplemental oxygen  -Maintain saturation up to 92%     Concern of aspiration pneumonia:  -CT chest showed groundglass opacities within the posterior aspect of right lung and minimally within the left lung base  -MRSA DNA probe negative  -Respiratory culture showed no growth  -Patient on cefepime 2 g twice daily.Continue for 7 days and metronidazole for anaerobic coverage     Dysphagia:  -Modified barium swallow study shows severe oropharyngeal dysphagia and recommended alternate means of nutrition  -Currently patient is n.p.o. with sips of water with meds  -Patient is on dextrose 5% and 0.45% normal saline  -Consult IR for NG placement  -Start peripheral TPN for now     CKD stage IIIb:  -BUNs/creatinine 22/2.1(baseline 2)  -Monitor I's and O's  -Monitor BMP  -Currently on dextrose 5% and 0.45% normal saline at 75 mL/h due to hypernatremia      DVT ppx: Heparin 5000 units every 8 hours     Diet: Diet NPO Exceptions are: Sips of Water with Meds      PT/OT/SW : Consulted     Discharge Planning : When medically stable     Consults: None       Coreen Man MD  Internal Medicine Resident, PGY-1  [facility]; [place], [place]  1/27/2025, 7:27 AM

## 2025-01-27 NOTE — PLAN OF CARE
Problem: Chronic Conditions and Co-morbidities  Goal: Patient's chronic conditions and co-morbidity symptoms are monitored and maintained or improved  Outcome: Progressing  Flowsheets (Taken 1/27/2025 0821)  Care Plan - Patient's Chronic Conditions and Co-Morbidity Symptoms are Monitored and Maintained or Improved:   Monitor and assess patient's chronic conditions and comorbid symptoms for stability, deterioration, or improvement   Collaborate with multidisciplinary team to address chronic and comorbid conditions and prevent exacerbation or deterioration     Problem: Discharge Planning  Goal: Discharge to home or other facility with appropriate resources  Outcome: Progressing  Flowsheets (Taken 1/27/2025 0821)  Discharge to home or other facility with appropriate resources:   Identify barriers to discharge with patient and caregiver   Arrange for needed discharge resources and transportation as appropriate   Identify discharge learning needs (meds, wound care, etc)   Refer to discharge planning if patient needs post-hospital services based on physician order or complex needs related to functional status, cognitive ability or social support system     Problem: Safety - Adult  Goal: Free from fall injury  Outcome: Progressing  Flowsheets (Taken 1/27/2025 0700)  Free From Fall Injury: Instruct family/caregiver on patient safety     Problem: Respiratory - Adult  Goal: Achieves optimal ventilation and oxygenation  1/27/2025 1745 by Shari Cruz, RN  Outcome: Progressing  Flowsheets (Taken 1/27/2025 0821)  Achieves optimal ventilation and oxygenation:   Assess for changes in respiratory status   Assess for changes in mentation and behavior   Position to facilitate oxygenation and minimize respiratory effort  1/27/2025 0756 by Marli Dan RCP  Outcome: Progressing     Problem: Pain  Goal: Verbalizes/displays adequate comfort level or baseline comfort level  Outcome: Progressing  Flowsheets (Taken 1/27/2025

## 2025-01-28 ENCOUNTER — APPOINTMENT (OUTPATIENT)
Dept: INTERVENTIONAL RADIOLOGY/VASCULAR | Age: 65
DRG: 720 | End: 2025-01-28
Payer: MEDICAID

## 2025-01-28 ENCOUNTER — APPOINTMENT (OUTPATIENT)
Dept: GENERAL RADIOLOGY | Age: 65
DRG: 720 | End: 2025-01-28
Payer: MEDICAID

## 2025-01-28 PROBLEM — E44.0 MODERATE MALNUTRITION (HCC): Status: ACTIVE | Noted: 2025-01-28

## 2025-01-28 LAB
ALBUMIN SERPL-MCNC: 3.3 G/DL (ref 3.5–5.2)
ALBUMIN/GLOB SERPL: 1 {RATIO} (ref 1–2.5)
ALP SERPL-CCNC: 84 U/L (ref 40–129)
ALT SERPL-CCNC: 51 U/L (ref 10–50)
ANION GAP SERPL CALCULATED.3IONS-SCNC: 12 MMOL/L (ref 9–16)
AST SERPL-CCNC: 25 U/L (ref 10–50)
BASOPHILS # BLD: <0.03 K/UL (ref 0–0.2)
BASOPHILS NFR BLD: 0 % (ref 0–2)
BILIRUB DIRECT SERPL-MCNC: <0.1 MG/DL (ref 0–0.2)
BILIRUB INDIRECT SERPL-MCNC: ABNORMAL MG/DL (ref 0–1)
BILIRUB SERPL-MCNC: 0.2 MG/DL (ref 0–1.2)
BUN SERPL-MCNC: 25 MG/DL (ref 8–23)
CALCIUM SERPL-MCNC: 8.8 MG/DL (ref 8.6–10.4)
CHLORIDE SERPL-SCNC: 114 MMOL/L (ref 98–107)
CO2 SERPL-SCNC: 22 MMOL/L (ref 20–31)
CREAT SERPL-MCNC: 1.8 MG/DL (ref 0.7–1.2)
EOSINOPHIL # BLD: 0.21 K/UL (ref 0–0.44)
EOSINOPHILS RELATIVE PERCENT: 4 % (ref 1–4)
ERYTHROCYTE [DISTWIDTH] IN BLOOD BY AUTOMATED COUNT: 16 % (ref 11.8–14.4)
GFR, ESTIMATED: 42 ML/MIN/1.73M2
GLOBULIN SER CALC-MCNC: 3.3 G/DL
GLUCOSE BLD-MCNC: 110 MG/DL (ref 75–110)
GLUCOSE BLD-MCNC: 113 MG/DL (ref 75–110)
GLUCOSE BLD-MCNC: 119 MG/DL (ref 75–110)
GLUCOSE BLD-MCNC: 123 MG/DL (ref 75–110)
GLUCOSE SERPL-MCNC: 127 MG/DL (ref 74–99)
HCT VFR BLD AUTO: 32.2 % (ref 40.7–50.3)
HGB BLD-MCNC: 9.5 G/DL (ref 13–17)
IMM GRANULOCYTES # BLD AUTO: 0.05 K/UL (ref 0–0.3)
IMM GRANULOCYTES NFR BLD: 1 %
LYMPHOCYTES NFR BLD: 0.93 K/UL (ref 1.1–3.7)
LYMPHOCYTES RELATIVE PERCENT: 17 % (ref 24–43)
MAGNESIUM SERPL-MCNC: 1.9 MG/DL (ref 1.6–2.4)
MCH RBC QN AUTO: 27.5 PG (ref 25.2–33.5)
MCHC RBC AUTO-ENTMCNC: 29.5 G/DL (ref 28.4–34.8)
MCV RBC AUTO: 93.1 FL (ref 82.6–102.9)
MICROORGANISM SPEC CULT: NORMAL
MICROORGANISM SPEC CULT: NORMAL
MONOCYTES NFR BLD: 0.34 K/UL (ref 0.1–1.2)
MONOCYTES NFR BLD: 6 % (ref 3–12)
NEUTROPHILS NFR BLD: 72 % (ref 36–65)
NEUTS SEG NFR BLD: 3.87 K/UL (ref 1.5–8.1)
NRBC BLD-RTO: 0 PER 100 WBC
PHOSPHATE SERPL-MCNC: 3.3 MG/DL (ref 2.5–4.5)
PLATELET # BLD AUTO: 152 K/UL (ref 138–453)
PMV BLD AUTO: 10.7 FL (ref 8.1–13.5)
POTASSIUM SERPL-SCNC: 3.2 MMOL/L (ref 3.7–5.3)
PROT SERPL-MCNC: 6.6 G/DL (ref 6.6–8.7)
RBC # BLD AUTO: 3.46 M/UL (ref 4.21–5.77)
RBC # BLD: ABNORMAL 10*6/UL
SERVICE CMNT-IMP: NORMAL
SERVICE CMNT-IMP: NORMAL
SODIUM SERPL-SCNC: 148 MMOL/L (ref 136–145)
SPECIMEN DESCRIPTION: NORMAL
SPECIMEN DESCRIPTION: NORMAL
TRIGL SERPL-MCNC: 222 MG/DL
WBC OTHER # BLD: 5.4 K/UL (ref 3.5–11.3)

## 2025-01-28 PROCEDURE — 2580000003 HC RX 258: Performed by: INTERNAL MEDICINE

## 2025-01-28 PROCEDURE — 2500000003 HC RX 250 WO HCPCS

## 2025-01-28 PROCEDURE — 83735 ASSAY OF MAGNESIUM: CPT

## 2025-01-28 PROCEDURE — 82947 ASSAY GLUCOSE BLOOD QUANT: CPT

## 2025-01-28 PROCEDURE — 6360000002 HC RX W HCPCS

## 2025-01-28 PROCEDURE — 80076 HEPATIC FUNCTION PANEL: CPT

## 2025-01-28 PROCEDURE — 84100 ASSAY OF PHOSPHORUS: CPT

## 2025-01-28 PROCEDURE — 2060000000 HC ICU INTERMEDIATE R&B

## 2025-01-28 PROCEDURE — 74230 X-RAY XM SWLNG FUNCJ C+: CPT

## 2025-01-28 PROCEDURE — 43752 NASAL/OROGASTRIC W/TUBE PLMT: CPT

## 2025-01-28 PROCEDURE — 2500000003 HC RX 250 WO HCPCS: Performed by: INTERNAL MEDICINE

## 2025-01-28 PROCEDURE — 80048 BASIC METABOLIC PNL TOTAL CA: CPT

## 2025-01-28 PROCEDURE — 99222 1ST HOSP IP/OBS MODERATE 55: CPT | Performed by: SURGERY

## 2025-01-28 PROCEDURE — 2700000000 HC OXYGEN THERAPY PER DAY

## 2025-01-28 PROCEDURE — 92611 MOTION FLUOROSCOPY/SWALLOW: CPT

## 2025-01-28 PROCEDURE — 84478 ASSAY OF TRIGLYCERIDES: CPT

## 2025-01-28 PROCEDURE — 6360000002 HC RX W HCPCS: Performed by: INTERNAL MEDICINE

## 2025-01-28 PROCEDURE — 6370000000 HC RX 637 (ALT 250 FOR IP)

## 2025-01-28 PROCEDURE — 36415 COLL VENOUS BLD VENIPUNCTURE: CPT

## 2025-01-28 PROCEDURE — 99254 IP/OBS CNSLTJ NEW/EST MOD 60: CPT | Performed by: PHYSICIAN ASSISTANT

## 2025-01-28 PROCEDURE — 2580000003 HC RX 258

## 2025-01-28 PROCEDURE — 94640 AIRWAY INHALATION TREATMENT: CPT

## 2025-01-28 PROCEDURE — 99232 SBSQ HOSP IP/OBS MODERATE 35: CPT | Performed by: INTERNAL MEDICINE

## 2025-01-28 PROCEDURE — 85025 COMPLETE CBC W/AUTO DIFF WBC: CPT

## 2025-01-28 PROCEDURE — 94761 N-INVAS EAR/PLS OXIMETRY MLT: CPT

## 2025-01-28 RX ORDER — POTASSIUM CHLORIDE 7.45 MG/ML
10 INJECTION INTRAVENOUS
Status: COMPLETED | OUTPATIENT
Start: 2025-01-28 | End: 2025-01-28

## 2025-01-28 RX ORDER — POTASSIUM CHLORIDE 7.45 MG/ML
10 INJECTION INTRAVENOUS
Status: DISCONTINUED | OUTPATIENT
Start: 2025-01-28 | End: 2025-01-28

## 2025-01-28 RX ADMIN — POTASSIUM CHLORIDE 10 MEQ: 7.46 INJECTION, SOLUTION INTRAVENOUS at 11:10

## 2025-01-28 RX ADMIN — POTASSIUM CHLORIDE 10 MEQ: 7.46 INJECTION, SOLUTION INTRAVENOUS at 13:29

## 2025-01-28 RX ADMIN — HEPARIN SODIUM 5000 UNITS: 5000 INJECTION INTRAVENOUS; SUBCUTANEOUS at 06:11

## 2025-01-28 RX ADMIN — BUDESONIDE INHALATION 500 MCG: 0.5 SUSPENSION RESPIRATORY (INHALATION) at 20:22

## 2025-01-28 RX ADMIN — IPRATROPIUM BROMIDE AND ALBUTEROL SULFATE 1 DOSE: 2.5; .5 SOLUTION RESPIRATORY (INHALATION) at 20:22

## 2025-01-28 RX ADMIN — IPRATROPIUM BROMIDE AND ALBUTEROL SULFATE 1 DOSE: 2.5; .5 SOLUTION RESPIRATORY (INHALATION) at 12:37

## 2025-01-28 RX ADMIN — IPRATROPIUM BROMIDE AND ALBUTEROL SULFATE 1 DOSE: 2.5; .5 SOLUTION RESPIRATORY (INHALATION) at 16:03

## 2025-01-28 RX ADMIN — CEFEPIME 2000 MG: 2 INJECTION, POWDER, FOR SOLUTION INTRAVENOUS at 01:20

## 2025-01-28 RX ADMIN — I.V. FAT EMULSION 250 ML: 20 EMULSION INTRAVENOUS at 06:14

## 2025-01-28 RX ADMIN — POTASSIUM CHLORIDE 10 MEQ: 7.46 INJECTION, SOLUTION INTRAVENOUS at 10:04

## 2025-01-28 RX ADMIN — HEPARIN SODIUM 5000 UNITS: 5000 INJECTION INTRAVENOUS; SUBCUTANEOUS at 15:53

## 2025-01-28 RX ADMIN — METRONIDAZOLE 500 MG: 500 INJECTION, SOLUTION INTRAVENOUS at 13:36

## 2025-01-28 RX ADMIN — I.V. FAT EMULSION 250 ML: 20 EMULSION INTRAVENOUS at 18:40

## 2025-01-28 RX ADMIN — SODIUM CHLORIDE: 9 INJECTION, SOLUTION INTRAVENOUS at 10:02

## 2025-01-28 RX ADMIN — CEFEPIME 2000 MG: 2 INJECTION, POWDER, FOR SOLUTION INTRAVENOUS at 12:29

## 2025-01-28 RX ADMIN — METRONIDAZOLE 500 MG: 500 INJECTION, SOLUTION INTRAVENOUS at 21:53

## 2025-01-28 RX ADMIN — SODIUM CHLORIDE, PRESERVATIVE FREE 10 ML: 5 INJECTION INTRAVENOUS at 21:58

## 2025-01-28 RX ADMIN — SODIUM CHLORIDE, PRESERVATIVE FREE 40 MG: 5 INJECTION INTRAVENOUS at 09:27

## 2025-01-28 RX ADMIN — POTASSIUM CHLORIDE: 2 INJECTION, SOLUTION, CONCENTRATE INTRAVENOUS at 18:37

## 2025-01-28 RX ADMIN — METRONIDAZOLE 500 MG: 500 INJECTION, SOLUTION INTRAVENOUS at 02:32

## 2025-01-28 RX ADMIN — POTASSIUM CHLORIDE 10 MEQ: 7.46 INJECTION, SOLUTION INTRAVENOUS at 12:27

## 2025-01-28 ASSESSMENT — PAIN SCALES - GENERAL
PAINLEVEL_OUTOF10: 0

## 2025-01-28 NOTE — PROGRESS NOTES
Here for NG placement. Supine on table. Strap on. Juan Wyatt present. 10 fr NutraGlide ng placed down right nares. Secured with device at 80 cm. Tape to cheek. NG to be bridled when patient gets to floor. Patient back on stretcher and back to room.

## 2025-01-28 NOTE — PLAN OF CARE
Problem: Chronic Conditions and Co-morbidities  Goal: Patient's chronic conditions and co-morbidity symptoms are monitored and maintained or improved  1/28/2025 0513 by Calli Marcum RN  Outcome: Progressing  1/27/2025 1745 by Shari Cruz RN  Outcome: Progressing  Flowsheets (Taken 1/27/2025 0821)  Care Plan - Patient's Chronic Conditions and Co-Morbidity Symptoms are Monitored and Maintained or Improved:   Monitor and assess patient's chronic conditions and comorbid symptoms for stability, deterioration, or improvement   Collaborate with multidisciplinary team to address chronic and comorbid conditions and prevent exacerbation or deterioration     Problem: Discharge Planning  Goal: Discharge to home or other facility with appropriate resources  1/28/2025 0513 by Calli Marcum RN  Outcome: Progressing  1/27/2025 1745 by Shari Cruz RN  Outcome: Progressing  Flowsheets (Taken 1/27/2025 0821)  Discharge to home or other facility with appropriate resources:   Identify barriers to discharge with patient and caregiver   Arrange for needed discharge resources and transportation as appropriate   Identify discharge learning needs (meds, wound care, etc)   Refer to discharge planning if patient needs post-hospital services based on physician order or complex needs related to functional status, cognitive ability or social support system     Problem: Safety - Adult  Goal: Free from fall injury  1/28/2025 0513 by Calli Marcum RN  Outcome: Progressing  1/27/2025 1745 by Shari Cruz RN  Outcome: Progressing  Flowsheets (Taken 1/27/2025 0700)  Free From Fall Injury: Instruct family/caregiver on patient safety     Problem: Skin/Tissue Integrity  Goal: Skin integrity remains intact  Description: 1.  Monitor for areas of redness and/or skin breakdown  2.  Assess vascular access sites hourly  3.  Every 4-6 hours minimum:  Change oxygen saturation probe site  4.  Every 4-6 hours:  If on nasal continuous positive airway

## 2025-01-28 NOTE — PROGRESS NOTES
Nutrition Assessment     Type and Reason for Visit: Reassess    Nutrition Recommendations/Plan:   Continue NPO as medically necessary  Modify PPN to closer meet pt's needs. Next bag to run @ 83.3 mL/hr providing 2000 mL, 870 kcals, 90 gm AA, 250 mL/d IL. Increased potassium acetate to 15 mEq.   Monitor POC, nutrition status, wt, meds, labs     Malnutrition Assessment:  Malnutrition Status: Moderate malnutrition    Nutrition Assessment:  Spoke with RN and unsure of plans to continue PPN. Current PPN running at goal of 50 mL/hr. PS MD and will continue PPN today. Writer ordered custom bag d/t decreasing potassium lab. Next bag to run at 83.3 mL/hr and provide 2,000 mL/d, 870 kcals, 90 gm AA, and 250 mL/d IL. RN made writer aware pt had 3 loose/soft stools last night and one this morning, stated looked like a stool from when a TF is started. Pt receiving maxipime x5 days. Labs: Na 148 mmol/L, K 3.2 mmol/L, -127 mg/dL.    Estimated Daily Nutrient Needs:  Energy (kcal):  3556-8028 Weight Used for Energy Requirements: Ideal     Protein (g):   gm/d Weight Used for Protein Requirements: Ideal        Fluid (ml/day):  or per physician Method Used for Fluid Requirements: 1 ml/kcal    Nutrition Related Findings:   Meds/labs reviewed Wound Type: None    Current Nutrition Therapies:    Diet NPO  PN-Adult Premix 4.25/10 - Standard Electrolytes- Peripheral Line    Anthropometric Measures:  Height: 175.3 cm (5' 9\")  Current Body Wt: 75.8 kg (167 lb)   BMI: 24.7    Nutrition Diagnosis:   Inadequate oral intake related to swallowing difficulty as evidenced by NPO or clear liquid status due to medical condition, nutrition support - parenteral nutrition    Nutrition Interventions:   Food and/or Nutrient Delivery: Continue NPO, Modify Parenteral Nutrition  Nutrition Education/Counseling: No recommendation at this time  Coordination of Nutrition Care: Continue to monitor while inpatient  Plan of Care discussed with: RN,

## 2025-01-28 NOTE — PLAN OF CARE
Problem: Chronic Conditions and Co-morbidities  Goal: Patient's chronic conditions and co-morbidity symptoms are monitored and maintained or improved  1/28/2025 1744 by Shari Cruz RN  Outcome: Progressing  Flowsheets (Taken 1/28/2025 0925)  Care Plan - Patient's Chronic Conditions and Co-Morbidity Symptoms are Monitored and Maintained or Improved:   Monitor and assess patient's chronic conditions and comorbid symptoms for stability, deterioration, or improvement   Collaborate with multidisciplinary team to address chronic and comorbid conditions and prevent exacerbation or deterioration  1/28/2025 0513 by Calli Marcum RN  Outcome: Progressing     Problem: Discharge Planning  Goal: Discharge to home or other facility with appropriate resources  1/28/2025 1744 by Shari Cruz RN  Outcome: Progressing  Flowsheets (Taken 1/28/2025 0925)  Discharge to home or other facility with appropriate resources:   Identify barriers to discharge with patient and caregiver   Arrange for needed discharge resources and transportation as appropriate   Identify discharge learning needs (meds, wound care, etc)   Refer to discharge planning if patient needs post-hospital services based on physician order or complex needs related to functional status, cognitive ability or social support system  1/28/2025 0513 by Calli Marcum RN  Outcome: Progressing     Problem: Safety - Adult  Goal: Free from fall injury  1/28/2025 1744 by Shari Cruz RN  Outcome: Progressing  Flowsheets (Taken 1/28/2025 0700)  Free From Fall Injury: Instruct family/caregiver on patient safety  1/28/2025 0513 by Calli Marcum RN  Outcome: Progressing     Problem: Respiratory - Adult  Goal: Achieves optimal ventilation and oxygenation  Outcome: Progressing  Flowsheets (Taken 1/28/2025 0925)  Achieves optimal ventilation and oxygenation:   Assess for changes in respiratory status   Assess for changes in mentation and behavior   Position to facilitate oxygenation  Intact:   Monitor for areas of redness and/or skin breakdown   Assess vascular access sites hourly   Every 4-6 hours minimum: Change oxygen saturation probe site  Taken 1/28/2025 0700  Skin Integrity Remains Intact:   Monitor for areas of redness and/or skin breakdown   Assess vascular access sites hourly   Every 4-6 hours minimum: Change oxygen saturation probe site  1/28/2025 0513 by Calli Marcum, RN  Outcome: Progressing  Goal: Incisions, wounds, or drain sites healing without S/S of infection  Outcome: Progressing  Flowsheets  Taken 1/28/2025 0925  Incisions, Wounds, or Drain Sites Healing Without Sign and Symptoms of Infection: ADMISSION and DAILY: Assess and document risk factors for pressure ulcer development  Taken 1/28/2025 0700  Incisions, Wounds, or Drain Sites Healing Without Sign and Symptoms of Infection: ADMISSION and DAILY: Assess and document risk factors for pressure ulcer development  Goal: Oral mucous membranes remain intact  Outcome: Progressing  Flowsheets  Taken 1/28/2025 0925  Oral Mucous Membranes Remain Intact:   Assess oral mucosa and hygiene practices   Implement preventative oral hygiene regimen  Taken 1/28/2025 0700  Oral Mucous Membranes Remain Intact:   Assess oral mucosa and hygiene practices   Implement preventative oral hygiene regimen     Problem: Musculoskeletal - Adult  Goal: Return mobility to safest level of function  Outcome: Progressing  Flowsheets (Taken 1/28/2025 0925)  Return Mobility to Safest Level of Function:   Assess patient stability and activity tolerance for standing, transferring and ambulating with or without assistive devices   Assist with transfers and ambulation using safe patient handling equipment as needed  Goal: Maintain proper alignment of affected body part  Outcome: Progressing  Flowsheets (Taken 1/28/2025 0925)  Maintain proper alignment of affected body part:   Support and protect limb and body alignment per provider's orders   Instruct and

## 2025-01-28 NOTE — BRIEF OP NOTE
Brief Postoperative Note for NGT placement    Talon Walker Jr.  YOB: 1960  [id number]    Pre-operative Diagnosis: Dysphagia     Post-operative Diagnosis: Same    Procedure: Fluoroscopy Guided NGT Placement     Anesthesia: None     Surgeons/Assistants: Juan Hinson PA-C    Complications: None    EBL: None    Specimens: Were Not Obtained    Description:    Successful placement of 10 Latvian nutriglide nasogastric tube through the right nostril. Nasogatric tube secured at 80 cm.  Placement confirmed with administration of air bolus under fluoroscopy.  Okay to use NGT.    Electronically signed by KENDRA Nelson on 1/28/2025 at 3:06 PM

## 2025-01-28 NOTE — RT PROTOCOL NOTE
RT Nebulizer Bronchodilator Protocol Note    There is a bronchodilator order in the chart from a provider indicating to follow the RT Bronchodilator Protocol and there is an “Initiate RT Bronchodilator Protocol” order as well (see protocol at bottom of note).    CXR Findings:  XR CHEST PORTABLE    Result Date: 1/27/2025  Possible slight decrease in right mid and lower lung airspace opacities       The findings from the last RT Protocol Assessment were as follows:  Smoking: Chronic pulmonary disease  Respiratory Pattern: Dyspnea on exertion or RR 21-25 bpm  Breath Sounds: Inspiratory and expiratory or bilateral wheezing and/or rhonchi  Cough: Weak, productive  Indication for Bronchodilator Therapy:    Bronchodilator Assessment Score: 12    Aerosolized bronchodilator medication orders have been revised according to the RT Nebulizer Bronchodilator Protocol below.    Respiratory Therapist to perform RT Therapy Protocol Assessment initially then follow the protocol.  Repeat RT Therapy Protocol Assessment PRN for score 0-3 or on second treatment, BID, and PRN for scores above 3.    No Indications - adjust the frequency to every 6 hours PRN wheezing or bronchospasm, if no treatments needed after 48 hours then discontinue using Per Protocol order mode.     If indication present, adjust the RT bronchodilator orders based on the Bronchodilator Assessment Score as indicated below.  If a patient is on this medication at home then do not decrease Frequency below that used at home.    0-3 - enter or revise RT bronchodilator order(s) to equivalent RT Bronchodilator order with Frequency of every 4 hours PRN for wheezing or increased work of breathing using Per Protocol order mode.       4-6 - enter or revise RT Bronchodilator order(s) to two equivalent RT bronchodilator orders with one order with BID Frequency and one order with Frequency of every 4 hours PRN wheezing or increased work of breathing using Per Protocol order mode.

## 2025-01-28 NOTE — CONSULTS
General Surgery  Consult    PATIENT NAME: Talon Walker Jr.  AGE: 64 y.o.  MEDICAL RECORD NO. [id number]  DATE: 1/28/2025  SURGEON: Dr. Merlos  PRIMARY CARE PHYSICIAN: Adrian Adkins APRN - CNP    Patient evaluated at the request of  Dr. Martinez  Reason for evaluation: Peg placement    Patient information was obtained from patient and past medical records.  History/Exam limitations: none.    IMPRESSION:     Patient Active Problem List   Diagnosis    Gastroesophageal reflux disease    Allergic rhinitis    Controlled type 2 diabetes mellitus with stage 3 chronic kidney disease, without long-term current use of insulin (HCC)    Chronic obstructive pulmonary disease (HCC)    Dysthymia    Acute cerebrovascular accident (HCC)    Left-sided weakness    Lower leg edema    Aspiration pneumonia of right lower lobe (HCC)    History of recent pneumonia    Anemia in chronic renal disease    Acute respiratory failure with hypercapnia    Abnormal EEG    History of stroke    Abnormal computed tomography angiography of head    Moderate malnutrition (HCC)       PLAN:   64 year old male with CVA and dysphagia in need of long term nutritional support.   Plan for Peg placement if family in agreement, possible 1/29 if scheduling allows.      HISTORY:   History of Chief Complaint:    Talon Walker Jr. is a 64 y.o. male who presents after being found down with a CVA s/p craniotomy. He was extubated 1/24 but has failed swallow studies on 1/25 and 1/28. He currently has an NG for feeding. He is agreeable with proceeding with a Peg placement. He has had a Peg before in the past after a previous CVA. No other abdominal surgeries.     Past Medical History   has a past medical history of Acid reflux, Asthma, Cerebrovascular disease, Chronic cough, COPD (chronic obstructive pulmonary disease) (HCC), Dysphagia, Dyspnea, Unspecified cerebral artery occlusion with cerebral infarction, and Wheelchair dependent.  Past Surgical History   has a past   Courtney Sanchez APRN - CNP   atorvastatin (LIPITOR) 40 MG tablet Take 1 tablet by mouth daily 2/6/24   Adrian Adkins APRN - CNP   cetirizine (ZYRTEC) 10 MG tablet TAKE ONE TABLET BY MOUTH DAILY 11/30/22   Lucille, CARO Pollock CNP   Misc. Devices (WHEELCHAIR) MISC 1 each by Does not apply route daily WITH ELEVATING LEG RESTS 7/18/22   Lucille, CARO Pollock CNP   Multiple Vitamins-Minerals ([employer] SPECTRAVITE ADVANCED) TABS TAKE 1 TABLET BY MOUTH EVERY DAY 10/27/21   Adrian Adkins APRN - CNP   fluticasone (FLONASE) 50 MCG/ACT nasal spray INSTILL 2 SPRAYS INTO EACH NOSTRIL EVERY DAY 9/15/21   Riki Perkins MD   Misc. Devices (ADJUST BATH/SHOWER SEAT/BACK) MISC 1 each by Does not apply route daily 10/17/19   Adrian Adkins APRN - CNP   Misc. Devices (COMMODE BEDSIDE) MISC 1 each by Does not apply route daily 10/17/19   MightAdrian APRN - CNP   Misc. Devices (NOVA CUSHION GEL SEAT PAD) MISC 1 each by Does not apply route daily 3/28/19   Adrian Adkins APRN - CNP   blood glucose monitor kit and supplies 1 kit by Other route daily Test 1 times a day & as needed for symptoms of irregular blood glucose. 8/6/18   Omid Clemente APRN - CNP   [employer]S ASPIRIN CHILD 81 MG chewable tablet TAKE 1 TABLET BY MOUTH DAILY 6/28/17   Adrian Adkins APRN - CNP    Scheduled Meds:   fat emulsion  250 mL IntraVENous Daily    metroNIDAZOLE  500 mg IntraVENous Q8H    ipratropium 0.5 mg-albuterol 2.5 mg  1 Dose Inhalation Q4H WA RT    atorvastatin  40 mg Orogastric Daily    budesonide  0.5 mg Nebulization BID RT    sodium chloride flush  5-40 mL IntraVENous 2 times per day    pantoprazole (PROTONIX) 40 mg in sodium chloride (PF) 0.9 % 10 mL injection  40 mg IntraVENous Daily    insulin lispro  0-4 Units SubCUTAneous Q6H    cefepime  2,000 mg IntraVENous Q12H    heparin (porcine)  5,000 Units SubCUTAneous 3 times per day    aspirin  81 mg Orogastric Daily     Continuous Infusions:   PN-Adult 2-in-1 Peripheral Line  this study no laryngeal penetration  or aspiration.     Cookie not tested.     IMPRESSION:  Nectar thick liquid is aspirated as with the prior study.     Not given on the prior study, thin liquid is administered with this study.  There is aspiration of a minimal amount.     No laryngeal penetration or aspiration of puree similar to prior.     Please see separate speech pathology report for full discussion of findings  and recommendations.       Thank you for the interesting evaluation. Further recommendations to follow.    Gabby Villanueva PA-C  1/28/2025, 5:12 PM

## 2025-01-28 NOTE — PROGRESS NOTES
[facility]  Internal Medicine Teaching Residency Program  Inpatient Daily Progress Note  ______________________________________________________________________________    Patient: Talon Walker Jr.  YOB: 1960   MRN:[id number]    Acct: [id number]     Room: 0141/0141-01  Admit date: 1/23/2025  Today's date: 01/28/25  Number of days in the hospital: 5    SUBJECTIVE   Admitting Diagnosis: Acute respiratory failure with hypercapnia  CC: Loss of consciousness  Pt examined at bedside. Chart & results reviewed.   Patient remained afebrile had blood pressure 125/76.He is maintaining saturation of 97% on 2 L oxygen via nasal cannula.His blood sugar is well-controlled and does not require any insulin.  Patient received PPN yesterday for 24 hours.Patient is on cefepime(6th day) and metronidazole(3rd day) for aspiration pneumonia.patient's cough is better.  Chest x-ray 1/27/2025 showed possible decrease in right mid and lower lung space opacities.  Patient has urine output of 2800 mL / 24 hours.     Labs reviewed: Hypernatraemia 148(2.6 L free water deficit), potassium 3.2, hyperchloremia 114, BUNs/creatinine 25/1.8(previously 2), triglyceride 222, hemoglobin 9.5, WBC 5.4, platelet count 152    MRSA DNA probe: Negative  Respiratory culture: No growth  Blood culture: No growth so far    Plan:  -Repeat barium swallow  -If patient has persistent dysphagia then IR guided NG tube placement.Start tube feeds as per dietitian note to 50 mL every 6 Free water flushes  -Taper the PPN  -Replace electrolytes      Review of Systems   Constitutional:  Negative for chills and fever.   HENT:  Positive for trouble swallowing. Negative for sore throat.    Respiratory:  Positive for cough and shortness of breath. Negative for chest tightness, wheezing and stridor.    Cardiovascular:  Negative for chest pain, palpitations and leg swelling.   Gastrointestinal:  Negative for constipation,

## 2025-01-28 NOTE — PROCEDURES
INSTRUMENTAL SWALLOW REPORT  MODIFIED BARIUM SWALLOW    NAME: Talon Walker Jr.   : 1960  MRN: [id number]       Date of Eval: 2025              Referring Diagnosis(es):      Past Medical History:  has a past medical history of Acid reflux, Asthma, Cerebrovascular disease, Chronic cough, COPD (chronic obstructive pulmonary disease) (HCC), Dysphagia, Dyspnea, Unspecified cerebral artery occlusion with cerebral infarction, and Wheelchair dependent.  Past Surgical History:  has a past surgical history that includes craniotomy; brain surgery; knee surgery; and Gastrostomy tube placement.    Type of Study: Repeat MBS      Patient Complaints/Reason for Referral:  Talon Walker Jr. was referred for a MBS to assess the efficiency of his/her swallow function, assess for aspiration, and to make recommendations regarding safe dietary consistencies, effective compensatory strategies, and safe eating environment.       Onset of problem:      Talon Walker Jr. is a 64 y.o. male with prior CVA/craniotomy () with residual left-sided deficits  who presents to the emergency department for evaluation of altered mental status.  Per EMS patient's wife called as she found the patient unresponsive on the ground.      Patient has signed DNR CCA paperwork. ER physician at outside hospital spoke with patient's wife and updated on severity of condition and wife asked that his wishes be honored. Nursing then spoke w/ patient's son, who is documented POA. Son asked that the patient's DNR CCA be reversed and have him intubated. He would like the patient to be full code but \"does not want CPR to extend beyond 10 minutes\".      Per Clinical Ethicist:    Ethics consulted for clarity in patient Code Status. Mr Talon Walker is a 63y/o transported from [place]. On 2024, the patient verbally requested a DNR-CCA (No Intubation) Order. That DNR-CCA order was signed and is still in effect. No Guardian or POA can override the

## 2025-01-29 ENCOUNTER — ANESTHESIA (OUTPATIENT)
Dept: OPERATING ROOM | Age: 65
End: 2025-01-29
Payer: MEDICAID

## 2025-01-29 ENCOUNTER — ANESTHESIA EVENT (OUTPATIENT)
Dept: OPERATING ROOM | Age: 65
End: 2025-01-29
Payer: MEDICAID

## 2025-01-29 PROBLEM — I21.4 NSTEMI (NON-ST ELEVATION MYOCARDIAL INFARCTION) (HCC): Status: ACTIVE | Noted: 2025-01-29

## 2025-01-29 LAB
ALBUMIN SERPL-MCNC: 3.3 G/DL (ref 3.5–5.2)
ALBUMIN/GLOB SERPL: 1.1 {RATIO} (ref 1–2.5)
ALP SERPL-CCNC: 77 U/L (ref 40–129)
ALT SERPL-CCNC: 36 U/L (ref 10–50)
ANION GAP SERPL CALCULATED.3IONS-SCNC: 11 MMOL/L (ref 9–16)
AST SERPL-CCNC: 18 U/L (ref 10–50)
BASOPHILS # BLD: 0.04 K/UL (ref 0–0.2)
BASOPHILS NFR BLD: 1 % (ref 0–2)
BILIRUB DIRECT SERPL-MCNC: 0.1 MG/DL (ref 0–0.2)
BILIRUB INDIRECT SERPL-MCNC: ABNORMAL MG/DL (ref 0–1)
BILIRUB SERPL-MCNC: <0.2 MG/DL (ref 0–1.2)
BUN SERPL-MCNC: 28 MG/DL (ref 8–23)
CALCIUM SERPL-MCNC: 8.7 MG/DL (ref 8.6–10.4)
CHLORIDE SERPL-SCNC: 116 MMOL/L (ref 98–107)
CO2 SERPL-SCNC: 20 MMOL/L (ref 20–31)
CREAT SERPL-MCNC: 1.6 MG/DL (ref 0.7–1.2)
EOSINOPHIL # BLD: 0.4 K/UL (ref 0–0.44)
EOSINOPHILS RELATIVE PERCENT: 5 % (ref 1–4)
ERYTHROCYTE [DISTWIDTH] IN BLOOD BY AUTOMATED COUNT: 15.9 % (ref 11.8–14.4)
GFR, ESTIMATED: 48 ML/MIN/1.73M2
GLOBULIN SER CALC-MCNC: 3.1 G/DL
GLUCOSE BLD-MCNC: 103 MG/DL (ref 75–110)
GLUCOSE BLD-MCNC: 139 MG/DL (ref 75–110)
GLUCOSE BLD-MCNC: 139 MG/DL (ref 75–110)
GLUCOSE BLD-MCNC: 143 MG/DL (ref 75–110)
GLUCOSE BLD-MCNC: 148 MG/DL (ref 75–110)
GLUCOSE SERPL-MCNC: 139 MG/DL (ref 74–99)
HCT VFR BLD AUTO: 31.5 % (ref 40.7–50.3)
HGB BLD-MCNC: 9.4 G/DL (ref 13–17)
IMM GRANULOCYTES # BLD AUTO: 0.06 K/UL (ref 0–0.3)
IMM GRANULOCYTES NFR BLD: 1 %
LYMPHOCYTES NFR BLD: 1.33 K/UL (ref 1.1–3.7)
LYMPHOCYTES RELATIVE PERCENT: 18 % (ref 24–43)
MAGNESIUM SERPL-MCNC: 1.9 MG/DL (ref 1.6–2.4)
MCH RBC QN AUTO: 27.2 PG (ref 25.2–33.5)
MCHC RBC AUTO-ENTMCNC: 29.8 G/DL (ref 28.4–34.8)
MCV RBC AUTO: 91.3 FL (ref 82.6–102.9)
MONOCYTES NFR BLD: 0.47 K/UL (ref 0.1–1.2)
MONOCYTES NFR BLD: 6 % (ref 3–12)
NEUTROPHILS NFR BLD: 69 % (ref 36–65)
NEUTS SEG NFR BLD: 5.09 K/UL (ref 1.5–8.1)
NRBC BLD-RTO: 0 PER 100 WBC
PHOSPHATE SERPL-MCNC: 2.8 MG/DL (ref 2.5–4.5)
PLATELET # BLD AUTO: 165 K/UL (ref 138–453)
PMV BLD AUTO: 11.1 FL (ref 8.1–13.5)
POTASSIUM SERPL-SCNC: 3.6 MMOL/L (ref 3.7–5.3)
PROT SERPL-MCNC: 6.4 G/DL (ref 6.6–8.7)
RBC # BLD AUTO: 3.45 M/UL (ref 4.21–5.77)
RBC # BLD: ABNORMAL 10*6/UL
SODIUM SERPL-SCNC: 147 MMOL/L (ref 136–145)
TRIGL SERPL-MCNC: 227 MG/DL
WBC OTHER # BLD: 7.4 K/UL (ref 3.5–11.3)

## 2025-01-29 PROCEDURE — 84478 ASSAY OF TRIGLYCERIDES: CPT

## 2025-01-29 PROCEDURE — 3700000001 HC ADD 15 MINUTES (ANESTHESIA): Performed by: SURGERY

## 2025-01-29 PROCEDURE — 6360000002 HC RX W HCPCS

## 2025-01-29 PROCEDURE — 2580000003 HC RX 258

## 2025-01-29 PROCEDURE — 6370000000 HC RX 637 (ALT 250 FOR IP)

## 2025-01-29 PROCEDURE — 99232 SBSQ HOSP IP/OBS MODERATE 35: CPT | Performed by: STUDENT IN AN ORGANIZED HEALTH CARE EDUCATION/TRAINING PROGRAM

## 2025-01-29 PROCEDURE — 85025 COMPLETE CBC W/AUTO DIFF WBC: CPT

## 2025-01-29 PROCEDURE — 36415 COLL VENOUS BLD VENIPUNCTURE: CPT

## 2025-01-29 PROCEDURE — 80076 HEPATIC FUNCTION PANEL: CPT

## 2025-01-29 PROCEDURE — 0DH67UZ INSERTION OF FEEDING DEVICE INTO STOMACH, VIA NATURAL OR ARTIFICIAL OPENING: ICD-10-PCS | Performed by: SURGERY

## 2025-01-29 PROCEDURE — 3700000000 HC ANESTHESIA ATTENDED CARE: Performed by: SURGERY

## 2025-01-29 PROCEDURE — 3E0G76Z INTRODUCTION OF NUTRITIONAL SUBSTANCE INTO UPPER GI, VIA NATURAL OR ARTIFICIAL OPENING: ICD-10-PCS | Performed by: SURGERY

## 2025-01-29 PROCEDURE — 2700000000 HC OXYGEN THERAPY PER DAY

## 2025-01-29 PROCEDURE — 83735 ASSAY OF MAGNESIUM: CPT

## 2025-01-29 PROCEDURE — 2720000010 HC SURG SUPPLY STERILE: Performed by: SURGERY

## 2025-01-29 PROCEDURE — 43246 EGD PLACE GASTROSTOMY TUBE: CPT | Performed by: SURGERY

## 2025-01-29 PROCEDURE — 80048 BASIC METABOLIC PNL TOTAL CA: CPT

## 2025-01-29 PROCEDURE — 1200000000 HC SEMI PRIVATE

## 2025-01-29 PROCEDURE — 7100000001 HC PACU RECOVERY - ADDTL 15 MIN: Performed by: SURGERY

## 2025-01-29 PROCEDURE — 3600000002 HC SURGERY LEVEL 2 BASE: Performed by: SURGERY

## 2025-01-29 PROCEDURE — 94640 AIRWAY INHALATION TREATMENT: CPT

## 2025-01-29 PROCEDURE — 84100 ASSAY OF PHOSPHORUS: CPT

## 2025-01-29 PROCEDURE — 82947 ASSAY GLUCOSE BLOOD QUANT: CPT

## 2025-01-29 PROCEDURE — 3600000012 HC SURGERY LEVEL 2 ADDTL 15MIN: Performed by: SURGERY

## 2025-01-29 PROCEDURE — 7100000000 HC PACU RECOVERY - FIRST 15 MIN: Performed by: SURGERY

## 2025-01-29 PROCEDURE — 94761 N-INVAS EAR/PLS OXIMETRY MLT: CPT

## 2025-01-29 PROCEDURE — 99231 SBSQ HOSP IP/OBS SF/LOW 25: CPT

## 2025-01-29 PROCEDURE — 99232 SBSQ HOSP IP/OBS MODERATE 35: CPT | Performed by: SURGERY

## 2025-01-29 PROCEDURE — 2709999900 HC NON-CHARGEABLE SUPPLY: Performed by: SURGERY

## 2025-01-29 RX ORDER — ONDANSETRON 2 MG/ML
4 INJECTION INTRAMUSCULAR; INTRAVENOUS
Status: DISCONTINUED | OUTPATIENT
Start: 2025-01-29 | End: 2025-01-29

## 2025-01-29 RX ORDER — POTASSIUM CHLORIDE 7.45 MG/ML
10 INJECTION INTRAVENOUS
Status: DISCONTINUED | OUTPATIENT
Start: 2025-01-29 | End: 2025-01-29

## 2025-01-29 RX ORDER — SODIUM CHLORIDE 0.9 % (FLUSH) 0.9 %
5-40 SYRINGE (ML) INJECTION EVERY 12 HOURS SCHEDULED
Status: DISCONTINUED | OUTPATIENT
Start: 2025-01-29 | End: 2025-01-29

## 2025-01-29 RX ORDER — SODIUM CHLORIDE 0.9 % (FLUSH) 0.9 %
5-40 SYRINGE (ML) INJECTION PRN
Status: DISCONTINUED | OUTPATIENT
Start: 2025-01-29 | End: 2025-01-29

## 2025-01-29 RX ORDER — FENTANYL CITRATE 50 UG/ML
50 INJECTION, SOLUTION INTRAMUSCULAR; INTRAVENOUS EVERY 5 MIN PRN
Status: DISCONTINUED | OUTPATIENT
Start: 2025-01-29 | End: 2025-01-29

## 2025-01-29 RX ORDER — NALOXONE HYDROCHLORIDE 0.4 MG/ML
INJECTION, SOLUTION INTRAMUSCULAR; INTRAVENOUS; SUBCUTANEOUS PRN
Status: DISCONTINUED | OUTPATIENT
Start: 2025-01-29 | End: 2025-01-29

## 2025-01-29 RX ORDER — PROPOFOL 10 MG/ML
INJECTION, EMULSION INTRAVENOUS
Status: DISCONTINUED | OUTPATIENT
Start: 2025-01-29 | End: 2025-01-29 | Stop reason: SDUPTHER

## 2025-01-29 RX ORDER — IPRATROPIUM BROMIDE AND ALBUTEROL SULFATE 2.5; .5 MG/3ML; MG/3ML
1 SOLUTION RESPIRATORY (INHALATION)
Status: DISCONTINUED | OUTPATIENT
Start: 2025-01-29 | End: 2025-01-29

## 2025-01-29 RX ORDER — LIDOCAINE HYDROCHLORIDE 10 MG/ML
INJECTION, SOLUTION EPIDURAL; INFILTRATION; INTRACAUDAL; PERINEURAL
Status: DISCONTINUED | OUTPATIENT
Start: 2025-01-29 | End: 2025-01-29 | Stop reason: SDUPTHER

## 2025-01-29 RX ORDER — LABETALOL HYDROCHLORIDE 5 MG/ML
10 INJECTION, SOLUTION INTRAVENOUS
Status: DISCONTINUED | OUTPATIENT
Start: 2025-01-29 | End: 2025-01-29

## 2025-01-29 RX ORDER — CEFAZOLIN SODIUM 1 G/3ML
INJECTION, POWDER, FOR SOLUTION INTRAMUSCULAR; INTRAVENOUS
Status: DISCONTINUED | OUTPATIENT
Start: 2025-01-29 | End: 2025-01-29 | Stop reason: SDUPTHER

## 2025-01-29 RX ORDER — FENTANYL CITRATE 50 UG/ML
25 INJECTION, SOLUTION INTRAMUSCULAR; INTRAVENOUS EVERY 5 MIN PRN
Status: DISCONTINUED | OUTPATIENT
Start: 2025-01-29 | End: 2025-01-29

## 2025-01-29 RX ORDER — SODIUM CHLORIDE, SODIUM LACTATE, POTASSIUM CHLORIDE, CALCIUM CHLORIDE 600; 310; 30; 20 MG/100ML; MG/100ML; MG/100ML; MG/100ML
INJECTION, SOLUTION INTRAVENOUS
Status: DISCONTINUED | OUTPATIENT
Start: 2025-01-29 | End: 2025-01-29 | Stop reason: SDUPTHER

## 2025-01-29 RX ORDER — SODIUM CHLORIDE 9 MG/ML
INJECTION, SOLUTION INTRAVENOUS PRN
Status: DISCONTINUED | OUTPATIENT
Start: 2025-01-29 | End: 2025-01-29

## 2025-01-29 RX ADMIN — PROPOFOL 50 MG: 10 INJECTION, EMULSION INTRAVENOUS at 09:30

## 2025-01-29 RX ADMIN — BUDESONIDE INHALATION 500 MCG: 0.5 SUSPENSION RESPIRATORY (INHALATION) at 21:21

## 2025-01-29 RX ADMIN — SODIUM CHLORIDE, POTASSIUM CHLORIDE, SODIUM LACTATE AND CALCIUM CHLORIDE: 600; 310; 30; 20 INJECTION, SOLUTION INTRAVENOUS at 08:59

## 2025-01-29 RX ADMIN — IPRATROPIUM BROMIDE AND ALBUTEROL SULFATE 1 DOSE: 2.5; .5 SOLUTION RESPIRATORY (INHALATION) at 21:21

## 2025-01-29 RX ADMIN — IPRATROPIUM BROMIDE AND ALBUTEROL SULFATE 1 DOSE: 2.5; .5 SOLUTION RESPIRATORY (INHALATION) at 11:59

## 2025-01-29 RX ADMIN — CEFEPIME 2000 MG: 2 INJECTION, POWDER, FOR SOLUTION INTRAVENOUS at 01:20

## 2025-01-29 RX ADMIN — BUDESONIDE INHALATION 500 MCG: 0.5 SUSPENSION RESPIRATORY (INHALATION) at 08:16

## 2025-01-29 RX ADMIN — HEPARIN SODIUM 5000 UNITS: 5000 INJECTION INTRAVENOUS; SUBCUTANEOUS at 14:10

## 2025-01-29 RX ADMIN — IPRATROPIUM BROMIDE AND ALBUTEROL SULFATE 1 DOSE: 2.5; .5 SOLUTION RESPIRATORY (INHALATION) at 15:55

## 2025-01-29 RX ADMIN — CEFEPIME 2000 MG: 2 INJECTION, POWDER, FOR SOLUTION INTRAVENOUS at 13:07

## 2025-01-29 RX ADMIN — METRONIDAZOLE 500 MG: 500 INJECTION, SOLUTION INTRAVENOUS at 18:19

## 2025-01-29 RX ADMIN — POTASSIUM BICARBONATE 40 MEQ: 782 TABLET, EFFERVESCENT ORAL at 13:17

## 2025-01-29 RX ADMIN — HEPARIN SODIUM 5000 UNITS: 5000 INJECTION INTRAVENOUS; SUBCUTANEOUS at 22:01

## 2025-01-29 RX ADMIN — LIDOCAINE HYDROCHLORIDE 50 MG: 10 INJECTION, SOLUTION EPIDURAL; INFILTRATION; INTRACAUDAL; PERINEURAL at 09:30

## 2025-01-29 RX ADMIN — CEFAZOLIN 2 G: 1 INJECTION, POWDER, FOR SOLUTION INTRAMUSCULAR; INTRAVENOUS at 09:33

## 2025-01-29 RX ADMIN — METRONIDAZOLE 500 MG: 500 INJECTION, SOLUTION INTRAVENOUS at 11:05

## 2025-01-29 RX ADMIN — HEPARIN SODIUM 5000 UNITS: 5000 INJECTION INTRAVENOUS; SUBCUTANEOUS at 00:11

## 2025-01-29 RX ADMIN — METRONIDAZOLE 500 MG: 500 INJECTION, SOLUTION INTRAVENOUS at 02:31

## 2025-01-29 RX ADMIN — IPRATROPIUM BROMIDE AND ALBUTEROL SULFATE 1 DOSE: 2.5; .5 SOLUTION RESPIRATORY (INHALATION) at 08:16

## 2025-01-29 ASSESSMENT — PAIN SCALES - GENERAL: PAINLEVEL_OUTOF10: 0

## 2025-01-29 ASSESSMENT — PAIN - FUNCTIONAL ASSESSMENT: PAIN_FUNCTIONAL_ASSESSMENT: NONE - DENIES PAIN

## 2025-01-29 ASSESSMENT — ENCOUNTER SYMPTOMS: SHORTNESS OF BREATH: 1

## 2025-01-29 NOTE — OP NOTE
Operative Note      Patient: Talon Walker Jr.  YOB: 1960  MRN: [id number]    Date of Procedure: 1/29/2025    Pre-Op Diagnosis Codes:      * Dysphagia, unspecified type [R13.10]    Post-Op Diagnosis: Same       Procedure(s):  **ADD ON- WANTS AS EARLY AS POSSIBLE** ESOPHAGOGASTRODUODENOSCOPY PERCUTANEOUS ENDOSCOPIC GASTROSTOMY TUBE INSERTION    Surgeon(s):  Rashel Merlos MD    Assistant:   * No surgical staff found *    Anesthesia: Monitor Anesthesia Care    Estimated Blood Loss (mL): Minimal    Complications: None    Specimens:   * No specimens in log *    Implants:  * No implants in log *      Drains:   External Urinary Catheter (Active)   Site Assessment Intact 01/29/25 0608   Placement Replaced 01/29/25 0608   Securement Method Securing device (Describe) 01/29/25 0608   Catheter Care Catheter/Wick replaced;Suction Canister/Tubing changed 01/29/25 0608   Perineal Care Yes 01/28/25 2200   Suction 40 mmgHg continuous 01/29/25 0608   Urine Color Yellow 01/29/25 0608   Urine Appearance Clear 01/29/25 0608   Output (mL) 800 mL 01/29/25 0608       [REMOVED] NG/OG/NJ/NE Tube Orogastric Left mouth (Removed)   Surrounding Skin Clean, dry & intact 01/24/25 0800   Securement device Tape 01/24/25 0800   Status Clamped 01/24/25 0800   Placement Verified X-Ray (Initial);Respiratory Status;External Catheter Length 01/24/25 0800   Drainage Appearance None 01/24/25 0800       [REMOVED] NG/OG/NJ/NE Tube Nasogastric 16 fr Left nostril (Removed)   Surrounding Skin Clean, dry & intact 01/25/25 1730   Securement device Tape 01/25/25 1730   Status Clamped 01/25/25 1730   Placement Verified X-Ray (Initial) 01/25/25 1730   NG/OG/NJ/NE External Measurement (cm) 70 cm 01/25/25 1730       [REMOVED] NG/OG/NJ/NE Tube Nasogastric 10 fr Right nostril (Removed)   Surrounding Skin Clean, dry & intact 01/28/25 1600   Securement device Bridle 01/28/25 1600   Status Clamped 01/28/25 1600       [REMOVED] Urinary Catheter 01/23/25  Power (Removed)   $ Urethral catheter insertion $ Not inserted for procedure 01/23/25 0658   Catheter Indications Need for fluid volume management of the critically ill patient in a critical care setting 01/24/25 0800   Site Assessment No urethral drainage 01/24/25 0800   Urine Color Yellow 01/24/25 1100   Urine Appearance Clear 01/24/25 1100   Collection Container Standard 01/24/25 0800   Securement Method Securing device (Describe) 01/24/25 0800   Catheter Care  Soap and water 01/24/25 0800   Catheter Best Practices  Drainage tube clipped to bed;Catheter secured to thigh;Tamper seal intact;Bag below bladder;Bag not on floor;Lack of dependent loop in tubing;Drainage bag less than half full 01/24/25 0800   Status Draining;Patent 01/24/25 0800   Output (mL) 450 mL 01/24/25 1100       [REMOVED] External Urinary Catheter (Removed)   Site Assessment Clean,dry & intact 01/28/25 1030   Placement Replaced 01/28/25 1030   Securement Method Securing device (Describe) 01/28/25 1030   Catheter Care Catheter/Wick replaced 01/28/25 1030   Perineal Care Yes 01/28/25 1030   Suction 40 mmgHg continuous 01/28/25 1030   Urine Color Yellow 01/28/25 1030   Urine Appearance Clear 01/28/25 1030   Output (mL) 650 mL 01/28/25 0621       Findings:  Infection Present At Time Of Surgery (PATOS) (choose all levels that have infection present):  No infection present  Other Findings: Excellent 1:1 palpation and transillumination prior to PEG placement.  20 Fr pull-PEG placed at 2cm at the skin.      Detailed Description of Procedure:   Patient brought to the operating theater and placed supine.  Timeout performed.  Monitored anesthesia car provided.      Endoscope advanced via oropharynx into stomach.  Insufflation obtained and pylorus and duodenum examined without obvious pathology.  Scope retroflexed, and no pathology noted at cardia or GEJ.  Excellent 1:1 palpation and transillumination was then noted.  An incision was mode over the left

## 2025-01-29 NOTE — CARE COORDINATION
Dx: Acute cerebrovascular accident ([facility]) [I63.9]  NSTEMI (non-ST elevation myocardial infarction) ([facility]) [I21.4]  Acute respiratory failure with hypercapnia [J96.02]  Aspiration pneumonia of right lower lobe, unspecified aspiration pneumonia type ([facility]) [J69.0]    Admit date: 1/23/2025  Hospital day:6      ______________________________________      Patients goal/ Transitional Planning:   Spoke with wife and patients home health aide.  Stated plan remains return home with family and aide, requesting isis lift at home.  New PEG tube placed, will need Tube feed supplies.  Home care nursing services list given.  Patient states prefers [facility] for TF,     PT/OT recs: Baseline, no further needs        [facility] RISK OF UNPLANNED READMISSION 2.0             20.4 Total Score

## 2025-01-29 NOTE — ANESTHESIA PRE PROCEDURE
Department of Anesthesiology  Preprocedure Note       Name:  Talon Walker Jr.   Age:  64 y.o.  :  1960                                          MRN:  [id number]         Date:  2025      Surgeon: Surgeon(s):  Rashel Merlos MD    Procedure: Procedure(s):  **ADD ON- WANTS AS EARLY AS POSSIBLE** ESOPHAGOGASTRODUODENOSCOPY PERCUTANEOUS ENDOSCOPIC GASTROSTOMY TUBE INSERTION    Medications prior to admission:   Prior to Admission medications    Medication Sig Start Date End Date Taking? Authorizing Provider   sertraline (ZOLOFT) 100 MG tablet Take 2 tablets by mouth daily 25   Adrian Adkins APRN - CNP   omeprazole (PRILOSEC) 40 MG delayed release capsule TAKE 1 CAPSULE BY MOUTH DAILY 24   Adrian Adkins APRN - CNP   folic acid (FOLVITE) 1 MG tablet TAKE 1 TABLET BY MOUTH DAILY 24   Adrian Adkins APRN - CNP   levETIRAcetam (KEPPRA) 500 MG tablet TAKE 1/2 TABLET BY MOUTH TWICE A DAY 24   Adrian Adkins APRN - CNP   pioglitazone (ACTOS) 30 MG tablet  24   Provider, MD Amanda   baclofen (LIORESAL) 10 MG tablet TAKE 1 TABLET BY MOUTH 2 TIMES A DAY 24   Adrian Adkins APRN - CNP   budesonide (PULMICORT) 0.5 MG/2ML nebulizer suspension USE ONE VIAL VIA NEBULIZER MACHINE TWO TIMES DAILY 24  Kostas Brunson MD   ipratropium 0.5 mg-albuterol 2.5 mg (DUONEB) 0.5-2.5 (3) MG/3ML SOLN nebulizer solution INHALE THREE MILLILITERS VIA NEBULIZER BY MOUTH EVERY 6 HOURS 24   Kostas Brunson MD   gabapentin (NEURONTIN) 300 MG capsule TAKE 1 CAPSULE BY MOUTH TWICE A DAY 8/14/24 2/10/25  Adrian Adkins APRN - CNP   Lancets MISC Test one times a day & as needed for symptoms of irregular blood glucose. DX: E11.41 Dispense brand covered by insurance. Note to Pharmacy: Brand per patient preference. May round up to next available package size. 24   Lucille Adrian MANZO, APRN - CNP   blood glucose test strips (TRUE METRIX BLOOD GLUCOSE TEST) strip Test one times a day & as

## 2025-01-29 NOTE — PROGRESS NOTES
SPEECH LANGUAGE PATHOLOGY  Speech Language Pathology  [facility] OR    Date: 1/29/2025  Patient Name: Talon Walker Jr.  YOB: 1960   AGE: 64 y.o.  MRN: [id number]        Patient Not Available for Speech Therapy     Due to:  [] Testing  [] Hemodialysis  [] Cancelled by RN  [x] Surgery   [] Intubation/Sedation/Pain Medication  [] Medical instability  [] Refusal  [] Other    Next scheduled treatment: as appropriate  Completed by: YOUSIF Thorpe, [unfilled]

## 2025-01-29 NOTE — ANESTHESIA POSTPROCEDURE EVALUATION
Department of Anesthesiology  Postprocedure Note    Patient: Talon Walker Jr.  MRN: [id number]  YOB: 1960  Date of evaluation: 1/29/2025    Procedure Summary       Date: 01/29/25 Room / Location: [address]    Anesthesia Start: 0859 Anesthesia Stop: 0955    Procedure: ESOPHAGOGASTRODUODENOSCOPY PERCUTANEOUS ENDOSCOPIC GASTROSTOMY TUBE PLACEMENT Diagnosis:       Dysphagia, unspecified type      (Dysphagia, unspecified type [R13.10])    Surgeons: Rashel Merlos MD Responsible Provider: Jane Vee MD    Anesthesia Type: MAC ASA Status: 4            Anesthesia Type: No value filed.    Elisa Phase I: Elisa Score: 9    Elisa Phase II:      Anesthesia Post Evaluation    Patient location during evaluation: PACU  Patient participation: waiting for patient participation  Level of consciousness: awake  Pain score: 1  Airway patency: patent  Nausea & Vomiting: no nausea and no vomiting  Cardiovascular status: hemodynamically stable  Respiratory status: acceptable  Hydration status: euvolemic  Pain management: adequate    No notable events documented.

## 2025-01-29 NOTE — PLAN OF CARE
Problem: Chronic Conditions and Co-morbidities  Goal: Patient's chronic conditions and co-morbidity symptoms are monitored and maintained or improved  1/29/2025 1839 by Maru Abbasi RN  Outcome: Progressing  1/29/2025 0602 by Malika Cagle RN  Outcome: Progressing     Problem: Discharge Planning  Goal: Discharge to home or other facility with appropriate resources  1/29/2025 1839 by Maru Abbasi RN  Outcome: Progressing  1/29/2025 0602 by Malika Cagle RN  Outcome: Progressing     Problem: Safety - Adult  Goal: Free from fall injury  1/29/2025 1839 by Maru Abbasi RN  Outcome: Progressing  1/29/2025 0602 by Malika Cagle RN  Outcome: Progressing  Flowsheets (Taken 1/28/2025 1939)  Free From Fall Injury: Instruct family/caregiver on patient safety     Problem: Respiratory - Adult  Goal: Achieves optimal ventilation and oxygenation  1/29/2025 1839 by Maru Abbasi RN  Outcome: Progressing  Flowsheets (Taken 1/29/2025 0816 by Rosio Mcdonnell RCP)  Achieves optimal ventilation and oxygenation:   Assess for changes in respiratory status   Respiratory therapy support as indicated  1/29/2025 0602 by Malika Cagle RN  Outcome: Progressing     Problem: Pain  Goal: Verbalizes/displays adequate comfort level or baseline comfort level  1/29/2025 1839 by Maru Abbasi RN  Outcome: Progressing  1/29/2025 0602 by Malika Cagle RN  Outcome: Progressing     Problem: Skin/Tissue Integrity  Goal: Skin integrity remains intact  Description: 1.  Monitor for areas of redness and/or skin breakdown  2.  Assess vascular access sites hourly  3.  Every 4-6 hours minimum:  Change oxygen saturation probe site  4.  Every 4-6 hours:  If on nasal continuous positive airway pressure, respiratory therapy assess nares and determine need for appliance change or resting period  1/29/2025 1839 by Maru Abbasi RN  Outcome: Progressing  1/29/2025 0602 by Malika Cagle RN  Outcome: Progressing  Flowsheets (Taken  1/28/2025 1939)  Skin Integrity Remains Intact: Monitor for areas of redness and/or skin breakdown  Goal: Incisions, wounds, or drain sites healing without S/S of infection  1/29/2025 1839 by Maru Abbasi RN  Outcome: Progressing  1/29/2025 0602 by Malika Cagle RN  Outcome: Progressing  Flowsheets (Taken 1/28/2025 1939)  Incisions, Wounds, or Drain Sites Healing Without Sign and Symptoms of Infection: ADMISSION and DAILY: Assess and document risk factors for pressure ulcer development  Goal: Oral mucous membranes remain intact  1/29/2025 1839 by Maru Abbasi RN  Outcome: Progressing  1/29/2025 0602 by Malika Cagle RN  Outcome: Progressing  Flowsheets (Taken 1/28/2025 1939)  Oral Mucous Membranes Remain Intact: Assess oral mucosa and hygiene practices     Problem: Musculoskeletal - Adult  Goal: Return mobility to safest level of function  1/29/2025 1839 by Maru Abbasi RN  Outcome: Progressing  1/29/2025 0602 by Malika Cagle RN  Outcome: Progressing  Goal: Maintain proper alignment of affected body part  1/29/2025 1839 by Maru Abbasi RN  Outcome: Progressing  1/29/2025 0602 by Malika Cagle RN  Outcome: Progressing  Goal: Return ADL status to a safe level of function  1/29/2025 1839 by Maru Abbasi RN  Outcome: Progressing  1/29/2025 0602 by Malika Cagle RN  Outcome: Progressing     Problem: Gastrointestinal - Adult  Goal: Minimal or absence of nausea and vomiting  1/29/2025 1839 by Maru Abbasi RN  Outcome: Progressing  1/29/2025 0602 by Malika Cagle RN  Outcome: Progressing  Goal: Maintains or returns to baseline bowel function  1/29/2025 1839 by Maru Abbasi RN  Outcome: Progressing  1/29/2025 0602 by Malika Cagle RN  Outcome: Progressing  Goal: Maintains adequate nutritional intake  1/29/2025 1839 by Maru Abbasi RN  Outcome: Progressing  1/29/2025 0602 by Malika Cagle RN  Outcome: Progressing     Problem: Genitourinary - Adult  Goal: Absence of  Worker, Psychosocial CNS, Spiritual Care as appropriate  Outcome: Progressing

## 2025-01-29 NOTE — PROGRESS NOTES
Attending Note      I have reviewed the above [facility] note(s).  I have seen and examined the patient.  I have discussed the findings, established the care plan and recommendations with the Advanced Practice Provider.     Chief Complaint: \"dysphagia\"    Exam:  NAD, AO x 3  Abd soft, obese, prior feeding tube scar present    Plan:  OR today for PEG placement  Informed consent obtained    Rashel Merlos MD  2025  9:51 AM            PROGRESS NOTE          PATIENT NAME: Talon Walker Jr.  MEDICAL RECORD NO. [id number]  DATE: 2025  PRIMARY CARE PHYSICIAN: Adrian Adkins, APRN - CNP    HD: # 6    ASSESSMENT    Patient Active Problem List   Diagnosis    Gastroesophageal reflux disease    Allergic rhinitis    Controlled type 2 diabetes mellitus with stage 3 chronic kidney disease, without long-term current use of insulin (HCC)    Chronic obstructive pulmonary disease (HCC)    Dysthymia    Acute cerebrovascular accident (HCC)    Left-sided weakness    Lower leg edema    Aspiration pneumonia of right lower lobe (HCC)    History of recent pneumonia    Anemia in chronic renal disease    Acute respiratory failure with hypercapnia    Abnormal EEG    History of stroke    Abnormal computed tomography angiography of head    Moderate malnutrition (HCC)     65 yo with dysphagia following recent CVA and multiple failed MBSS     MEDICAL DECISION MAKING AND PLAN    PEG tube planned for today as add on case   Consent signed with DNR addendum in chart   Risks reviewed with patient and al questions answered   NPO at this time       Chief Complaint: \"none\"    SUBJECTIVE    Talon Walker Jr. is awake, alert and cooperative this AM. He is requesting his glasses but they were unable to be located at this time. He hasno additional questions regarding PEG tube       OBJECTIVE  VITALS: Temp: Temp: 98.6 °F (37 °C)Temp  Av.7 °F (37.1 °C)  Min: 98.2 °F (36.8 °C)  Max: 99.9 °F (37.7 °C) BP Systolic (24hrs), Av  , Min:118 , Max:162   Diastolic (24hrs), Av, Min:52, Max:68   Pulse Pulse  Av.9  Min: 84  Max: 96 Resp Resp  Av.2  Min: 19  Max: 31 Pulse ox SpO2  Av.9 %  Min: 91 %  Max: 100 %  CONSTITUTIONAL: Alert and oriented times 3, no acute distress and cooperative to examination.  HEENT: Head is normocephalic, atraumatic. EOMI, PERRLA, NG in place  NECK: Soft, trachea midline and straight  LUNGS: Chest expands equally bilaterally upon respiration, no accessory muscle used.   CARDIOVASCULAR: Regular rate and rhythm without murmur, gallop or rub.  ABDOMEN: soft, nontender, nondistended, no masses or organomegaly, no hernias palpable, no guarding or peritoneal signs. Scars are consistent with previous surgical history.  NEUROLOGIC: There are no focalizing motor or sensory deficits, speech is garbled  EXTREMITIES: no cyanosis, clubbing or edema    I/O last 3 completed shifts:  In: 864   Out: 2150 [Urine:2150]    Drain/tube output:  In: 0   Out: 800 [Urine:800]    LAB:  CBC:   Recent Labs     25  0500 25  0517 25  0513   WBC 5.2 5.4 7.4   HGB 8.9* 9.5* 9.4*   HCT 30.4* 32.2* 31.5*   MCV 93.0 93.1 91.3    152 165     BMP:   Recent Labs     25  0500 25  0517 25  0513   * 148* 147*   K 3.3* 3.2* 3.6*   * 114* 116*   CO2 21 22 20   BUN 26* 25* 28*   CREATININE 2.0* 1.8* 1.6*   GLUCOSE 123* 127* 139*     COAGS: No results for input(s): \"APTT\", \"INR\" in the last 72 hours.    Invalid input(s): \"PROT\"    RADIOLOGY:  FL MODIFIED BARIUM SWALLOW W VIDEO    Result Date: 2025  EXAMINATION: MODIFIED BARIUM SWALLOW WAS PERFORMED IN CONJUNCTION WITH SPEECH PATHOLOGY SERVICES TECHNIQUE: Under fluoroscopic evaluation cineradiography/videoradiography recordings were performed in conjunction with the speech-language pathologist (SLP). Various liquid, solid and/or semi-solid barium preparations were used to assess swallowing function. FLUOROSCOPY DOSE AND TYPE: Radiation  Exposure Index: DAP [id number], COMPARISON: 01/25/2025 HISTORY: ORDERING SYSTEM PROVIDED HISTORY: Dysphagia TECHNOLOGIST PROVIDED HISTORY: Dysphagia FINDINGS: Thin liquid: Given by straw there is deep laryngeal penetration to the cords with a minimal amount of aspiration.  This consistency was not attempted on the prior study. Nectar thick liquid: Premature spillage to the piriform sinus and vallecula. Aspiration as seen previously.  Cough response. Puree: No laryngeal penetration or aspiration.  Similar to prior. Soft solid: Not attempted on prior.  With this study no laryngeal penetration or aspiration. Cookie not tested.     Nectar thick liquid is aspirated as with the prior study. Not given on the prior study, thin liquid is administered with this study. There is aspiration of a minimal amount. No laryngeal penetration or aspiration of puree similar to prior. Please see separate speech pathology report for full discussion of findings and recommendations.     XR CHEST PORTABLE    Result Date: 1/27/2025  EXAMINATION: ONE XRAY VIEW OF THE CHEST 1/27/2025 3:35 pm COMPARISON: 01/24/2025 HISTORY: ORDERING SYSTEM PROVIDED HISTORY: interval change TECHNOLOGIST PROVIDED HISTORY: interval change FINDINGS: Heart size stable.  Right mid and lower lung opacities again noted.  No pneumothorax.  No pulmonary vascular congestion or edema.     Possible slight decrease in right mid and lower lung airspace opacities          CARO MOROCHO - CNP  1/29/25, 7:45 AM

## 2025-01-29 NOTE — PROGRESS NOTES
[facility]  Internal Medicine Teaching Residency Program  Inpatient Daily Progress Note  ______________________________________________________________________________    Patient: Talon Walker Jr.  YOB: 1960   MRN:[id number]    Acct: [id number]     Room: 0141/0141-01  Admit date: 1/23/2025  Today's date: 01/29/25  Number of days in the hospital: 6    SUBJECTIVE   Admitting Diagnosis: Acute respiratory failure with hypercapnia  CC: Loss of consciousness  Pt examined at bedside. Chart & results reviewed.   Patient remained afebrile, maintaining a MAP above 80.He is maintaining saturation of 91% on room air.Patient has urine output of 800 mL / 24 hours with 4 unmeasured occurrences.Patient had 8 episodes of bowel movement yesterday.  We will adjust his diet as it started after initiation of PPN.Patient was started on tube feeds yesterday after IR guided placement of NG tube. Has blood sugar levels are well-controlled.    **Patient's wife was called and she was made aware of the condition of the patient and the failure of the repeat barium swallow study.  She was made aware about the need for the permanent PEG tube placement.Wife stated that the patient already had PEG tube 15 years back and she knew about the procedure.  As per wife she has already talked and given the consent for the PEG tube placement.    Labs reviewed: Hypernatraemia 147(2.3 L free water deficit), potassium 3.6, phosphorus 2.8, magnesium 1.9, hyperchloremia 116, BUNs/creatinine 28/1.6(previously 2), triglyceride 227, hemoglobin 9.4, WBC 7.4, platelet count 165    MRSA DNA probe: Negative  Respiratory culture: No growth  Blood culture: No growth so far    Plan:  -Plan for PEG tube placement by general surgery  -Replace electrolytes      Review of Systems   Constitutional:  Negative for chills and fever.   HENT:  Positive for trouble swallowing. Negative for sore throat.    Respiratory:   microvascular ischemic changes.     XR CHEST PORTABLE    Result Date: 1/24/2025  No significant interval change.     CT BRAIN PERFUSION    Result Date: 1/23/2025  Large area of hypoperfusion in the left MCA distribution.     CT CERVICAL SPINE WO CONTRAST    Result Date: 1/23/2025  No acute abnormality of the cervical spine.     XR CHEST PORTABLE    Result Date: 1/23/2025  Airspace infiltrates throughout the right lung most prevalent in the right lower lobe compatible with pneumonia     XR ABDOMEN FOR NG/OG/NE TUBE PLACEMENT    Result Date: 1/23/2025  Nasogastric tube with its tip in the fundus of the stomach. The side port is at the level of the gastroesophageal junction.     XR CHEST PORTABLE    Result Date: 1/23/2025  Satisfactory ET and NG tube placement. Patchy airspace opacity stable compared to the prior CT scan.     CTA HEAD NECK W CONTRAST    Result Date: 1/23/2025  1. 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 2. 50% stenosis of the upper cervical right ICA by NASCET criteria possibly due to dissection or vasculitis.     CT CHEST PULMONARY EMBOLISM W CONTRAST    Result Date: 1/23/2025  1. No evidence of pulmonary embolism. 2. Extensive ground-glass opacities within the posterior aspect of the right lung and minimally within the left lung base, which may be secondary to infection, aspiration, and less likely atelectasis.  Recommend critical correlation. 3. Prominent mediastinal lymph nodes, similar to prior, and likely reactive.     CT Head WO Contrast    Result Date: 1/23/2025  1. No acute intracranial abnormality. 2. Extensive right cerebral encephalomalacia. 3. Previous right craniotomy.       ASSESSMENT & PLAN     ASSESSMENT / PLAN:     Principal Problem:    Acute respiratory failure with hypercapnia  Active Problems:    Chronic obstructive pulmonary disease (HCC)    Acute cerebrovascular accident (HCC)    Aspiration pneumonia of right lower lobe (HCC)    Abnormal

## 2025-01-29 NOTE — PROGRESS NOTES
Nutrition Assessment     Type and Reason for Visit: Reassess    Nutrition Recommendations/Plan:   Continue NPO as medically necessary  When able to restart TF, suggest diabetic with goal of 55 mL/hr. Recommendations provided below  Continue to replace potassium once able  Collect updated wt when pt returns from OR  Monitor POC, wt, meds, labs     Malnutrition Assessment:  Malnutrition Status: Moderate malnutrition    Nutrition Assessment:  Spoke with RN and PS MD. Per Dr. Man, pt will not continue PPN. Pt off unit receiving PEG placement. Writer called pharmacy to notify of cancelling PPN order placed by MD. When able to start TF tomorrow morning, initiate @ 10 mL/hr and advance 10 mL q6h to goal of 55 mL/hr to provide 1980 kcals, and 109 gm PRO/d. Recommend water flushes of 200 mL q6h d/t elevated sodium lab. Water flushes to provide 1600 mL. Total water provided to be 2602 mL. Labs: Na 147 mmol/L, K 3.6 mmol/L. Meds: Humalog, protonix, potassium chloride.    Estimated Daily Nutrient Needs:  Energy (kcal):  0950-5252 Weight Used for Energy Requirements: Ideal     Protein (g):   gm/d Weight Used for Protein Requirements: Ideal        Fluid (ml/day):  or per physician Method Used for Fluid Requirements: 1 ml/kcal    Nutrition Related Findings:   Meds/labs reviewed Wound Type: None    Current Nutrition Therapies:    Diet NPO  PN-Adult 2-in-1 Peripheral Line (Standard)    Anthropometric Measures:  Height: 175.3 cm (5' 9\")  Current Body Wt: 75.8 kg (167 lb)   BMI: 24.7    Nutrition Diagnosis:   Inadequate oral intake related to swallowing difficulty as evidenced by NPO or clear liquid status due to medical condition, nutrition support - parenteral nutrition  Moderate malnutrition, in context of acute illness or injury related to inadequate protein-energy intake as evidenced by criteria as identified in malnutrition assessment    Nutrition Interventions:   Food and/or Nutrient Delivery: Continue NPO, Discontinue  Parenteral Nutrition, Start Tube Feeding  Nutrition Education/Counseling: No recommendation at this time  Coordination of Nutrition Care: Continue to monitor while inpatient  Plan of Care discussed with: RN, MD, Pharmacist    Goals:  Goals: Initiation of nutrition, by next RD assessment  Type of Goal: New goal  Previous Goal Met: Goal(s) Achieved    Nutrition Monitoring and Evaluation:   Behavioral-Environmental Outcomes: None Identified  Food/Nutrient Intake Outcomes: Enteral Nutrition Intake/Tolerance  Physical Signs/Symptoms Outcomes: Biochemical Data, Chewing or Swallowing, Weight    Discharge Planning:    Too soon to determine     Jessy Braxton MS, RDN, LDN  Contact: [phone]

## 2025-01-29 NOTE — PLAN OF CARE
PEG Post-op Instructions    - Please keep PEG tube to gravity drainage via Power bag.  - PEG placed and secured at 2cm at the skin.    - Ok to use PEG tube for medications immediately (administer meds, clamp tube for 1 hour, then place back to Power bag gravity drainage).  - Ok to use PEG tube for tube feeds tomorrow morning 01/30/25 at 0600.  Ok to restart at prior tube feed rate.  - KEEP ABDOMINAL BINDER IN PLACE AT ALL TIMES.      MOY Merlos MD, [facility]  Acute Care Surgery Attending  [facility]

## 2025-01-29 NOTE — PLAN OF CARE
Problem: Respiratory - Adult  Goal: Achieves optimal ventilation and oxygenation  1/28/2025 2153 by Raul Cabrera RCP  Outcome: Progressing   BRONCHOSPASM/BRONCHOCONSTRICTION     [x]         IMPROVE AERATION/BREATH SOUNDS  [x]   ADMINISTER BRONCHODILATOR THERAPY AS APPROPRIATE  [x]   ASSESS BREATH SOUNDS  []   IMPLEMENT AEROSOL/MDI PROTOCOL  [x]   PATIENT EDUCATION AS NEEDED

## 2025-01-29 NOTE — PLAN OF CARE
Problem: Chronic Conditions and Co-morbidities  Goal: Patient's chronic conditions and co-morbidity symptoms are monitored and maintained or improved  1/29/2025 0602 by Malika Cagle RN  Outcome: Progressing  1/28/2025 1744 by Shari Cruz RN  Outcome: Progressing  Flowsheets (Taken 1/28/2025 0925)  Care Plan - Patient's Chronic Conditions and Co-Morbidity Symptoms are Monitored and Maintained or Improved:   Monitor and assess patient's chronic conditions and comorbid symptoms for stability, deterioration, or improvement   Collaborate with multidisciplinary team to address chronic and comorbid conditions and prevent exacerbation or deterioration     Problem: Discharge Planning  Goal: Discharge to home or other facility with appropriate resources  1/29/2025 0602 by Malika Cagle RN  Outcome: Progressing  1/28/2025 1744 by Shari Cruz RN  Outcome: Progressing  Flowsheets (Taken 1/28/2025 0925)  Discharge to home or other facility with appropriate resources:   Identify barriers to discharge with patient and caregiver   Arrange for needed discharge resources and transportation as appropriate   Identify discharge learning needs (meds, wound care, etc)   Refer to discharge planning if patient needs post-hospital services based on physician order or complex needs related to functional status, cognitive ability or social support system     Problem: Safety - Adult  Goal: Free from fall injury  1/29/2025 0602 by Malika Cagle RN  Outcome: Progressing  Flowsheets (Taken 1/28/2025 1939)  Free From Fall Injury: Instruct family/caregiver on patient safety  1/28/2025 1744 by Shari Cruz RN  Outcome: Progressing  Flowsheets (Taken 1/28/2025 0700)  Free From Fall Injury: Instruct family/caregiver on patient safety     Problem: Respiratory - Adult  Goal: Achieves optimal ventilation and oxygenation  1/29/2025 0602 by Malika Cagle RN  Outcome: Progressing  1/28/2025 2153 by Raul Cabrera RCP  Outcome:  dysrhythmias or at baseline:   Monitor cardiac rate and rhythm   Assess for signs of decreased cardiac output     Problem: Skin/Tissue Integrity - Adult  Goal: Skin integrity remains intact  Description: 1.  Monitor for areas of redness and/or skin breakdown  2.  Assess vascular access sites hourly  3.  Every 4-6 hours minimum:  Change oxygen saturation probe site  4.  Every 4-6 hours:  If on nasal continuous positive airway pressure, respiratory therapy assess nares and determine need for appliance change or resting period  1/29/2025 0602 by Malika Cagle RN  Outcome: Progressing  Flowsheets (Taken 1/28/2025 1939)  Skin Integrity Remains Intact: Monitor for areas of redness and/or skin breakdown  1/28/2025 1744 by Shari Cruz RN  Outcome: Progressing  Flowsheets  Taken 1/28/2025 0925  Skin Integrity Remains Intact:   Monitor for areas of redness and/or skin breakdown   Assess vascular access sites hourly   Every 4-6 hours minimum: Change oxygen saturation probe site  Taken 1/28/2025 0700  Skin Integrity Remains Intact:   Monitor for areas of redness and/or skin breakdown   Assess vascular access sites hourly   Every 4-6 hours minimum: Change oxygen saturation probe site  Goal: Incisions, wounds, or drain sites healing without S/S of infection  1/29/2025 0602 by Malika Cagle RN  Outcome: Progressing  Flowsheets (Taken 1/28/2025 1939)  Incisions, Wounds, or Drain Sites Healing Without Sign and Symptoms of Infection: ADMISSION and DAILY: Assess and document risk factors for pressure ulcer development  1/28/2025 1744 by Shari Cruz, RN  Outcome: Progressing  Flowsheets  Taken 1/28/2025 0925  Incisions, Wounds, or Drain Sites Healing Without Sign and Symptoms of Infection: ADMISSION and DAILY: Assess and document risk factors for pressure ulcer development  Taken 1/28/2025 0700  Incisions, Wounds, or Drain Sites Healing Without Sign and Symptoms of Infection: ADMISSION and DAILY: Assess and document risk

## 2025-01-30 LAB
ANION GAP SERPL CALCULATED.3IONS-SCNC: 15 MMOL/L (ref 9–16)
BASOPHILS # BLD: <0.03 K/UL (ref 0–0.2)
BASOPHILS NFR BLD: 0 % (ref 0–2)
BUN SERPL-MCNC: 29 MG/DL (ref 8–23)
CALCIUM SERPL-MCNC: 8.9 MG/DL (ref 8.6–10.4)
CHLORIDE SERPL-SCNC: 114 MMOL/L (ref 98–107)
CO2 SERPL-SCNC: 20 MMOL/L (ref 20–31)
CREAT SERPL-MCNC: 1.7 MG/DL (ref 0.7–1.2)
EOSINOPHIL # BLD: 0.25 K/UL (ref 0–0.44)
EOSINOPHILS RELATIVE PERCENT: 5 % (ref 1–4)
ERYTHROCYTE [DISTWIDTH] IN BLOOD BY AUTOMATED COUNT: 16.2 % (ref 11.8–14.4)
GFR, ESTIMATED: 45 ML/MIN/1.73M2
GLUCOSE BLD-MCNC: 113 MG/DL (ref 75–110)
GLUCOSE BLD-MCNC: 132 MG/DL (ref 75–110)
GLUCOSE BLD-MCNC: 133 MG/DL (ref 75–110)
GLUCOSE SERPL-MCNC: 115 MG/DL (ref 74–99)
HCT VFR BLD AUTO: 34.5 % (ref 40.7–50.3)
HGB BLD-MCNC: 10.5 G/DL (ref 13–17)
IMM GRANULOCYTES # BLD AUTO: 0.1 K/UL (ref 0–0.3)
IMM GRANULOCYTES NFR BLD: 2 %
LYMPHOCYTES NFR BLD: 0.78 K/UL (ref 1.1–3.7)
LYMPHOCYTES RELATIVE PERCENT: 16 % (ref 24–43)
MAGNESIUM SERPL-MCNC: 1.9 MG/DL (ref 1.6–2.4)
MCH RBC QN AUTO: 27.3 PG (ref 25.2–33.5)
MCHC RBC AUTO-ENTMCNC: 30.4 G/DL (ref 28.4–34.8)
MCV RBC AUTO: 89.6 FL (ref 82.6–102.9)
MONOCYTES NFR BLD: 0.37 K/UL (ref 0.1–1.2)
MONOCYTES NFR BLD: 8 % (ref 3–12)
NEUTROPHILS NFR BLD: 68 % (ref 36–65)
NEUTS SEG NFR BLD: 3.26 K/UL (ref 1.5–8.1)
NRBC BLD-RTO: 0 PER 100 WBC
PHOSPHATE SERPL-MCNC: 3 MG/DL (ref 2.5–4.5)
PLATELET # BLD AUTO: 161 K/UL (ref 138–453)
PMV BLD AUTO: 10.9 FL (ref 8.1–13.5)
POTASSIUM SERPL-SCNC: 3.5 MMOL/L (ref 3.7–5.3)
RBC # BLD AUTO: 3.85 M/UL (ref 4.21–5.77)
RBC # BLD: ABNORMAL 10*6/UL
SODIUM SERPL-SCNC: 149 MMOL/L (ref 136–145)
WBC OTHER # BLD: 4.8 K/UL (ref 3.5–11.3)

## 2025-01-30 PROCEDURE — 94761 N-INVAS EAR/PLS OXIMETRY MLT: CPT

## 2025-01-30 PROCEDURE — 80048 BASIC METABOLIC PNL TOTAL CA: CPT

## 2025-01-30 PROCEDURE — 99024 POSTOP FOLLOW-UP VISIT: CPT | Performed by: SURGERY

## 2025-01-30 PROCEDURE — 36415 COLL VENOUS BLD VENIPUNCTURE: CPT

## 2025-01-30 PROCEDURE — 6360000002 HC RX W HCPCS

## 2025-01-30 PROCEDURE — 82947 ASSAY GLUCOSE BLOOD QUANT: CPT

## 2025-01-30 PROCEDURE — 6370000000 HC RX 637 (ALT 250 FOR IP)

## 2025-01-30 PROCEDURE — 97129 THER IVNTJ 1ST 15 MIN: CPT

## 2025-01-30 PROCEDURE — 92526 ORAL FUNCTION THERAPY: CPT

## 2025-01-30 PROCEDURE — 2580000003 HC RX 258

## 2025-01-30 PROCEDURE — 99232 SBSQ HOSP IP/OBS MODERATE 35: CPT | Performed by: INTERNAL MEDICINE

## 2025-01-30 PROCEDURE — 2500000003 HC RX 250 WO HCPCS

## 2025-01-30 PROCEDURE — 94640 AIRWAY INHALATION TREATMENT: CPT

## 2025-01-30 PROCEDURE — 84100 ASSAY OF PHOSPHORUS: CPT

## 2025-01-30 PROCEDURE — 85025 COMPLETE CBC W/AUTO DIFF WBC: CPT

## 2025-01-30 PROCEDURE — 2700000000 HC OXYGEN THERAPY PER DAY

## 2025-01-30 PROCEDURE — 1200000000 HC SEMI PRIVATE

## 2025-01-30 PROCEDURE — 83735 ASSAY OF MAGNESIUM: CPT

## 2025-01-30 RX ORDER — METRONIDAZOLE 500 MG/1
500 TABLET ORAL EVERY 8 HOURS SCHEDULED
Status: COMPLETED | OUTPATIENT
Start: 2025-01-30 | End: 2025-02-01

## 2025-01-30 RX ADMIN — BUDESONIDE INHALATION 500 MCG: 0.5 SUSPENSION RESPIRATORY (INHALATION) at 07:58

## 2025-01-30 RX ADMIN — SODIUM CHLORIDE, PRESERVATIVE FREE 10 ML: 5 INJECTION INTRAVENOUS at 10:45

## 2025-01-30 RX ADMIN — METRONIDAZOLE 500 MG: 500 INJECTION, SOLUTION INTRAVENOUS at 02:09

## 2025-01-30 RX ADMIN — METRONIDAZOLE 500 MG: 500 TABLET ORAL at 15:10

## 2025-01-30 RX ADMIN — METRONIDAZOLE 500 MG: 500 TABLET ORAL at 21:35

## 2025-01-30 RX ADMIN — HEPARIN SODIUM 5000 UNITS: 5000 INJECTION INTRAVENOUS; SUBCUTANEOUS at 15:09

## 2025-01-30 RX ADMIN — ATORVASTATIN CALCIUM 40 MG: 10 TABLET, FILM COATED ORAL at 10:15

## 2025-01-30 RX ADMIN — IPRATROPIUM BROMIDE AND ALBUTEROL SULFATE 1 DOSE: 2.5; .5 SOLUTION RESPIRATORY (INHALATION) at 19:54

## 2025-01-30 RX ADMIN — SODIUM CHLORIDE, PRESERVATIVE FREE 40 MG: 5 INJECTION INTRAVENOUS at 10:15

## 2025-01-30 RX ADMIN — BUDESONIDE INHALATION 500 MCG: 0.5 SUSPENSION RESPIRATORY (INHALATION) at 19:55

## 2025-01-30 RX ADMIN — IPRATROPIUM BROMIDE AND ALBUTEROL SULFATE 1 DOSE: 2.5; .5 SOLUTION RESPIRATORY (INHALATION) at 11:54

## 2025-01-30 RX ADMIN — HEPARIN SODIUM 5000 UNITS: 5000 INJECTION INTRAVENOUS; SUBCUTANEOUS at 05:28

## 2025-01-30 RX ADMIN — HEPARIN SODIUM 5000 UNITS: 5000 INJECTION INTRAVENOUS; SUBCUTANEOUS at 21:35

## 2025-01-30 RX ADMIN — IPRATROPIUM BROMIDE AND ALBUTEROL SULFATE 1 DOSE: 2.5; .5 SOLUTION RESPIRATORY (INHALATION) at 07:58

## 2025-01-30 RX ADMIN — CEFEPIME 2000 MG: 2 INJECTION, POWDER, FOR SOLUTION INTRAVENOUS at 00:28

## 2025-01-30 RX ADMIN — SODIUM CHLORIDE, PRESERVATIVE FREE 10 ML: 5 INJECTION INTRAVENOUS at 20:16

## 2025-01-30 RX ADMIN — IPRATROPIUM BROMIDE AND ALBUTEROL SULFATE 1 DOSE: 2.5; .5 SOLUTION RESPIRATORY (INHALATION) at 15:34

## 2025-01-30 NOTE — PLAN OF CARE
Talon Walker  was evaluated today and a DME order was entered for variable height hospital bed because he requires assistance for positioning needs not possible in an ordinary bed, complexity of body positioning needs, requirement of elevation of head of bed more than 30 degrees for the diagnosis of stroke with extensive infarct involving the right cerebral hemisphere and persistent deficits, dysphagia due to stroke and risk for aspiration, requirement of traction equipment.  Patient needs variability of bed height to perform patient transfers and for eating, personal cares, hygiene, and taking own medications.  Current body Weight - Scale: 77.1 kg (169 lb 15.6 oz).  The need for this equipment was discussed with the patient and he understands and is in agreement.       Electronically signed by AMY PAULA MD on   1/30/25 at 3:09 PM EST    Please note that this chart was generated using voice recognition Dragon dictation software.  Although every effort was made to ensure the accuracy of this automated transcription, some errors in transcription may have occurred.

## 2025-01-30 NOTE — PLAN OF CARE
Talon Walekr Jr. requires a patient lift for the transfer between bed and a chair, wheelchair, and commode.   Without the use of the lift, the patient would be bed confined.  Diagnosis-extensive stroke with infarct involving right cerebral hemisphere  Electronically signed by AMY PAULA MD on 1/30/2025 at 3:09 PM

## 2025-01-30 NOTE — PLAN OF CARE
Problem: Respiratory - Adult  Goal: Achieves optimal ventilation and oxygenation  1/29/2025 2124 by Raul Cabrera RCP  Outcome: Progressing

## 2025-01-30 NOTE — PROGRESS NOTES
[facility]  Internal Medicine Teaching Residency Program  Inpatient Daily Progress Note  ______________________________________________________________________________    Patient: Talon Walker Jr.  YOB: 1960   MRN:[id number]    Acct: [id number]     Room: 0141/0141-01  Admit date: 1/23/2025  Today's date: 01/30/25  Number of days in the hospital: 7    SUBJECTIVE   Admitting Diagnosis: Acute respiratory failure with hypercapnia  CC: Loss of consciousness  Pt examined at bedside. Chart & results reviewed.   Patient remained afebrile, blood pressure 132/78. He is maintaining saturation on room air.Patient has urine output of 1450 mL / 24 hours with 1 unmeasured occurrence.Patient had 1 episode of bowel movement.Has blood sugar levels are well-controlled.  He got PEG tube on 1/29/2025.    MRSA DNA probe: Negative  Respiratory culture: No growth  Blood culture: No growth so far    Plan:  -Start tube feeds  -Free water flushes to 50 mL every 6 hours  -Follow morning labs  -Hold cefepime and continue metronidazole  -Discharge planning    Review of Systems   Constitutional:  Negative for chills and fever.   HENT:  Positive for trouble swallowing. Negative for sore throat.    Respiratory:  Positive for cough and shortness of breath. Negative for chest tightness, wheezing and stridor.    Cardiovascular:  Negative for chest pain, palpitations and leg swelling.   Gastrointestinal:  Negative for constipation, diarrhea, nausea and vomiting.   Genitourinary:  Negative for dysuria.   Musculoskeletal:  Positive for gait problem.   Neurological:  Positive for weakness. Negative for seizures and numbness.   Psychiatric/Behavioral:  Negative for confusion.        BRIEF HISTORY     The patient is a 64 y.o. male with past medical history of  COPD  CHF  Right CVA s/p craniotomy(residual left-sided deficit)  Patient was initially admitted under neurocritical care.  He initially

## 2025-01-30 NOTE — PROGRESS NOTES
On-call Internal Medicine Intern Resident was notified by writer of patient HR between 110-115. Resident advised patient to page if HR goes above 120, and no prn medications were to be added at this time.

## 2025-01-30 NOTE — PLAN OF CARE
Problem: Chronic Conditions and Co-morbidities  Goal: Patient's chronic conditions and co-morbidity symptoms are monitored and maintained or improved  1/29/2025 1915 by Romeo Vides RN  Outcome: Progressing  1/29/2025 1839 by Maru Abbasi RN  Outcome: Progressing  1/29/2025 0602 by Malika Cagle RN  Outcome: Progressing     Problem: Safety - Adult  Goal: Free from fall injury  1/29/2025 1915 by Romeo Vides RN  Outcome: Progressing  1/29/2025 1839 by Maru Abbasi RN  Outcome: Progressing  1/29/2025 0602 by Malika Cagle RN  Outcome: Progressing  Flowsheets (Taken 1/28/2025 1939)  Free From Fall Injury: Instruct family/caregiver on patient safety     Problem: Respiratory - Adult  Goal: Achieves optimal ventilation and oxygenation  1/29/2025 1915 by Romeo Vides RN  Outcome: Progressing  1/29/2025 1839 by Maru Abbasi RN  Outcome: Progressing  Flowsheets (Taken 1/29/2025 0816 by Rosio Mcdonnell RCP)  Achieves optimal ventilation and oxygenation:   Assess for changes in respiratory status   Respiratory therapy support as indicated  1/29/2025 0602 by Malika Cagle RN  Outcome: Progressing     Problem: Neurosensory - Adult  Goal: Achieves stable or improved neurological status  1/29/2025 1915 by Romeo Vides RN  Outcome: Progressing  1/29/2025 1839 by Maru Abbasi RN  Outcome: Progressing  1/29/2025 0602 by Malika Cagle RN  Outcome: Progressing  Goal: Achieves maximal functionality and self care  1/29/2025 1915 by Romeo Vides RN  Outcome: Progressing  1/29/2025 1839 by Maru Abbasi RN  Outcome: Progressing  1/29/2025 0602 by Malika Cagle RN  Outcome: Progressing     Problem: Skin/Tissue Integrity - Adult  Goal: Skin integrity remains intact  Description: 1.  Monitor for areas of redness and/or skin breakdown  2.  Assess vascular access sites hourly  3.  Every 4-6 hours minimum:  Change oxygen saturation probe site  4.  Every 4-6 hours:  If on nasal continuous positive airway  RN  Outcome: Progressing  Flowsheets (Taken 1/28/2025 1939)  Absence of Physical Injury: Implement safety measures based on patient assessment     Problem: Behavior  Goal: Pt/Family maintain appropriate behavior and adhere to behavioral management agreement, if implemented  Description: INTERVENTIONS:  1. Assess patient/family's coping skills and  non-compliant behavior (including use of illegal substances)  2. Notify security of behavior or suspected illegal substances which indicate the need for search of the family and/or belongings  3. Encourage verbalization of thoughts and concerns in a socially appropriate manner  4. Utilize positive, consistent limit setting strategies supporting safety of patient, staff and others  5. Encourage participation in the decision making process about the behavioral management agreement  6. If a visitor's behavior poses a threat to safety call refer to organization policy.  7. Initiate consult with , Psychosocial CNS, Spiritual Care as appropriate  1/29/2025 1915 by Romeo Vides, RN  Outcome: Progressing  1/29/2025 1839 by Maru Abbasi, RN  Outcome: Progressing

## 2025-01-30 NOTE — CARE COORDINATION
Case Management Assessment  Initial Evaluation    Date/Time of Evaluation:  1/25/25 1647 PM  Assessment Completed by: Lauren Phan    If patient is discharged prior to next notation, then this note serves as note for discharge by case management.    Patient Name: Talon Walker Jr.                   YOB: 1960  Diagnosis: Acute cerebrovascular accident (HCC) [I63.9]  NSTEMI (non-ST elevation myocardial infarction) (HCC) [I21.4]  Acute respiratory failure with hypercapnia [J96.02]  Aspiration pneumonia of right lower lobe, unspecified aspiration pneumonia type (HCC) [J69.0]                   Date / Time: 1/23/2025  9:02 AM    Patient Admission Status: Inpatient   Readmission Risk (Low < 19, Mod (19-27), High > 27): Readmission Risk Score: 19.4    Current PCP: Adrian Adkins APRN - CNP  PCP verified by CM? Yes (MYCHAL Gauthier)    Chart Reviewed: Yes      History Provided by: Patient, Spouse  Patient Orientation: Alert and Oriented, Person, Place, Situation, Self    Patient Cognition: Alert    Hospitalization in the last 30 days (Readmission):  No    If yes, Readmission Assessment in  Navigator will be completed.    Advance Directives:      Code Status: DNR-CCA   Patient's Primary Decision Maker is: Legal Next of Kin    Primary Decision Maker: Jannie Walker - Legal Guardian, Legal Guardian - [phone]    Secondary Decision Maker: Wally Walker - Child, Legal Guardian - [phone]    Discharge Planning:    Patient lives with: Other (Comment) (ex-wife) Type of Home: House  Primary Care Giver: Other (Comment) (ex-spouse)  Patient Support Systems include: Spouse/Significant Other   Current Financial resources: Medicaid  Current community resources: ECF/Home Care  Current services prior to admission: (P) Home Care            Current DME:              Type of Home Care services:  (P) PT, OT, Skilled Therapy, Aide Services, Nursing Services    ADLS  Prior functional level: Assistance with the following:,

## 2025-01-30 NOTE — PROGRESS NOTES
PROGRESS NOTE      PATIENT NAME: Talon Walker Jr.  MEDICAL RECORD NO. [id number]  DATE: 2025  PRIMARY CARE PHYSICIAN: Adrian Adkins, APRN - CNP    HD: # 7    ASSESSMENT    Patient Active Problem List   Diagnosis    Gastroesophageal reflux disease    Allergic rhinitis    Controlled type 2 diabetes mellitus with stage 3 chronic kidney disease, without long-term current use of insulin (HCC)    Chronic obstructive pulmonary disease (HCC)    Dysthymia    Acute cerebrovascular accident (HCC)    Left-sided weakness    Lower leg edema    Aspiration pneumonia of right lower lobe (HCC)    History of recent pneumonia    Anemia in chronic renal disease    Acute respiratory failure with hypercapnia    Abnormal EEG    History of stroke    Abnormal computed tomography angiography of head    Moderate malnutrition (HCC)    NSTEMI (non-ST elevation myocardial infarction) (HCC)       MEDICAL DECISION MAKING AND PLAN    64M POD 1 PEG tube placement  Tolerating trickle needs this AM   Abdominal binder at all times  General surgery will sign off at this time      Chief Complaint: \"n/a\"    SUBJECTIVE    Not able to obtain, patient sleepy, not cooperative      OBJECTIVE  VITALS: Temp: Temp: 98.6 °F (37 °C)Temp  Av.1 °F (36.7 °C)  Min: 97 °F (36.1 °C)  Max: 99.3 °F (37.4 °C) BP Systolic (24hrs), Av , Min:101 , Max:165   Diastolic (24hrs), Av, Min:61, Max:139   Pulse Pulse  Av.3  Min: 91  Max: 105 Resp Resp  Av.1  Min: 21  Max: 32 Pulse ox SpO2  Av.9 %  Min: 90 %  Max: 99 %  GENERAL: sleepy, comfortable appearing  NEURO: garbled speech, appears to move all four extremities spontaneously   HEENT: NC/AT, non icteric  : deferred  LUNGS: breathing comfortably on room air  HEART: rate mildly elevated 100s, regular rhythm  ABDOMEN: soft, nontender, LUQ PEG bumper cm, no drainage, EN running 10/hr. Binder in place  EXTREMITY: no cyanosis, clubbing or edema    I/O last 3 completed shifts:  In: .7  [I.V.:1201.7; NG/GT:650]  Out: 3050 [Urine:2250; Emesis/NG output:800]    Drain/tube output:  In: 1851.7 [I.V.:1201.7; NG/GT:650]  Out: 2250 [Urine:1450]    LAB:  CBC:   Recent Labs     01/28/25  0517 01/29/25  0513 01/30/25  0624   WBC 5.4 7.4 4.8   HGB 9.5* 9.4* 10.5*   HCT 32.2* 31.5* 34.5*   MCV 93.1 91.3 89.6    165 161     BMP:   Recent Labs     01/28/25  0517 01/29/25  0513 01/30/25  0624   * 147* 149*   K 3.2* 3.6* 3.5*   * 116* 114*   CO2 22 20 20   BUN 25* 28* 29*   CREATININE 1.8* 1.6* 1.7*   GLUCOSE 127* 139* 115*     COAGS: No results for input(s): \"APTT\", \"INR\" in the last 72 hours.    Invalid input(s): \"PROT\"    RADIOLOGY:  No new radiology reviewed      Jessy Zavala PA-C  1/30/25, 7:19 AM

## 2025-01-30 NOTE — PLAN OF CARE
Problem: Chronic Conditions and Co-morbidities  Goal: Patient's chronic conditions and co-morbidity symptoms are monitored and maintained or improved  Outcome: Progressing     Problem: Discharge Planning  Goal: Discharge to home or other facility with appropriate resources  Outcome: Progressing     Problem: Safety - Adult  Goal: Free from fall injury  Outcome: Progressing     Problem: Respiratory - Adult  Goal: Achieves optimal ventilation and oxygenation  Outcome: Progressing     Problem: Pain  Goal: Verbalizes/displays adequate comfort level or baseline comfort level  Outcome: Progressing     Problem: Skin/Tissue Integrity  Goal: Skin integrity remains intact  Description: 1.  Monitor for areas of redness and/or skin breakdown  2.  Assess vascular access sites hourly  3.  Every 4-6 hours minimum:  Change oxygen saturation probe site  4.  Every 4-6 hours:  If on nasal continuous positive airway pressure, respiratory therapy assess nares and determine need for appliance change or resting period  Outcome: Progressing     Problem: ABCDS Injury Assessment  Goal: Absence of physical injury  Outcome: Progressing     Problem: Nutrition Deficit:  Goal: Optimize nutritional status  Outcome: Progressing     Problem: Neurosensory - Adult  Goal: Achieves stable or improved neurological status  Outcome: Progressing  Goal: Achieves maximal functionality and self care  Outcome: Progressing     Problem: Cardiovascular - Adult  Goal: Maintains optimal cardiac output and hemodynamic stability  Outcome: Progressing  Goal: Absence of cardiac dysrhythmias or at baseline  Outcome: Progressing     Problem: Skin/Tissue Integrity - Adult  Goal: Skin integrity remains intact  Description: 1.  Monitor for areas of redness and/or skin breakdown  2.  Assess vascular access sites hourly  3.  Every 4-6 hours minimum:  Change oxygen saturation probe site  4.  Every 4-6 hours:  If on nasal continuous positive airway pressure, respiratory therapy

## 2025-01-30 NOTE — PROGRESS NOTES
Speech Language Pathology  [facility]    Cognitive/Dysphagia Treatment Note    Date: 1/30/2025  Patient’s Name: Talon Walker Jr.  MRN: [id number]  Diagnosis:   Patient Active Problem List   Diagnosis Code    Gastroesophageal reflux disease K21.9    Allergic rhinitis J30.9    Controlled type 2 diabetes mellitus with stage 3 chronic kidney disease, without long-term current use of insulin ([facility]) E11.22, N18.30    Chronic obstructive pulmonary disease ([facility]) J44.9    Dysthymia F34.1    Acute cerebrovascular accident ([facility]) I63.9    Left-sided weakness R53.1    Lower leg edema R60.0    Aspiration pneumonia of right lower lobe ([facility]) J69.0    History of recent pneumonia Z87.01    Anemia in chronic renal disease N18.9, D63.1    Acute respiratory failure with hypercapnia J96.02    Abnormal EEG R94.01    History of stroke Z86.73    Abnormal computed tomography angiography of head R93.0    Moderate malnutrition ([facility]) E44.0    NSTEMI (non-ST elevation myocardial infarction) ([facility]) I21.4       Pain: 0/10    Cognitive Treatment    Treatment time: 4916-3651      Subjective: [x] Alert [x] Cooperative     [] Confused     [] Agitated    [] Lethargic      Objective/Assessment:    Recall:   Delayed Recall: 0/3 did not increased with max verbal cues, 0/3 did not increase with max verbal cues    Problem Solving/Reasoning:   Word Deduction: 2/5 increased to 4/5 with max verbal cues  Functional Problem Solving: 3/3  Opposites: 6/8 increased to 8/8 with min verbal cues  Similarities and Differences: 1/4 increased to 4/4 mod-max verbal cues    Dysphagia    Objective/Assessment:    Pt. Seen for O/M treatment program for dysphagia.  Pt. Completed O/M exercises X 5 X 1 sets with max verbal and tactile cues.  Pt. Completed khalif maneuver X 0 reps with max cues. Education provided and exercises left at bedside.    Discharge recommendations: [x]  Further therapy recommended at discharge.The patient should be able to tolerate at

## 2025-01-31 ENCOUNTER — APPOINTMENT (OUTPATIENT)
Dept: CT IMAGING | Age: 65
DRG: 720 | End: 2025-01-31
Payer: MEDICAID

## 2025-01-31 ENCOUNTER — APPOINTMENT (OUTPATIENT)
Dept: GENERAL RADIOLOGY | Age: 65
DRG: 720 | End: 2025-01-31
Payer: MEDICAID

## 2025-01-31 PROBLEM — I63.9 CEREBROVASCULAR ACCIDENT (CVA) INVOLVING RIGHT CEREBRAL HEMISPHERE (HCC): Status: ACTIVE | Noted: 2025-01-31

## 2025-01-31 PROBLEM — G93.41 ACUTE METABOLIC ENCEPHALOPATHY: Status: ACTIVE | Noted: 2025-01-31

## 2025-01-31 PROBLEM — I67.2 INTRACRANIAL ATHEROSCLEROSIS: Status: ACTIVE | Noted: 2025-01-31

## 2025-01-31 PROBLEM — G40.001 LOCALIZATION-RELATED (FOCAL) (PARTIAL) IDIOPATHIC EPILEPSY AND EPILEPTIC SYNDROMES WITH SEIZURES OF LOCALIZED ONSET, NOT INTRACTABLE, WITH STATUS EPILEPTICUS (HCC): Status: ACTIVE | Noted: 2025-01-31

## 2025-01-31 LAB
ABO + RH BLD: NORMAL
ALBUMIN SERPL-MCNC: 3.5 G/DL (ref 3.5–5.2)
ALBUMIN/GLOB SERPL: 0.9 {RATIO} (ref 1–2.5)
ALP SERPL-CCNC: 87 U/L (ref 40–129)
ALT SERPL-CCNC: 19 U/L (ref 10–50)
ANION GAP SERPL CALCULATED.3IONS-SCNC: 14 MMOL/L (ref 9–16)
ANION GAP SERPL CALCULATED.3IONS-SCNC: 15 MMOL/L (ref 9–16)
ARM BAND NUMBER: NORMAL
AST SERPL-CCNC: 23 U/L (ref 10–50)
ATYPICAL LYMPHOCYTE ABSOLUTE COUNT: 0.04 K/UL
ATYPICAL LYMPHOCYTES: 1 %
BASOPHILS # BLD: 0 K/UL (ref 0–0.2)
BASOPHILS # BLD: 0 K/UL (ref 0–0.2)
BASOPHILS NFR BLD: 0 % (ref 0–2)
BASOPHILS NFR BLD: 0 % (ref 0–2)
BILIRUB DIRECT SERPL-MCNC: 0.1 MG/DL (ref 0–0.2)
BILIRUB INDIRECT SERPL-MCNC: ABNORMAL MG/DL (ref 0–1)
BILIRUB SERPL-MCNC: <0.2 MG/DL (ref 0–1.2)
BILIRUB UR QL STRIP: NEGATIVE
BLOOD BANK SAMPLE EXPIRATION: NORMAL
BLOOD GROUP ANTIBODIES SERPL: NEGATIVE
BODY TEMPERATURE: 37
BUN SERPL-MCNC: 29 MG/DL (ref 8–23)
BUN SERPL-MCNC: 30 MG/DL (ref 8–23)
CA-I BLD-SCNC: 1.12 MMOL/L (ref 1.13–1.33)
CALCIUM SERPL-MCNC: 8.9 MG/DL (ref 8.6–10.4)
CALCIUM SERPL-MCNC: 9.1 MG/DL (ref 8.6–10.4)
CASTS #/AREA URNS LPF: ABNORMAL /LPF (ref 0–2)
CHLORIDE SERPL-SCNC: 113 MMOL/L (ref 98–107)
CHLORIDE SERPL-SCNC: 113 MMOL/L (ref 98–107)
CLARITY UR: ABNORMAL
CO2 SERPL-SCNC: 22 MMOL/L (ref 20–31)
CO2 SERPL-SCNC: 24 MMOL/L (ref 20–31)
COHGB MFR BLD: 0.3 % (ref 0–5)
COLOR UR: YELLOW
CREAT SERPL-MCNC: 1.8 MG/DL (ref 0.7–1.2)
CREAT SERPL-MCNC: 1.8 MG/DL (ref 0.7–1.2)
EOSINOPHIL # BLD: 0.07 K/UL (ref 0–0.4)
EOSINOPHIL # BLD: 0.07 K/UL (ref 0–0.4)
EOSINOPHILS RELATIVE PERCENT: 2 % (ref 1–4)
EOSINOPHILS RELATIVE PERCENT: 2 % (ref 1–4)
EPI CELLS #/AREA URNS HPF: ABNORMAL /HPF (ref 0–5)
ERYTHROCYTE [DISTWIDTH] IN BLOOD BY AUTOMATED COUNT: 16.5 % (ref 11.8–14.4)
ERYTHROCYTE [DISTWIDTH] IN BLOOD BY AUTOMATED COUNT: 16.5 % (ref 11.8–14.4)
FIO2 ON VENT: NORMAL %
GFR, ESTIMATED: 42 ML/MIN/1.73M2
GFR, ESTIMATED: 42 ML/MIN/1.73M2
GLOBULIN SER CALC-MCNC: 3.7 G/DL
GLUCOSE BLD-MCNC: 114 MG/DL (ref 75–110)
GLUCOSE BLD-MCNC: 116 MG/DL (ref 75–110)
GLUCOSE BLD-MCNC: 127 MG/DL (ref 75–110)
GLUCOSE BLD-MCNC: 127 MG/DL (ref 75–110)
GLUCOSE BLD-MCNC: 141 MG/DL (ref 75–110)
GLUCOSE BLD-MCNC: 159 MG/DL (ref 75–110)
GLUCOSE SERPL-MCNC: 124 MG/DL (ref 74–99)
GLUCOSE SERPL-MCNC: 133 MG/DL (ref 74–99)
GLUCOSE UR STRIP-MCNC: NEGATIVE MG/DL
HCO3 VENOUS: 25.9 MMOL/L (ref 24–30)
HCT VFR BLD AUTO: 37.4 % (ref 40.7–50.3)
HCT VFR BLD AUTO: 39 % (ref 40.7–50.3)
HGB BLD-MCNC: 11.4 G/DL (ref 13–17)
HGB BLD-MCNC: 11.8 G/DL (ref 13–17)
HGB UR QL STRIP.AUTO: ABNORMAL
IMM GRANULOCYTES # BLD AUTO: 0.03 K/UL (ref 0–0.3)
IMM GRANULOCYTES # BLD AUTO: 0.07 K/UL (ref 0–0.3)
IMM GRANULOCYTES NFR BLD: 1 %
IMM GRANULOCYTES NFR BLD: 2 %
INR PPP: 1.3
KETONES UR STRIP-MCNC: NEGATIVE MG/DL
LACTIC ACID, WHOLE BLOOD: 0.8 MMOL/L (ref 0.7–2.1)
LACTIC ACID, WHOLE BLOOD: 1.4 MMOL/L (ref 0.7–2.1)
LEUKOCYTE ESTERASE UR QL STRIP: NEGATIVE
LYMPHOCYTES NFR BLD: 0.48 K/UL (ref 1–4.8)
LYMPHOCYTES NFR BLD: 0.68 K/UL (ref 1–4.8)
LYMPHOCYTES RELATIVE PERCENT: 14 % (ref 24–44)
LYMPHOCYTES RELATIVE PERCENT: 19 % (ref 24–44)
MAGNESIUM SERPL-MCNC: 2.1 MG/DL (ref 1.6–2.4)
MAGNESIUM SERPL-MCNC: 2.1 MG/DL (ref 1.6–2.4)
MCH RBC QN AUTO: 27.4 PG (ref 25.2–33.5)
MCH RBC QN AUTO: 27.4 PG (ref 25.2–33.5)
MCHC RBC AUTO-ENTMCNC: 30.3 G/DL (ref 28.4–34.8)
MCHC RBC AUTO-ENTMCNC: 30.5 G/DL (ref 28.4–34.8)
MCV RBC AUTO: 89.9 FL (ref 82.6–102.9)
MCV RBC AUTO: 90.5 FL (ref 82.6–102.9)
MONOCYTES NFR BLD: 0.25 K/UL (ref 0.1–0.8)
MONOCYTES NFR BLD: 0.51 K/UL (ref 0.1–0.8)
MONOCYTES NFR BLD: 15 % (ref 1–7)
MONOCYTES NFR BLD: 7 % (ref 1–7)
MORPHOLOGY: ABNORMAL
MORPHOLOGY: ABNORMAL
NEUTROPHILS NFR BLD: 68 % (ref 36–66)
NEUTROPHILS NFR BLD: 69 % (ref 36–66)
NEUTS SEG NFR BLD: 2.31 K/UL (ref 1.8–7.7)
NEUTS SEG NFR BLD: 2.49 K/UL (ref 1.8–7.7)
NITRITE UR QL STRIP: NEGATIVE
NRBC BLD-RTO: 0 PER 100 WBC
NRBC BLD-RTO: 0 PER 100 WBC
O2 SAT, VEN: 68.6 % (ref 60–85)
PCO2 VENOUS: 45.1 MM HG (ref 39–55)
PH UR STRIP: 5.5 [PH] (ref 5–8)
PH VENOUS: 7.38 (ref 7.32–7.42)
PHOSPHATE SERPL-MCNC: 3.4 MG/DL (ref 2.5–4.5)
PHOSPHATE SERPL-MCNC: 3.9 MG/DL (ref 2.5–4.5)
PLATELET # BLD AUTO: 177 K/UL (ref 138–453)
PLATELET # BLD AUTO: 195 K/UL (ref 138–453)
PMV BLD AUTO: 11.3 FL (ref 8.1–13.5)
PMV BLD AUTO: 11.4 FL (ref 8.1–13.5)
PO2 VENOUS: 36.8 MM HG (ref 30–50)
POSITIVE BASE EXCESS, VEN: 0.4 MMOL/L (ref 0–2)
POTASSIUM SERPL-SCNC: 3.5 MMOL/L (ref 3.7–5.3)
POTASSIUM SERPL-SCNC: 3.5 MMOL/L (ref 3.7–5.3)
PROT SERPL-MCNC: 7.2 G/DL (ref 6.6–8.7)
PROT UR STRIP-MCNC: ABNORMAL MG/DL
PROTHROMBIN TIME: 16.3 SEC (ref 11.7–14.9)
RBC # BLD AUTO: 4.16 M/UL (ref 4.21–5.77)
RBC # BLD AUTO: 4.31 M/UL (ref 4.21–5.77)
RBC #/AREA URNS HPF: ABNORMAL /HPF (ref 0–2)
SODIUM SERPL-SCNC: 150 MMOL/L (ref 136–145)
SODIUM SERPL-SCNC: 151 MMOL/L (ref 136–145)
SP GR UR STRIP: 1.04 (ref 1–1.03)
UROBILINOGEN UR STRIP-ACNC: NORMAL EU/DL (ref 0–1)
WBC #/AREA URNS HPF: ABNORMAL /HPF (ref 0–5)
WBC OTHER # BLD: 3.4 K/UL (ref 3.5–11.3)
WBC OTHER # BLD: 3.6 K/UL (ref 3.5–11.3)
YEAST URNS QL MICRO: ABNORMAL

## 2025-01-31 PROCEDURE — 99223 1ST HOSP IP/OBS HIGH 75: CPT | Performed by: PSYCHIATRY & NEUROLOGY

## 2025-01-31 PROCEDURE — 93005 ELECTROCARDIOGRAM TRACING: CPT

## 2025-01-31 PROCEDURE — 81001 URINALYSIS AUTO W/SCOPE: CPT

## 2025-01-31 PROCEDURE — 95711 VEEG 2-12 HR UNMONITORED: CPT

## 2025-01-31 PROCEDURE — 36415 COLL VENOUS BLD VENIPUNCTURE: CPT

## 2025-01-31 PROCEDURE — 6370000000 HC RX 637 (ALT 250 FOR IP)

## 2025-01-31 PROCEDURE — 86900 BLOOD TYPING SEROLOGIC ABO: CPT

## 2025-01-31 PROCEDURE — 70498 CT ANGIOGRAPHY NECK: CPT

## 2025-01-31 PROCEDURE — 99222 1ST HOSP IP/OBS MODERATE 55: CPT | Performed by: STUDENT IN AN ORGANIZED HEALTH CARE EDUCATION/TRAINING PROGRAM

## 2025-01-31 PROCEDURE — 95700 EEG CONT REC W/VID EEG TECH: CPT

## 2025-01-31 PROCEDURE — 6360000002 HC RX W HCPCS

## 2025-01-31 PROCEDURE — 82805 BLOOD GASES W/O2 SATURATION: CPT

## 2025-01-31 PROCEDURE — 1200000000 HC SEMI PRIVATE

## 2025-01-31 PROCEDURE — 99233 SBSQ HOSP IP/OBS HIGH 50: CPT | Performed by: INTERNAL MEDICINE

## 2025-01-31 PROCEDURE — 84100 ASSAY OF PHOSPHORUS: CPT

## 2025-01-31 PROCEDURE — 86850 RBC ANTIBODY SCREEN: CPT

## 2025-01-31 PROCEDURE — 99222 1ST HOSP IP/OBS MODERATE 55: CPT | Performed by: SURGERY

## 2025-01-31 PROCEDURE — 83605 ASSAY OF LACTIC ACID: CPT

## 2025-01-31 PROCEDURE — 80048 BASIC METABOLIC PNL TOTAL CA: CPT

## 2025-01-31 PROCEDURE — 85025 COMPLETE CBC W/AUTO DIFF WBC: CPT

## 2025-01-31 PROCEDURE — 2580000003 HC RX 258

## 2025-01-31 PROCEDURE — 6370000000 HC RX 637 (ALT 250 FOR IP): Performed by: STUDENT IN AN ORGANIZED HEALTH CARE EDUCATION/TRAINING PROGRAM

## 2025-01-31 PROCEDURE — 94640 AIRWAY INHALATION TREATMENT: CPT

## 2025-01-31 PROCEDURE — 2500000003 HC RX 250 WO HCPCS

## 2025-01-31 PROCEDURE — 86901 BLOOD TYPING SEROLOGIC RH(D): CPT

## 2025-01-31 PROCEDURE — 2700000000 HC OXYGEN THERAPY PER DAY

## 2025-01-31 PROCEDURE — 80076 HEPATIC FUNCTION PANEL: CPT

## 2025-01-31 PROCEDURE — 94761 N-INVAS EAR/PLS OXIMETRY MLT: CPT

## 2025-01-31 PROCEDURE — 71260 CT THORAX DX C+: CPT

## 2025-01-31 PROCEDURE — 83735 ASSAY OF MAGNESIUM: CPT

## 2025-01-31 PROCEDURE — 6360000004 HC RX CONTRAST MEDICATION

## 2025-01-31 PROCEDURE — 70450 CT HEAD/BRAIN W/O DYE: CPT

## 2025-01-31 PROCEDURE — 82330 ASSAY OF CALCIUM: CPT

## 2025-01-31 PROCEDURE — 82947 ASSAY GLUCOSE BLOOD QUANT: CPT

## 2025-01-31 PROCEDURE — 85610 PROTHROMBIN TIME: CPT

## 2025-01-31 PROCEDURE — 74018 RADEX ABDOMEN 1 VIEW: CPT

## 2025-01-31 RX ORDER — POTASSIUM CHLORIDE 7.45 MG/ML
10 INJECTION INTRAVENOUS
Status: COMPLETED | OUTPATIENT
Start: 2025-01-31 | End: 2025-01-31

## 2025-01-31 RX ORDER — IOPAMIDOL 755 MG/ML
75 INJECTION, SOLUTION INTRAVASCULAR
Status: COMPLETED | OUTPATIENT
Start: 2025-01-31 | End: 2025-01-31

## 2025-01-31 RX ORDER — QUETIAPINE FUMARATE 25 MG/1
12.5 TABLET, FILM COATED ORAL NIGHTLY
Status: DISCONTINUED | OUTPATIENT
Start: 2025-01-31 | End: 2025-01-31

## 2025-01-31 RX ORDER — LABETALOL HYDROCHLORIDE 5 MG/ML
10 INJECTION, SOLUTION INTRAVENOUS EVERY 6 HOURS PRN
Status: DISCONTINUED | OUTPATIENT
Start: 2025-01-31 | End: 2025-02-06 | Stop reason: HOSPADM

## 2025-01-31 RX ORDER — POTASSIUM CHLORIDE 7.45 MG/ML
10 INJECTION INTRAVENOUS
Status: DISPENSED | OUTPATIENT
Start: 2025-01-31 | End: 2025-01-31

## 2025-01-31 RX ORDER — LEVETIRACETAM 500 MG/1
500 TABLET ORAL 2 TIMES DAILY
Status: DISCONTINUED | OUTPATIENT
Start: 2025-01-31 | End: 2025-01-31

## 2025-01-31 RX ORDER — POLYETHYLENE GLYCOL 3350 17 G/17G
17 POWDER, FOR SOLUTION ORAL DAILY
Status: DISCONTINUED | OUTPATIENT
Start: 2025-01-31 | End: 2025-02-06 | Stop reason: HOSPADM

## 2025-01-31 RX ORDER — LEVETIRACETAM 500 MG/5ML
500 INJECTION, SOLUTION, CONCENTRATE INTRAVENOUS EVERY 12 HOURS
Status: DISCONTINUED | OUTPATIENT
Start: 2025-01-31 | End: 2025-02-06 | Stop reason: HOSPADM

## 2025-01-31 RX ORDER — DEXTROSE MONOHYDRATE AND SODIUM CHLORIDE 5; .3 G/100ML; G/100ML
INJECTION, SOLUTION INTRAVENOUS CONTINUOUS
Status: DISCONTINUED | OUTPATIENT
Start: 2025-01-31 | End: 2025-01-31

## 2025-01-31 RX ADMIN — LEVETIRACETAM 500 MG: 500 TABLET, FILM COATED ORAL at 13:43

## 2025-01-31 RX ADMIN — METRONIDAZOLE 500 MG: 500 TABLET ORAL at 21:38

## 2025-01-31 RX ADMIN — IOPAMIDOL 75 ML: 755 INJECTION, SOLUTION INTRAVENOUS at 03:37

## 2025-01-31 RX ADMIN — IPRATROPIUM BROMIDE AND ALBUTEROL SULFATE 1 DOSE: 2.5; .5 SOLUTION RESPIRATORY (INHALATION) at 07:37

## 2025-01-31 RX ADMIN — HEPARIN SODIUM 5000 UNITS: 5000 INJECTION INTRAVENOUS; SUBCUTANEOUS at 06:09

## 2025-01-31 RX ADMIN — ATORVASTATIN CALCIUM 40 MG: 10 TABLET, FILM COATED ORAL at 10:24

## 2025-01-31 RX ADMIN — METRONIDAZOLE 500 MG: 500 TABLET ORAL at 13:43

## 2025-01-31 RX ADMIN — HEPARIN SODIUM 5000 UNITS: 5000 INJECTION INTRAVENOUS; SUBCUTANEOUS at 21:38

## 2025-01-31 RX ADMIN — IPRATROPIUM BROMIDE AND ALBUTEROL SULFATE 1 DOSE: 2.5; .5 SOLUTION RESPIRATORY (INHALATION) at 19:56

## 2025-01-31 RX ADMIN — DEXTROSE MONOHYDRATE AND SODIUM CHLORIDE: 5; .3 INJECTION, SOLUTION INTRAVENOUS at 20:01

## 2025-01-31 RX ADMIN — ASPIRIN 81 MG 81 MG: 81 TABLET ORAL at 10:24

## 2025-01-31 RX ADMIN — SODIUM CHLORIDE, PRESERVATIVE FREE 10 ML: 5 INJECTION INTRAVENOUS at 08:15

## 2025-01-31 RX ADMIN — POTASSIUM CHLORIDE 10 MEQ: 7.46 INJECTION, SOLUTION INTRAVENOUS at 17:22

## 2025-01-31 RX ADMIN — ACETAMINOPHEN 650 MG: 325 TABLET ORAL at 00:10

## 2025-01-31 RX ADMIN — OLANZAPINE 2.5 MG: 10 INJECTION, POWDER, LYOPHILIZED, FOR SOLUTION INTRAMUSCULAR at 08:36

## 2025-01-31 RX ADMIN — LEVETIRACETAM 2000 MG: 100 INJECTION INTRAVENOUS at 04:51

## 2025-01-31 RX ADMIN — POTASSIUM CHLORIDE 10 MEQ: 7.46 INJECTION, SOLUTION INTRAVENOUS at 11:06

## 2025-01-31 RX ADMIN — POTASSIUM CHLORIDE 10 MEQ: 7.46 INJECTION, SOLUTION INTRAVENOUS at 13:48

## 2025-01-31 RX ADMIN — POTASSIUM CHLORIDE 10 MEQ: 7.46 INJECTION, SOLUTION INTRAVENOUS at 15:57

## 2025-01-31 RX ADMIN — HEPARIN SODIUM 5000 UNITS: 5000 INJECTION INTRAVENOUS; SUBCUTANEOUS at 13:43

## 2025-01-31 RX ADMIN — LEVETIRACETAM 500 MG: 100 INJECTION INTRAVENOUS at 20:42

## 2025-01-31 RX ADMIN — METRONIDAZOLE 500 MG: 500 TABLET ORAL at 10:24

## 2025-01-31 RX ADMIN — IPRATROPIUM BROMIDE AND ALBUTEROL SULFATE 1 DOSE: 2.5; .5 SOLUTION RESPIRATORY (INHALATION) at 11:05

## 2025-01-31 RX ADMIN — IOPAMIDOL 75 ML: 755 INJECTION, SOLUTION INTRAVENOUS at 04:37

## 2025-01-31 RX ADMIN — SODIUM CHLORIDE, PRESERVATIVE FREE 40 MG: 5 INJECTION INTRAVENOUS at 10:25

## 2025-01-31 RX ADMIN — SODIUM CHLORIDE, PRESERVATIVE FREE 10 ML: 5 INJECTION INTRAVENOUS at 20:42

## 2025-01-31 RX ADMIN — IPRATROPIUM BROMIDE AND ALBUTEROL SULFATE 1 DOSE: 2.5; .5 SOLUTION RESPIRATORY (INHALATION) at 15:25

## 2025-01-31 RX ADMIN — POLYETHYLENE GLYCOL 3350 17 G: 17 POWDER, FOR SOLUTION ORAL at 10:25

## 2025-01-31 ASSESSMENT — PAIN DESCRIPTION - LOCATION: LOCATION: KNEE

## 2025-01-31 ASSESSMENT — PAIN DESCRIPTION - ORIENTATION: ORIENTATION: RIGHT;LEFT

## 2025-01-31 ASSESSMENT — PAIN DESCRIPTION - DESCRIPTORS: DESCRIPTORS: ACHING

## 2025-01-31 ASSESSMENT — PAIN SCALES - GENERAL
PAINLEVEL_OUTOF10: 1
PAINLEVEL_OUTOF10: 3

## 2025-01-31 NOTE — CARE COORDINATION
Transitional Planning    1105 PC from Jessica @ [facility], will forward the enteral feeding orders to that department.    1108 Spoke to family at bedside, they use [facility] for DME.  Faxed orders to [facility] @[phone].

## 2025-01-31 NOTE — PLAN OF CARE
Internal medicine consulted critical care due to concern for altered mentation.  I evaluated the patient in the CT scanner is that where he was obtaining CT head and CT abdomen.  During my evaluation was noted the patient had a rightward gaze deviation.  Patient has history of old strokes however nurse states that this gaze deviation is new.  He was however following commands and protecting his airway.  Spoke with medicine who informed patient is DNR CCA no intubation.  Stroke alert called, last known well midnight.  Stroke team arrived at bedside.  At that time stroke team and internal medicine team were at bedside.  Patient not hypotensive, not a candidate for intubation.  No critical care needs at this time and medicine agreed.  Medicine and neurology to manage patient from here.  Please consult critical care with any new or worsening concerns.'    Electronically signed by Edgar Luong MD on 1/31/2025 at 4:46 AM

## 2025-01-31 NOTE — PLAN OF CARE
Problem: Chronic Conditions and Co-morbidities  Goal: Patient's chronic conditions and co-morbidity symptoms are monitored and maintained or improved  1/31/2025 1848 by Tasia Rea RN  Outcome: Progressing  1/31/2025 0502 by Calli Marcum RN  Outcome: Progressing     Problem: Discharge Planning  Goal: Discharge to home or other facility with appropriate resources  1/31/2025 1848 by Tasia Rea RN  Outcome: Progressing  1/31/2025 0502 by Calli Marcum RN  Outcome: Progressing     Problem: Safety - Adult  Goal: Free from fall injury  1/31/2025 1848 by Tasia Rea RN  Outcome: Progressing  1/31/2025 0502 by Calli Marcum RN  Outcome: Progressing     Problem: Respiratory - Adult  Goal: Achieves optimal ventilation and oxygenation  Outcome: Progressing     Problem: Pain  Goal: Verbalizes/displays adequate comfort level or baseline comfort level  Outcome: Progressing     Problem: Skin/Tissue Integrity  Goal: Skin integrity remains intact  Description: 1.  Monitor for areas of redness and/or skin breakdown  2.  Assess vascular access sites hourly  3.  Every 4-6 hours minimum:  Change oxygen saturation probe site  4.  Every 4-6 hours:  If on nasal continuous positive airway pressure, respiratory therapy assess nares and determine need for appliance change or resting period  1/31/2025 1848 by Tasia Rea RN  Outcome: Progressing  1/31/2025 0502 by Calli Marcum RN  Outcome: Progressing     Problem: ABCDS Injury Assessment  Goal: Absence of physical injury  1/31/2025 1848 by Tasia Rea RN  Outcome: Progressing  1/31/2025 0502 by Calli Marcum RN  Outcome: Progressing     Problem: Nutrition Deficit:  Goal: Optimize nutritional status  Outcome: Progressing     Problem: Neurosensory - Adult  Goal: Achieves stable or improved neurological status  Outcome: Progressing  Goal: Achieves maximal functionality and self care  Outcome: Progressing     Problem: Cardiovascular - Adult  Goal: Maintains

## 2025-01-31 NOTE — CONSULTS
Stroke Neurology Consult Note  Stroke Alert @ 3:50  Arrival at bedside @ 3:52    Reason for Consult:  Inpatient Stroke alert  Requesting Physician:  ED team   Endovascular Neurosurgeon: Martin Lorenz MD  Stroke Team:Mele Garrett MD  History Obtained From:  electronic medical record  Chief Complaint:  Altered mental status   Allergies:  Pcn [penicillins], Morphine, and Sulfa antibiotics    History of Presenting Illness     The patient was brought to the OSH after being found unresponsive by his wife for an unspecified period of time. He exhibited agonal breathing and was more lethargic than usual,  A CT scan at that time revealed significant right cerebral encephalomalacia, and a CTA of the head and neck showed multisegmental intracranial vessel occlusions, raising concern for vasculitis or nicholas nicholas disease. The patient was admitted to the neuro ICU for close monitoring of his neurological condition and acute respiratory failure management. He was extubated on 1/24, An MRI of the brain showed no signs of an acute stroke, and an long-term EEG recorded for 3 days mild diffuse encephalopathy with right hemispheric slowing and right temporal breach rhythm without epileptiform activity. On 1/25, he was transferred from the ICU to the care of the medical team stroke alert was called due to decreased mentation and right gaze preference.  Initial systolic blood pressure 145, glucose 114, NIH 15, adjusted 2, right gaze preference and increase dysarthria, CT did not show acute abnormality, CTA unchanged from previous exam      LKW: unknown around 7 PM  NIHSS: 15, adjusted NIH to  Modified [place] Scale: 4  SBP: 145  Glucose: 114  CT head without contrast: no acute abnormality  TNK: NA  Endovascular: Not applicable    Home medications Reviewed:   -no AC/AP??  -Lipitor 40mg   -Keppra 500mg BID  -zoloft 200mg daily  -gabapentin 300mg BID  -baclofen 10mg BID  -Pioglitazone  -lasix 20mg every other day    Past Histories

## 2025-01-31 NOTE — PLAN OF CARE
Heavy stool burden on CT   Recommend bowel regimen and enema   No surgical intervention indicated at this time   General surgery to sign off     - CARO Lam     General Surgery

## 2025-01-31 NOTE — PROGRESS NOTES
Physical Therapy        Physical Therapy Cancel Note      DATE: 2025    NAME: Talon Walker Jr.  MRN: [id number]   : 1960      Patient not seen this date for Physical Therapy due to:    Patient is not appropriate for PT evaluation/treatment at this time d/t re-ordered on patient, checked in with nursing, no change in status, pt at baseline and dependent for all care, not appropriate for skilled therapy.      Electronically signed by Ryann Riddle PT on 2025 at 9:34 AM

## 2025-01-31 NOTE — PROGRESS NOTES
[facility] NEUROLOGY & NEUROSCIENCE  IN-PATIENT SERVICE    NEUROLOGY CONSULT  NOTE    Date:   1/31/2025  Patient name:  Talon Walker Jr.  Date of admission:  1/23/2025  YOB: 1960    Chief Complaint:     Chief Complaint   Patient presents with    Cerebrovascular Accident       Reason for Consult:      AMS with right gaze deviation    History of Present Illness:     The patient was brought to the OSH after being found unresponsive by his wife for an unspecified period of time. He exhibited agonal breathing and was more lethargic than usual,  A CT scan at that time revealed significant right cerebral encephalomalacia, and a CTA of the head and neck showed multisegmental intracranial vessel occlusions, raising concern for vasculitis or nicholas nicholas disease. The patient was admitted to the neuro ICU for close monitoring of his neurological condition and acute respiratory failure management. He was extubated on 1/24, An MRI of the brain showed no signs of an acute stroke, and an long-term EEG recorded for 3 days mild diffuse encephalopathy with right hemispheric slowing and right temporal breach rhythm without epileptiform activity. On 1/25, he was transferred from the ICU to the care of the medical team stroke alert was called due to decreased mentation and right gaze preference.  Initial systolic blood pressure 145, glucose 114, NIH 15, adjusted 2, right gaze preference and increase dysarthria, CT did not show acute abnormality, CTA unchanged from previous exam    Past Medical History:     Past Medical History:   Diagnosis Date    Acid reflux     Asthma     Cerebrovascular disease     Chronic cough     COPD (chronic obstructive pulmonary disease) (HCC)     Dysphagia     Dyspnea     Unspecified cerebral artery occlusion with cerebral infarction 11/05/2008    Wheelchair dependent         Past Surgical History:     Past Surgical History:   Procedure Laterality Date    BRAIN SURGERY      CRANIOTOMY    available package size. 7/30/24   Adrian Adkins APRN - CNP   blood glucose test strips (TRUE METRIX BLOOD GLUCOSE TEST) strip Test one times a day & as needed for symptoms of irregular blood glucose. DX: E11.41 Dispense brand covered by insurance. Note to Pharmacy: Brand per patient preference. May round up to next available package size. 7/30/24   Adrian Adkins APRN - CNP   furosemide (LASIX) 20 MG tablet Take 1 tablet by mouth every other day 7/17/24   Courtney Sanchez APRN - CNP   atorvastatin (LIPITOR) 40 MG tablet Take 1 tablet by mouth daily 2/6/24   Adrian Adkins APRN - CNP   cetirizine (ZYRTEC) 10 MG tablet TAKE ONE TABLET BY MOUTH DAILY 11/30/22   Adrian Adkins APRN - CNP Misc. Devices (WHEELCHAIR) MISC 1 each by Does not apply route daily WITH ELEVATING LEG RESTS 7/18/22   Adrian Adkins APRN - CNP   Multiple Vitamins-Minerals ([employer] SPECTRAVITE ADVANCED) TABS TAKE 1 TABLET BY MOUTH EVERY DAY 10/27/21   Adrian Adkins APRN - CNP   fluticasone (FLONASE) 50 MCG/ACT nasal spray INSTILL 2 SPRAYS INTO EACH NOSTRIL EVERY DAY 9/15/21   Riki Perkins MD   Misc. Devices (ADJUST BATH/SHOWER SEAT/BACK) MISC 1 each by Does not apply route daily 10/17/19   Adrian Adkins APRN - CNP   Misc. Devices (COMMODE BEDSIDE) MISC 1 each by Does not apply route daily 10/17/19   Adrian Adkins APRN - CNP   Misc. Devices (NOVA CUSHION GEL SEAT PAD) MISC 1 each by Does not apply route daily 3/28/19   Adrian Adkins APRN - CNP   blood glucose monitor kit and supplies 1 kit by Other route daily Test 1 times a day & as needed for symptoms of irregular blood glucose. 8/6/18   Omid Clemente APRN - CNP   [employer]S ASPIRIN CHILD 81 MG chewable tablet TAKE 1 TABLET BY MOUTH DAILY 6/28/17   Adrian Adkins APRN - CNP        Allergies:     Pcn [penicillins], Morphine, and Sulfa antibiotics    Social History:     Tobacco:    reports that he quit smoking about 17 years ago. His smoking use included cigarettes. He

## 2025-01-31 NOTE — PROGRESS NOTES
Nutrition Assessment     Type and Reason for Visit: Reassess    Nutrition Recommendations/Plan:   Re-start Standard with Fiber TF (Jevity 1.5) and advance to goal of 50 mL/hr  Adjusted TF water flush order to reflect physician recommendations  Monitor TF tolerance and adequacy, meds, labs, BM     Malnutrition Assessment:  Malnutrition Status: Moderate malnutrition    Nutrition Assessment:  Spoke with RN. Re-starting feeds after conversation. TF stopped d/t receiving a CT of his abdomen as it was distended. Per general surgery, CT showed heavy stool burden and recommended starting a bowel regimen. Pt was tolerating PEG feeds and per RN, flushes provided were 350 mL q6h ordered by physician, but were not reflected in TF order. Writer discussed with RN and writer adjusted TF water flush order in the case the water flush was the 50 mL and not the 350 mL. Prior to clamping TF, pt only got up to 30 mL/hr. Labs: Na 151 mmol/L, K 3.5 mmol/L. +1 trace BUE and +1 BLE edema.    Estimated Daily Nutrient Needs:  Energy (kcal):  9484-6980 Weight Used for Energy Requirements: Ideal     Protein (g):   gm/d Weight Used for Protein Requirements: Ideal        Fluid (ml/day):  or per physician Method Used for Fluid Requirements: 1 ml/kcal    Nutrition Related Findings:   Meds/labs reviewed Wound Type: None    Current Nutrition Therapies:    Diet NPO  ADULT TUBE FEEDING; PEG; Standard with Fiber; Continuous; 10; Yes; 10; Q 6 hours; 55; 350; Q 6 hours    Anthropometric Measures:  Height: 175.3 cm (5' 9\")  Current Body Wt: 75.8 kg (167 lb)   BMI: 24.7        Nutrition Diagnosis:   Inadequate oral intake related to swallowing difficulty as evidenced by nutrition support - enteral nutrition  Moderate malnutrition, in context of acute illness or injury related to inadequate protein-energy intake as evidenced by criteria as identified in malnutrition assessment    Nutrition Interventions:   Food and/or Nutrient Delivery: Continue NPO,  Start Tube Feeding  Nutrition Education/Counseling: No recommendation at this time  Coordination of Nutrition Care: Continue to monitor while inpatient  Plan of Care discussed with: RN    Goals:  Goals: Tolerate nutrition support at goal rate, by next RD assessment  Type of Goal: New goal  Previous Goal Met: Goal(s) Achieved    Nutrition Monitoring and Evaluation:   Behavioral-Environmental Outcomes: None Identified  Food/Nutrient Intake Outcomes: Enteral Nutrition Intake/Tolerance  Physical Signs/Symptoms Outcomes: Biochemical Data, Chewing or Swallowing, Weight    Discharge Planning:    Enteral Nutrition     Jessy Braxton MS, RDN, LDN  Contact: [phone]

## 2025-01-31 NOTE — PROGRESS NOTES
0350: Stroke alert called per critical care resident. Pt noted to have AMS, right sided gaze, increased dysarthria and increased lethargy.    0351: Blood sugar 114.    0352: Neuro resident at bedside. General surgery resident to bedside.VS as follows: /97, HR 94, RR 22, O2 98% on nasal cannula 3L.    0356: Neuro assessment of NIH 15.    0358: Primary resident at bedside. VS as follows: /68, HR 94, RR 24.     0400: Stroke nurse at bedside.See new orders placed.

## 2025-01-31 NOTE — PROGRESS NOTES
[facility]  Speech Language Pathology    Date: 1/31/2025  Patient Name: Talon Walker Jr.  YOB: 1960   AGE: 64 y.o.  MRN: [id number]        Patient Not Available for Speech Therapy     Due to:  [] Testing  [] Hemodialysis  [] Cancelled by RN  [] Surgery   [] Intubation/Sedation/Pain Medication  [] Medical instability  [x] Other: Pt. Was lethargic upon entering the room. Per RN, Pt. has had decreased mental status and has been lethargic. Pt. Did not arouse to max verbal and tactile stimulation. Pt. not appropriate for ST at this time.    Next scheduled treatment: 02/03/2025    Completed by: Kortney Turner  Clinician    Cosigned By: Shari Flores M.A., CCC/SLP

## 2025-01-31 NOTE — CONSULTS
Endovascular Neurosurgery Consult    Reason for evaluation: R ICA occlusion w/ large stroke, L ICA moyamoya  Referring Provider: Marbin Martinez MD     SUBJECTIVE:   History of Chief Complaint:    Talon Walker Jr. is a 64 y.o. 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 Pt presented after being found down with agonal breathing and lethargy. CT showed known prior stroke and hemicrani. MRI with no acute stroke. CTA with multifocal occlusions and collateralization. CTP undiagnostic d/t technical pt movement and lack of blood flow to the right hemisphere.EEG showing R hemisphere slowing with breach but no epileptiform activity. Pt was initially admitted to the NeuroICU and stabilized, now transferred to Neurology team. EVN consulted 1/31/25 d/t the CTA findings.     Allergies  is allergic to pcn [penicillins], morphine, and sulfa antibiotics.  Medications  Prior to Admission medications    Medication Sig Start Date End Date Taking? Authorizing Provider   sertraline (ZOLOFT) 100 MG tablet Take 2 tablets by mouth daily 1/8/25   Adrian Adkins APRN - CNP   omeprazole (PRILOSEC) 40 MG delayed release capsule TAKE 1 CAPSULE BY MOUTH DAILY 12/18/24   Adrian Adkins APRN - CNP   folic acid (FOLVITE) 1 MG tablet TAKE 1 TABLET BY MOUTH DAILY 12/18/24   Adrian Adkins APRN - CNP   levETIRAcetam (KEPPRA) 500 MG tablet TAKE 1/2 TABLET BY MOUTH TWICE A DAY 12/18/24   Adrian Adkins APRN - CNP   pioglitazone (ACTOS) 30 MG tablet  12/18/24   Provider, MD Amanda   baclofen (LIORESAL) 10 MG tablet TAKE 1 TABLET BY MOUTH 2 TIMES A DAY 11/20/24   Adrian Adkins APRN - CNP   budesonide (PULMICORT) 0.5 MG/2ML nebulizer suspension USE ONE VIAL VIA NEBULIZER MACHINE TWO TIMES DAILY 8/29/24 8/30/25  Kostas Brunson MD   ipratropium 0.5 mg-albuterol 2.5 mg (DUONEB) 0.5-2.5 (3) MG/3ML SOLN nebulizer solution INHALE THREE MILLILITERS VIA NEBULIZER BY MOUTH EVERY 6

## 2025-01-31 NOTE — PLAN OF CARE
Problem: Chronic Conditions and Co-morbidities  Goal: Patient's chronic conditions and co-morbidity symptoms are monitored and maintained or improved  1/31/2025 0502 by Calli Marcum RN  Outcome: Progressing  1/30/2025 1854 by Tasia Rea RN  Outcome: Progressing     Problem: Discharge Planning  Goal: Discharge to home or other facility with appropriate resources  1/31/2025 0502 by Calli Marcum RN  Outcome: Progressing  1/30/2025 1854 by Tasia Rea RN  Outcome: Progressing     Problem: Safety - Adult  Goal: Free from fall injury  1/31/2025 0502 by Calli Marcum RN  Outcome: Progressing  1/30/2025 1854 by Tasia Rea RN  Outcome: Progressing     Problem: ABCDS Injury Assessment  Goal: Absence of physical injury  1/31/2025 0502 by Calli Marcum RN  Outcome: Progressing  1/30/2025 1854 by Tasia Rea RN  Outcome: Progressing     Problem: Skin/Tissue Integrity - Adult  Goal: Skin integrity remains intact  Description: 1.  Monitor for areas of redness and/or skin breakdown  2.  Assess vascular access sites hourly  3.  Every 4-6 hours minimum:  Change oxygen saturation probe site  4.  Every 4-6 hours:  If on nasal continuous positive airway pressure, respiratory therapy assess nares and determine need for appliance change or resting period  1/31/2025 0502 by Calli Marcum RN  Outcome: Progressing  1/30/2025 1854 by Tasia Rea RN  Outcome: Progressing

## 2025-01-31 NOTE — CONSULTS
General Surgery  Consult    PATIENT NAME: Talon Walker Jr.  AGE: 64 y.o.  MEDICAL RECORD NO. [id number]  DATE: 1/31/2025  SURGEON: Dr. Marcum  PRIMARY CARE PHYSICIAN: Adrian Adkins, CARO - CNP    Patient evaluated at the request of  Dr. Martinez  Reason for evaluation: abdominal distention s/p peg    Patient information was obtained from nurisng and chart review  History/Exam limitations: mental status.    IMPRESSION:     Patient Active Problem List   Diagnosis    Gastroesophageal reflux disease    Allergic rhinitis    Controlled type 2 diabetes mellitus with stage 3 chronic kidney disease, without long-term current use of insulin (HCC)    Chronic obstructive pulmonary disease (HCC)    Dysthymia    Acute cerebrovascular accident (HCC)    Left-sided weakness    Lower leg edema    Aspiration pneumonia of right lower lobe (HCC)    History of recent pneumonia    Anemia in chronic renal disease    Acute respiratory failure with hypercapnia    Abnormal EEG    History of stroke    Abnormal computed tomography angiography of head    Moderate malnutrition (HCC)    NSTEMI (non-ST elevation myocardial infarction) (HCC)   Status post PEG tube placement  History of stroke  Altered mental status      PLAN:   Will obtain stat CT abdomen pelvis to assess PEG tube  Discussed with primary team, they will continue to workup altered mental status, a stat head CT without contrast is ordered as well  Hold tube feeds, further recommendations to follow CT abdomen pelvis      HISTORY:   History of Chief Complaint:    Talon Walker Jr. is a 64 y.o. male seen in re consult for abdominal distention s/p peg tube placement 1/29/2025.  Patient was tolerating tube feeds having bowel movements.  Patient has become more lethargic throughout the evening and abdominal distention was noted.  General surgery was consulted for abdominal distention.  Patient is lethargic but does awaken and answers questions.  Intermittently follows commands.  He is  full discussion of findings and recommendations.     XR CHEST PORTABLE    Result Date: 1/27/2025  Possible slight decrease in right mid and lower lung airspace opacities     XR ABDOMEN FOR NG/OG/NE TUBE PLACEMENT    Result Date: 1/25/2025  1.  Endogastric tube in the right lower lung lobe. 2.  Reticular airspace opacities throughout the right lung possibly pneumonia. The findings were sent to the [facility] at 6:14 pm on 1/25/2025 to be communicated to a licensed caregiver.     MRI brain without contrast    Result Date: 1/24/2025  1. No convincing acute intracranial abnormality.  No acute infarct. 2. Extensive chronic infarct involving the majority of the right cerebral hemisphere. 3. Mild to moderate global parenchymal volume loss with chronic microvascular ischemic changes.        Thank you for the interesting evaluation. Further recommendations to follow.    Shira Ceballos DO  1/31/2025, 6:48 AM

## 2025-01-31 NOTE — PROGRESS NOTES
12:59: Called to bedside to evaluate patient for concerns of abdominal distention.    Patient seen and examined at bedside.  Per RN patient is more drowsy compared to earlier with abdominal distention.  Patient is able to wake answers orientation questions appropriately but tired and sleepy.  Hemodynamically stable, afebrile and maintaining oxygen sats on room air.  Patient has been slightly tachycardic throughout the day currently 103 NSR.  He has PEG tube placed and on continuous tube feeds that were just held.  On examination abdomen is soft without tenderness.    Recommendations/plan:  - Will get x-ray KUB for abdominal distention.  - Hold tube feeds till KUB out acute abdominal pathology.  - Continue to monitor mentation, will check VBG and lactate.  - General Surgery consulted for evaluation.    03:06: Discussed with on-call surgery resident about patient's worsening clinical status.  They would like to get CT abdomen pelvis with contrast.  Will also get a CT head to rule out acute intracranial due to patient's worsening mentation.  VBG is unremarkable.    03:30: Consulted critical care for evaluation due to patient's rapid deterioration.  Discussed with on-call critical care resident about patient's intermittent tachypnea and altered mental status.      Patient's CODE STATUS is DNR CCA.  On chart review it is noted that he was CCA before but then CODE STATUS changed and intubated earlier this month per decision from patient's son who is POA.  Currently it is not clear if he is CCA with or without intubation.    Called patient's wife Jannie Walker - legal guardian to confirm about the CODE STATUS and she confirms that he was changed back to DNR CCA with no intubation but she would like me to talk to her son who is POA.  Called Wally Walker - POA who confirms that he is DNR CCA and no intubation.    03:47: Patient was noted to have right gaze deviation after CT for which stroke alert was called.  On examination  patient had rightsided gaze.  Neurology at bedside reviewed scans and so far there is no acute stroke or changes but findings of previous known stroke.  GEN surgery is not planning for any acute intervention as no concern of acute bowel obstruction on CT.    04:20: Neuro has ordered CTA head and neck and loading dose Keppra.  I ordered as needed labetalol for blood pressure.  I also updated son in detail at bedside and answered all his questions.    Updated RN at bedside.      Electronically signed by  Magaly Quiroz MD   Internal Medicine Resident, PGY- 3  on 1/31/2025 at 12:58 AM      Please note that part of this chart was generated using voice recognition dictation software. Although every effort was made to ensure the accuracy of this automated transcription, some errors in transcription may have occurred.

## 2025-01-31 NOTE — PROGRESS NOTES
[facility]  PROGRESS NOTE    Shift date: 1/30/2025  Shift day: Thursday   Shift # 2    Room # 0141/0141-01   Name: Talon Walker Jr.                Taoist: Yarsanism  Place of Orthodoxy: Unknown    Referral:  Stroke Alert    Admit Date & Time: 1/23/2025  9:02 AM    Assessment:  Talon Walker Jr. is a 64 y.o. male in the hospital because of \"Acute Respiratory Failure with Hypercapnia.\" Patient was called out as a \"Stroke Alert.\" Patient was resting in hospital bed, noticeably agitated, surrounded by family in room, when  visited.    Intervention:   responded to Stroke Alert page and introduced herself to patient's family in room. Patient's spouse, son, sister, caregiver, and former caregiver were all present in room supporting him.  provided support to family, shared prayer together and offered hospitality to them. Patient was altered at time of visit.  informed bedside nurse that patient remains \"agitated, restless,\" and is pulling at his lines. Patient's family thanked  for visit and care.     Outcome:  Patient thanked  for visit and care.    Plan:  Chaplains will remain available to offer spiritual and emotional support as needed.      Electronically signed by Chaplain Rukhsana, on 1/31/2025 at 7:33 AM.  [facility]  [phone]

## 2025-01-31 NOTE — PROGRESS NOTES
[facility]  Internal Medicine Teaching Residency Program  Inpatient Daily Progress Note  ______________________________________________________________________________    Patient: Talon Walker Jr.  YOB: 1960   MRN:[id number]    Acct: [id number]     Room: 0141/0141-01  Admit date: 1/23/2025  Today's date: 01/31/25  Number of days in the hospital: 8    SUBJECTIVE   Admitting Diagnosis: Acute respiratory failure with hypercapnia  CC: Loss of consciousness  Pt examined at bedside. Chart & results reviewed.   -Patient is afebrile, pulse 95, blood pressure 140/71 and maintaining saturation on 2 L oxygen via nasal cannula.  -Overnight there was concern of abdominal distention.X-ray KUB ruled out any obstruction.  CT chest abdomen and pelvis with contrast ruled out perforated viscus, showed fecal impaction in the rectum, showed improvement in the right upper, right middle of the right lower lobe multifocal pneumonia.  -Overnight stroke alert was called because patient was moved drowsy and right gaze deviation.Neurology was consulted.  They loaded patient with Keppra 2 g and continued on 500 mg twice daily.  CT head showed no acute intracranial abnormality and CTA head and neck was unchanged from the previous imaging.    Labs reviewed: Sodium 151(Free water deficit 3.6 L), hyperchloremia 113, BUNs/creatinine 29/1.8, WBC 3.4, hemoglobin 11.8    MRSA DNA probe: Negative  Respiratory culture: No growth  Blood culture: No growth so far    Plan:  -Increase free water flushes to 300 mL every 6 hours  -Optimize bowel regimen to relieve patient of stool burden in rectum(received enema)  -Resume aspirin  -urine analysis   -start on 1/3rd normal saline  -resume tube feeds with goal  rate 20 ml/hour      Review of Systems   Constitutional:  Negative for chills and fever.   HENT:  Positive for trouble swallowing. Negative for sore throat.    Respiratory:  Positive for cough and  1/28/2025-failed  -IR guided NG tube placement on 1/28/2025  -General Surgery consulted for PEG tube placement.Tentative plan today.     CKD stage IIIb:  -BUNs/creatinine 25/1.8(baseline 2)  -Monitor I's and O's  -Monitor BMP     DVT ppx: Heparin 5000 units every 8 hours     Diet: Diet NPO  ADULT TUBE FEEDING; PEG; Standard with Fiber; Continuous; 10; Yes; 10; Q 6 hours; 20; 30; Q 6 hours     PT/OT/SW : Consulted     Discharge Planning : When medically stable     Consults: None       Coreen Man MD  Internal Medicine Resident, PGY-1  [facility]; [place], [place]  1/31/2025, 3:59 PM

## 2025-02-01 LAB
ANION GAP SERPL CALCULATED.3IONS-SCNC: 10 MMOL/L (ref 9–16)
BUN SERPL-MCNC: 21 MG/DL (ref 8–23)
CALCIUM SERPL-MCNC: 8.3 MG/DL (ref 8.6–10.4)
CHLORIDE SERPL-SCNC: 111 MMOL/L (ref 98–107)
CO2 SERPL-SCNC: 22 MMOL/L (ref 20–31)
CREAT SERPL-MCNC: 1.8 MG/DL (ref 0.7–1.2)
EKG ATRIAL RATE: 99 BPM
EKG P AXIS: 42 DEGREES
EKG P-R INTERVAL: 140 MS
EKG Q-T INTERVAL: 398 MS
EKG QRS DURATION: 74 MS
EKG QTC CALCULATION (BAZETT): 510 MS
EKG R AXIS: -53 DEGREES
EKG T AXIS: 45 DEGREES
EKG VENTRICULAR RATE: 99 BPM
GFR, ESTIMATED: 42 ML/MIN/1.73M2
GLUCOSE BLD-MCNC: 132 MG/DL (ref 75–110)
GLUCOSE BLD-MCNC: 146 MG/DL (ref 75–110)
GLUCOSE BLD-MCNC: 149 MG/DL (ref 75–110)
GLUCOSE BLD-MCNC: 154 MG/DL (ref 75–110)
GLUCOSE BLD-MCNC: 96 MG/DL (ref 75–110)
GLUCOSE SERPL-MCNC: 168 MG/DL (ref 74–99)
MAGNESIUM SERPL-MCNC: 2 MG/DL (ref 1.6–2.4)
PHOSPHATE SERPL-MCNC: 3.1 MG/DL (ref 2.5–4.5)
POTASSIUM SERPL-SCNC: 4 MMOL/L (ref 3.7–5.3)
SODIUM SERPL-SCNC: 143 MMOL/L (ref 136–145)

## 2025-02-01 PROCEDURE — 82947 ASSAY GLUCOSE BLOOD QUANT: CPT

## 2025-02-01 PROCEDURE — 6370000000 HC RX 637 (ALT 250 FOR IP)

## 2025-02-01 PROCEDURE — 99232 SBSQ HOSP IP/OBS MODERATE 35: CPT | Performed by: INTERNAL MEDICINE

## 2025-02-01 PROCEDURE — 2700000000 HC OXYGEN THERAPY PER DAY

## 2025-02-01 PROCEDURE — 99232 SBSQ HOSP IP/OBS MODERATE 35: CPT | Performed by: STUDENT IN AN ORGANIZED HEALTH CARE EDUCATION/TRAINING PROGRAM

## 2025-02-01 PROCEDURE — 94761 N-INVAS EAR/PLS OXIMETRY MLT: CPT

## 2025-02-01 PROCEDURE — 83735 ASSAY OF MAGNESIUM: CPT

## 2025-02-01 PROCEDURE — 95720 EEG PHY/QHP EA INCR W/VEEG: CPT | Performed by: PSYCHIATRY & NEUROLOGY

## 2025-02-01 PROCEDURE — 2500000003 HC RX 250 WO HCPCS

## 2025-02-01 PROCEDURE — 99233 SBSQ HOSP IP/OBS HIGH 50: CPT | Performed by: PSYCHIATRY & NEUROLOGY

## 2025-02-01 PROCEDURE — 84100 ASSAY OF PHOSPHORUS: CPT

## 2025-02-01 PROCEDURE — 2580000003 HC RX 258

## 2025-02-01 PROCEDURE — 6370000000 HC RX 637 (ALT 250 FOR IP): Performed by: STUDENT IN AN ORGANIZED HEALTH CARE EDUCATION/TRAINING PROGRAM

## 2025-02-01 PROCEDURE — 36415 COLL VENOUS BLD VENIPUNCTURE: CPT

## 2025-02-01 PROCEDURE — 1200000000 HC SEMI PRIVATE

## 2025-02-01 PROCEDURE — 80048 BASIC METABOLIC PNL TOTAL CA: CPT

## 2025-02-01 PROCEDURE — 6360000002 HC RX W HCPCS

## 2025-02-01 PROCEDURE — 95714 VEEG EA 12-26 HR UNMNTR: CPT

## 2025-02-01 PROCEDURE — 94640 AIRWAY INHALATION TREATMENT: CPT

## 2025-02-01 RX ORDER — LEVETIRACETAM 500 MG/1
TABLET ORAL
Qty: 90 TABLET | Refills: 3 | Status: SHIPPED | OUTPATIENT
Start: 2025-02-01

## 2025-02-01 RX ADMIN — SODIUM CHLORIDE, PRESERVATIVE FREE 40 MG: 5 INJECTION INTRAVENOUS at 08:27

## 2025-02-01 RX ADMIN — SODIUM CHLORIDE, PRESERVATIVE FREE 10 ML: 5 INJECTION INTRAVENOUS at 08:27

## 2025-02-01 RX ADMIN — LEVETIRACETAM 500 MG: 100 INJECTION INTRAVENOUS at 08:27

## 2025-02-01 RX ADMIN — IPRATROPIUM BROMIDE AND ALBUTEROL SULFATE 1 DOSE: 2.5; .5 SOLUTION RESPIRATORY (INHALATION) at 14:54

## 2025-02-01 RX ADMIN — IPRATROPIUM BROMIDE AND ALBUTEROL SULFATE 1 DOSE: 2.5; .5 SOLUTION RESPIRATORY (INHALATION) at 07:33

## 2025-02-01 RX ADMIN — SODIUM CHLORIDE, PRESERVATIVE FREE 10 ML: 5 INJECTION INTRAVENOUS at 21:56

## 2025-02-01 RX ADMIN — ASPIRIN 81 MG 81 MG: 81 TABLET ORAL at 08:27

## 2025-02-01 RX ADMIN — ATORVASTATIN CALCIUM 40 MG: 10 TABLET, FILM COATED ORAL at 08:27

## 2025-02-01 RX ADMIN — IPRATROPIUM BROMIDE AND ALBUTEROL SULFATE 1 DOSE: 2.5; .5 SOLUTION RESPIRATORY (INHALATION) at 19:28

## 2025-02-01 RX ADMIN — METRONIDAZOLE 500 MG: 500 TABLET ORAL at 06:21

## 2025-02-01 RX ADMIN — HEPARIN SODIUM 5000 UNITS: 5000 INJECTION INTRAVENOUS; SUBCUTANEOUS at 06:04

## 2025-02-01 RX ADMIN — LEVETIRACETAM 500 MG: 100 INJECTION INTRAVENOUS at 21:19

## 2025-02-01 RX ADMIN — HEPARIN SODIUM 5000 UNITS: 5000 INJECTION INTRAVENOUS; SUBCUTANEOUS at 21:35

## 2025-02-01 RX ADMIN — HEPARIN SODIUM 5000 UNITS: 5000 INJECTION INTRAVENOUS; SUBCUTANEOUS at 13:25

## 2025-02-01 RX ADMIN — IPRATROPIUM BROMIDE AND ALBUTEROL SULFATE 1 DOSE: 2.5; .5 SOLUTION RESPIRATORY (INHALATION) at 11:29

## 2025-02-01 RX ADMIN — POLYETHYLENE GLYCOL 3350 17 G: 17 POWDER, FOR SOLUTION ORAL at 08:27

## 2025-02-01 NOTE — PLAN OF CARE
Problem: Respiratory - Adult  Goal: Achieves optimal ventilation and oxygenation  2/1/2025 0736 by Frieda Bae RCP  Outcome: Progressing  1/31/2025 1959 by Aliya Stuart RCP  Outcome: Progressing  1/31/2025 1848 by Tasia Rea RN  Outcome: Progressing

## 2025-02-01 NOTE — PLAN OF CARE
Problem: Chronic Conditions and Co-morbidities  Goal: Patient's chronic conditions and co-morbidity symptoms are monitored and maintained or improved  2/1/2025 1716 by Tunde Crook RN  Outcome: Progressing  Flowsheets (Taken 2/1/2025 0800)  Care Plan - Patient's Chronic Conditions and Co-Morbidity Symptoms are Monitored and Maintained or Improved:   Monitor and assess patient's chronic conditions and comorbid symptoms for stability, deterioration, or improvement   Collaborate with multidisciplinary team to address chronic and comorbid conditions and prevent exacerbation or deterioration   Update acute care plan with appropriate goals if chronic or comorbid symptoms are exacerbated and prevent overall improvement and discharge  2/1/2025 0515 by Calli Marcum RN  Outcome: Progressing     Problem: Discharge Planning  Goal: Discharge to home or other facility with appropriate resources  2/1/2025 1716 by Tunde Crook RN  Outcome: Progressing  Flowsheets (Taken 2/1/2025 0800)  Discharge to home or other facility with appropriate resources:   Identify barriers to discharge with patient and caregiver   Arrange for needed discharge resources and transportation as appropriate   Identify discharge learning needs (meds, wound care, etc)   Refer to discharge planning if patient needs post-hospital services based on physician order or complex needs related to functional status, cognitive ability or social support system  2/1/2025 0515 by Calli Marcum RN  Outcome: Progressing     Problem: Safety - Adult  Goal: Free from fall injury  2/1/2025 1716 by Tunde Crook RN  Outcome: Progressing  Flowsheets (Taken 2/1/2025 0725)  Free From Fall Injury: Instruct family/caregiver on patient safety  2/1/2025 0515 by Calli Marcum RN  Outcome: Progressing     Problem: Respiratory - Adult  Goal: Achieves optimal ventilation and oxygenation  2/1/2025 1716 by Tunde Crook RN  Outcome: Progressing  Flowsheets (Taken 2/1/2025

## 2025-02-01 NOTE — PROCEDURES
PROCEDURE NOTE  Date: 2/1/2025   Name: Talon Walker Jr.  YOB: 1960    Procedures    LONG-TERM EEG-VIDEO MONITORING   CLINICAL NEUROPHYSIOLOGY LABORATORY  DEPARTMENT OF NEUROLOGY  [facility]     Patient: Talon Walker Jr.  Age: 64 y.o.  MRN: [id number]    Referring Physician: No ref. provider found  History: The patient is a 64 y.o. male who presented breakthrough seizure/encephalopathy. This long-term video-EEG monitoring study was performed to determine the nature of the patient's clinical events. The patient is on neuroactive medications.   Talon Walker Jr.   Current Facility-Administered Medications   Medication Dose Route Frequency Provider Last Rate Last Admin    labetalol (NORMODYNE;TRANDATE) injection 10 mg  10 mg IntraVENous Q6H PRN Magaly Quiroz MD        polyethylene glycol (GLYCOLAX) packet 17 g  17 g Oral Daily Shari Alejandre DO   17 g at 02/01/25 0827    levETIRAcetam (KEPPRA) injection 500 mg  500 mg IntraVENous Q12H Noah Tineo MD   500 mg at 02/01/25 0827    ipratropium 0.5 mg-albuterol 2.5 mg (DUONEB) nebulizer solution 1 Dose  1 Dose Inhalation Q4H WA RT Romel Armijo DO   1 Dose at 02/01/25 0733    atorvastatin (LIPITOR) tablet 40 mg  40 mg Orogastric Daily Romel Armijo DO   40 mg at 02/01/25 0827    sodium chloride flush 0.9 % injection 5-40 mL  5-40 mL IntraVENous 2 times per day Romel Armijo DO   10 mL at 02/01/25 0827    sodium chloride flush 0.9 % injection 5-40 mL  5-40 mL IntraVENous PRN Romel Armijo DO        0.9 % sodium chloride infusion   IntraVENous PRN Romel Armijo DO 25 mL/hr at 01/29/25 2052 Rate Verify at 01/29/25 2052    magnesium sulfate 2000 mg in 50 mL IVPB premix  2,000 mg IntraVENous PRN Romel Armijo DO        ondansetron (ZOFRAN-ODT) disintegrating tablet 4 mg  4 mg Oral Q8H PRN Romel Armijo DO        Or    ondansetron (ZOFRAN) injection 4 mg  4 mg IntraVENous Q6H PRN Romel Armijo DO        acetaminophen (TYLENOL) tablet 650 mg  650 mg Oral  sleep. Muscle atonia and frequent eye movement artifact was seen during periods of REM sleep.  No abnormalities were activated by sleep. The EKG channel revealed no abnormalities.    Ictal EEG Recording / Patient Events: During this period the patient had no events or seizures.    Summary: During this day of recording no events were recorded. The interictal EEG was abnormal due to continuous right hemisphere polymorphic theta slowing suggestive of underlying structural dysfunction in the right hemisphere. The breach presentation can be seen in conditions such as craniotomy.  The theta frequencies prominent on the left hemisphere is suggestive of moderate encephalopathy.  Monitoring was continued in order to record the patient's typical events. The EKG channel revealed no abnormalities.    David Dey MD  [facility] [place]

## 2025-02-01 NOTE — PLAN OF CARE
Problem: Respiratory - Adult  Goal: Achieves optimal ventilation and oxygenation  1/31/2025 1959 by Aliya Stuart RCP  Outcome: Progressing

## 2025-02-01 NOTE — PROGRESS NOTES
[facility]  Internal Medicine Teaching Residency Program  Inpatient Daily Progress Note  ______________________________________________________________________________    Patient: Talon Walker Jr.  YOB: 1960   MRN:[id number]    Acct: [id number]     Room: 0141/0141-01  Admit date: 1/23/2025  Today's date: 02/01/25  Number of days in the hospital: 9    SUBJECTIVE   Admitting Diagnosis: Acute respiratory failure with hypercapnia  CC: Loss of consciousness  Pt examined at bedside. Chart & results reviewed.   -Patient is Aox4 this morning, vitals are stable.  -Hypernatremia is improved.        Labs reviewed: Hypernatremia improved to 143   hyperchloremia 113, BUNs/creatinine 29/1.8, WBC 3.4, hemoglobin 11.8    MRSA DNA probe: Negative  Respiratory culture: No growth  Blood culture: No growth so far    Plan:  -Continue free water flushes to 200 mL every 6 hours and on discharge as well  -Resume aspirin  -urine analysis   -continue tube feeds  -patient to discharge home      Review of Systems   Constitutional:  Negative for chills and fever.   HENT:  Positive for trouble swallowing. Negative for sore throat.    Respiratory:  Positive for cough and shortness of breath. Negative for chest tightness, wheezing and stridor.    Cardiovascular:  Negative for chest pain, palpitations and leg swelling.   Gastrointestinal:  Negative for constipation, diarrhea, nausea and vomiting.   Genitourinary:  Negative for dysuria.   Musculoskeletal:  Positive for gait problem.   Neurological:  Positive for weakness. Negative for seizures and numbness.   Psychiatric/Behavioral:  Negative for confusion.        BRIEF HISTORY     The patient is a 64 y.o. male with past medical history of  COPD  CHF  Right CVA s/p craniotomy(residual left-sided deficit)  Patient was initially admitted under neurocritical care.  He initially presented after being found down by the wife from an outlying

## 2025-02-01 NOTE — PROGRESS NOTES
[facility] Neurology   IN-PATIENT SERVICE   [facility]    Progress note             Date:   2/1/2025  Patient name:  Talon Walker Jr.  Date of admission:  1/23/2025  9:02 AM  MRN:   [id number]  Account:  [id number]  YOB: 1960  PCP:    Adrian Adkins APRN - CNP  Room:   [address] [place] [address]  Code Status:    DNR-CCA    Chief Complaint:     Chief Complaint   Patient presents with    Cerebrovascular Accident   AMS with right gaze deviation admitted 1/23/25 after being found unresponsive with agonal breathing concerning for seizure like activity     Interval hx:   The Patient was seen and examined at bedside with wife /son and multiple other family members   Is vitally stable   No acute events overnight  Currently on LTME for concern of breakthrough seizure off his keppra this admission. LTME so far showing no epileptiform activity and expected continuous right hemisphere polymorphic theta slowing. Per family patient much improved today and back to baseline. Discussed that we will continue keppra 500mg BID long term and LTME overnight. Otherwise no acute issues or findings.       Brief History of Present Illness:   The patient was brought to the OSH after being found unresponsive by his wife for an unspecified period of time. He exhibited agonal breathing and was more lethargic than usual,  A CT scan at that time revealed significant right cerebral encephalomalacia, and a CTA of the head and neck showed multisegmental intracranial vessel occlusions, raising concern for vasculitis or nicholas nicholas disease. The patient was admitted to the neuro ICU for close monitoring of his neurological condition and acute respiratory failure management. He was extubated on 1/24, An MRI of the brain showed no signs of an acute stroke, and an long-term EEG recorded for 3 days mild diffuse encephalopathy with right hemispheric slowing and right temporal breach rhythm without epileptiform activity. On 1/25, he was  transferred from the ICU to the care of the medical team stroke alert was called due to decreased mentation and right gaze preference.  Initial systolic blood pressure 145, glucose 114, NIH 15, adjusted 2, right gaze preference and increase dysarthria, CT did not show acute abnormality, CTA unchanged from previous exam    Past Medical History:     Past Medical History:   Diagnosis Date    Acid reflux     Asthma     Cerebrovascular disease     Chronic cough     COPD (chronic obstructive pulmonary disease) (HCC)     Dysphagia     Dyspnea     Unspecified cerebral artery occlusion with cerebral infarction 11/05/2008    Wheelchair dependent         Past Surgical History:     Past Surgical History:   Procedure Laterality Date    BRAIN SURGERY      CRANIOTOMY      GASTROSTOMY TUBE PLACEMENT      removed    GASTROSTOMY TUBE PLACEMENT  01/29/2025    Date: 01/29/25 Room / Location: [address] Anesthesia Start: 0859 Anesthesia Stop: Procedure: **ADD ON- WANTS AS EARLY AS POSSIBLE** ESOPHAGOGASTRODUODENOSCOPY PERCUTANEOUS ENDOSCOPIC GASTROSTOMY TUBE INSERTION    KNEE SURGERY      UPPER GASTROINTESTINAL ENDOSCOPY N/A 1/29/2025    ESOPHAGOGASTRODUODENOSCOPY PERCUTANEOUS ENDOSCOPIC GASTROSTOMY TUBE PLACEMENT performed by Rashel Merlos MD at [facility] OR        Medications Prior to Admission:     Prior to Admission medications    Medication Sig Start Date End Date Taking? Authorizing Provider   sertraline (ZOLOFT) 100 MG tablet Take 2 tablets by mouth daily 1/8/25   Adrian Adkins APRN - CNP   omeprazole (PRILOSEC) 40 MG delayed release capsule TAKE 1 CAPSULE BY MOUTH DAILY 12/18/24   Adrian Adkins APRN - CNP   folic acid (FOLVITE) 1 MG tablet TAKE 1 TABLET BY MOUTH DAILY 12/18/24   Adrian Adkins APRN - CNP   levETIRAcetam (KEPPRA) 500 MG tablet TAKE 1/2 TABLET BY MOUTH TWICE A DAY 12/18/24   Adrian Adkins APRN - CNP   pioglitazone (ACTOS) 30 MG tablet  12/18/24   Provider, MD Amanda

## 2025-02-01 NOTE — PROCEDURES
PROCEDURE NOTE  Date: 2/2/2025   Name: Talon Walker Jr.  YOB: 1960    Procedures    LONG-TERM EEG-VIDEO MONITORING   CLINICAL NEUROPHYSIOLOGY LABORATORY  DEPARTMENT OF NEUROLOGY  [facility]     Patient: Talon Walker Jr.  Age: 64 y.o.  MRN: [id number]    Referring Physician: No ref. provider found  History: The patient is a 64 y.o. male who presented breakthrough seizure/encephalopathy. This long-term video-EEG monitoring study was performed to determine the nature of the patient's clinical events. The patient is on neuroactive medications.   Talon Walker Jr.   Current Facility-Administered Medications   Medication Dose Route Frequency Provider Last Rate Last Admin    labetalol (NORMODYNE;TRANDATE) injection 10 mg  10 mg IntraVENous Q6H PRN Magaly Quiroz MD        polyethylene glycol (GLYCOLAX) packet 17 g  17 g Oral Daily Shari Alejandre DO   17 g at 02/02/25 0823    levETIRAcetam (KEPPRA) injection 500 mg  500 mg IntraVENous Q12H Noah Tineo MD   500 mg at 02/02/25 0824    ipratropium 0.5 mg-albuterol 2.5 mg (DUONEB) nebulizer solution 1 Dose  1 Dose Inhalation Q4H WA RT Romel Armijo DO   1 Dose at 02/02/25 0756    atorvastatin (LIPITOR) tablet 40 mg  40 mg Orogastric Daily Romel Armijo DO   40 mg at 02/02/25 0824    sodium chloride flush 0.9 % injection 5-40 mL  5-40 mL IntraVENous 2 times per day Romel Armijo DO   10 mL at 02/02/25 0824    sodium chloride flush 0.9 % injection 5-40 mL  5-40 mL IntraVENous PRN Romel Armijo DO        0.9 % sodium chloride infusion   IntraVENous PRN Romel Armijo DO 25 mL/hr at 01/29/25 2052 Rate Verify at 01/29/25 2052    magnesium sulfate 2000 mg in 50 mL IVPB premix  2,000 mg IntraVENous PRN Romel Armijo DO        ondansetron (ZOFRAN-ODT) disintegrating tablet 4 mg  4 mg Oral Q8H PRN Romel Armijo DO        Or    ondansetron (ZOFRAN) injection 4 mg  4 mg IntraVENous Q6H PRN Romel Armijo DO        acetaminophen (TYLENOL) tablet 650 mg  650 mg Oral  hemisphere is suggestive of moderate encephalopathy.  Monitoring was continued in order to record the patient's typical events. The EKG channel revealed no abnormalities.  There is diffuse EEG lead artifact throughout the background.    David Dey MD  [facility] [place]

## 2025-02-01 NOTE — PROCEDURES
PROCEDURE NOTE  Date: 2/1/2025   Name: Talon Walker Jr.  YOB: 1960    Procedures      EEG was reviewed up to 11:55 PM    The interictal EEG was abnormal due to continuous right hemisphere polymorphic theta slowing suggestive of underlying structural dysfunction in the right hemisphere.  The breach presentation is likely due to craniotomy.  The theta frequencies prominent on the left hemisphere is suggestive of moderate encephalopathy.      Electronically signed by David Dey MD

## 2025-02-01 NOTE — PROCEDURES
PROCEDURE NOTE  Date: 2/1/2025   Name: Talon Walker Jr.  YOB: 1960    Procedures    EEG was reviewed up to 6:30 PM    The interictal EEG was abnormal due to continuous right hemisphere polymorphic theta slowing suggestive of underlying structural dysfunction in the right hemisphere.  The beta frequencies is likely due to medication use.  The breach presentation can be seen in conditions such as craniotomy.  The theta frequencies prominent on the left hemisphere is suggestive of moderate encephalopathy.      Electronically signed by David Dey MD

## 2025-02-01 NOTE — PLAN OF CARE
Problem: Chronic Conditions and Co-morbidities  Goal: Patient's chronic conditions and co-morbidity symptoms are monitored and maintained or improved  2/1/2025 0515 by Calli Marcum RN  Outcome: Progressing  1/31/2025 1848 by Tasia Rea RN  Outcome: Progressing     Problem: Discharge Planning  Goal: Discharge to home or other facility with appropriate resources  2/1/2025 0515 by Calli Marcum RN  Outcome: Progressing  1/31/2025 1848 by Tasia Rea RN  Outcome: Progressing     Problem: Safety - Adult  Goal: Free from fall injury  2/1/2025 0515 by Calli Marcum RN  Outcome: Progressing  1/31/2025 1848 by Tasia Rea RN  Outcome: Progressing

## 2025-02-01 NOTE — CARE COORDINATION
need to follow up on Monday about DME and enterals. referrals were sent Friday; Also needs HHC choices.

## 2025-02-01 NOTE — DISCHARGE INSTRUCTIONS
You were admitted after being found down.  You had extensive workup done by neurology that shows chronic occlusion of cerebral vessels along with your previous history of stroke.  You had an EEG that did not showed any seizure.  You were also evaluated for difficulty swallowing with risk of aspiration at had a PEG tube placed by general surgery.    Going home you are advised to continue the tube feeds diet and avoid oral intake.  Continue taking Keppra 500 mg twice daily.  Next continue taking aspirin and Lipitor.  Stop taking Actos pioglitazone on discharge.  Instead you are started on a new medication called Januvia.  Start taking Januvia 25 mg daily on discharge.  Get your HbA1c checked within 1 week of discharge and follow-up with your PCP  Follow-up with your PCP within 1 week of discharge.    Follow-up with neuroendovascular Dr. Pastor in 2 weeks and Dr. Lorenz in 4 weeks.    In case of any worsening or new symptoms come back to ER for further evaluation

## 2025-02-02 LAB
ANION GAP SERPL CALCULATED.3IONS-SCNC: 12 MMOL/L (ref 9–16)
BUN SERPL-MCNC: 22 MG/DL (ref 8–23)
CALCIUM SERPL-MCNC: 8.2 MG/DL (ref 8.6–10.4)
CHLORIDE SERPL-SCNC: 113 MMOL/L (ref 98–107)
CO2 SERPL-SCNC: 22 MMOL/L (ref 20–31)
CREAT SERPL-MCNC: 1.8 MG/DL (ref 0.7–1.2)
GFR, ESTIMATED: 42 ML/MIN/1.73M2
GLUCOSE BLD-MCNC: 143 MG/DL (ref 75–110)
GLUCOSE BLD-MCNC: 152 MG/DL (ref 75–110)
GLUCOSE BLD-MCNC: 166 MG/DL (ref 75–110)
GLUCOSE BLD-MCNC: 174 MG/DL (ref 75–110)
GLUCOSE BLD-MCNC: 204 MG/DL (ref 75–110)
GLUCOSE BLD-MCNC: 207 MG/DL (ref 75–110)
GLUCOSE SERPL-MCNC: 226 MG/DL (ref 74–99)
MAGNESIUM SERPL-MCNC: 2.2 MG/DL (ref 1.6–2.4)
PHOSPHATE SERPL-MCNC: 3.2 MG/DL (ref 2.5–4.5)
POTASSIUM SERPL-SCNC: 4.1 MMOL/L (ref 3.7–5.3)
SODIUM SERPL-SCNC: 147 MMOL/L (ref 136–145)

## 2025-02-02 PROCEDURE — 6370000000 HC RX 637 (ALT 250 FOR IP)

## 2025-02-02 PROCEDURE — 94761 N-INVAS EAR/PLS OXIMETRY MLT: CPT

## 2025-02-02 PROCEDURE — 82947 ASSAY GLUCOSE BLOOD QUANT: CPT

## 2025-02-02 PROCEDURE — 83735 ASSAY OF MAGNESIUM: CPT

## 2025-02-02 PROCEDURE — 2580000003 HC RX 258

## 2025-02-02 PROCEDURE — 2500000003 HC RX 250 WO HCPCS

## 2025-02-02 PROCEDURE — 94640 AIRWAY INHALATION TREATMENT: CPT

## 2025-02-02 PROCEDURE — 95718 EEG PHYS/QHP 2-12 HR W/VEEG: CPT | Performed by: PSYCHIATRY & NEUROLOGY

## 2025-02-02 PROCEDURE — 6370000000 HC RX 637 (ALT 250 FOR IP): Performed by: STUDENT IN AN ORGANIZED HEALTH CARE EDUCATION/TRAINING PROGRAM

## 2025-02-02 PROCEDURE — 36415 COLL VENOUS BLD VENIPUNCTURE: CPT

## 2025-02-02 PROCEDURE — 99232 SBSQ HOSP IP/OBS MODERATE 35: CPT | Performed by: INTERNAL MEDICINE

## 2025-02-02 PROCEDURE — 80048 BASIC METABOLIC PNL TOTAL CA: CPT

## 2025-02-02 PROCEDURE — 95711 VEEG 2-12 HR UNMONITORED: CPT

## 2025-02-02 PROCEDURE — 84100 ASSAY OF PHOSPHORUS: CPT

## 2025-02-02 PROCEDURE — 1200000000 HC SEMI PRIVATE

## 2025-02-02 PROCEDURE — 99232 SBSQ HOSP IP/OBS MODERATE 35: CPT | Performed by: STUDENT IN AN ORGANIZED HEALTH CARE EDUCATION/TRAINING PROGRAM

## 2025-02-02 PROCEDURE — 6360000002 HC RX W HCPCS

## 2025-02-02 PROCEDURE — 99233 SBSQ HOSP IP/OBS HIGH 50: CPT | Performed by: PSYCHIATRY & NEUROLOGY

## 2025-02-02 PROCEDURE — 95720 EEG PHY/QHP EA INCR W/VEEG: CPT | Performed by: PSYCHIATRY & NEUROLOGY

## 2025-02-02 PROCEDURE — 2700000000 HC OXYGEN THERAPY PER DAY

## 2025-02-02 RX ORDER — ALOGLIPTIN 6.25 MG/1
6.25 TABLET, FILM COATED ORAL DAILY
Status: DISCONTINUED | OUTPATIENT
Start: 2025-02-02 | End: 2025-02-06 | Stop reason: HOSPADM

## 2025-02-02 RX ADMIN — SODIUM CHLORIDE, PRESERVATIVE FREE 10 ML: 5 INJECTION INTRAVENOUS at 08:24

## 2025-02-02 RX ADMIN — INSULIN LISPRO 1 UNITS: 100 INJECTION, SOLUTION INTRAVENOUS; SUBCUTANEOUS at 13:53

## 2025-02-02 RX ADMIN — SODIUM CHLORIDE, PRESERVATIVE FREE 10 ML: 5 INJECTION INTRAVENOUS at 20:10

## 2025-02-02 RX ADMIN — ASPIRIN 81 MG 81 MG: 81 TABLET ORAL at 08:24

## 2025-02-02 RX ADMIN — LEVETIRACETAM 500 MG: 100 INJECTION INTRAVENOUS at 08:24

## 2025-02-02 RX ADMIN — IPRATROPIUM BROMIDE AND ALBUTEROL SULFATE 1 DOSE: 2.5; .5 SOLUTION RESPIRATORY (INHALATION) at 11:57

## 2025-02-02 RX ADMIN — LEVETIRACETAM 500 MG: 100 INJECTION INTRAVENOUS at 20:09

## 2025-02-02 RX ADMIN — SODIUM CHLORIDE, PRESERVATIVE FREE 40 MG: 5 INJECTION INTRAVENOUS at 08:24

## 2025-02-02 RX ADMIN — HEPARIN SODIUM 5000 UNITS: 5000 INJECTION INTRAVENOUS; SUBCUTANEOUS at 13:53

## 2025-02-02 RX ADMIN — IPRATROPIUM BROMIDE AND ALBUTEROL SULFATE 1 DOSE: 2.5; .5 SOLUTION RESPIRATORY (INHALATION) at 07:56

## 2025-02-02 RX ADMIN — ATORVASTATIN CALCIUM 40 MG: 10 TABLET, FILM COATED ORAL at 08:24

## 2025-02-02 RX ADMIN — ALOGLIPTIN 6.25 MG: 6.25 TABLET, FILM COATED ORAL at 16:41

## 2025-02-02 RX ADMIN — HEPARIN SODIUM 5000 UNITS: 5000 INJECTION INTRAVENOUS; SUBCUTANEOUS at 06:45

## 2025-02-02 RX ADMIN — POLYETHYLENE GLYCOL 3350 17 G: 17 POWDER, FOR SOLUTION ORAL at 08:23

## 2025-02-02 RX ADMIN — IPRATROPIUM BROMIDE AND ALBUTEROL SULFATE 1 DOSE: 2.5; .5 SOLUTION RESPIRATORY (INHALATION) at 20:52

## 2025-02-02 RX ADMIN — HEPARIN SODIUM 5000 UNITS: 5000 INJECTION INTRAVENOUS; SUBCUTANEOUS at 22:04

## 2025-02-02 ASSESSMENT — PAIN SCALES - GENERAL: PAINLEVEL_OUTOF10: 0

## 2025-02-02 NOTE — PLAN OF CARE
Problem: Respiratory - Adult  Goal: Achieves optimal ventilation and oxygenation  2/2/2025 0759 by Candelaria Washburn RCP  Outcome: Progressing  Flowsheets (Taken 2/2/2025 0756)  Achieves optimal ventilation and oxygenation:   Assess for changes in respiratory status   Assess for changes in mentation and behavior   Position to facilitate oxygenation and minimize respiratory effort   Oxygen supplementation based on oxygen saturation or arterial blood gases   Encourage broncho-pulmonary hygiene including cough, deep breathe, incentive spirometry   Assess the need for suctioning and aspirate as needed   Assess and instruct to report shortness of breath or any respiratory difficulty   Respiratory therapy support as indicated

## 2025-02-02 NOTE — PLAN OF CARE
Problem: Respiratory - Adult  Goal: Achieves optimal ventilation and oxygenation  2/1/2025 1930 by Aliya Stuart RCP  Outcome: Progressing

## 2025-02-02 NOTE — PROGRESS NOTES
[facility]  Internal Medicine Teaching Residency Program  Inpatient Daily Progress Note  ______________________________________________________________________________    Patient: Talon Walker Jr.  YOB: 1960   MRN:[id number]    Acct: [id number]     Room: 0141/0141-01  Admit date: 1/23/2025  Today's date: 02/02/25  Number of days in the hospital: 10    SUBJECTIVE   Admitting Diagnosis: Acute respiratory failure with hypercapnia  CC: Loss of consciousness  Pt examined at bedside. Chart & results reviewed.   -Patient is Aox4 this morning, vitals are stable.  -Hypernatremia is improved.        Labs reviewed: Hypernatremia improved to 143   hyperchloremia 113, BUNs/creatinine 29/1.8, WBC 3.4, hemoglobin 11.8    MRSA DNA probe: Negative  Respiratory culture: No growth  Blood culture: No growth so far    Plan:  -Continue free water flushes to 200 mL every 6 hours and on discharge as well  -Resume aspirin  -urine analysis   -continue tube feeds  -patient to discharge home      Review of Systems   Constitutional:  Negative for chills and fever.   HENT:  Positive for trouble swallowing. Negative for sore throat.    Respiratory:  Positive for cough and shortness of breath. Negative for chest tightness, wheezing and stridor.    Cardiovascular:  Negative for chest pain, palpitations and leg swelling.   Gastrointestinal:  Negative for constipation, diarrhea, nausea and vomiting.   Genitourinary:  Negative for dysuria.   Musculoskeletal:  Positive for gait problem.   Neurological:  Positive for weakness. Negative for seizures and numbness.   Psychiatric/Behavioral:  Negative for confusion.        BRIEF HISTORY     The patient is a 64 y.o. male with past medical history of  COPD  CHF  Right CVA s/p craniotomy(residual left-sided deficit)  Patient was initially admitted under neurocritical care.  He initially presented after being found down by the wife from an outlying  upper cervical right ICA by NASCET criteria possibly due to dissection or vasculitis.     CT CHEST PULMONARY EMBOLISM W CONTRAST    Result Date: 1/23/2025  1. No evidence of pulmonary embolism. 2. Extensive ground-glass opacities within the posterior aspect of the right lung and minimally within the left lung base, which may be secondary to infection, aspiration, and less likely atelectasis.  Recommend critical correlation. 3. Prominent mediastinal lymph nodes, similar to prior, and likely reactive.     CT Head WO Contrast    Result Date: 1/23/2025  1. No acute intracranial abnormality. 2. Extensive right cerebral encephalomalacia. 3. Previous right craniotomy.       ASSESSMENT & PLAN     ASSESSMENT / PLAN:     Principal Problem:    Acute respiratory failure with hypercapnia  Active Problems:    Chronic obstructive pulmonary disease (HCC)    Acute cerebrovascular accident (HCC)    Aspiration pneumonia of right lower lobe (HCC)    Abnormal EEG    History of stroke    Abnormal computed tomography angiography of head    Moderate malnutrition (HCC)    NSTEMI (non-ST elevation myocardial infarction) (HCC)    Acute metabolic encephalopathy    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, not intractable, with status epilepticus (HCC)    Intracranial atherosclerosis    Cerebrovascular accident (CVA) involving right cerebral hemisphere (HCC)  Resolved Problems:    * No resolved hospital problems. *    IMPRESSION  This is a 64 y.o. 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 Endovascular plan no intervention  -CT head without contrast showed no acute intracranial abnormality.  It only demonstrated extensive right cerebral encephalomalacia  -MRI brain showed no evidence acute

## 2025-02-02 NOTE — PROGRESS NOTES
[facility] Neurology   IN-PATIENT SERVICE   [facility]    Progress note             Date:   2/2/2025  Patient name:  Talon Walker Jr.  Date of admission:  1/23/2025  9:02 AM  MRN:   [id number]  Account:  [id number]  YOB: 1960  PCP:    Adrian Adkins APRN - CNP  Room:   [address] [place] [address]  Code Status:    DNR-CCA    Chief Complaint:   AMS with right gaze deviation admitted 1/23/25 after being found unresponsive with agonal breathing concerning for seizure like activity   Interval hx:   The Patient was seen and examined at bedside  Vitals last 24 hours -156, heart rate , afebrile however was borderline Tmax 100.3 2/2/25 @02:00AM  No acute events overnight  No further seizure activity noted.  LTM he currently day 2 with continuous right hemispheric polymorphic theta slowing consistent with his right hemispheric CVA breach rhythm noted.  Theta frequency prominent on the left hemisphere consistent with moderate encephalopathy.  Otherwise no focal epileptiform noted.  Plan to discontinue LTME and otherwise patient doing very well at his baseline continues to improve with medical therapies.  Brief History of Present Illness:   The patient was brought to the OSH after being found unresponsive by his wife for an unspecified period of time. He exhibited agonal breathing and was more lethargic than usual,  A CT scan at that time revealed significant right cerebral encephalomalacia, and a CTA of the head and neck showed multisegmental intracranial vessel occlusions, raising concern for vasculitis or nicholas nicholas disease. The patient was admitted to the neuro ICU for close monitoring of his neurological condition and acute respiratory failure management. He was extubated on 1/24, An MRI of the brain showed no signs of an acute stroke, and an long-term EEG recorded for 3 days mild diffuse encephalopathy with right hemispheric slowing and right temporal breach rhythm without epileptiform  distress  ABD: Soft, NTTP  NEURO:     MENTAL STATUS: Awake, alerts to name but not attentive , not following commands    LANG/SPEECH: Severe dysarthria    CRANIAL NERVES:    II: Left telly-field cut     III, IV, VI: Rightward gaze preference     V: Decreased sensation on left telly-face     VII: Left facial weakness     VIII: normal hearing to speech    MOTOR:     Upper Extremity  Lower Extremity   Right  4+/5 4+/5   Left 1/5 2/5        REFLEXES: Hyporeflexic throughout , Mute plantar responses     SENSORY: Decreased to soft touch on the left side    COORD: Normal finger to nose and heel to shin of the right hemibody      Investigations:      Laboratory Testing:  Recent Results (from the past 24 hour(s))   POC Glucose Fingerstick    Collection Time: 02/01/25  6:09 PM   Result Value Ref Range    POC Glucose 154 (H) 75 - 110 mg/dL   POC Glucose Fingerstick    Collection Time: 02/01/25  8:42 PM   Result Value Ref Range    POC Glucose 132 (H) 75 - 110 mg/dL   POC Glucose Fingerstick    Collection Time: 02/02/25 12:55 AM   Result Value Ref Range    POC Glucose 143 (H) 75 - 110 mg/dL   POC Glucose Fingerstick    Collection Time: 02/02/25  6:50 AM   Result Value Ref Range    POC Glucose 166 (H) 75 - 110 mg/dL   POC Glucose Fingerstick    Collection Time: 02/02/25 11:23 AM   Result Value Ref Range    POC Glucose 207 (H) 75 - 110 mg/dL   Magnesium    Collection Time: 02/02/25 12:10 PM   Result Value Ref Range    Magnesium 2.2 1.6 - 2.4 mg/dL   Phosphorus    Collection Time: 02/02/25 12:10 PM   Result Value Ref Range    Phosphorus 3.2 2.5 - 4.5 mg/dL   Basic Metabolic Panel    Collection Time: 02/02/25 12:10 PM   Result Value Ref Range    Sodium 147 (H) 136 - 145 mmol/L    Potassium 4.1 3.7 - 5.3 mmol/L    Chloride 113 (H) 98 - 107 mmol/L    CO2 22 20 - 31 mmol/L    Anion Gap 12 9 - 16 mmol/L    Glucose 226 (H) 74 - 99 mg/dL    BUN 22 8 - 23 mg/dL    Creatinine 1.8 (H) 0.7 - 1.2 mg/dL    Est, Glom Filt Rate 42 (L) >60

## 2025-02-02 NOTE — PLAN OF CARE
Problem: Chronic Conditions and Co-morbidities  Goal: Patient's chronic conditions and co-morbidity symptoms are monitored and maintained or improved  2/2/2025 1705 by Tunde Crook RN  Outcome: Progressing  Flowsheets (Taken 2/2/2025 0800)  Care Plan - Patient's Chronic Conditions and Co-Morbidity Symptoms are Monitored and Maintained or Improved:   Monitor and assess patient's chronic conditions and comorbid symptoms for stability, deterioration, or improvement   Collaborate with multidisciplinary team to address chronic and comorbid conditions and prevent exacerbation or deterioration   Update acute care plan with appropriate goals if chronic or comorbid symptoms are exacerbated and prevent overall improvement and discharge  2/2/2025 0658 by Lenora Cui RN  Outcome: Progressing     Problem: Discharge Planning  Goal: Discharge to home or other facility with appropriate resources  2/2/2025 1705 by Tunde Crook RN  Outcome: Progressing  Flowsheets (Taken 2/2/2025 0800)  Discharge to home or other facility with appropriate resources:   Identify barriers to discharge with patient and caregiver   Arrange for needed discharge resources and transportation as appropriate   Identify discharge learning needs (meds, wound care, etc)   Refer to discharge planning if patient needs post-hospital services based on physician order or complex needs related to functional status, cognitive ability or social support system  2/2/2025 0658 by Lenora Cui RN  Outcome: Progressing     Problem: Safety - Adult  Goal: Free from fall injury  2/2/2025 1705 by Tunde Crook RN  Outcome: Progressing  Flowsheets (Taken 2/2/2025 0712)  Free From Fall Injury: Instruct family/caregiver on patient safety  2/2/2025 0658 by Lenora Cui RN  Outcome: Progressing     Problem: Respiratory - Adult  Goal: Achieves optimal ventilation and oxygenation  2/2/2025 1705 by Tunde Crook RN  Outcome: Progressing  Flowsheets (Taken

## 2025-02-02 NOTE — PLAN OF CARE
by Lenora Cui RN  Outcome: Progressing  2/1/2025 1716 by Tunde Crook, RN  Outcome: Progressing  Flowsheets (Taken 2/1/2025 0800)  Effect of thought disturbance (confusion, delirium, dementia, or psychosis) are managed with adequate functional status:   Assess for contributors to thought disturbance, including medications, impaired vision or hearing, underlying metabolic abnormalities, dehydration, psychiatric diagnoses, notify LIP   [place] high risk fall precautions, as indicated   Decrease environmental stimuli, including noise as appropriate   Provide frequent short contacts to provide reality reorientation, refocusing and direction   If unable to ensure safety without constant attention obtain sitter and review sitter guidelines with assigned personnel   Monitor and intervene to maintain adequate nutrition, hydration, elimination, sleep and activity   Initiate Psychosocial Clinical Nurse Specialist and Spiritual Care consult, as indicated     Problem: Behavior  Goal: Pt/Family maintain appropriate behavior and adhere to behavioral management agreement, if implemented  Description: INTERVENTIONS:  1. Assess patient/family's coping skills and  non-compliant behavior (including use of illegal substances)  2. Notify security of behavior or suspected illegal substances which indicate the need for search of the family and/or belongings  3. Encourage verbalization of thoughts and concerns in a socially appropriate manner  4. Utilize positive, consistent limit setting strategies supporting safety of patient, staff and others  5. Encourage participation in the decision making process about the behavioral management agreement  6. If a visitor's behavior poses a threat to safety call refer to organization policy.  7. Initiate consult with , Psychosocial CNS, Spiritual Care as appropriate  2/2/2025 0658 by Lenora Cui, RN  Outcome: Progressing  2/1/2025 1716 by Tunde Crook, RN  Outcome:  Progressing  Flowsheets (Taken 2/1/2025 0800)  Patient/family maintains appropriate behavior and adheres to behavioral management agreement, if implemented:   Assess patient/family’s coping skills and  non-compliant behavior (including use of illegal substances)   Notify security of behavior or suspected illegal substances which indicate the need for search of the patient and/or belongings   Encourage verbalization of thoughts and concerns in a socially appropriate manner   Utilize positive, consistent limit setting strategies supporting safety of patient, staff and others   Initiate consult with , Psychosocial Clinical Nurse Specialist, Spiritual Care as appropriate   Implement a Health Care Agreement if patient meets criteria   If a patient’s behavior jeopardizes the safety of the patient, staff, or others refer to organization policy. If a visitor’s behavior poses a threat to safety refer to organization policy   Encourage participation in the decision making process about the behavioral management agreement

## 2025-02-02 NOTE — PROGRESS NOTES
Endovascular Neurosurgery Progress Note    Reason for evaluation: R ICA occlusion w/ large stroke, L ICA moyamoya  Referring Provider: Suman Davis MD     SUBJECTIVE:     NAEO from EVN perspective. Encephalopathy continues. On EEG. Followed by Neurology.    History of Chief Complaint:    Talon Walker Jr. is a 64 y.o. 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 Pt presented after being found down with agonal breathing and lethargy. CT showed known prior stroke and hemicrani. MRI with no acute stroke. CTA with multifocal occlusions and collateralization. CTP undiagnostic d/t technical pt movement and lack of blood flow to the right hemisphere.EEG showing R hemisphere slowing with breach but no epileptiform activity. Pt was initially admitted to the NeuroICU and stabilized, now transferred to Neurology team. EVN consulted 1/31/25 d/t the CTA findings.     Allergies  is allergic to pcn [penicillins], morphine, and sulfa antibiotics.  Medications  Prior to Admission medications    Medication Sig Start Date End Date Taking? Authorizing Provider   levETIRAcetam (KEPPRA) 500 MG tablet TAKE ONE TABLET BY MOUTH TWICE A DAY 2/1/25  Yes Magaly Quiroz MD   sertraline (ZOLOFT) 100 MG tablet Take 2 tablets by mouth daily 1/8/25   Adrian Adkins APRN - CNP   omeprazole (PRILOSEC) 40 MG delayed release capsule TAKE 1 CAPSULE BY MOUTH DAILY 12/18/24   Adrian Adkins APRN - CNP   folic acid (FOLVITE) 1 MG tablet TAKE 1 TABLET BY MOUTH DAILY 12/18/24   Adrian Adkins APRN - CNP   pioglitazone (ACTOS) 30 MG tablet  12/18/24   Provider, MD Amanda   baclofen (LIORESAL) 10 MG tablet TAKE 1 TABLET BY MOUTH 2 TIMES A DAY 11/20/24   Adrian Adkins APRN - CNP   budesonide (PULMICORT) 0.5 MG/2ML nebulizer suspension USE ONE VIAL VIA NEBULIZER MACHINE TWO TIMES DAILY 8/29/24 8/30/25  Kostas Brunson MD   ipratropium 0.5 mg-albuterol 2.5 mg (DUONEB) 0.5-2.5 (3)  8/6/18   Omid Clemente APRN - CNP   [employer] ASPIRIN CHILD 81 MG chewable tablet TAKE 1 TABLET BY MOUTH DAILY 6/28/17   Adrian Adkins APRN - CNP    Scheduled Meds:   polyethylene glycol  17 g Oral Daily    levETIRAcetam  500 mg IntraVENous Q12H    ipratropium 0.5 mg-albuterol 2.5 mg  1 Dose Inhalation Q4H WA RT    atorvastatin  40 mg Orogastric Daily    sodium chloride flush  5-40 mL IntraVENous 2 times per day    pantoprazole (PROTONIX) 40 mg in sodium chloride (PF) 0.9 % 10 mL injection  40 mg IntraVENous Daily    insulin lispro  0-4 Units SubCUTAneous Q6H    heparin (porcine)  5,000 Units SubCUTAneous 3 times per day    aspirin  81 mg Orogastric Daily     Continuous Infusions:   sodium chloride 25 mL/hr at 01/29/25 2052    dextrose       PRN Meds:.labetalol, sodium chloride flush, sodium chloride, magnesium sulfate, ondansetron **OR** ondansetron, acetaminophen **OR** acetaminophen, glucose, dextrose bolus **OR** dextrose bolus, glucagon (rDNA), dextrose  Past Medical History   has a past medical history of Acid reflux, Asthma, Cerebrovascular disease, Chronic cough, COPD (chronic obstructive pulmonary disease) (HCC), Dysphagia, Dyspnea, Unspecified cerebral artery occlusion with cerebral infarction, and Wheelchair dependent.  Past Surgical History   has a past surgical history that includes craniotomy; brain surgery; knee surgery; Gastrostomy tube placement; Gastrostomy tube placement (01/29/2025); and Upper gastrointestinal endoscopy (N/A, 1/29/2025).  Social History   reports that he quit smoking about 17 years ago. His smoking use included cigarettes. He started smoking about 47 years ago. He has a 45 pack-year smoking history. He has never used smokeless tobacco.   reports no history of alcohol use.   reports no history of drug use.  Family History  family history includes Cancer in his mother; Diabetes in his father, mother, and sister; Heart Failure in his father.    Review of Systems:  CONSTITUTIONAL:

## 2025-02-02 NOTE — PROGRESS NOTES
Endovascular Neurosurgery Progress Note    Reason for evaluation: R ICA occlusion w/ large stroke, L ICA moyamoya  Referring Provider: Suman Davis MD     SUBJECTIVE:     NAEO from EVN perspective. Encephalopathy better today. Son and wife state he is coming back to his baseline. Followed by Neurology. On EEG.    History of Chief Complaint:    Talon Walker Jr. is a 64 y.o. 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 Pt presented after being found down with agonal breathing and lethargy. CT showed known prior stroke and hemicrani. MRI with no acute stroke. CTA with multifocal occlusions and collateralization. CTP undiagnostic d/t technical pt movement and lack of blood flow to the right hemisphere.EEG showing R hemisphere slowing with breach but no epileptiform activity. Pt was initially admitted to the NeuroICU and stabilized, now transferred to Neurology team. EVN consulted 1/31/25 d/t the CTA findings.     Allergies  is allergic to pcn [penicillins], morphine, and sulfa antibiotics.  Medications  Prior to Admission medications    Medication Sig Start Date End Date Taking? Authorizing Provider   levETIRAcetam (KEPPRA) 500 MG tablet TAKE ONE TABLET BY MOUTH TWICE A DAY 2/1/25  Yes Magaly Quiroz MD   sertraline (ZOLOFT) 100 MG tablet Take 2 tablets by mouth daily 1/8/25   Adrian Adkins APRN - CNP   omeprazole (PRILOSEC) 40 MG delayed release capsule TAKE 1 CAPSULE BY MOUTH DAILY 12/18/24   Adrian Adkins APRN - CNP   folic acid (FOLVITE) 1 MG tablet TAKE 1 TABLET BY MOUTH DAILY 12/18/24   Adrian Adkins APRN - CNP   pioglitazone (ACTOS) 30 MG tablet  12/18/24   Provider, MD Amanda   baclofen (LIORESAL) 10 MG tablet TAKE 1 TABLET BY MOUTH 2 TIMES A DAY 11/20/24   Adrian Adkins APRN - CNP   budesonide (PULMICORT) 0.5 MG/2ML nebulizer suspension USE ONE VIAL VIA NEBULIZER MACHINE TWO TIMES DAILY 8/29/24 8/30/25  Kostas Brunson MD  day & as needed for symptoms of irregular blood glucose. 8/6/18   Omid Clemente APRN - CNP   [employer] ASPIRIN CHILD 81 MG chewable tablet TAKE 1 TABLET BY MOUTH DAILY 6/28/17   Adrian Adkins APRN - CNP    Scheduled Meds:   polyethylene glycol  17 g Oral Daily    levETIRAcetam  500 mg IntraVENous Q12H    ipratropium 0.5 mg-albuterol 2.5 mg  1 Dose Inhalation Q4H WA RT    atorvastatin  40 mg Orogastric Daily    sodium chloride flush  5-40 mL IntraVENous 2 times per day    pantoprazole (PROTONIX) 40 mg in sodium chloride (PF) 0.9 % 10 mL injection  40 mg IntraVENous Daily    insulin lispro  0-4 Units SubCUTAneous Q6H    heparin (porcine)  5,000 Units SubCUTAneous 3 times per day    aspirin  81 mg Orogastric Daily     Continuous Infusions:   sodium chloride 25 mL/hr at 01/29/25 2052    dextrose       PRN Meds:.labetalol, sodium chloride flush, sodium chloride, magnesium sulfate, ondansetron **OR** ondansetron, acetaminophen **OR** acetaminophen, glucose, dextrose bolus **OR** dextrose bolus, glucagon (rDNA), dextrose  Past Medical History   has a past medical history of Acid reflux, Asthma, Cerebrovascular disease, Chronic cough, COPD (chronic obstructive pulmonary disease) (HCC), Dysphagia, Dyspnea, Unspecified cerebral artery occlusion with cerebral infarction, and Wheelchair dependent.  Past Surgical History   has a past surgical history that includes craniotomy; brain surgery; knee surgery; Gastrostomy tube placement; Gastrostomy tube placement (01/29/2025); and Upper gastrointestinal endoscopy (N/A, 1/29/2025).  Social History   reports that he quit smoking about 17 years ago. His smoking use included cigarettes. He started smoking about 47 years ago. He has a 45 pack-year smoking history. He has never used smokeless tobacco.   reports no history of alcohol use.   reports no history of drug use.  Family History  family history includes Cancer in his mother; Diabetes in his father, mother, and sister; Heart Failure

## 2025-02-02 NOTE — PROCEDURES
PROCEDURE NOTE  Date: 2/2/2025   Name: Talon Walker Jr.  YOB: 1960    Procedures    LONG-TERM EEG-VIDEO MONITORING   CLINICAL NEUROPHYSIOLOGY LABORATORY  DEPARTMENT OF NEUROLOGY  [facility]     Patient: Talon Walker Jr.  Age: 64 y.o.  MRN: [id number]    Referring Physician: No ref. provider found  History: The patient is a 64 y.o. male who presented breakthrough seizure/encephalopathy. This long-term video-EEG monitoring study was performed to determine the nature of the patient's clinical events. The patient is on neuroactive medications.   Talon Walker Jr.   Current Facility-Administered Medications   Medication Dose Route Frequency Provider Last Rate Last Admin    labetalol (NORMODYNE;TRANDATE) injection 10 mg  10 mg IntraVENous Q6H PRN Magaly Quiroz MD        polyethylene glycol (GLYCOLAX) packet 17 g  17 g Oral Daily Shari Alejandre DO   17 g at 02/02/25 0823    levETIRAcetam (KEPPRA) injection 500 mg  500 mg IntraVENous Q12H Noah Tineo MD   500 mg at 02/02/25 0824    ipratropium 0.5 mg-albuterol 2.5 mg (DUONEB) nebulizer solution 1 Dose  1 Dose Inhalation Q4H WA RT Romel Armijo DO   1 Dose at 02/02/25 1157    atorvastatin (LIPITOR) tablet 40 mg  40 mg Orogastric Daily Romel Armijo DO   40 mg at 02/02/25 0824    sodium chloride flush 0.9 % injection 5-40 mL  5-40 mL IntraVENous 2 times per day Romel Armijo DO   10 mL at 02/02/25 0824    sodium chloride flush 0.9 % injection 5-40 mL  5-40 mL IntraVENous PRN Romel Armijo DO        0.9 % sodium chloride infusion   IntraVENous PRN Romel Armijo DO 25 mL/hr at 01/29/25 2052 Rate Verify at 01/29/25 2052    magnesium sulfate 2000 mg in 50 mL IVPB premix  2,000 mg IntraVENous PRN Romel Armijo DO        ondansetron (ZOFRAN-ODT) disintegrating tablet 4 mg  4 mg Oral Q8H PRN Romel Armijo DO        Or    ondansetron (ZOFRAN) injection 4 mg  4 mg IntraVENous Q6H PRN Romel Armijo DO        acetaminophen (TYLENOL) tablet 650 mg  650 mg Oral  Q6H PRN Romel Armijo Hin, DO   650 mg at 01/31/25 0010    Or    acetaminophen (TYLENOL) suppository 650 mg  650 mg Rectal Q6H PRN ArmijoRomeln, DO        pantoprazole (PROTONIX) 40 mg in sodium chloride (PF) 0.9 % 10 mL injection  40 mg IntraVENous Daily Romel Armijon, DO   40 mg at 02/02/25 0824    insulin lispro (HUMALOG,ADMELOG) injection vial 0-4 Units  0-4 Units SubCUTAneous Q6H Romel Armijon, DO   1 Units at 02/02/25 1353    glucose chewable tablet 16 g  4 tablet Oral PRN Armijo, Romel Hin, DO        dextrose bolus 10% 125 mL  125 mL IntraVENous PRN ArmijoRomeln, DO        Or    dextrose bolus 10% 250 mL  250 mL IntraVENous PRN Armijo, Romel Silvan, DO        glucagon injection 1 mg  1 mg SubCUTAneous PRN Armijo, Romel Silvan, DO        dextrose 10 % infusion   IntraVENous Continuous PRN ArmijoRomeln, DO        heparin (porcine) injection 5,000 Units  5,000 Units SubCUTAneous 3 times per day Romel Armijo, DO   5,000 Units at 02/02/25 1353    aspirin chewable tablet 81 mg  81 mg Orogastric Daily Coreen Man MD   81 mg at 02/02/25 0824     Technical Description: This is a 21-channel digital EEG recording with time-locked video. Electrodes were placed in accordance with the 10-20 International System of Electrode Placement. Single lead EKG monitoring was included.    Baseline EEG Recording:  A formal baseline EEG recording was not obtained.     Day - 1/31/25, starting at 17:24 and reviewed upto 7:30 on 2/1/25    Interictal EEG Samples:  In the alert state, the posterior background rhythm was a symmetric, poorly-modulated, 4-7 Hz, 20-40 uV rhythm prominent on the left hemisphere.During drowsiness, there were bursts of diffuse slowing and waxing and waning of the background rhythms.  There is right hemisphere sharply contoured theta and delta frequencies. There is breach presentation on the right hemisphere. During stage II sleep symmetric V waves, K complexes, and sleep spindles were seen. The appearance of diffuse delta activity was  observed in slow wave sleep. Muscle atonia and frequent eye movement artifact was seen during periods of REM sleep.  No abnormalities were activated by sleep. The EKG channel revealed no abnormalities.    Ictal EEG Recording / Patient Events: During this period the patient had no events or seizures.    Summary: During this day of recording no events were recorded. The interictal EEG was abnormal due to continuous right hemisphere polymorphic theta slowing suggestive of underlying structural dysfunction in the right hemisphere. The breach presentation can be seen in conditions such as craniotomy.  The theta frequencies prominent on the left hemisphere is suggestive of moderate encephalopathy.  Monitoring was continued in order to record the patient's typical events. The EKG channel revealed no abnormalities.    Day - 2/1/25, starting at 7:30 and reviewed upto 7:30 on 2/2/25    Interictal EEG Samples:  In the alert state, the posterior background rhythm was a symmetric, poorly-modulated, 4-7 Hz, 20-40 uV rhythm prominent on the left hemisphere embedded with beta frequencies.During drowsiness, there were bursts of diffuse slowing and waxing and waning of the background rhythms.  There is right hemisphere sharply contoured theta and delta frequencies. There is breach presentation on the right hemisphere.  Vertex waves were seen during sleep.  The EKG channel revealed no abnormalities.  There is diffuse EEG lead artifact throughout the background.    Ictal EEG Recording / Patient Events: During this period the patient had no events or seizures.    Summary: During this day of recording no events were recorded. The interictal EEG was abnormal due to continuous right hemisphere polymorphic theta slowing suggestive of underlying structural dysfunction in the right hemisphere.  The beta frequencies is likely due to medication use.  The breach presentation can be seen in conditions such as craniotomy.  The theta frequencies

## 2025-02-03 LAB
ALBUMIN SERPL-MCNC: 3.4 G/DL (ref 3.5–5.2)
ALBUMIN/GLOB SERPL: 1 {RATIO} (ref 1–2.5)
ALP SERPL-CCNC: 76 U/L (ref 40–129)
ALT SERPL-CCNC: 15 U/L (ref 10–50)
ANION GAP SERPL CALCULATED.3IONS-SCNC: 11 MMOL/L (ref 9–16)
AST SERPL-CCNC: 25 U/L (ref 10–50)
BILIRUB DIRECT SERPL-MCNC: <0.1 MG/DL (ref 0–0.2)
BILIRUB INDIRECT SERPL-MCNC: ABNORMAL MG/DL (ref 0–1)
BILIRUB SERPL-MCNC: <0.2 MG/DL (ref 0–1.2)
BUN SERPL-MCNC: 23 MG/DL (ref 8–23)
CALCIUM SERPL-MCNC: 8.2 MG/DL (ref 8.6–10.4)
CHLORIDE SERPL-SCNC: 113 MMOL/L (ref 98–107)
CO2 SERPL-SCNC: 24 MMOL/L (ref 20–31)
CREAT SERPL-MCNC: 1.6 MG/DL (ref 0.7–1.2)
GFR, ESTIMATED: 48 ML/MIN/1.73M2
GLOBULIN SER CALC-MCNC: 3.3 G/DL
GLUCOSE BLD-MCNC: 195 MG/DL (ref 75–110)
GLUCOSE BLD-MCNC: 214 MG/DL (ref 75–110)
GLUCOSE BLD-MCNC: 220 MG/DL (ref 75–110)
GLUCOSE SERPL-MCNC: 173 MG/DL (ref 74–99)
MAGNESIUM SERPL-MCNC: 2.1 MG/DL (ref 1.6–2.4)
PHOSPHATE SERPL-MCNC: 3.6 MG/DL (ref 2.5–4.5)
POTASSIUM SERPL-SCNC: 4.3 MMOL/L (ref 3.7–5.3)
PROT SERPL-MCNC: 6.7 G/DL (ref 6.6–8.7)
SODIUM SERPL-SCNC: 148 MMOL/L (ref 136–145)

## 2025-02-03 PROCEDURE — 83735 ASSAY OF MAGNESIUM: CPT

## 2025-02-03 PROCEDURE — 2700000000 HC OXYGEN THERAPY PER DAY

## 2025-02-03 PROCEDURE — 94640 AIRWAY INHALATION TREATMENT: CPT

## 2025-02-03 PROCEDURE — 1200000000 HC SEMI PRIVATE

## 2025-02-03 PROCEDURE — 80076 HEPATIC FUNCTION PANEL: CPT

## 2025-02-03 PROCEDURE — 2500000003 HC RX 250 WO HCPCS

## 2025-02-03 PROCEDURE — 82947 ASSAY GLUCOSE BLOOD QUANT: CPT

## 2025-02-03 PROCEDURE — 6370000000 HC RX 637 (ALT 250 FOR IP)

## 2025-02-03 PROCEDURE — 36415 COLL VENOUS BLD VENIPUNCTURE: CPT

## 2025-02-03 PROCEDURE — 6370000000 HC RX 637 (ALT 250 FOR IP): Performed by: STUDENT IN AN ORGANIZED HEALTH CARE EDUCATION/TRAINING PROGRAM

## 2025-02-03 PROCEDURE — 2580000003 HC RX 258

## 2025-02-03 PROCEDURE — 84100 ASSAY OF PHOSPHORUS: CPT

## 2025-02-03 PROCEDURE — 6360000002 HC RX W HCPCS

## 2025-02-03 PROCEDURE — 97129 THER IVNTJ 1ST 15 MIN: CPT

## 2025-02-03 PROCEDURE — 80048 BASIC METABOLIC PNL TOTAL CA: CPT

## 2025-02-03 PROCEDURE — 94761 N-INVAS EAR/PLS OXIMETRY MLT: CPT

## 2025-02-03 PROCEDURE — 99233 SBSQ HOSP IP/OBS HIGH 50: CPT | Performed by: PSYCHIATRY & NEUROLOGY

## 2025-02-03 PROCEDURE — 99232 SBSQ HOSP IP/OBS MODERATE 35: CPT | Performed by: INTERNAL MEDICINE

## 2025-02-03 RX ADMIN — HEPARIN SODIUM 5000 UNITS: 5000 INJECTION INTRAVENOUS; SUBCUTANEOUS at 06:39

## 2025-02-03 RX ADMIN — HEPARIN SODIUM 5000 UNITS: 5000 INJECTION INTRAVENOUS; SUBCUTANEOUS at 21:01

## 2025-02-03 RX ADMIN — SODIUM CHLORIDE, PRESERVATIVE FREE 40 MG: 5 INJECTION INTRAVENOUS at 09:54

## 2025-02-03 RX ADMIN — ALOGLIPTIN 6.25 MG: 6.25 TABLET, FILM COATED ORAL at 09:54

## 2025-02-03 RX ADMIN — ATORVASTATIN CALCIUM 40 MG: 10 TABLET, FILM COATED ORAL at 09:54

## 2025-02-03 RX ADMIN — IPRATROPIUM BROMIDE AND ALBUTEROL SULFATE 1 DOSE: 2.5; .5 SOLUTION RESPIRATORY (INHALATION) at 15:22

## 2025-02-03 RX ADMIN — LEVETIRACETAM 500 MG: 100 INJECTION INTRAVENOUS at 20:13

## 2025-02-03 RX ADMIN — POLYETHYLENE GLYCOL 3350 17 G: 17 POWDER, FOR SOLUTION ORAL at 09:54

## 2025-02-03 RX ADMIN — SODIUM CHLORIDE, PRESERVATIVE FREE 10 ML: 5 INJECTION INTRAVENOUS at 09:55

## 2025-02-03 RX ADMIN — IPRATROPIUM BROMIDE AND ALBUTEROL SULFATE 1 DOSE: 2.5; .5 SOLUTION RESPIRATORY (INHALATION) at 19:33

## 2025-02-03 RX ADMIN — INSULIN LISPRO 1 UNITS: 100 INJECTION, SOLUTION INTRAVENOUS; SUBCUTANEOUS at 01:22

## 2025-02-03 RX ADMIN — IPRATROPIUM BROMIDE AND ALBUTEROL SULFATE 1 DOSE: 2.5; .5 SOLUTION RESPIRATORY (INHALATION) at 11:27

## 2025-02-03 RX ADMIN — INSULIN LISPRO 1 UNITS: 100 INJECTION, SOLUTION INTRAVENOUS; SUBCUTANEOUS at 20:12

## 2025-02-03 RX ADMIN — IPRATROPIUM BROMIDE AND ALBUTEROL SULFATE 1 DOSE: 2.5; .5 SOLUTION RESPIRATORY (INHALATION) at 07:44

## 2025-02-03 RX ADMIN — SODIUM CHLORIDE, PRESERVATIVE FREE 10 ML: 5 INJECTION INTRAVENOUS at 20:13

## 2025-02-03 RX ADMIN — ASPIRIN 81 MG 81 MG: 81 TABLET ORAL at 09:54

## 2025-02-03 RX ADMIN — HEPARIN SODIUM 5000 UNITS: 5000 INJECTION INTRAVENOUS; SUBCUTANEOUS at 13:10

## 2025-02-03 RX ADMIN — LEVETIRACETAM 500 MG: 100 INJECTION INTRAVENOUS at 09:54

## 2025-02-03 RX ADMIN — INSULIN LISPRO 1 UNITS: 100 INJECTION, SOLUTION INTRAVENOUS; SUBCUTANEOUS at 13:10

## 2025-02-03 ASSESSMENT — ENCOUNTER SYMPTOMS
SORE THROAT: 0
CHEST TIGHTNESS: 0
NAUSEA: 0
COUGH: 1
VOMITING: 0
CONSTIPATION: 0
SHORTNESS OF BREATH: 1
WHEEZING: 0
DIARRHEA: 0
TROUBLE SWALLOWING: 1
STRIDOR: 0

## 2025-02-03 ASSESSMENT — PAIN SCALES - GENERAL: PAINLEVEL_OUTOF10: 0

## 2025-02-03 NOTE — PROGRESS NOTES
Endovascular Neurosurgery Progress Note    Reason for evaluation: R ICA occlusion w/ large stroke, L ICA moyamoya  Referring Provider: Kostas Brunson MD     SUBJECTIVE:     NAEO from EVN perspective. Off LTME. Improving encephalopathy.   History of Chief Complaint:    Talon Walker Jr. is a 64 y.o. 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 Pt presented after being found down with agonal breathing and lethargy. CT showed known prior stroke and hemicrani. MRI with no acute stroke. CTA with multifocal occlusions and collateralization. CTP undiagnostic d/t technical pt movement and lack of blood flow to the right hemisphere.EEG showing R hemisphere slowing with breach but no epileptiform activity. Pt was initially admitted to the NeuroICU and stabilized, now transferred to Neurology team. EVN consulted 1/31/25 d/t the CTA findings.     Allergies  is allergic to pcn [penicillins], morphine, and sulfa antibiotics.  Medications  Prior to Admission medications    Medication Sig Start Date End Date Taking? Authorizing Provider   SITagliptin (JANUVIA) 25 MG tablet Take 1 tablet by mouth daily 2/2/25  Yes Magaly Quiroz MD   levETIRAcetam (KEPPRA) 500 MG tablet TAKE ONE TABLET BY MOUTH TWICE A DAY 2/1/25  Yes Magaly Quiroz MD   sertraline (ZOLOFT) 100 MG tablet Take 2 tablets by mouth daily 1/8/25   Adrian Adkins APRN - CNP   omeprazole (PRILOSEC) 40 MG delayed release capsule TAKE 1 CAPSULE BY MOUTH DAILY 12/18/24   Adrian Adkins APRN - CNP   folic acid (FOLVITE) 1 MG tablet TAKE 1 TABLET BY MOUTH DAILY 12/18/24   Adrian Adkins APRN - CNP   baclofen (LIORESAL) 10 MG tablet TAKE 1 TABLET BY MOUTH 2 TIMES A DAY 11/20/24   Adrian Adkins APRN - CNP   budesonide (PULMICORT) 0.5 MG/2ML nebulizer suspension USE ONE VIAL VIA NEBULIZER MACHINE TWO TIMES DAILY 8/29/24 8/30/25  Kostas Brunson MD   ipratropium 0.5 mg-albuterol 2.5 mg (DUONEB) 0.5-2.5 (3)  of Systems:  CONSTITUTIONAL:  negative for fevers, chills, fatigue and malaise    EYES:  negative for double vision, blurred vision and photophobia     HEENT:  negative for tinnitus, epistaxis and sore throat    RESPIRATORY:  negative for cough, shortness of breath, wheezing    CARDIOVASCULAR:  negative for chest pain, palpitations, syncope, edema    GASTROINTESTINAL:  negative for nausea, vomiting    GENITOURINARY:  negative for incontinence    MUSCULOSKELETAL:  negative for neck or back pain    NEUROLOGICAL:  Negative for weakness and tingling  negative for headaches and dizziness    PSYCHIATRIC:  negative for anxiety      Review of systems otherwise negative.      OBJECTIVE:     Vitals:    02/03/25 0744   BP:    Pulse: 96   Resp: 23   Temp:    SpO2: 99%        Gen: No acute distress, abnormal breathing pattern  MS: Awake, oriented x3, minimally verbal but able to respond correctly, follows 1-step commands   CN: Pupils reactive, R gaze preference but able to cross midline, able to see in all visual fields but testing limited d/t mentation, L facial droop, moderate dysarthria  Motor: RUE+RLE 4/5, LUE flaccid and contracture, LLE 2-3/5 to pain  Sens: Severe left neglect  Gait: Deferred    18 points  NIH Stroke Scale  1A: Level of consciousness --> 0 = Alert; keenly responsive  1B: Ask month and age --> 0 = Both questions right  1C: 'Blink eyes' & 'squeeze hands' --> 0 = Performs both tasks  2: Horizontal extraocular movements --> 1 = Partial gaze palsy: can be overcome  3: Visual fields --> 0 = No visual loss  4: Facial palsy --> 2 = Partial paralysis (lower face)  5A: Left arm motor drift --> 4 = No movement  5B: Right arm motor drift --> 2 = Drift, hits bed  6A: Left leg motor drift --> 3 = No effort against gravity  6B: Right leg motor drift --> 2 = Drift, hits bed  7: Limb Ataxia --> 0 = No ataxia  8: Sensation --> 1 = Mild-moderate loss: can sense being touched   9: Language/aphasia --> 1 = Mild-moderate

## 2025-02-03 NOTE — PLAN OF CARE
Problem: Chronic Conditions and Co-morbidities  Goal: Patient's chronic conditions and co-morbidity symptoms are monitored and maintained or improved  Outcome: Progressing     Problem: Discharge Planning  Goal: Discharge to home or other facility with appropriate resources  Outcome: Progressing     Problem: Safety - Adult  Goal: Free from fall injury  Outcome: Progressing     Problem: Respiratory - Adult  Goal: Achieves optimal ventilation and oxygenation  2/3/2025 1712 by Tasia Rea RN  Outcome: Progressing  2/3/2025 0745 by Olivia Rich RCP  Outcome: Progressing     Problem: Pain  Goal: Verbalizes/displays adequate comfort level or baseline comfort level  Outcome: Progressing     Problem: Skin/Tissue Integrity  Goal: Skin integrity remains intact  Description: 1.  Monitor for areas of redness and/or skin breakdown  2.  Assess vascular access sites hourly  3.  Every 4-6 hours minimum:  Change oxygen saturation probe site  4.  Every 4-6 hours:  If on nasal continuous positive airway pressure, respiratory therapy assess nares and determine need for appliance change or resting period  Outcome: Progressing     Problem: ABCDS Injury Assessment  Goal: Absence of physical injury  Outcome: Progressing     Problem: Nutrition Deficit:  Goal: Optimize nutritional status  Outcome: Progressing     Problem: Neurosensory - Adult  Goal: Achieves stable or improved neurological status  Outcome: Progressing  Goal: Achieves maximal functionality and self care  Outcome: Progressing     Problem: Cardiovascular - Adult  Goal: Maintains optimal cardiac output and hemodynamic stability  Outcome: Progressing  Goal: Absence of cardiac dysrhythmias or at baseline  Outcome: Progressing     Problem: Skin/Tissue Integrity - Adult  Goal: Skin integrity remains intact  Description: 1.  Monitor for areas of redness and/or skin breakdown  2.  Assess vascular access sites hourly  3.  Every 4-6 hours minimum:  Change oxygen saturation  agreement, if implemented  Description: INTERVENTIONS:  1. Assess patient/family's coping skills and  non-compliant behavior (including use of illegal substances)  2. Notify security of behavior or suspected illegal substances which indicate the need for search of the family and/or belongings  3. Encourage verbalization of thoughts and concerns in a socially appropriate manner  4. Utilize positive, consistent limit setting strategies supporting safety of patient, staff and others  5. Encourage participation in the decision making process about the behavioral management agreement  6. If a visitor's behavior poses a threat to safety call refer to organization policy.  7. Initiate consult with , Psychosocial CNS, Spiritual Care as appropriate  Outcome: Progressing

## 2025-02-03 NOTE — PROGRESS NOTES
Speech Language Pathology  [facility]    Cognitive/Speech and Language Treatment Note    Date: 2/3/2025  Patient’s Name: Talon Walker Jr.  MRN: [id number]  Diagnosis:   Patient Active Problem List   Diagnosis Code    Gastroesophageal reflux disease K21.9    Allergic rhinitis J30.9    Controlled type 2 diabetes mellitus with stage 3 chronic kidney disease, without long-term current use of insulin ([facility]) E11.22, N18.30    Chronic obstructive pulmonary disease ([facility]) J44.9    Dysthymia F34.1    Acute cerebrovascular accident ([facility]) I63.9    Left-sided weakness R53.1    Lower leg edema R60.0    Aspiration pneumonia of right lower lobe ([facility]) J69.0    History of recent pneumonia Z87.01    Anemia in chronic renal disease N18.9, D63.1    Acute respiratory failure with hypercapnia J96.02    Abnormal EEG R94.01    History of stroke Z86.73    Abnormal computed tomography angiography of head R93.0    Moderate malnutrition ([facility]) E44.0    NSTEMI (non-ST elevation myocardial infarction) ([facility]) I21.4    Acute metabolic encephalopathy G93.41    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, not intractable, with status epilepticus ([facility]) G40.001    Intracranial atherosclerosis I67.2    Cerebrovascular accident (CVA) involving right cerebral hemisphere ([facility]) I63.9       Pain: 0/10    Cognitive Treatment    Treatment time: 905-925      Subjective: [] Alert [x] Cooperative     [] Confused     [] Agitated    [x] Lethargic      Cognition Objective/Assessment:    Recall:   Delayed Recall: 0/3 increased to 2/3 with max visual and verbal cues      Problem Solving/Reasoning:   Determining Category Exclusions: 2/6 increased to 5/6 with max verbal cues.      Speech and Language Objective/Assessment:    Verbal Expression:   Word Discrimination: Three-Word Level with increased Difficulty/Length of Words: 12/15 increased to 15/15 with max verbal cues.       Other:

## 2025-02-03 NOTE — CARE COORDINATION
Transitional Planning    0945 VM from Keeley at [facility], has some questions regarding tube feed.  [phone].    1010 PC to [facility] @ [phone], left a VM regarding DME status.     1025 PC to [facility], [facility] requesting a call back.    1210 Spoke to pt's wife and pt's son at  desk, given HHC list and updated regarding DME progress.  Would like [facility], referral placed.     1520 PC from Hellen at [facility], can accept but will not be able to start care until Saturday, will hold appt for now and asks that we keep her updated.      1540 PC to Xenia, pt's caregiver/guardian, updated on plan for DME, tube feeds and HHC.      1615 PC to [facility], [facility] requesting a call back.     1620 PC to [facility] @ [phone], has received order and will check benefits and reach out to patient.

## 2025-02-03 NOTE — PROGRESS NOTES
Physician Progress Note      PATIENT:               ADONAY VAUGHAN  [facility] #:                  [id number]  :                       1960  ADMIT DATE:       2025 9:02 AM  DISCH DATE:  RESPONDING  PROVIDER #:        Suman Davis MD          QUERY TEXT:    Patient admitted with altered mental status and respiratory distress   transferred from OSH.  Noted documentation of likely type II MI in [facility] H/P   . In order to support the diagnosis of type II MI, please refer to 4th   universal definition of MI below and include additional clinical indicators in   your documentation.  Or please document if the diagnosis of type II MI has   been ruled out after study.  ?  The medical record reflects the following:  Risk Factors: COPD, T2DM, CHF, Hx of CVA s/p craniotomy and left-sided   deficits  Clinical Indicators: Trop 59>81> 83>75,  EKG; Sinus tachycardia, Left axis   deviation, Low voltage QRS, Inferior infarct (cited on or before   2024), Abnormal ECG, CT perfusion w/ large area of hypoperfusion in   left MCA distribution. Lactic acid 5.3>3.4,  ABG; pH 7.284/pCO2 45.4/   BiCarb 21.6/O2sat 90.0/ neg base ex 5.0/FiO2 40  Treatment: Received 3L NS PTA, admitted to [facility] ICU intubated on vent for   respiratory failure    Fourth Universal Definition of Myocardial Infarction:  Clearly separates MI   from myocardial injury. Patients with elevated blood troponin levels but   without clinical evidence of ischemia are said to have had a myocardial   injury.? To have a myocardial infarction requires both an elevated troponin   blood test along with at least one of the following:  - Symptoms of acute myocardial ischemia (Types 1 - 5 MI)  - Clinical evidence of ischemia, as evidenced in an electrocardiogram (EKG)   showing new ischemic changes (Type 1, Type 2, Type 3, or Type 4a MI)  - Development of pathological Q waves (Types 1 - 5 MI)  - Imaging evidence of new loss of viable myocardium or new regional wall

## 2025-02-03 NOTE — PROGRESS NOTES
[facility]  Internal Medicine Teaching Residency Program  Inpatient Daily Progress Note  ______________________________________________________________________________    Patient: Talon Walker Jr.  YOB: 1960   MRN:[id number]    Acct: [id number]     Room: 0141/0141-01  Admit date: 1/23/2025  Today's date: 02/03/25  Number of days in the hospital: 11    SUBJECTIVE   Admitting Diagnosis: Acute respiratory failure with hypercapnia  CC: Loss of consciousness  Pt examined at bedside. Chart & results reviewed.   -Patient is Aox4 this morning, vitals are stable.  -Hypernatremia is improved.        Labs reviewed: Hypernatremia improved to 143 and trended back up to 148   hyperchloremia 113, BUNs/creatinine 23/1.6, WBC 3.4, hemoglobin 11.8    MRSA DNA probe: Negative  Respiratory culture: No growth  Blood culture: No growth so far    Plan:  -Will increase free water flushes  -Resume aspirin  -continue tube feeds  -patient to discharge home  -Medically discharged       Review of Systems   Constitutional:  Negative for chills and fever.   HENT:  Positive for trouble swallowing. Negative for sore throat.    Respiratory:  Positive for cough and shortness of breath. Negative for chest tightness, wheezing and stridor.    Cardiovascular:  Negative for chest pain, palpitations and leg swelling.   Gastrointestinal:  Negative for constipation, diarrhea, nausea and vomiting.   Genitourinary:  Negative for dysuria.   Musculoskeletal:  Positive for gait problem.   Neurological:  Positive for weakness. Negative for seizures and numbness.   Psychiatric/Behavioral:  Negative for confusion.        BRIEF HISTORY     The patient is a 64 y.o. male with past medical history of  COPD  CHF  Right CVA s/p craniotomy(residual left-sided deficit)  Patient was initially admitted under neurocritical care.  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  CTA head and neck showed occlusion of M1 segment of the right and left MCA, chronic occlusion of M1 segment of the right and left ICA and chronic occlusion of P1 segment of left PCA.Patient had GCS 3/15, fixed dilated pupil on presentation and was intubated in outlying facility.Patient was admitted in neuro ICU at [facility].  Patient was out of window for thrombolytics and endovascular plans no intervention.Patient was extubated on .  Patient's hospital course was also complicated with CT chest PE showing extensive groundglass opacities within the posterior aspect of right lung and minimally within the left lung base concerning for aspiration.Patient received Flagyl and Levaquin at [facility].  Currently patient is on cefepime for aspiration pneumonia.     As per neurocritical note patient is DNR CCA.    OBJECTIVE     Vital Signs:  /68   Pulse 99   Temp 98.3 °F (36.8 °C) (Oral)   Resp 15   Ht 1.753 m (5' 9\")   Wt 70.5 kg (155 lb 6.8 oz)   SpO2 97%   BMI 22.95 kg/m²     Temp (24hrs), Av.5 °F (36.9 °C), Min:97.8 °F (36.6 °C), Max:99.9 °F (37.7 °C)    In: 2030   Out: 850 [Urine:850]      Physical Exam  Constitutional:       General: He is not in acute distress.     Appearance: He is not toxic-appearing.   HENT:      Mouth/Throat:      Mouth: Mucous membranes are dry.      Pharynx: Oropharynx is clear.   Eyes:      Conjunctiva/sclera: Conjunctivae normal.      Pupils: Pupils are equal, round, and reactive to light.   Cardiovascular:      Rate and Rhythm: Normal rate and regular rhythm.   Pulmonary:      Effort: No respiratory distress.      Breath sounds: Decreased breath sounds present. No wheezing.   Abdominal:      General: There is no distension.      Tenderness: There is no abdominal tenderness. There is no guarding.   Musculoskeletal:      Right lower leg: No edema.      Left lower leg: No edema.

## 2025-02-04 LAB
ANION GAP SERPL CALCULATED.3IONS-SCNC: 12 MMOL/L (ref 9–16)
BUN SERPL-MCNC: 27 MG/DL (ref 8–23)
CALCIUM SERPL-MCNC: 8.4 MG/DL (ref 8.6–10.4)
CHLORIDE SERPL-SCNC: 112 MMOL/L (ref 98–107)
CO2 SERPL-SCNC: 25 MMOL/L (ref 20–31)
CREAT SERPL-MCNC: 1.6 MG/DL (ref 0.7–1.2)
ERYTHROCYTE [DISTWIDTH] IN BLOOD BY AUTOMATED COUNT: 16.3 % (ref 11.8–14.4)
GFR, ESTIMATED: 48 ML/MIN/1.73M2
GLUCOSE BLD-MCNC: 124 MG/DL (ref 75–110)
GLUCOSE BLD-MCNC: 180 MG/DL (ref 75–110)
GLUCOSE BLD-MCNC: 186 MG/DL (ref 75–110)
GLUCOSE BLD-MCNC: 189 MG/DL (ref 75–110)
GLUCOSE BLD-MCNC: 207 MG/DL (ref 75–110)
GLUCOSE SERPL-MCNC: 202 MG/DL (ref 74–99)
HCT VFR BLD AUTO: 32.5 % (ref 40.7–50.3)
HGB BLD-MCNC: 9.8 G/DL (ref 13–17)
MAGNESIUM SERPL-MCNC: 2.2 MG/DL (ref 1.6–2.4)
MCH RBC QN AUTO: 27.7 PG (ref 25.2–33.5)
MCHC RBC AUTO-ENTMCNC: 30.2 G/DL (ref 28.4–34.8)
MCV RBC AUTO: 91.8 FL (ref 82.6–102.9)
NRBC BLD-RTO: 0 PER 100 WBC
PHOSPHATE SERPL-MCNC: 3.4 MG/DL (ref 2.5–4.5)
PLATELET # BLD AUTO: 173 K/UL (ref 138–453)
PMV BLD AUTO: 12 FL (ref 8.1–13.5)
POTASSIUM SERPL-SCNC: 4.5 MMOL/L (ref 3.7–5.3)
RBC # BLD AUTO: 3.54 M/UL (ref 4.21–5.77)
SODIUM SERPL-SCNC: 149 MMOL/L (ref 136–145)
WBC OTHER # BLD: 4.1 K/UL (ref 3.5–11.3)

## 2025-02-04 PROCEDURE — 83735 ASSAY OF MAGNESIUM: CPT

## 2025-02-04 PROCEDURE — 6360000002 HC RX W HCPCS

## 2025-02-04 PROCEDURE — 92507 TX SP LANG VOICE COMM INDIV: CPT

## 2025-02-04 PROCEDURE — 6370000000 HC RX 637 (ALT 250 FOR IP): Performed by: STUDENT IN AN ORGANIZED HEALTH CARE EDUCATION/TRAINING PROGRAM

## 2025-02-04 PROCEDURE — 99232 SBSQ HOSP IP/OBS MODERATE 35: CPT | Performed by: PSYCHIATRY & NEUROLOGY

## 2025-02-04 PROCEDURE — 85027 COMPLETE CBC AUTOMATED: CPT

## 2025-02-04 PROCEDURE — 6370000000 HC RX 637 (ALT 250 FOR IP)

## 2025-02-04 PROCEDURE — 99232 SBSQ HOSP IP/OBS MODERATE 35: CPT | Performed by: INTERNAL MEDICINE

## 2025-02-04 PROCEDURE — 2580000003 HC RX 258

## 2025-02-04 PROCEDURE — 36415 COLL VENOUS BLD VENIPUNCTURE: CPT

## 2025-02-04 PROCEDURE — 2500000003 HC RX 250 WO HCPCS

## 2025-02-04 PROCEDURE — 82947 ASSAY GLUCOSE BLOOD QUANT: CPT

## 2025-02-04 PROCEDURE — 2700000000 HC OXYGEN THERAPY PER DAY

## 2025-02-04 PROCEDURE — 80048 BASIC METABOLIC PNL TOTAL CA: CPT

## 2025-02-04 PROCEDURE — 84100 ASSAY OF PHOSPHORUS: CPT

## 2025-02-04 PROCEDURE — 94640 AIRWAY INHALATION TREATMENT: CPT

## 2025-02-04 PROCEDURE — 1200000000 HC SEMI PRIVATE

## 2025-02-04 PROCEDURE — 97129 THER IVNTJ 1ST 15 MIN: CPT

## 2025-02-04 RX ORDER — BUDESONIDE 0.5 MG/2ML
0.5 INHALANT ORAL
Status: DISCONTINUED | OUTPATIENT
Start: 2025-02-04 | End: 2025-02-06 | Stop reason: HOSPADM

## 2025-02-04 RX ORDER — BUDESONIDE AND FORMOTEROL FUMARATE DIHYDRATE 160; 4.5 UG/1; UG/1
2 AEROSOL RESPIRATORY (INHALATION)
Status: DISCONTINUED | OUTPATIENT
Start: 2025-02-04 | End: 2025-02-04

## 2025-02-04 RX ORDER — CARVEDILOL 6.25 MG/1
3.12 TABLET ORAL 2 TIMES DAILY WITH MEALS
Status: DISCONTINUED | OUTPATIENT
Start: 2025-02-04 | End: 2025-02-06 | Stop reason: HOSPADM

## 2025-02-04 RX ORDER — CARVEDILOL 3.12 MG/1
3.12 TABLET ORAL 2 TIMES DAILY WITH MEALS
Qty: 60 TABLET | Refills: 3 | Status: SHIPPED | OUTPATIENT
Start: 2025-02-04

## 2025-02-04 RX ADMIN — LEVETIRACETAM 500 MG: 100 INJECTION INTRAVENOUS at 08:49

## 2025-02-04 RX ADMIN — ASPIRIN 81 MG 81 MG: 81 TABLET ORAL at 08:50

## 2025-02-04 RX ADMIN — BUDESONIDE 500 MCG: 0.5 SUSPENSION RESPIRATORY (INHALATION) at 10:15

## 2025-02-04 RX ADMIN — HEPARIN SODIUM 5000 UNITS: 5000 INJECTION INTRAVENOUS; SUBCUTANEOUS at 22:58

## 2025-02-04 RX ADMIN — IPRATROPIUM BROMIDE AND ALBUTEROL SULFATE 1 DOSE: 2.5; .5 SOLUTION RESPIRATORY (INHALATION) at 21:33

## 2025-02-04 RX ADMIN — HEPARIN SODIUM 5000 UNITS: 5000 INJECTION INTRAVENOUS; SUBCUTANEOUS at 13:24

## 2025-02-04 RX ADMIN — INSULIN LISPRO 1 UNITS: 100 INJECTION, SOLUTION INTRAVENOUS; SUBCUTANEOUS at 00:45

## 2025-02-04 RX ADMIN — CARVEDILOL 3.12 MG: 6.25 TABLET, FILM COATED ORAL at 10:05

## 2025-02-04 RX ADMIN — ATORVASTATIN CALCIUM 40 MG: 10 TABLET, FILM COATED ORAL at 08:50

## 2025-02-04 RX ADMIN — ALOGLIPTIN 6.25 MG: 6.25 TABLET, FILM COATED ORAL at 08:50

## 2025-02-04 RX ADMIN — IPRATROPIUM BROMIDE AND ALBUTEROL SULFATE 1 DOSE: 2.5; .5 SOLUTION RESPIRATORY (INHALATION) at 07:46

## 2025-02-04 RX ADMIN — INSULIN LISPRO 1 UNITS: 100 INJECTION, SOLUTION INTRAVENOUS; SUBCUTANEOUS at 20:49

## 2025-02-04 RX ADMIN — LEVETIRACETAM 500 MG: 100 INJECTION INTRAVENOUS at 20:49

## 2025-02-04 RX ADMIN — INSULIN LISPRO 1 UNITS: 100 INJECTION, SOLUTION INTRAVENOUS; SUBCUTANEOUS at 10:05

## 2025-02-04 RX ADMIN — POLYETHYLENE GLYCOL 3350 17 G: 17 POWDER, FOR SOLUTION ORAL at 08:50

## 2025-02-04 RX ADMIN — SODIUM CHLORIDE, PRESERVATIVE FREE 40 MG: 5 INJECTION INTRAVENOUS at 08:49

## 2025-02-04 RX ADMIN — HEPARIN SODIUM 5000 UNITS: 5000 INJECTION INTRAVENOUS; SUBCUTANEOUS at 05:38

## 2025-02-04 RX ADMIN — IPRATROPIUM BROMIDE AND ALBUTEROL SULFATE 1 DOSE: 2.5; .5 SOLUTION RESPIRATORY (INHALATION) at 15:42

## 2025-02-04 RX ADMIN — BUDESONIDE 500 MCG: 0.5 SUSPENSION RESPIRATORY (INHALATION) at 21:33

## 2025-02-04 RX ADMIN — INSULIN LISPRO 1 UNITS: 100 INJECTION, SOLUTION INTRAVENOUS; SUBCUTANEOUS at 13:24

## 2025-02-04 RX ADMIN — CARVEDILOL 3.12 MG: 6.25 TABLET, FILM COATED ORAL at 16:33

## 2025-02-04 RX ADMIN — SODIUM CHLORIDE, PRESERVATIVE FREE 10 ML: 5 INJECTION INTRAVENOUS at 20:51

## 2025-02-04 ASSESSMENT — ENCOUNTER SYMPTOMS
NAUSEA: 0
SHORTNESS OF BREATH: 1
STRIDOR: 0
DIARRHEA: 0
CONSTIPATION: 0
COUGH: 1
TROUBLE SWALLOWING: 1
CHEST TIGHTNESS: 0
SORE THROAT: 0
VOMITING: 0
WHEEZING: 0

## 2025-02-04 NOTE — PROGRESS NOTES
[facility]  Internal Medicine Teaching Residency Program  Inpatient Daily Progress Note  ______________________________________________________________________________    Patient: Talon Walker Jr.  YOB: 1960   MRN:[id number]    Acct: [id number]     Room: 0141/0141-01  Admit date: 1/23/2025  Today's date: 02/04/25  Number of days in the hospital: 12    SUBJECTIVE   Admitting Diagnosis: Acute respiratory failure with hypercapnia  CC: Loss of consciousness  Pt examined at bedside. Chart & results reviewed.   -Patient is Aox4 this morning, vitals are stable.  -Hypernatremia is improved.        Labs reviewed: Hypernatremia improved to 143 and trended back up to 149      MRSA DNA probe: Negative  Respiratory culture: No growth  Blood culture: No growth so far    Plan:  -Will increase free water flushes  -continue tube feeds  -patient to discharge home  -Medically discharged pending      Review of Systems   Constitutional:  Negative for chills and fever.   HENT:  Positive for trouble swallowing. Negative for sore throat.    Respiratory:  Positive for cough and shortness of breath. Negative for chest tightness, wheezing and stridor.    Cardiovascular:  Negative for chest pain, palpitations and leg swelling.   Gastrointestinal:  Negative for constipation, diarrhea, nausea and vomiting.   Genitourinary:  Negative for dysuria.   Musculoskeletal:  Positive for gait problem.   Neurological:  Positive for weakness. Negative for seizures and numbness.   Psychiatric/Behavioral:  Negative for confusion.        BRIEF HISTORY     The patient is a 64 y.o. male with past medical history of  COPD  CHF  Right CVA s/p craniotomy(residual left-sided deficit)  Patient was initially admitted under neurocritical care.  He initially presented after being found down by the wife from an outlying facility after being diagnosed with multisegment intracranial vessel occlusion consistent with

## 2025-02-04 NOTE — PLAN OF CARE
Problem: Discharge Planning  Goal: Discharge to home or other facility with appropriate resources  2/4/2025 1124 by Rosibel Castillo RN  Outcome: Progressing  2/4/2025 0602 by Krystal Rivas RN  Outcome: Progressing     Problem: Safety - Adult  Goal: Free from fall injury  2/4/2025 1124 by Rosibel Castillo RN  Outcome: Progressing  2/4/2025 0602 by Krystal Rivas RN  Outcome: Progressing     Problem: Respiratory - Adult  Goal: Achieves optimal ventilation and oxygenation  2/4/2025 1124 by Rosibel Castillo RN  Outcome: Progressing  2/4/2025 0602 by Krystal Rivas RN  Outcome: Progressing     Problem: Pain  Goal: Verbalizes/displays adequate comfort level or baseline comfort level  2/4/2025 0602 by Krystal Rivas RN  Outcome: Progressing

## 2025-02-04 NOTE — PROGRESS NOTES
Speech Language Pathology  [facility]    Cognitive/Speech and Language Treatment Note    Date: 2/4/2025  Patient’s Name: Talon Walker Jr.  MRN: [id number]  Diagnosis:   Patient Active Problem List   Diagnosis Code    Gastroesophageal reflux disease K21.9    Allergic rhinitis J30.9    Controlled type 2 diabetes mellitus with stage 3 chronic kidney disease, without long-term current use of insulin ([facility]) E11.22, N18.30    Chronic obstructive pulmonary disease ([facility]) J44.9    Dysthymia F34.1    Acute cerebrovascular accident ([facility]) I63.9    Left-sided weakness R53.1    Lower leg edema R60.0    Aspiration pneumonia of right lower lobe ([facility]) J69.0    History of recent pneumonia Z87.01    Anemia in chronic renal disease N18.9, D63.1    Acute respiratory failure with hypercapnia J96.02    Abnormal EEG R94.01    History of stroke Z86.73    Abnormal computed tomography angiography of head R93.0    Moderate malnutrition ([facility]) E44.0    NSTEMI (non-ST elevation myocardial infarction) ([facility]) I21.4    Acute metabolic encephalopathy G93.41    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, not intractable, with status epilepticus ([facility]) G40.001    Intracranial atherosclerosis I67.2    Cerebrovascular accident (CVA) involving right cerebral hemisphere ([facility]) I63.9       Pain: 0/10    Cognitive Treatment    Treatment time: 935-1000      Subjective: [x] Alert [x] Cooperative     [] Confused     [] Agitated    [] Lethargic      Objective/Assessment:    Recall:   Delayed Recall: 0/3 did not increase with max cues, 0/3 increased to 2/3 with max cues    Problem Solving/Reasoning:   Determining Category Exclusions: 3/5 increased to 5/5 with max verbal cues.   Multiple Uses for Objects (2 units): 9/10 increased to 10/10 with min verbal cues.       Speech and Language Treatment    Objective/Assessment:    Verbal Expression:   Multisyllabic Words: 7/9 increased  to 9/9 with min verbal cues    Other:   Pt. Provided with education regarding communication compensatory strategies for dysarthria. Pt. Encouraged to utilize strategies once discharged from the hospital. Pt. Verbalized understanding. Pt. Verbalized that he has been doing his swallowing exercises.     Pt has OMEX for dysphagia at bedside, verbalizes understanding for exercise program. Reviewed recommendation to continue to complete exercises at least 2-3x daily.     Plan:  [x] Continue ST services    [] Discharge from ST:      Discharge recommendations: [x]  Further therapy recommended at discharge.The patient should be able to tolerate at least 3 hours of therapy per day over 5 days or 15 hours over 7 days. [] Further therapy recommended at discharge.   [] No therapy recommended at discharge.      Completed by: Kortney Turner  Clinician    Cosigned By: Iman Nieves M.S.CCC/SLP

## 2025-02-04 NOTE — PROGRESS NOTES
CLINICAL PHARMACY NOTE: MEDS TO BEDS    Total # of Prescriptions Filled: 1   The following medications were delivered to the patient:  COREG 3.125MG     Additional Documentation:     KEPPRA TOO SOON OK TO FILL 2/27

## 2025-02-04 NOTE — CARE COORDINATION
Transitional Planning    0735  left from overnight- Snook from [facility], requested H&P, type of formula and if pt has a PEG.  Clinical faxed to [phone] as requested.     1410 PC to Keeley at [facility], will need medical necessity form signed by provider and sent back, provided  email address. [facility] rep will deliver the equipment to the bedside and they will provide teaching on the pump/equipment.  Formula will be delivered to the home but Snook asks that 2 days worth be sent home with patient.      1650 Received medical necessity form from [facility], placed in soft chart for provider signature.     1730 Updated pt's family on progress with enteral feeding supplies and DME.  Will discuss with pt's C aide and determine if pt can be accommodated at home until hospital bed arrives.  Family updated that d/c order has been in however CM is awaiting DME and enteral feeding supplies.  Family will f/u with CM in the am regarding feasibility of discharging to home before bed is delivered.

## 2025-02-04 NOTE — PROGRESS NOTES
Endovascular Neurosurgery Progress Note    Reason for evaluation: R ICA occlusion w/ large stroke, L ICA m[place]oya  Referring Provider: Kostas Brunson MD     SUBJECTIVE:     NAEO from EVN perspective. Off LTME. Improving encephalopathy.   History of Chief Complaint:    Talon Walker Jr. is a 64 y.o. 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 Pt presented after being found down with agonal breathing and lethargy. CT showed known prior stroke and hemicrani. MRI with no acute stroke. CTA with multifocal occlusions and collateralization. CTP undiagnostic d/t technical pt movement and lack of blood flow to the right hemisphere.EEG showing R hemisphere slowing with breach but no epileptiform activity. Pt was initially admitted to the NeuroICU and stabilized, now transferred to Neurology team. EVN consulted 1/31/25 d/t the CTA findings.     Allergies  is allergic to pcn [penicillins], morphine, and sulfa antibiotics.  Medications  Prior to Admission medications    Medication Sig Start Date End Date Taking? Authorizing Provider   carvedilol (COREG) 3.125 MG tablet Take 1 tablet by mouth 2 times daily (with meals) 2/4/25  Yes Magaly Quiroz MD   SITagliptin (JANUVIA) 25 MG tablet Take 1 tablet by mouth daily 2/2/25  Yes Magaly Quiroz MD   levETIRAcetam (KEPPRA) 500 MG tablet TAKE ONE TABLET BY MOUTH TWICE A DAY 2/1/25  Yes Magaly Quiroz MD   sertraline (ZOLOFT) 100 MG tablet Take 2 tablets by mouth daily 1/8/25   Adrian Adkins APRN - CNP   omeprazole (PRILOSEC) 40 MG delayed release capsule TAKE 1 CAPSULE BY MOUTH DAILY 12/18/24   Adrian Adkins APRN - CNP   folic acid (FOLVITE) 1 MG tablet TAKE 1 TABLET BY MOUTH DAILY 12/18/24   Adrian Adkins APRN - CNP   baclofen (LIORESAL) 10 MG tablet TAKE 1 TABLET BY MOUTH 2 TIMES A DAY 11/20/24   Adrian Adkins APRN - CNP   budesonide (PULMICORT) 0.5 MG/2ML nebulizer suspension USE ONE VIAL VIA NEBULIZER

## 2025-02-04 NOTE — PLAN OF CARE
Problem: Chronic Conditions and Co-morbidities  Goal: Patient's chronic conditions and co-morbidity symptoms are monitored and maintained or improved  2/4/2025 0602 by Krystal Rivas RN  Outcome: Progressing  2/3/2025 1712 by Tasia Rea RN  Outcome: Progressing     Problem: Discharge Planning  Goal: Discharge to home or other facility with appropriate resources  2/4/2025 0602 by Krystal Rivas RN  Outcome: Progressing  2/3/2025 1712 by Tasia Rea RN  Outcome: Progressing     Problem: Safety - Adult  Goal: Free from fall injury  2/4/2025 0602 by Krystal Rivas RN  Outcome: Progressing  2/3/2025 1712 by Tasia Rea RN  Outcome: Progressing     Problem: Respiratory - Adult  Goal: Achieves optimal ventilation and oxygenation  2/4/2025 0602 by Krystal Rivas RN  Outcome: Progressing  2/3/2025 1936 by Marlys Rose RCP  Outcome: Progressing  2/3/2025 1712 by Tasia Rea RN  Outcome: Progressing     Problem: Pain  Goal: Verbalizes/displays adequate comfort level or baseline comfort level  2/4/2025 0602 by Krystal Rivas RN  Outcome: Progressing  2/3/2025 1712 by Tasia Rea RN  Outcome: Progressing     Problem: Skin/Tissue Integrity  Goal: Skin integrity remains intact  Description: 1.  Monitor for areas of redness and/or skin breakdown  2.  Assess vascular access sites hourly  3.  Every 4-6 hours minimum:  Change oxygen saturation probe site  4.  Every 4-6 hours:  If on nasal continuous positive airway pressure, respiratory therapy assess nares and determine need for appliance change or resting period  2/4/2025 0602 by Krystal Rivas RN  Outcome: Progressing  2/3/2025 1712 by Tasia Rea RN  Outcome: Progressing     Problem: ABCDS Injury Assessment  Goal: Absence of physical injury  2/4/2025 0602 by Krystal Rivas RN  Outcome: Progressing  2/3/2025 1712 by Tasia Rea RN  Outcome: Progressing     Problem: Nutrition Deficit:  Goal: Optimize nutritional  Progressing  2/3/2025 1712 by Tasia Rea RN  Outcome: Progressing  Goal: Maintain proper alignment of affected body part  2/4/2025 0602 by Krystal Rivas RN  Outcome: Progressing  2/3/2025 1712 by Tasia Rea RN  Outcome: Progressing  Goal: Return ADL status to a safe level of function  2/4/2025 0602 by Krystal Rivas RN  Outcome: Progressing  2/3/2025 1712 by Tasia Rea RN  Outcome: Progressing     Problem: Gastrointestinal - Adult  Goal: Minimal or absence of nausea and vomiting  2/4/2025 0602 by Krystal Rivas RN  Outcome: Progressing  2/3/2025 1712 by Tasia Rea RN  Outcome: Progressing  Goal: Maintains or returns to baseline bowel function  2/4/2025 0602 by Krystal Rivas RN  Outcome: Progressing  2/3/2025 1712 by Tasia Rea RN  Outcome: Progressing  Goal: Maintains adequate nutritional intake  2/4/2025 0602 by Krystal Rivas RN  Outcome: Progressing  2/3/2025 1712 by Tasia Rea RN  Outcome: Progressing     Problem: Genitourinary - Adult  Goal: Absence of urinary retention  2/4/2025 0602 by Krystal Rivas RN  Outcome: Progressing  2/3/2025 1712 by Tasia Rea RN  Outcome: Progressing     Problem: Confusion  Goal: Confusion, delirium, dementia, or psychosis is improved or at baseline  Description: INTERVENTIONS:  1. Assess for possible contributors to thought disturbance, including medications, impaired vision or hearing, underlying metabolic abnormalities, dehydration, psychiatric diagnoses, and notify attending LIP  2. [place] high risk fall precautions, as indicated  3. Provide frequent short contacts to provide reality reorientation, refocusing and direction  4. Decrease environmental stimuli, including noise as appropriate  5. Monitor and intervene to maintain adequate nutrition, hydration, elimination, sleep and activity  6. If unable to ensure safety without constant attention obtain sitter and review sitter guidelines with assigned personnel  7.

## 2025-02-05 LAB
ALBUMIN SERPL-MCNC: 3.3 G/DL (ref 3.5–5.2)
ALBUMIN/GLOB SERPL: 1 {RATIO} (ref 1–2.5)
ALP SERPL-CCNC: 68 U/L (ref 40–129)
ALT SERPL-CCNC: 13 U/L (ref 10–50)
ANION GAP SERPL CALCULATED.3IONS-SCNC: 10 MMOL/L (ref 9–16)
AST SERPL-CCNC: 20 U/L (ref 10–50)
BILIRUB DIRECT SERPL-MCNC: <0.1 MG/DL (ref 0–0.2)
BILIRUB INDIRECT SERPL-MCNC: ABNORMAL MG/DL (ref 0–1)
BILIRUB SERPL-MCNC: <0.2 MG/DL (ref 0–1.2)
BUN SERPL-MCNC: 25 MG/DL (ref 8–23)
CALCIUM SERPL-MCNC: 8.4 MG/DL (ref 8.6–10.4)
CHLORIDE SERPL-SCNC: 110 MMOL/L (ref 98–107)
CO2 SERPL-SCNC: 26 MMOL/L (ref 20–31)
CREAT SERPL-MCNC: 1.5 MG/DL (ref 0.7–1.2)
GFR, ESTIMATED: 52 ML/MIN/1.73M2
GLOBULIN SER CALC-MCNC: 3.4 G/DL
GLUCOSE BLD-MCNC: 172 MG/DL (ref 75–110)
GLUCOSE BLD-MCNC: 178 MG/DL (ref 75–110)
GLUCOSE BLD-MCNC: 202 MG/DL (ref 75–110)
GLUCOSE BLD-MCNC: 217 MG/DL (ref 75–110)
GLUCOSE BLD-MCNC: 233 MG/DL (ref 75–110)
GLUCOSE SERPL-MCNC: 227 MG/DL (ref 74–99)
MAGNESIUM SERPL-MCNC: 2.1 MG/DL (ref 1.6–2.4)
PHOSPHATE SERPL-MCNC: 3 MG/DL (ref 2.5–4.5)
POTASSIUM SERPL-SCNC: 4.3 MMOL/L (ref 3.7–5.3)
PROT SERPL-MCNC: 6.7 G/DL (ref 6.6–8.7)
SODIUM SERPL-SCNC: 146 MMOL/L (ref 136–145)

## 2025-02-05 PROCEDURE — 97129 THER IVNTJ 1ST 15 MIN: CPT

## 2025-02-05 PROCEDURE — 80048 BASIC METABOLIC PNL TOTAL CA: CPT

## 2025-02-05 PROCEDURE — 2580000003 HC RX 258

## 2025-02-05 PROCEDURE — 94761 N-INVAS EAR/PLS OXIMETRY MLT: CPT

## 2025-02-05 PROCEDURE — 94640 AIRWAY INHALATION TREATMENT: CPT

## 2025-02-05 PROCEDURE — 99232 SBSQ HOSP IP/OBS MODERATE 35: CPT | Performed by: INTERNAL MEDICINE

## 2025-02-05 PROCEDURE — 92526 ORAL FUNCTION THERAPY: CPT

## 2025-02-05 PROCEDURE — 6370000000 HC RX 637 (ALT 250 FOR IP)

## 2025-02-05 PROCEDURE — 2700000000 HC OXYGEN THERAPY PER DAY

## 2025-02-05 PROCEDURE — 2500000003 HC RX 250 WO HCPCS

## 2025-02-05 PROCEDURE — 1200000000 HC SEMI PRIVATE

## 2025-02-05 PROCEDURE — 6360000002 HC RX W HCPCS

## 2025-02-05 PROCEDURE — 83735 ASSAY OF MAGNESIUM: CPT

## 2025-02-05 PROCEDURE — 6370000000 HC RX 637 (ALT 250 FOR IP): Performed by: INTERNAL MEDICINE

## 2025-02-05 PROCEDURE — 36415 COLL VENOUS BLD VENIPUNCTURE: CPT

## 2025-02-05 PROCEDURE — 84100 ASSAY OF PHOSPHORUS: CPT

## 2025-02-05 PROCEDURE — 80076 HEPATIC FUNCTION PANEL: CPT

## 2025-02-05 PROCEDURE — 6370000000 HC RX 637 (ALT 250 FOR IP): Performed by: STUDENT IN AN ORGANIZED HEALTH CARE EDUCATION/TRAINING PROGRAM

## 2025-02-05 RX ORDER — IPRATROPIUM BROMIDE AND ALBUTEROL SULFATE 2.5; .5 MG/3ML; MG/3ML
1 SOLUTION RESPIRATORY (INHALATION)
Status: DISCONTINUED | OUTPATIENT
Start: 2025-02-05 | End: 2025-02-05

## 2025-02-05 RX ORDER — IPRATROPIUM BROMIDE AND ALBUTEROL SULFATE 2.5; .5 MG/3ML; MG/3ML
1 SOLUTION RESPIRATORY (INHALATION) EVERY 4 HOURS PRN
Status: DISCONTINUED | OUTPATIENT
Start: 2025-02-05 | End: 2025-02-06 | Stop reason: HOSPADM

## 2025-02-05 RX ORDER — GLUCAGON 1 MG/ML
1 KIT INJECTION PRN
Status: DISCONTINUED | OUTPATIENT
Start: 2025-02-05 | End: 2025-02-06 | Stop reason: HOSPADM

## 2025-02-05 RX ORDER — IPRATROPIUM BROMIDE AND ALBUTEROL SULFATE 2.5; .5 MG/3ML; MG/3ML
1 SOLUTION RESPIRATORY (INHALATION)
Status: DISCONTINUED | OUTPATIENT
Start: 2025-02-06 | End: 2025-02-06 | Stop reason: HOSPADM

## 2025-02-05 RX ORDER — DEXTROSE MONOHYDRATE 100 MG/ML
INJECTION, SOLUTION INTRAVENOUS CONTINUOUS PRN
Status: DISCONTINUED | OUTPATIENT
Start: 2025-02-05 | End: 2025-02-06 | Stop reason: HOSPADM

## 2025-02-05 RX ADMIN — ALOGLIPTIN 6.25 MG: 6.25 TABLET, FILM COATED ORAL at 09:14

## 2025-02-05 RX ADMIN — IPRATROPIUM BROMIDE AND ALBUTEROL SULFATE 1 DOSE: .5; 2.5 SOLUTION RESPIRATORY (INHALATION) at 21:04

## 2025-02-05 RX ADMIN — INSULIN LISPRO 1 UNITS: 100 INJECTION, SOLUTION INTRAVENOUS; SUBCUTANEOUS at 15:14

## 2025-02-05 RX ADMIN — INSULIN LISPRO 1 UNITS: 100 INJECTION, SOLUTION INTRAVENOUS; SUBCUTANEOUS at 10:15

## 2025-02-05 RX ADMIN — ASPIRIN 81 MG 81 MG: 81 TABLET ORAL at 09:14

## 2025-02-05 RX ADMIN — IPRATROPIUM BROMIDE AND ALBUTEROL SULFATE 1 DOSE: .5; 2.5 SOLUTION RESPIRATORY (INHALATION) at 14:30

## 2025-02-05 RX ADMIN — HEPARIN SODIUM 5000 UNITS: 5000 INJECTION INTRAVENOUS; SUBCUTANEOUS at 15:14

## 2025-02-05 RX ADMIN — LEVETIRACETAM 500 MG: 100 INJECTION INTRAVENOUS at 21:00

## 2025-02-05 RX ADMIN — ATORVASTATIN CALCIUM 40 MG: 10 TABLET, FILM COATED ORAL at 09:15

## 2025-02-05 RX ADMIN — CARVEDILOL 3.12 MG: 6.25 TABLET, FILM COATED ORAL at 18:10

## 2025-02-05 RX ADMIN — BUDESONIDE 500 MCG: 0.5 SUSPENSION RESPIRATORY (INHALATION) at 14:34

## 2025-02-05 RX ADMIN — LEVETIRACETAM 500 MG: 100 INJECTION INTRAVENOUS at 09:14

## 2025-02-05 RX ADMIN — IPRATROPIUM BROMIDE AND ALBUTEROL SULFATE 1 DOSE: 2.5; .5 SOLUTION RESPIRATORY (INHALATION) at 07:50

## 2025-02-05 RX ADMIN — HEPARIN SODIUM 5000 UNITS: 5000 INJECTION INTRAVENOUS; SUBCUTANEOUS at 21:00

## 2025-02-05 RX ADMIN — INSULIN LISPRO 1 UNITS: 100 INJECTION, SOLUTION INTRAVENOUS; SUBCUTANEOUS at 18:10

## 2025-02-05 RX ADMIN — CARVEDILOL 3.12 MG: 6.25 TABLET, FILM COATED ORAL at 09:14

## 2025-02-05 RX ADMIN — WATER 40 MG: 1 INJECTION INTRAMUSCULAR; INTRAVENOUS; SUBCUTANEOUS at 22:42

## 2025-02-05 RX ADMIN — POLYETHYLENE GLYCOL 3350 17 G: 17 POWDER, FOR SOLUTION ORAL at 09:13

## 2025-02-05 RX ADMIN — SODIUM CHLORIDE, PRESERVATIVE FREE 10 ML: 5 INJECTION INTRAVENOUS at 21:30

## 2025-02-05 RX ADMIN — HEPARIN SODIUM 5000 UNITS: 5000 INJECTION INTRAVENOUS; SUBCUTANEOUS at 06:03

## 2025-02-05 RX ADMIN — BUDESONIDE 500 MCG: 0.5 SUSPENSION RESPIRATORY (INHALATION) at 21:04

## 2025-02-05 RX ADMIN — SODIUM CHLORIDE, PRESERVATIVE FREE 10 ML: 5 INJECTION INTRAVENOUS at 09:16

## 2025-02-05 RX ADMIN — SODIUM CHLORIDE, PRESERVATIVE FREE 40 MG: 5 INJECTION INTRAVENOUS at 09:13

## 2025-02-05 ASSESSMENT — ENCOUNTER SYMPTOMS
DIARRHEA: 0
COUGH: 1
NAUSEA: 0
TROUBLE SWALLOWING: 1
CHEST TIGHTNESS: 0
SORE THROAT: 0
VOMITING: 0
WHEEZING: 0
CONSTIPATION: 0
STRIDOR: 0
SHORTNESS OF BREATH: 1

## 2025-02-05 ASSESSMENT — PAIN SCALES - GENERAL: PAINLEVEL_OUTOF10: 0

## 2025-02-05 NOTE — PLAN OF CARE
Problem: Chronic Conditions and Co-morbidities  Goal: Patient's chronic conditions and co-morbidity symptoms are monitored and maintained or improved  2/5/2025 1533 by Theresa Galeana RN  Outcome: Progressing  2/5/2025 0628 by Krystal Rivas RN  Outcome: Progressing     Problem: Discharge Planning  Goal: Discharge to home or other facility with appropriate resources  2/5/2025 1533 by Theresa Galeana RN  Outcome: Progressing  2/5/2025 0628 by Krystal Rivas RN  Outcome: Progressing     Problem: Safety - Adult  Goal: Free from fall injury  2/5/2025 1533 by Theresa Galeana RN  Outcome: Progressing  2/5/2025 0628 by Krystal Rivas RN  Outcome: Progressing     Problem: Respiratory - Adult  Goal: Achieves optimal ventilation and oxygenation  2/5/2025 1533 by Theresa Galeana RN  Outcome: Progressing  2/5/2025 0754 by Nayeli Biswas RCP  Outcome: Progressing  2/5/2025 0628 by Krystal Rivas RN  Outcome: Progressing     Problem: Neurosensory - Adult  Goal: Achieves stable or improved neurological status  2/5/2025 1533 by Theresa Galeana RN  Outcome: Progressing  2/5/2025 0628 by Krystal Rivas RN  Outcome: Progressing  Goal: Achieves maximal functionality and self care  2/5/2025 1533 by Theresa Galeana RN  Outcome: Progressing  2/5/2025 0628 by Krystal Rivas RN  Outcome: Progressing     Problem: Cardiovascular - Adult  Goal: Maintains optimal cardiac output and hemodynamic stability  2/5/2025 1533 by Theresa Galeana RN  Outcome: Progressing  2/5/2025 0628 by Krystal Rivas RN  Outcome: Progressing  Goal: Absence of cardiac dysrhythmias or at baseline  2/5/2025 1533 by Theresa Galeana RN  Outcome: Progressing  2/5/2025 0628 by Krystal Rivas RN  Outcome: Progressing     Problem: Skin/Tissue Integrity - Adult  Goal: Skin integrity remains intact  Description: 1.  Monitor for areas of redness and/or skin breakdown  2.  Assess vascular access sites hourly  3.  Every 4-6 hours minimum:  Change oxygen saturation probe site  4.  Every 4-6 hours:  If  level  2/5/2025 1533 by Theresa Galeana RN  Outcome: Progressing  2/5/2025 0628 by Krystal Rivas RN  Outcome: Progressing     Problem: Skin/Tissue Integrity  Goal: Skin integrity remains intact  Description: 1.  Monitor for areas of redness and/or skin breakdown  2.  Assess vascular access sites hourly  3.  Every 4-6 hours minimum:  Change oxygen saturation probe site  4.  Every 4-6 hours:  If on nasal continuous positive airway pressure, respiratory therapy assess nares and determine need for appliance change or resting period  2/5/2025 1533 by Theresa Galeana RN  Outcome: Progressing  2/5/2025 0628 by Krystal Rivas RN  Outcome: Progressing     Problem: ABCDS Injury Assessment  Goal: Absence of physical injury  2/5/2025 1533 by Theresa Galeana RN  Outcome: Progressing  2/5/2025 0628 by Krystal Rivas RN  Outcome: Progressing     Problem: Nutrition Deficit:  Goal: Optimize nutritional status  2/5/2025 1533 by Theresa Galeana RN  Outcome: Progressing  2/5/2025 0628 by Krystal Rivas RN  Outcome: Progressing     Problem: Confusion  Goal: Confusion, delirium, dementia, or psychosis is improved or at baseline  Description: INTERVENTIONS:  1. Assess for possible contributors to thought disturbance, including medications, impaired vision or hearing, underlying metabolic abnormalities, dehydration, psychiatric diagnoses, and notify attending LIP  2. [place] high risk fall precautions, as indicated  3. Provide frequent short contacts to provide reality reorientation, refocusing and direction  4. Decrease environmental stimuli, including noise as appropriate  5. Monitor and intervene to maintain adequate nutrition, hydration, elimination, sleep and activity  6. If unable to ensure safety without constant attention obtain sitter and review sitter guidelines with assigned personnel  7. Initiate Psychosocial CNS and Spiritual Care consult, as indicated  2/5/2025 1533 by Theresa Galeana RN  Outcome: Progressing  2/5/2025 0628 by Rob

## 2025-02-05 NOTE — PROGRESS NOTES
Speech Language Pathology  [facility]    Cognitive Treatment Note    Date: 2/5/2025  Patient’s Name: Talon Walker Jr.  MRN: [id number]  Diagnosis:   Patient Active Problem List   Diagnosis Code    Gastroesophageal reflux disease K21.9    Allergic rhinitis J30.9    Controlled type 2 diabetes mellitus with stage 3 chronic kidney disease, without long-term current use of insulin ([facility]) E11.22, N18.30    Chronic obstructive pulmonary disease ([facility]) J44.9    Dysthymia F34.1    Acute cerebrovascular accident ([facility]) I63.9    Left-sided weakness R53.1    Lower leg edema R60.0    Aspiration pneumonia of right lower lobe ([facility]) J69.0    History of recent pneumonia Z87.01    Anemia in chronic renal disease N18.9, D63.1    Acute respiratory failure with hypercapnia J96.02    Abnormal EEG R94.01    History of stroke Z86.73    Abnormal computed tomography angiography of head R93.0    Moderate malnutrition ([facility]) E44.0    NSTEMI (non-ST elevation myocardial infarction) ([facility]) I21.4    Acute metabolic encephalopathy G93.41    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, not intractable, with status epilepticus ([facility]) G40.001    Intracranial atherosclerosis I67.2    Cerebrovascular accident (CVA) involving right cerebral hemisphere ([facility]) I63.9       Pain: 0/10    Cognitive Treatment    Treatment time: 0819-0544      Subjective: [x] Alert [x] Cooperative     [] Confused     [] Agitated    [] Lethargic      Objective/Assessment:    Recall:   Delayed Recall (unrelated 3 units with visuals): 0/3 did not increase with max verbal cues, 0/3 did not increase with max verbal cues    Problem Solving/Reasoning:   State the Category: [place]: 2/4 increased to 4/4 with max verbal cues  Add to the Category: [place]: 1/4 increased to 3/4 with max verbal cues  Comparing Sentence Content: 2/5 did not increase with max verbal cues    Dysphagia

## 2025-02-05 NOTE — DISCHARGE INSTR - COC
Continuity of Care Form    Patient Name: Talon Walker Jr.   :  1960  MRN:  [id number]    Admit date:  2025  Discharge date:  2025    Code Status Order: DNR-CCA   Advance Directives:   Advance Care Flowsheet Documentation        Date/Time Healthcare Directive Type of Healthcare Directive Copy in Chart Healthcare Agent Appointed Healthcare Agent's Name Healthcare Agent's Phone Number    [phone] Yes, patient has an advance directive for healthcare treatment  --  Yes, copy in chart  Spouse  jannie-wife  --                     Admitting Physician:  Marbin Martinez MD  PCP: Adrian Adkins, APRN - CNP    Discharging Nurse: Elias  Discharging Hospital Unit/Room#: 0141/0141-01  Discharging Unit Phone Number: [phone]    Emergency Contact:   Extended Emergency Contact Information  Primary Emergency Contact: Jannie Walker  [place] Phone: [phone]  Relation: Legal Guardian  Secondary Emergency Contact: Wally Walker  [place] Phone: [phone]  Relation: Child    Past Surgical History:  Past Surgical History:   Procedure Laterality Date    BRAIN SURGERY      CRANIOTOMY      GASTROSTOMY TUBE PLACEMENT      removed    GASTROSTOMY TUBE PLACEMENT  2025    Date: 25 Room / Location: [address] Anesthesia Start: 0859 Anesthesia Stop: Procedure: **ADD ON- WANTS AS EARLY AS POSSIBLE** ESOPHAGOGASTRODUODENOSCOPY PERCUTANEOUS ENDOSCOPIC GASTROSTOMY TUBE INSERTION    KNEE SURGERY      UPPER GASTROINTESTINAL ENDOSCOPY N/A 2025    ESOPHAGOGASTRODUODENOSCOPY PERCUTANEOUS ENDOSCOPIC GASTROSTOMY TUBE PLACEMENT performed by Rashel Merlos MD at [facility] OR       Immunization History:   Immunization History   Administered Date(s) Administered    Influenza A (J8O5-20) Vaccine PF IM 2010    Influenza Virus Vaccine 10/01/2014, 10/22/2015    Influenza Whole 10/01/2011    Influenza, FLUARIX, FLULAVAL, FLUZONE (age 6 mo+) and AFLURIA, (age 3 y+), Quadv PF, 0.5mL 10/05/2016,  [facility]  Address:  Phone:  Fax:    Dialysis Facility (if applicable)   Name:  Address:  Dialysis Schedule:  Phone:  Fax:    / signature: Electronically signed by JOSE GRANT on 2/6/25 at 5:08 PM EST    PHYSICIAN SECTION    Prognosis: Fair    Condition at Discharge: Stable    Rehab Potential (if transferring to Rehab): Fair    Recommended Labs or Other Treatments After Discharge: NA    Physician Certification: I certify the above information and transfer of Talon SAUL Aaron Dueñas  is necessary for the continuing treatment of the diagnosis listed and that he requires Home Care for greater 30 days.     Update Admission H&P: No change in H&P    PHYSICIAN SIGNATURE:  Electronically signed by Kostas Brunson MD on 2/6/25 at 5:05 PM EST

## 2025-02-05 NOTE — RT PROTOCOL NOTE
RT Inhaler-Nebulizer Bronchodilator Protocol Note    There is a bronchodilator order in the chart from a provider indicating to follow the RT Bronchodilator Protocol and there is an “Initiate RT Inhaler-Nebulizer Bronchodilator Protocol” order as well (see protocol at bottom of note).    CXR Findings:  No results found.    The findings from the last RT Protocol Assessment were as follows:   History Pulmonary Disease: Chronic pulmonary disease  Respiratory Pattern: Severe use of accessory muscle or RR>30 bpm  Breath Sounds: Intermittent or unilateral wheezes  Cough: Strong, spontaneous, non-productive  Indication for Bronchodilator Therapy: Decreased or absent breath sounds, Wheezing associated with pulm disorder  Bronchodilator Assessment Score: 14    Aerosolized bronchodilator medication orders have been revised according to the RT Inhaler-Nebulizer Bronchodilator Protocol below.    Respiratory Therapist to perform RT Therapy Protocol Assessment initially then follow the protocol.  Repeat RT Therapy Protocol Assessment PRN for score 0-3 or on second treatment, BID, and PRN for scores above 3.    No Indications - adjust the frequency to every 6 hours PRN wheezing or bronchospasm, if no treatments needed after 48 hours then discontinue using Per Protocol order mode.     If indication present, adjust the RT bronchodilator orders based on the Bronchodilator Assessment Score as indicated below.  Use Inhaler orders unless patient has one or more of the following: on home nebulizer, not able to hold breath for 10 seconds, is not alert and oriented, cannot activate and use MDI correctly, or respiratory rate 25 breaths per minute or more, then use the equivalent nebulizer order(s) with same Frequency and PRN reasons based on the score.  If a patient is on this medication at home then do not decrease Frequency below that used at home.    0-3 - enter or revise RT bronchodilator order(s) to equivalent RT Bronchodilator order

## 2025-02-05 NOTE — PLAN OF CARE
Problem: Respiratory - Adult  Goal: Achieves optimal ventilation and oxygenation  2/5/2025 0754 by Nyaeli Biswas RCP  Outcome: Progressing

## 2025-02-05 NOTE — PROGRESS NOTES
Comprehensive Nutrition Assessment    Type and Reason for Visit:  Reassess    Nutrition Recommendations/Plan:   Continue current PEG feed as tolerated  Adjust insulin as necessary  Monitor wt, meds, labs, TF tolerance and adequacy     Malnutrition Assessment:  Malnutrition Status:  Moderate malnutrition (02/05/25 1316)    Context:  Acute Illness     Findings of the 6 clinical characteristics of malnutrition:  Energy Intake:  50% or less of estimated energy requirements for 5 or more days  Weight Loss:  Greater than 2% over 1 week     Body Fat Loss:  Unable to assess     Muscle Mass Loss:  Unable to assess    Fluid Accumulation:  No fluid accumulation Extremities   Strength:  Not Performed    Nutrition Assessment:    Tolerating PEG feed @ goal of 50 mL/hr. has Had 5% wt loss in 8 days - significant per ASPEN guidelines. Wt loss poss. r/t fluid loss. LBM 2/4. No edema. Labs: Na 146 mmol/L, -233 mg/dL, Ca 8.4 mg/dl.    Nutrition Related Findings:    Meds/labs reviewed Wound Type: None       Current Nutrition Intake & Therapies:    Average Meal Intake: NPO  Average Supplements Intake: NPO  Diet NPO  ADULT TUBE FEEDING; PEG; Standard with Fiber; Continuous; 10; Yes; 10; Q 6 hours; 50; 50; Q 4 hours  Current Tube Feeding (TF) Orders:  Feeding Route: PEG  Formula: Standard with Fiber  Schedule: Continuous  Feeding Regimen: 50 mL/hr  Additives/Modulars: None  Water Flushes: 50 mL q4h  Current TF Provides: 1800 kclas, 77 gm PRO/d and 1212 mL water/d    Anthropometric Measures:  Height: 175.3 cm (5' 9\")  Ideal Body Weight (IBW): 160 lbs (73 kg)    Admission Body Weight: 76.7 kg (169 lb 1.5 oz)  Current Body Weight: 71.7 kg (158 lb),   IBW. Weight Source: Bed scale  Current BMI (kg/m2): 23.3  Weight Adjustment For: No Adjustment  BMI Categories: Normal Weight (BMI 18.5-24.9)    Estimated Daily Nutrient Needs:  Energy Requirements Based On: Kcal/kg  Weight Used for Energy Requirements: Ideal  Energy (kcal/day):

## 2025-02-05 NOTE — PROGRESS NOTES
02/05/25 1433   RT Protocol   History Pulmonary Disease 2   Respiratory pattern 8   Breath sounds 4   Cough 0   Indications for Bronchodilator Therapy Decreased or absent breath sounds;Wheezing associated with pulm disorder   Bronchodilator Assessment Score 14

## 2025-02-05 NOTE — CARE COORDINATION
Transitional Planning    0900 PC to [facility], spoke to Sourav, states message was left for family to schedule delivery yesterday, has not heard back.  CM encouraged to call Xenia, they have her number listed.   will provide phone number for Jessa so they can schedule delivery.     1015 Spoke to Xenia (caregiver) by phone, updated on plan for tube feed and DME ordered.  Xenia will reach out to [facility] to schedule bed/lift delivery.  Discussed that once the medical necessity form is signed for enteral feeding supplies [facility] will reach out to schedule teaching. Pt's family seems to be confused and stated that insurance is not covering these supplies, family has been updated that it's not an insurance issue it is coordinating for a safe discharge home.  Xenia was also concerned about being able to transfer pt from bed to  for ADL's and wanted PT/OT to assess     1115 faxed signed medical necessity form to [facility].     1230 PC from Keeley at [facility], will have supplies delivered to pt's room and will reach out to schedule Zoom education.    1545 Pt's wife and son stated that they were unable to schedule delivery of the bed as they were closed for the day.     1615 PC to Xenia, states she called twice to schedule the bed and lift, both times left a VM with the person with scheduling.

## 2025-02-05 NOTE — PROGRESS NOTES
[facility]  Internal Medicine Teaching Residency Program  Inpatient Daily Progress Note  ______________________________________________________________________________    Patient: Talon Walker Jr.  YOB: 1960   MRN:[id number]    Acct: [id number]     Room: 0141/0141-01  Admit date: 1/23/2025  Today's date: 02/05/25  Number of days in the hospital: 13    SUBJECTIVE   Admitting Diagnosis: Acute respiratory failure with hypercapnia  CC: Loss of consciousness  Pt examined at bedside. Chart & results reviewed.   -Patient is Aox4 this morning, vitals are stable.  -Hypernatremia is improved.    Plan:  -Continue free water flushes  -continue tube feeds  -patient to discharge home  -Medically discharged pending      Review of Systems   Constitutional:  Negative for chills and fever.   HENT:  Positive for trouble swallowing. Negative for sore throat.    Respiratory:  Positive for cough and shortness of breath. Negative for chest tightness, wheezing and stridor.    Cardiovascular:  Negative for chest pain, palpitations and leg swelling.   Gastrointestinal:  Negative for constipation, diarrhea, nausea and vomiting.   Genitourinary:  Negative for dysuria.   Musculoskeletal:  Positive for gait problem.   Neurological:  Positive for weakness. Negative for seizures and numbness.   Psychiatric/Behavioral:  Negative for confusion.        BRIEF HISTORY     The patient is a 64 y.o. male with past medical history of  COPD  CHF  Right CVA s/p craniotomy(residual left-sided deficit)  Patient was initially admitted under neurocritical care.  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  CTA head and neck showed occlusion of M1 segment of the right and left MCA, chronic occlusion of M1 segment of the right and left ICA and chronic occlusion of P1

## 2025-02-06 ENCOUNTER — APPOINTMENT (OUTPATIENT)
Dept: GENERAL RADIOLOGY | Age: 65
DRG: 720 | End: 2025-02-06
Payer: MEDICAID

## 2025-02-06 VITALS
HEIGHT: 69 IN | HEART RATE: 96 BPM | OXYGEN SATURATION: 95 % | SYSTOLIC BLOOD PRESSURE: 146 MMHG | DIASTOLIC BLOOD PRESSURE: 68 MMHG | RESPIRATION RATE: 21 BRPM | TEMPERATURE: 98.7 F | BODY MASS INDEX: 23.51 KG/M2 | WEIGHT: 158.73 LBS

## 2025-02-06 LAB
ANION GAP SERPL CALCULATED.3IONS-SCNC: 11 MMOL/L (ref 9–16)
ANION GAP SERPL CALCULATED.3IONS-SCNC: 13 MMOL/L (ref 9–16)
BASOPHILS # BLD: <0.03 K/UL (ref 0–0.2)
BASOPHILS NFR BLD: 0 % (ref 0–2)
BODY TEMPERATURE: 37
BUN SERPL-MCNC: 31 MG/DL (ref 8–23)
CALCIUM SERPL-MCNC: 8.4 MG/DL (ref 8.6–10.4)
CHLORIDE SERPL-SCNC: 105 MMOL/L (ref 98–107)
CHLORIDE SERPL-SCNC: 106 MMOL/L (ref 98–107)
CO2 SERPL-SCNC: 24 MMOL/L (ref 20–31)
CO2 SERPL-SCNC: 24 MMOL/L (ref 20–31)
COHGB MFR BLD: 0.3 % (ref 0–5)
CREAT SERPL-MCNC: 1.6 MG/DL (ref 0.7–1.2)
EOSINOPHIL # BLD: <0.03 K/UL (ref 0–0.44)
EOSINOPHILS RELATIVE PERCENT: 0 % (ref 1–4)
ERYTHROCYTE [DISTWIDTH] IN BLOOD BY AUTOMATED COUNT: 14.7 % (ref 11.8–14.4)
FIO2 ON VENT: ABNORMAL %
GFR, ESTIMATED: 48 ML/MIN/1.73M2
GLUCOSE BLD-MCNC: 145 MG/DL (ref 75–110)
GLUCOSE BLD-MCNC: 187 MG/DL (ref 75–110)
GLUCOSE BLD-MCNC: 256 MG/DL (ref 75–110)
GLUCOSE SERPL-MCNC: 437 MG/DL (ref 74–99)
HCO3 VENOUS: 28.3 MMOL/L (ref 24–30)
HCT VFR BLD AUTO: 30 % (ref 40.7–50.3)
HGB BLD-MCNC: 9 G/DL (ref 13–17)
IMM GRANULOCYTES # BLD AUTO: <0.03 K/UL (ref 0–0.3)
IMM GRANULOCYTES NFR BLD: 1 %
LYMPHOCYTES NFR BLD: 0.89 K/UL (ref 1.1–3.7)
LYMPHOCYTES RELATIVE PERCENT: 28 % (ref 24–43)
MAGNESIUM SERPL-MCNC: 2.1 MG/DL (ref 1.6–2.4)
MCH RBC QN AUTO: 27.4 PG (ref 25.2–33.5)
MCHC RBC AUTO-ENTMCNC: 30 G/DL (ref 28.4–34.8)
MCV RBC AUTO: 91.5 FL (ref 82.6–102.9)
MONOCYTES NFR BLD: 0.22 K/UL (ref 0.1–1.2)
MONOCYTES NFR BLD: 7 % (ref 3–12)
NEUTROPHILS NFR BLD: 64 % (ref 36–65)
NEUTS SEG NFR BLD: 1.99 K/UL (ref 1.5–8.1)
NRBC BLD-RTO: 0 PER 100 WBC
O2 SAT, VEN: 67.6 % (ref 60–85)
PCO2 VENOUS: 47.1 MM HG (ref 39–55)
PH VENOUS: 7.4 (ref 7.32–7.42)
PLATELET # BLD AUTO: 164 K/UL (ref 138–453)
PMV BLD AUTO: 11.9 FL (ref 8.1–13.5)
PO2 VENOUS: 35.7 MM HG (ref 30–50)
POSITIVE BASE EXCESS, VEN: 2.9 MMOL/L (ref 0–2)
POTASSIUM SERPL-SCNC: 4.7 MMOL/L (ref 3.7–5.3)
POTASSIUM SERPL-SCNC: 5.6 MMOL/L (ref 3.7–5.3)
RBC # BLD AUTO: 3.28 M/UL (ref 4.21–5.77)
RBC # BLD: ABNORMAL 10*6/UL
SODIUM SERPL-SCNC: 140 MMOL/L (ref 136–145)
SODIUM SERPL-SCNC: 143 MMOL/L (ref 136–145)
WBC OTHER # BLD: 3.1 K/UL (ref 3.5–11.3)

## 2025-02-06 PROCEDURE — 36415 COLL VENOUS BLD VENIPUNCTURE: CPT

## 2025-02-06 PROCEDURE — 94761 N-INVAS EAR/PLS OXIMETRY MLT: CPT

## 2025-02-06 PROCEDURE — 97130 THER IVNTJ EA ADDL 15 MIN: CPT

## 2025-02-06 PROCEDURE — 82805 BLOOD GASES W/O2 SATURATION: CPT

## 2025-02-06 PROCEDURE — 6370000000 HC RX 637 (ALT 250 FOR IP): Performed by: STUDENT IN AN ORGANIZED HEALTH CARE EDUCATION/TRAINING PROGRAM

## 2025-02-06 PROCEDURE — 6360000002 HC RX W HCPCS

## 2025-02-06 PROCEDURE — 2580000003 HC RX 258

## 2025-02-06 PROCEDURE — 85025 COMPLETE CBC W/AUTO DIFF WBC: CPT

## 2025-02-06 PROCEDURE — 6370000000 HC RX 637 (ALT 250 FOR IP)

## 2025-02-06 PROCEDURE — 82947 ASSAY GLUCOSE BLOOD QUANT: CPT

## 2025-02-06 PROCEDURE — 80048 BASIC METABOLIC PNL TOTAL CA: CPT

## 2025-02-06 PROCEDURE — 83735 ASSAY OF MAGNESIUM: CPT

## 2025-02-06 PROCEDURE — 83036 HEMOGLOBIN GLYCOSYLATED A1C: CPT

## 2025-02-06 PROCEDURE — 80051 ELECTROLYTE PANEL: CPT

## 2025-02-06 PROCEDURE — 92526 ORAL FUNCTION THERAPY: CPT

## 2025-02-06 PROCEDURE — 2500000003 HC RX 250 WO HCPCS

## 2025-02-06 PROCEDURE — 99239 HOSP IP/OBS DSCHRG MGMT >30: CPT | Performed by: INTERNAL MEDICINE

## 2025-02-06 PROCEDURE — 97129 THER IVNTJ 1ST 15 MIN: CPT

## 2025-02-06 PROCEDURE — 94640 AIRWAY INHALATION TREATMENT: CPT

## 2025-02-06 PROCEDURE — 71045 X-RAY EXAM CHEST 1 VIEW: CPT

## 2025-02-06 RX ORDER — ALBUTEROL SULFATE 0.83 MG/ML
2.5 SOLUTION RESPIRATORY (INHALATION) EVERY 6 HOURS PRN
Status: DISCONTINUED | OUTPATIENT
Start: 2025-02-06 | End: 2025-02-06 | Stop reason: HOSPADM

## 2025-02-06 RX ORDER — INSULIN LISPRO 100 [IU]/ML
6 INJECTION, SOLUTION INTRAVENOUS; SUBCUTANEOUS ONCE
Status: COMPLETED | OUTPATIENT
Start: 2025-02-06 | End: 2025-02-06

## 2025-02-06 RX ADMIN — IPRATROPIUM BROMIDE AND ALBUTEROL SULFATE 1 DOSE: 2.5; .5 SOLUTION RESPIRATORY (INHALATION) at 06:20

## 2025-02-06 RX ADMIN — ALOGLIPTIN 6.25 MG: 6.25 TABLET, FILM COATED ORAL at 09:46

## 2025-02-06 RX ADMIN — SODIUM CHLORIDE, PRESERVATIVE FREE 40 MG: 5 INJECTION INTRAVENOUS at 09:44

## 2025-02-06 RX ADMIN — INSULIN LISPRO 4 UNITS: 100 INJECTION, SOLUTION INTRAVENOUS; SUBCUTANEOUS at 10:47

## 2025-02-06 RX ADMIN — INSULIN LISPRO 1 UNITS: 100 INJECTION, SOLUTION INTRAVENOUS; SUBCUTANEOUS at 13:02

## 2025-02-06 RX ADMIN — IPRATROPIUM BROMIDE AND ALBUTEROL SULFATE 1 DOSE: 2.5; .5 SOLUTION RESPIRATORY (INHALATION) at 01:41

## 2025-02-06 RX ADMIN — SODIUM CHLORIDE, PRESERVATIVE FREE 10 ML: 5 INJECTION INTRAVENOUS at 09:46

## 2025-02-06 RX ADMIN — ASPIRIN 81 MG 81 MG: 81 TABLET ORAL at 09:44

## 2025-02-06 RX ADMIN — CARVEDILOL 3.12 MG: 6.25 TABLET, FILM COATED ORAL at 09:44

## 2025-02-06 RX ADMIN — BUDESONIDE 500 MCG: 0.5 SUSPENSION RESPIRATORY (INHALATION) at 06:21

## 2025-02-06 RX ADMIN — POLYETHYLENE GLYCOL 3350 17 G: 17 POWDER, FOR SOLUTION ORAL at 09:47

## 2025-02-06 RX ADMIN — HEPARIN SODIUM 5000 UNITS: 5000 INJECTION INTRAVENOUS; SUBCUTANEOUS at 16:24

## 2025-02-06 RX ADMIN — HEPARIN SODIUM 5000 UNITS: 5000 INJECTION INTRAVENOUS; SUBCUTANEOUS at 05:49

## 2025-02-06 RX ADMIN — INSULIN LISPRO 6 UNITS: 100 INJECTION, SOLUTION INTRAVENOUS; SUBCUTANEOUS at 09:44

## 2025-02-06 RX ADMIN — LEVETIRACETAM 500 MG: 100 INJECTION INTRAVENOUS at 09:44

## 2025-02-06 RX ADMIN — INSULIN LISPRO 2 UNITS: 100 INJECTION, SOLUTION INTRAVENOUS; SUBCUTANEOUS at 01:36

## 2025-02-06 RX ADMIN — IPRATROPIUM BROMIDE AND ALBUTEROL SULFATE 1 DOSE: 2.5; .5 SOLUTION RESPIRATORY (INHALATION) at 11:01

## 2025-02-06 RX ADMIN — CARVEDILOL 3.12 MG: 6.25 TABLET, FILM COATED ORAL at 16:24

## 2025-02-06 RX ADMIN — ATORVASTATIN CALCIUM 40 MG: 10 TABLET, FILM COATED ORAL at 09:44

## 2025-02-06 RX ADMIN — IPRATROPIUM BROMIDE AND ALBUTEROL SULFATE 1 DOSE: 2.5; .5 SOLUTION RESPIRATORY (INHALATION) at 16:02

## 2025-02-06 ASSESSMENT — ENCOUNTER SYMPTOMS
STRIDOR: 0
DIARRHEA: 0
SHORTNESS OF BREATH: 1
CHEST TIGHTNESS: 0
CONSTIPATION: 0
COUGH: 1
WHEEZING: 0
TROUBLE SWALLOWING: 1
SORE THROAT: 0
NAUSEA: 0
VOMITING: 0

## 2025-02-06 ASSESSMENT — PAIN SCALES - GENERAL
PAINLEVEL_OUTOF10: 0
PAINLEVEL_OUTOF10: 0

## 2025-02-06 NOTE — PLAN OF CARE
Problem: Chronic Conditions and Co-morbidities  Goal: Patient's chronic conditions and co-morbidity symptoms are monitored and maintained or improved  Outcome: Progressing     Problem: Discharge Planning  Goal: Discharge to home or other facility with appropriate resources  Outcome: Progressing     Problem: Safety - Adult  Goal: Free from fall injury  Outcome: Progressing     Problem: Respiratory - Adult  Goal: Achieves optimal ventilation and oxygenation  Outcome: Progressing     Problem: Pain  Goal: Verbalizes/displays adequate comfort level or baseline comfort level  Outcome: Progressing     Problem: Skin/Tissue Integrity  Goal: Skin integrity remains intact  Description: 1.  Monitor for areas of redness and/or skin breakdown  2.  Assess vascular access sites hourly  3.  Every 4-6 hours minimum:  Change oxygen saturation probe site  4.  Every 4-6 hours:  If on nasal continuous positive airway pressure, respiratory therapy assess nares and determine need for appliance change or resting period  Outcome: Progressing     Problem: ABCDS Injury Assessment  Goal: Absence of physical injury  Outcome: Progressing     Problem: Nutrition Deficit:  Goal: Optimize nutritional status  Outcome: Progressing     Problem: Neurosensory - Adult  Goal: Achieves stable or improved neurological status  Outcome: Progressing  Goal: Achieves maximal functionality and self care  Outcome: Progressing     Problem: Cardiovascular - Adult  Goal: Maintains optimal cardiac output and hemodynamic stability  Outcome: Progressing  Goal: Absence of cardiac dysrhythmias or at baseline  Outcome: Progressing     Problem: Skin/Tissue Integrity - Adult  Goal: Skin integrity remains intact  Description: 1.  Monitor for areas of redness and/or skin breakdown  2.  Assess vascular access sites hourly  3.  Every 4-6 hours minimum:  Change oxygen saturation probe site  4.  Every 4-6 hours:  If on nasal continuous positive airway pressure, respiratory therapy  non-compliant behavior (including use of illegal substances)  2. Notify security of behavior or suspected illegal substances which indicate the need for search of the family and/or belongings  3. Encourage verbalization of thoughts and concerns in a socially appropriate manner  4. Utilize positive, consistent limit setting strategies supporting safety of patient, staff and others  5. Encourage participation in the decision making process about the behavioral management agreement  6. If a visitor's behavior poses a threat to safety call refer to organization policy.  7. Initiate consult with , Psychosocial CNS, Spiritual Care as appropriate  Outcome: Progressing

## 2025-02-06 NOTE — PROGRESS NOTES
Speech Language Pathology  [facility]    Cognitive/Dysphagia Treatment Note    Date: 2/6/2025  Patient’s Name: Talon Walker Jr.  MRN: [id number]  Diagnosis:   Patient Active Problem List   Diagnosis Code    Gastroesophageal reflux disease K21.9    Allergic rhinitis J30.9    Controlled type 2 diabetes mellitus with stage 3 chronic kidney disease, without long-term current use of insulin (formerly [facility]) E11.22, N18.30    Chronic obstructive pulmonary disease (formerly [facility]) J44.9    Dysthymia F34.1    Acute cerebrovascular accident (formerly [facility]) I63.9    Left-sided weakness R53.1    Lower leg edema R60.0    Aspiration pneumonia of right lower lobe (formerly [facility]) J69.0    History of recent pneumonia Z87.01    Anemia in chronic renal disease N18.9, D63.1    Acute respiratory failure with hypercapnia J96.02    Abnormal EEG R94.01    History of stroke Z86.73    Abnormal computed tomography angiography of head R93.0    Moderate malnutrition (formerly [facility]) E44.0    NSTEMI (non-ST elevation myocardial infarction) (formerly [facility]) I21.4    Acute metabolic encephalopathy G93.41    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, not intractable, with status epilepticus (formerly [facility]) G40.001    Intracranial atherosclerosis I67.2    Cerebrovascular accident (CVA) involving right cerebral hemisphere (formerly [facility]) I63.9       Pain: 0/10    Cognitive Treatment    Treatment time: 9686-0364      Subjective: [x] Alert [x] Cooperative     [] Confused     [] Agitated    [] Lethargic      Objective/Assessment:    Recall:   Delayed Recall (related 3 units): 0/3 increased to 1/3 with max verbal and gesture cues, 0/3 increased to 2/3 with max verbal and gesture cues    Problem Solving/Reasoning:   Inconsistencies In Sentences: 2/7 increased to 7/7 with min-max verbal cues.  Similarities and Differences: 8/12 increased to 10/12 with max verbal cues  Category Members - [place] (5 units): 8/10 increased 9/10 with max verbal cues      Dysphagia Treatment    Objective/Assessment:    Pt. Swallow reassessed at bedside. No s/s with puree or nectar with tsp. Pt. +vocal quality following nectar and thin with straw. Pt. Alert and cooperative at this date. ST recommends a repeated Modified Barium Swallow to reassess swallow function. Discussed with Pt., family and RN.    Pt. Seen for O/M treatment program for dysphagia.  Pt. Completed O/M exercises X 5 X 5 sets with max verbal and tactile cues.  Pt. Completed khalif maneuver X 5 reps with mod visual and verbal cues. Education provided and exercises left at bedside.    Other:    Pt. Provided with education regarding communication compensatory strategies for dysarthria. Pt. Encouraged to utilize strategies once discharged from the hospital. ST. Modeled each of the strategies to the Pt.      Plan:  [x] Continue ST services    [] Discharge from ST:      Discharge recommendations: [x]  Further therapy recommended at discharge.The patient should be able to tolerate at least 3 hours of therapy per day over 5 days or 15 hours over 7 days. [] Further therapy recommended at discharge.   [] No therapy recommended at discharge.    Completed by: Kortney Turner  Clinician    Cosigned By: Iman Nieves M.S.CCC/SLP

## 2025-02-06 NOTE — CARE COORDINATION
Transitional Planning    0845 PC to [facility], spoke to Sam, scheduled transport for 5pm.      0900 PC to [facility], spoke to Venessa, updated that pt is discharging today and start of care for Saturday is fine.  She will need a [facility] order with it stating that start of care is Saturday 2/8.     0902 PC to [facility], spoke to Dhruv, has bed and lift and is awaiting contact with family to set up delivery. Updated that pt is discharging today at 5pm.     1630 [facility]

## 2025-02-06 NOTE — PLAN OF CARE
Problem: Chronic Conditions and Co-morbidities  Goal: Patient's chronic conditions and co-morbidity symptoms are monitored and maintained or improved  2/6/2025 1645 by Theresa Galeana RN  Outcome: Completed  2/6/2025 0648 by Lenora Cui RN  Outcome: Progressing     Problem: Discharge Planning  Goal: Discharge to home or other facility with appropriate resources  2/6/2025 1645 by Theresa Galeana RN  Outcome: Completed  2/6/2025 0648 by Lenora Cui RN  Outcome: Progressing     Problem: Safety - Adult  Goal: Free from fall injury  2/6/2025 1645 by Theresa Galeana RN  Outcome: Completed  2/6/2025 0648 by Lenora Cui RN  Outcome: Progressing     Problem: Respiratory - Adult  Goal: Achieves optimal ventilation and oxygenation  2/6/2025 1645 by Theresa Galeana RN  Outcome: Completed  2/6/2025 0648 by Lenora Cui RN  Outcome: Progressing     Problem: Neurosensory - Adult  Goal: Achieves stable or improved neurological status  2/6/2025 1645 by Theresa Galeana RN  Outcome: Completed  2/6/2025 0648 by Lenora Cui RN  Outcome: Progressing  Goal: Achieves maximal functionality and self care  2/6/2025 1645 by Theresa Galeana RN  Outcome: Completed  2/6/2025 0648 by Lenora Cui RN  Outcome: Progressing     Problem: Cardiovascular - Adult  Goal: Maintains optimal cardiac output and hemodynamic stability  2/6/2025 1645 by Theresa Galeana RN  Outcome: Completed  2/6/2025 0648 by Lenora Cui RN  Outcome: Progressing  Goal: Absence of cardiac dysrhythmias or at baseline  2/6/2025 1645 by Theresa Galeana RN  Outcome: Completed  2/6/2025 0648 by Lenora Cui RN  Outcome: Progressing     Problem: Cardiovascular - Adult  Goal: Absence of cardiac dysrhythmias or at baseline  2/6/2025 1645 by Theresa Galeana RN  Outcome: Completed  2/6/2025 0648 by Lenora Cui RN  Outcome: Progressing     Problem: Skin/Tissue Integrity - Adult  Goal: Skin integrity remains intact  Description: 1.  Monitor for areas of redness and/or skin breakdown  2.

## 2025-02-06 NOTE — PROGRESS NOTES
[facility]  Internal Medicine Teaching Residency Program  Inpatient Daily Progress Note  ______________________________________________________________________________    Patient: Talon Walker Jr.  YOB: 1960   MRN:[id number]    Acct: [id number]     Room: 0141/0141-01  Admit date: 1/23/2025  Today's date: 02/06/25  Number of days in the hospital: 14    SUBJECTIVE   Admitting Diagnosis: Acute respiratory failure with hypercapnia  CC: Loss of consciousness  Pt examined at bedside. Chart & results reviewed.   -Patient is Aox4 this morning, vitals are stable.  -Hypernatremia is improved.  -Patient was short of breath last night, 1 dose of methyl prednisone was administered, breathing treatment was given. Stat chest xray was negative.   -Glucose this morning is 437 and potassium is 5.6  -repeat showed 187 and K is 4.7    Plan:  -Continue Duoneb and symbicort  -administer total 10 units of insulin  -will check Mg as well  -Medically discharged, pending transport    Review of Systems   Constitutional:  Negative for chills and fever.   HENT:  Positive for trouble swallowing. Negative for sore throat.    Respiratory:  Positive for cough and shortness of breath. Negative for chest tightness, wheezing and stridor.    Cardiovascular:  Negative for chest pain, palpitations and leg swelling.   Gastrointestinal:  Negative for constipation, diarrhea, nausea and vomiting.   Genitourinary:  Negative for dysuria.   Musculoskeletal:  Positive for gait problem.   Neurological:  Positive for weakness. Negative for seizures and numbness.   Psychiatric/Behavioral:  Negative for confusion.        BRIEF HISTORY     The patient is a 64 y.o. male with past medical history of  COPD  CHF  Right CVA s/p craniotomy(residual left-sided deficit)  Patient was initially admitted under neurocritical care.  He initially presented after being found down by the wife from an outlying facility after  consistent with vasculitis/moyamoya. Endovascular plan no intervention  -CT head without contrast showed no acute intracranial abnormality.  It only demonstrated extensive right cerebral encephalomalacia  -MRI brain showed no evidence acute intracranial abnormality, no acute infarct  -LTME for 3 days showed mild diffuse encephalopathy, right right frontotemporal breach and no epileptiform discharges or seizures.  -Continue aspirin 81 mg and Lipitor 40 mg for secondary stroke prevention.  -Neurology signed off    Acute hypernatremia and hypochloremia:  -Secondary to dehydration  -Increase free water flushes to 350 mL every 6 hours.  -Monitor sodium level     Acute hypoxic and hypercapnic respiratory failure:  COPD  - patient was intubated on 1/23 and extubated on 1/24  -Continue Pulmicort nebulization twice daily and DuoNeb every 4 hours  -Wean down supplemental oxygen  -Maintain saturation up to 92%     Concern of aspiration pneumonia:  -CT chest showed groundglass opacities within the posterior aspect of right lung and minimally within the left lung base  -MRSA DNA probe negative  -Respiratory culture showed no growth  -Patient completed cefepime 2 g twice daily.Metronidazole stop date 2/1/2025     Dysphagia:  -Modified barium swallow study shows severe oropharyngeal dysphagia and recommended alternate means of nutrition  -Currently patient is n.p.o. with sips of water with meds  -Patient is on PPN  -Repeat barium swallow on 1/28/2025-failed  -IR guided NG tube placement on 1/28/2025  -General Surgery consulted for PEG tube placement.Tentative plan today.     CKD stage IIIb:  -BUNs/creatinine 25/1.8(baseline 2)  -Monitor I's and O's  -Monitor BMP     DVT ppx: Heparin 5000 units every 8 hours     Diet: Diet NPO  ADULT TUBE FEEDING; PEG; Standard with Fiber; Continuous; 10; Yes; 10; Q 6 hours; 50; 50; Q 4 hours     PT/OT/SW : Consulted     Discharge Planning : When medically stable     Consults: None       Karmjit

## 2025-02-08 LAB
ESTIMATED AVERAGE GLUCOSE: 131 MG/DL
HBA1C MFR BLD: 6.2 %

## 2025-02-10 ENCOUNTER — TELEPHONE (OUTPATIENT)
Dept: PRIMARY CARE CLINIC | Age: 65
End: 2025-02-10

## 2025-02-10 RX ORDER — BUPROPION HYDROCHLORIDE 150 MG/1
150 TABLET ORAL EVERY MORNING
Qty: 90 TABLET | Refills: 0 | Status: SHIPPED | OUTPATIENT
Start: 2025-02-10

## 2025-02-10 RX ORDER — HYDROXYZINE HYDROCHLORIDE 25 MG/1
25 TABLET, FILM COATED ORAL NIGHTLY
Qty: 90 TABLET | Refills: 0 | Status: SHIPPED | OUTPATIENT
Start: 2025-02-10

## 2025-02-10 NOTE — TELEPHONE ENCOUNTER
Patients caregiver Cintia contacted the office and stated that Edgar depression has gotten worse since being discharged from the hospital with his feeding tube. He is getting very mean and not being able to sleep because of it. She is requesting for his depression medications to be adjusted or a an additional medication to be sent into the pharmacy. She is also requesting for something for him to take to be able to sleep at night.     Please advise thank you

## 2025-02-10 NOTE — TELEPHONE ENCOUNTER
[facility] contacted the office to notify GLENN Adkins that they will be starting home care for the patient.      Please advise.

## 2025-02-10 NOTE — TELEPHONE ENCOUNTER
Have him start bupropion  mg daily.  This would be for his mood.  Also we will have him use hydroxyzine 25 mg nightly as needed for sleep.  Rx sent to pharmacy.  Call with progress in a few weeks.  Sooner if any issues.

## 2025-02-11 ENCOUNTER — APPOINTMENT (OUTPATIENT)
Dept: GENERAL RADIOLOGY | Age: 65
End: 2025-02-11
Payer: MEDICAID

## 2025-02-11 ENCOUNTER — TELEPHONE (OUTPATIENT)
Dept: PRIMARY CARE CLINIC | Age: 65
End: 2025-02-11

## 2025-02-11 ENCOUNTER — HOSPITAL ENCOUNTER (EMERGENCY)
Age: 65
Discharge: HOME | End: 2025-02-11
Attending: EMERGENCY MEDICINE
Payer: MEDICAID

## 2025-02-11 VITALS
RESPIRATION RATE: 28 BRPM | OXYGEN SATURATION: 100 % | DIASTOLIC BLOOD PRESSURE: 51 MMHG | TEMPERATURE: 97.6 F | SYSTOLIC BLOOD PRESSURE: 224 MMHG | HEART RATE: 116 BPM

## 2025-02-11 DIAGNOSIS — R41.89 UNRESPONSIVE: Primary | ICD-10-CM

## 2025-02-11 DIAGNOSIS — R73.9 HYPERGLYCEMIA: ICD-10-CM

## 2025-02-11 LAB
ALBUMIN SERPL-MCNC: 4.2 G/DL (ref 3.5–5.2)
ALBUMIN/GLOB SERPL: 0.9 {RATIO} (ref 1–2.5)
ALP SERPL-CCNC: 228 U/L (ref 40–129)
ALT SERPL-CCNC: 17 U/L (ref 10–50)
ANION GAP SERPL CALCULATED.3IONS-SCNC: 14 MMOL/L (ref 9–16)
ARTERIAL PATENCY WRIST A: ABNORMAL
AST SERPL-CCNC: 15 U/L (ref 10–50)
BASOPHILS # BLD: 0.06 K/UL (ref 0–0.2)
BASOPHILS NFR BLD: 0 % (ref 0–2)
BILIRUB SERPL-MCNC: 0.3 MG/DL (ref 0–1.2)
BODY TEMPERATURE: 37
BUN SERPL-MCNC: 59 MG/DL (ref 8–23)
BUN/CREAT SERPL: 26 (ref 9–20)
CALCIUM SERPL-MCNC: 10.1 MG/DL (ref 8.6–10.4)
CHLORIDE SERPL-SCNC: 104 MMOL/L (ref 98–107)
CO2 SERPL-SCNC: 30 MMOL/L (ref 20–31)
CREAT SERPL-MCNC: 2.3 MG/DL (ref 0.7–1.2)
EOSINOPHIL # BLD: 0.06 K/UL (ref 0–0.44)
EOSINOPHILS RELATIVE PERCENT: 0 % (ref 1–4)
ERYTHROCYTE [DISTWIDTH] IN BLOOD BY AUTOMATED COUNT: 15.3 % (ref 11.8–14.4)
FIO2 ON VENT: 100 %
GAS FLOW.O2 O2 DELIVERY SYS: ABNORMAL L/MIN
GFR, ESTIMATED: 32 ML/MIN/1.73M2
GLUCOSE SERPL-MCNC: 927 MG/DL (ref 74–99)
HCO3 VENOUS: 29.9 MMOL/L (ref 24–30)
HCT VFR BLD AUTO: 42.4 % (ref 40.7–50.3)
HGB BLD-MCNC: 12.7 G/DL (ref 13–17)
IMM GRANULOCYTES # BLD AUTO: 0.12 K/UL (ref 0–0.3)
IMM GRANULOCYTES NFR BLD: 1 %
LYMPHOCYTES NFR BLD: 4.48 K/UL (ref 1.1–3.7)
LYMPHOCYTES RELATIVE PERCENT: 25 % (ref 24–43)
MCH RBC QN AUTO: 28.3 PG (ref 25.2–33.5)
MCHC RBC AUTO-ENTMCNC: 30 G/DL (ref 28.4–34.8)
MCV RBC AUTO: 94.4 FL (ref 82.6–102.9)
MONOCYTES NFR BLD: 1.33 K/UL (ref 0.1–1.2)
MONOCYTES NFR BLD: 7 % (ref 3–12)
NEGATIVE BASE EXCESS, VEN: 0.5 MMOL/L (ref 0–2)
NEUTROPHILS NFR BLD: 67 % (ref 36–65)
NEUTS SEG NFR BLD: 12.19 K/UL (ref 1.5–8.1)
NRBC BLD-RTO: 0 PER 100 WBC
O2 SAT, VEN: 56.3 % (ref 60–85)
PCO2 VENOUS: 77.9 MM HG (ref 39–55)
PCO2, VEN, TEMP ADJ: 77.9 MMHG (ref 39–55)
PH VENOUS: 7.2 (ref 7.32–7.42)
PH, VEN, TEMP ADJ: 7.2 (ref 7.32–7.42)
PLATELET # BLD AUTO: 435 K/UL (ref 138–453)
PMV BLD AUTO: 13 FL (ref 8.1–13.5)
PO2 VENOUS: 36.8 MM HG (ref 30–50)
PO2, VEN, TEMP ADJ: 36.8 MMHG (ref 30–50)
POTASSIUM SERPL-SCNC: 5.9 MMOL/L (ref 3.7–5.3)
PROT SERPL-MCNC: 8.9 G/DL (ref 6.6–8.7)
RBC # BLD AUTO: 4.49 M/UL (ref 4.21–5.77)
SODIUM SERPL-SCNC: 148 MMOL/L (ref 136–145)
TEXT FOR RESPIRATORY: ABNORMAL
TROPONIN I SERPL HS-MCNC: 28 NG/L (ref 0–22)
WBC OTHER # BLD: 18.2 K/UL (ref 3.5–11.3)

## 2025-02-11 PROCEDURE — 84484 ASSAY OF TROPONIN QUANT: CPT

## 2025-02-11 PROCEDURE — 85025 COMPLETE CBC W/AUTO DIFF WBC: CPT

## 2025-02-11 PROCEDURE — 6370000000 HC RX 637 (ALT 250 FOR IP): Performed by: EMERGENCY MEDICINE

## 2025-02-11 PROCEDURE — 96374 THER/PROPH/DIAG INJ IV PUSH: CPT

## 2025-02-11 PROCEDURE — 99285 EMERGENCY DEPT VISIT HI MDM: CPT

## 2025-02-11 PROCEDURE — 80053 COMPREHEN METABOLIC PANEL: CPT

## 2025-02-11 PROCEDURE — 82805 BLOOD GASES W/O2 SATURATION: CPT

## 2025-02-11 PROCEDURE — 6370000000 HC RX 637 (ALT 250 FOR IP)

## 2025-02-11 PROCEDURE — 94664 DEMO&/EVAL PT USE INHALER: CPT

## 2025-02-11 PROCEDURE — 71045 X-RAY EXAM CHEST 1 VIEW: CPT

## 2025-02-11 RX ORDER — IPRATROPIUM BROMIDE AND ALBUTEROL SULFATE 2.5; .5 MG/3ML; MG/3ML
SOLUTION RESPIRATORY (INHALATION)
Status: COMPLETED
Start: 2025-02-11 | End: 2025-02-11

## 2025-02-11 RX ORDER — IPRATROPIUM BROMIDE AND ALBUTEROL SULFATE 2.5; .5 MG/3ML; MG/3ML
1 SOLUTION RESPIRATORY (INHALATION) ONCE
Status: COMPLETED | OUTPATIENT
Start: 2025-02-11 | End: 2025-02-11

## 2025-02-11 RX ADMIN — INSULIN HUMAN 10 UNITS: 100 INJECTION, SOLUTION PARENTERAL at 14:42

## 2025-02-11 RX ADMIN — IPRATROPIUM BROMIDE AND ALBUTEROL SULFATE 1 DOSE: 2.5; .5 SOLUTION RESPIRATORY (INHALATION) at 12:22

## 2025-02-11 RX ADMIN — IPRATROPIUM BROMIDE AND ALBUTEROL SULFATE 1 DOSE: .5; 2.5 SOLUTION RESPIRATORY (INHALATION) at 12:22

## 2025-02-11 NOTE — CARE COORDINATION
Patient is approved to go to [facility] with hospice care once hospice has everything set up for discharge to [facility].  SOPHIA Chaney

## 2025-02-11 NOTE — TELEPHONE ENCOUNTER
Please check with the pharmacy to see if there are alternative formulations of the medications we prescribed that can be used or if they have recommendations.  Thank you.

## 2025-02-11 NOTE — CONSULTS
Palliative Care Inpatient    Patient: Talon Walker Jr.  Room: 01A/01A    Reason For Consult   Goals of care evaluation  Distress management  Guidance and support  Facilitate communications  Assistance in coordinating care    Code Status: DNR-CC      Impression: Talon Walker Jr. is a 64 y.o. year old male  has a past medical history of Acid reflux, Asthma, Cerebrovascular disease, Chronic cough, COPD (chronic obstructive pulmonary disease) (HCC), Dysphagia, Dyspnea, Unspecified cerebral artery occlusion with cerebral infarction, and Wheelchair dependent..  Currently hospitalized for the management of abrupt onset on unresponsiveness.  The Palliative Care Team is following to assist with hospice discussion.     Vital Signs  BP (!) 172/84   Pulse (!) 117   Temp 97.6 °F (36.4 °C) (Tympanic)   Resp 30   SpO2 99%     Patient Active Problem List   Diagnosis    Gastroesophageal reflux disease    Allergic rhinitis    Controlled type 2 diabetes mellitus with stage 3 chronic kidney disease, without long-term current use of insulin (HCC)    Chronic obstructive pulmonary disease (HCC)    Dysthymia    Acute cerebrovascular accident (HCC)    Left-sided weakness    Lower leg edema    Aspiration pneumonia of right lower lobe (HCC)    History of recent pneumonia    Anemia in chronic renal disease    Acute respiratory failure with hypercapnia    Abnormal EEG    History of stroke    Abnormal computed tomography angiography of head    Moderate malnutrition (HCC)    NSTEMI (non-ST elevation myocardial infarction) (HCC)    Acute metabolic encephalopathy    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, not intractable, with status epilepticus (HCC)    Intracranial atherosclerosis    Cerebrovascular accident (CVA) involving right cerebral hemisphere (HCC)       Palliative Interaction: Writer speaks with wife Jannie, caregiver Xenia, 2 other previous caregivers in

## 2025-02-11 NOTE — CARE COORDINATION
Working on getting a bed at [facility] with hospice care.  Waiting for the final approval from [facility].  SOPHIA Chaney

## 2025-02-11 NOTE — TELEPHONE ENCOUNTER
The Atarax is available as a liquid if you would like to prescribe 10 mg /5ml but the welbutrin is on the do not crush list even with immediate release.  They recommend if you want to prescribe that it would need to be sent to [place] in [place] for compounding.

## 2025-02-11 NOTE — TELEPHONE ENCOUNTER
Patient is a tube feed now and the new medications that were prescribed for his depression and to help him sleep are not able to be crushed or go through the tube.  The family is wondering if liquid form can be called in for him or something easily crushable.

## 2025-02-11 NOTE — ED NOTES
Patient noted to be incontinent of large amount of urine. Patient does have rash to groin folds and coccyx, small open area noted coccyx. Skin barrier applied along with dressing.

## 2025-02-11 NOTE — ED NOTES
Received a return call from [facility]. Spoke with Diane CONROY [phone]. Report given to transport. Family updated and at bedside. Hospice at [facility] waiting on patients arrival. Transport sent with copy of records.

## 2025-02-11 NOTE — ED PROVIDER NOTES
[facility] EMERGENCY DEPARTMENT  EMERGENCY DEPARTMENT ENCOUNTER      Pt Name: Talon Walker Jr.  MRN: [id number]  Birthdate 1960  Date of evaluation: 2/11/2025  Provider: Yuniel Pinedo MD  Time Note started  12:20 PM EST  2/11/25           NURSING NOTES REVIEWED     Pt evaluated in a timely manner      CURRENT MEDICATIONS       Discharge Medication List as of 2/11/2025  4:04 PM        CONTINUE these medications which have NOT CHANGED    Details   sertraline (ZOLOFT) 100 MG tablet Take 2 tablets by mouth daily, Disp-180 tablet, R-3Normal      buPROPion (WELLBUTRIN XL) 150 MG extended release tablet Take 1 tablet by mouth every morning, Disp-90 tablet, R-0Normal      hydrOXYzine HCl (ATARAX) 25 MG tablet Take 1 tablet by mouth nightly, Disp-90 tablet, R-0Normal      carvedilol (COREG) 3.125 MG tablet Take 1 tablet by mouth 2 times daily (with meals), Disp-60 tablet, R-3Normal      SITagliptin (JANUVIA) 25 MG tablet Take 1 tablet by mouth daily, Disp-30 tablet, R-3Normal      levETIRAcetam (KEPPRA) 500 MG tablet TAKE ONE TABLET BY MOUTH TWICE A DAY, Disp-90 tablet, R-3Normal      omeprazole (PRILOSEC) 40 MG delayed release capsule TAKE 1 CAPSULE BY MOUTH DAILY, Disp-90 capsule, R-3Normal      folic acid (FOLVITE) 1 MG tablet TAKE 1 TABLET BY MOUTH DAILY, Disp-90 tablet, R-3Normal      baclofen (LIORESAL) 10 MG tablet TAKE 1 TABLET BY MOUTH 2 TIMES A DAY, Disp-60 tablet, R-5Normal      budesonide (PULMICORT) 0.5 MG/2ML nebulizer suspension USE ONE VIAL VIA NEBULIZER MACHINE TWO TIMES DAILY, Disp-60 mL, R-11Normal      ipratropium 0.5 mg-albuterol 2.5 mg (DUONEB) 0.5-2.5 (3) MG/3ML SOLN nebulizer solution INHALE THREE MILLILITERS VIA NEBULIZER BY MOUTH EVERY 6 HOURS, Disp-1080 mL, R-3Normal      gabapentin (NEURONTIN) 300 MG capsule TAKE 1 CAPSULE BY MOUTH TWICE A DAY, Disp-60 capsule, R-5Normal      Lancets MISC Disp-100 each, R-3, NormalTest one times a day & as needed for symptoms of irregular blood glucose.

## 2025-02-11 NOTE — ED NOTES
Patient code status updated to DNR-CC. [facility] should be here within the hour to speak with family.

## 2025-02-11 NOTE — ED NOTES
Contacted palliative care requesting hospice care be arranged for pt.  Spoke with Tiara.  Will be down

## 2025-02-12 ENCOUNTER — TELEPHONE (OUTPATIENT)
Dept: PRIMARY CARE CLINIC | Age: 65
End: 2025-02-12

## 2025-02-12 NOTE — DISCHARGE SUMMARY
[facility]     Department of Internal Medicine - Staff Internal Medicine Teaching Service    INPATIENT DISCHARGE SUMMARY      Patient Identification:  Talon Walker Jr. is a 64 y.o. male.  :  1960  MRN: [id number]     Acct: [id number]   PCP: Adrian Adkins APRN - CNP  Admit Date:  2025  Discharge date and time: 2025  5:34 PM   Attending Provider: No att. providers found                                     ACTIVE DISCHARGE DIAGNOSES     Hospital Problem Lists:  Principal Problem:    Acute respiratory failure with hypercapnia  Active Problems:    Chronic obstructive pulmonary disease (HCC)    Acute cerebrovascular accident (HCC)    Aspiration pneumonia of right lower lobe (HCC)    Abnormal EEG    History of stroke    Abnormal computed tomography angiography of head    Moderate malnutrition (HCC)    NSTEMI (non-ST elevation myocardial infarction) (HCC)    Acute metabolic encephalopathy    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, not intractable, with status epilepticus (HCC)    Intracranial atherosclerosis    Cerebrovascular accident (CVA) involving right cerebral hemisphere (HCC)  Resolved Problems:    * No resolved hospital problems. *      HOSPITAL STAY     Brief Inpatient course:   Patient was initially admitted under neurocritical care.  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  CTA head and neck showed occlusion of M1 segment of the right and left MCA, chronic occlusion of M1 segment of the right and left ICA and chronic occlusion of P1 segment of left PCA.Patient had GCS 3/15, fixed dilated pupil on presentation and was intubated in outlying facility.Patient was admitted in neuro ICU at [facility].  Patient was out of window for thrombolytics and

## 2025-02-12 NOTE — TELEPHONE ENCOUNTER
[facility] Transitions Initial Follow Up Call    Outreach made within 2 business days of discharge: Yes    Patient: Talon Walker Jr. Patient : 1960   MRN: [id number]  Reason for Admission: There are no discharge diagnoses documented for the most recent discharge.  Discharge Date: 25       Spoke with: patient is on hospice at [facility] department/facility: [facility]     TCM Interactive Patient Contact:  Was patient able to fill all prescriptions:   Was patient instructed to bring all medications to the follow-up visit:   Is patient taking all medications as directed in the discharge summary?   Does patient understand their discharge instructions:   Does patient have questions or concerns that need addressed prior to 7-14 day follow up office visit:     Scheduled appointment with PCP within 7-14 days    Follow Up  Future Appointments   Date Time Provider Department Center   3/19/2025  9:40 AM Adrian Adkins, CARO - CNP [facility]   3/28/2025  9:30 AM Bhargav Vasquez MD Neuro Endo [facility]   3/31/2025 12:00 PM Deion Ash MD [facility]   2025  9:30 AM [facility]   2025  9:30 AM Martin Lorenz MD Neuro Endo [facility]       Sadie Ag MA

## (undated) DEVICE — BINDER ABD 2XL W9IN CIRC 62 73IN 3 PNL E HK AND LOOP CLSR W

## (undated) DEVICE — MIC* SAFETY PERCUTANEOUS ENDOSCOPIC GASTROSTOMY PEG KIT - 20 FR - PULL: Brand: MIC PEG TUBE